# Patient Record
Sex: FEMALE | Race: BLACK OR AFRICAN AMERICAN | Employment: UNEMPLOYED | URBAN - METROPOLITAN AREA
[De-identification: names, ages, dates, MRNs, and addresses within clinical notes are randomized per-mention and may not be internally consistent; named-entity substitution may affect disease eponyms.]

---

## 2017-03-02 ENCOUNTER — OFFICE VISIT (OUTPATIENT)
Dept: FAMILY MEDICINE CLINIC | Age: 62
End: 2017-03-02

## 2017-03-02 VITALS
OXYGEN SATURATION: 100 % | RESPIRATION RATE: 20 BRPM | TEMPERATURE: 97.3 F | HEART RATE: 104 BPM | BODY MASS INDEX: 40.93 KG/M2 | DIASTOLIC BLOOD PRESSURE: 80 MMHG | HEIGHT: 63 IN | WEIGHT: 231 LBS | SYSTOLIC BLOOD PRESSURE: 150 MMHG

## 2017-03-02 DIAGNOSIS — Z12.31 ENCOUNTER FOR SCREENING MAMMOGRAM FOR MALIGNANT NEOPLASM OF BREAST: ICD-10-CM

## 2017-03-02 DIAGNOSIS — Z13.6 SCREENING FOR ISCHEMIC HEART DISEASE: ICD-10-CM

## 2017-03-02 DIAGNOSIS — E11.65 TYPE 2 DIABETES MELLITUS WITH HYPERGLYCEMIA, WITHOUT LONG-TERM CURRENT USE OF INSULIN (HCC): ICD-10-CM

## 2017-03-02 DIAGNOSIS — Z23 ENCOUNTER FOR IMMUNIZATION: ICD-10-CM

## 2017-03-02 DIAGNOSIS — Z11.59 NEED FOR HEPATITIS C SCREENING TEST: ICD-10-CM

## 2017-03-02 DIAGNOSIS — Z00.00 ROUTINE GENERAL MEDICAL EXAMINATION AT A HEALTH CARE FACILITY: Primary | ICD-10-CM

## 2017-03-02 DIAGNOSIS — Z78.0 POSTMENOPAUSAL: ICD-10-CM

## 2017-03-02 LAB — HBA1C MFR BLD HPLC: 6.5 %

## 2017-03-02 NOTE — PROGRESS NOTES
Pt here today for annual medicare wellness exam    1. Have you been to the ER, urgent care clinic since your last visit? Hospitalized since your last visit? No    2. Have you seen or consulted any other health care providers outside of the [address] [place] [address] since your last visit? Include any pap smears or colon screening.  No

## 2017-03-02 NOTE — PROGRESS NOTES
Chief Complaint   Patient presents with   Pulsity Annual Wellness Visit     annual         This is a Initial Medicare Annual Wellness Visit providing Personalized Prevention Plan Services (PPPS) (Performed 12 months after initial AWV and PPPS )    I have reviewed the patient's medical history in detail and updated the computerized patient record. Patient is here primarily for [address] Denies significant complaints. States she plans to stay with the practice instead of following prior PCP to new practice location. Declines pap today. History     Past Medical History:   Diagnosis Date    Anemia     resolved    Diverticulosis 11/13    DM type 2 (diabetes mellitus, type 2) ([facility] [address]     Hypertension     Internal hemorrhoids 11/13    Vitamin D deficiency       Past Surgical History:   Procedure Laterality Date    HX COLONOSCOPY  1/13    repeat 1/23 - [facility]    HX ORTHOPAEDIC      fluid drained from lt shoulder    HX TUBAL LIGATION       Current Outpatient Prescriptions   Medication Sig Dispense Refill    metFORMIN (GLUCOPHAGE) 1,000 mg tablet TAKE ONE TABLET BY MOUTH TWICE DAILY WITH MEALS 60 Tab 1    losartan-hydrochlorothiazide (HYZAAR) 100-25 mg per tablet TAKE ONE TABLET BY MOUTH ONCE DAILY 90 Tab 1    glipiZIDE SR (GLUCOTROL) 5 mg CR tablet TAKE ONE TABLET BY MOUTH ONCE DAILY 90 Tab 1    diltiazem (TAZTIA XT) 360 mg SR capsule Take 1 Cap by mouth daily. 90 Cap 1    ergocalciferol (VITAMIN D2) 50,000 unit capsule Take 1 Cap by mouth every fourteen (14) days. 12 Cap 1    aspirin 81 mg tablet Take 81 mg by mouth daily.          No Known Allergies  Family History   Problem Relation Age of Onset   Crystal Miners Stroke Mother     Hypertension Mother     Diabetes Daughter      Social History   Substance Use Topics    Smoking status: Never Smoker    Smokeless tobacco: Not on file    Alcohol use No     Patient Active Problem List   Diagnosis Code    Anemia D64.9    DM type 2 (diabetes mellitus, type 2) ([facility] [address] E11.9  Hypertension I10    Vitamin D deficiency E55.9       Depression Risk Factor Screening:     PHQ 2 / 9, over the last two weeks 3/2/2017   Little interest or pleasure in doing things Not at all   Feeling down, depressed or hopeless Not at all   Total Score PHQ 2 0     Alcohol Risk Factor Screening: On any occasion during the past 3 months, have you had more than 3 drinks containing alcohol? No    Do you average more than 7 drinks per week? No      Functional Ability and Level of Safety:     Hearing Loss   none    Activities of Daily Living   Self-care. Requires assistance with: no ADLs    Fall Risk     Fall Risk Assessment, last 12 mths 3/2/2017   Able to walk? No     Abuse Screen   None  Patient is not abused    Review of Systems   A comprehensive review of systems was negative except for that written in the HPI. Physical Examination     Evaluation of Cognitive Function:  Mood/affect:  happy  Appearance: age appropriate and overweight  Family member/caregiver input: none    No exam performed today, MWV. Patient Care Team:  Scott Hyde DO as PCP - General (Internal Medicine)  Linda Krishna MD (Gastroenterology)  Rigo Esquivel MD (Ophthalmology)  Marianne Peoples MD (Orthopedic Surgery)    Advice/Referrals/Counseling   Education and counseling provided:  Pneumococcal Vaccine      Assessment/Plan       ICD-10-CM ICD-9-CM    1. Routine general medical examination at a health care facility Z00.00 V70.0 Reviewed HM due with patient. Declined pap today. Will have tdap at local pharmacy       2. Encounter for screening mammogram for malignant neoplasm of breast Z12.31 V76.12 [facility] MAMMO BI SCREENING INCL CAD       3. Need for hepatitis C screening test Z11.59 V73.89 HEPATITIS C AB     4. Screening for ischemic heart disease Z13.6 V81.0 LIPID PANEL     5. Postmenopausal Z78.0 V49.81 DEXA BONE DENSITY STUDY AXIAL     6.  Encounter for immunization Z23 V03.89 [address] [place] [address] PNEUMOCOCCAL POLYSACCHARIDE VACCINE, 23-VALENT, ADULT OR IMMUNOSUPPRESSED PT DOSE,     7. Type 2 diabetes mellitus with hyperglycemia, without long-term current use of insulin (HCC) E11.65 250.00 AMB POC HEMOGLOBIN A1C     790.29        M[facility] schedule discussed. Printed copy provided. Follow-up Disposition:  Return in about 1 year (around 3/2/2018) for [address], 3 months for follow-up for DM. Lisandra Cool, ANP-BC  Adult Medicine  [facility]

## 2017-03-02 NOTE — MR AVS SNAPSHOT
Visit Information Date & Time Provider Department Dept. Phone Encounter #  
 3/2/2017 10:15 AM Camila Gorman [facility] [phone] [id number] Follow-up Instructions Return in about 1 year (around 3/2/2018) for [address], 3 months for follow-up for DM. Upcoming Health Maintenance Date Due Hepatitis C Screening 1955 Pneumococcal 19-64 Medium Risk (1 of 1 - PPSV23) 8/8/1976 DTaP/Tdap/Td series (1 - Tdap) 8/8/1978 PAP AKA CERVICAL CYTOLOGY 8/8/1978 BREAST CANCER SCRN MAMMOGRAM 5/16/2015 EYE EXAM RETINAL OR DILATED Q1 2/20/2016 HEMOGLOBIN A1C Q6M 3/13/2017 FOOT EXAM Q1 9/13/2017 MICROALBUMIN Q1 9/13/2017 LIPID PANEL Q1 9/13/2017 COLONOSCOPY 1/15/2023 Allergies as of 3/2/2017  Review Complete On: 3/2/2017 By: CLINT Gorman No Known Allergies Current Immunizations  Reviewed on 3/2/2017 Name Date Influenza Vaccine 10/1/2015, 11/20/2014 Pneumococcal Polysaccharide (PPSV-23)  Incomplete Tdap 10/7/2011 12:00 AM  
  
 Reviewed by CLINT Gorman on 3/2/2017 at 10:16 AM  
You Were Diagnosed With   
  
 Codes Comments Routine general medical examination at a health care facility    -  Primary ICD-10-CM: Z00.00 ICD-9-CM: V70.0 Encounter for screening mammogram for malignant neoplasm of breast     ICD-10-CM: Z12.31 
ICD-9-CM: V76.12 Need for hepatitis C screening test     ICD-10-CM: Z11.59 
ICD-9-CM: V73.89 Screening for ischemic heart disease     ICD-10-CM: Z13.6 ICD-9-CM: V81.0 Postmenopausal     ICD-10-CM: Z78.0 ICD-9-CM: V49.81 Encounter for immunization     ICD-10-CM: G95 ICD-9-CM: V03.89 Type 2 diabetes mellitus with hyperglycemia, without long-term current use of insulin (HCC)     ICD-10-CM: E11.65 ICD-9-CM: 250.00, 790.29 Vitals BP  
  
  
  
  
  
 150/80 (BP 1 Location: Left arm, BP Patient Position: Sitting) Vitals History BMI and BSA Data Body Mass Index Body Surface Area 40.92 kg/m 2 2.16 m 2 Preferred Pharmacy Pharmacy Name Phone [address] [phone] Your Updated Medication List  
  
   
This list is accurate as of: 3/2/17 10:38 AM.  Always use your most recent med list.  
  
  
  
  
 aspirin 81 mg tablet Take 81 mg by mouth daily. dilTIAZem 360 mg SR capsule Commonly known as:  TAZTIA XT Take 1 Cap by mouth daily. ergocalciferol 50,000 unit capsule Commonly known as:  VITAMIN D2 Take 1 Cap by mouth every fourteen (14) days. glipiZIDE SR 5 mg CR tablet Commonly known as:  GLUCOTROL XL  
TAKE ONE TABLET BY MOUTH ONCE DAILY losartan-hydroCHLOROthiazide 100-25 mg per tablet Commonly known as:  HYZAAR  
TAKE ONE TABLET BY MOUTH ONCE DAILY  
  
 metFORMIN 1,000 mg tablet Commonly known as:  GLUCOPHAGE  
TAKE ONE TABLET BY MOUTH TWICE DAILY WITH MEALS We Performed the Following ADMIN PNEUMOCOCCAL VACCINE [ [facility]] AMB POC HEMOGLOBIN A1C [76521 CPT(R)] PNEUMOCOCCAL POLYSACCHARIDE VACCINE, 23-VALENT, ADULT OR IMMUNOSUPPRESSED PT DOSE, [58771 CPT(R)] Follow-up Instructions Return in about 1 year (around 3/2/2018) for [address], 3 months for follow-up for DM. To-Do List   
 03/02/2017 Lab:  HEPATITIS C AB   
  
 03/02/2017 Lab:  LIPID PANEL   
  
 03/05/2017 Imaging:  DEXA BONE DENSITY STUDY AXIAL   
  
 03/05/2017 Imaging:  [facility] MAMMO BI SCREENING INCL CAD Patient Instructions Medicare Wellness Visit, Female The best way to live healthy is to have a healthy lifestyle by eating a well-balanced diet, exercising regularly, limiting alcohol and stopping smoking. Regular physical exams and screening tests are another way to keep healthy.  Preventive exams provided by your health care provider can find health problems before they become diseases or illnesses. Preventive services including immunizations, screening tests, monitoring and exams can help you take care of your own health. All people over age 72 should have a pneumovax  and and a prevnar shot to prevent pneumonia. These are once in a lifetime unless you and your provider decide differently. All people over 65 should have a yearly flu shot and a tetanus vaccine every 10 years. A bone mass density to screen for osteoporosis or thinning of the bones should be done every 2 years after 65. Screening for diabetes mellitus with a blood sugar test should be done every year. Glaucoma is a disease of the eye due to increased ocular pressure that can lead to blindness and it should be done every year by an eye professional. 
 
Cardiovascular screening tests that check for elevated lipids (fatty part of blood) which can lead to heart disease and strokes should be done every 5 years. Colorectal screening that evaluates for blood or polyps in your colon should be done yearly as a stool test or every five years as a flexible sigmoidoscope or every 10 years as a colonoscopy up to age 76. Breast cancer screening with a mammogram is recommended biennially  for women age 54-69. Screening for cervical cancer with a pap smear and pelvic exam is recommended for women after age 72 years every 2 years up to age 79 or when the provider and patient decide to stop. If there is a history of cervical abnormalities or other increased risk for cancer then the test is recommended yearly. Hepatitis C screening is also recommended for anyone born between 80 through [address]. A shingles vaccine is also recommended once in a lifetime after age 61. Your Medicare Wellness Exam is recommended annually. Here is a list of your current Health Maintenance items with a due date: 
Health Maintenance Due Topic Date Due  
 Hepatitis C Test  1955  Pneumococcal Vaccine (1 of 1 - PPSV23) 08/08/1976  
 DTaP/Tdap/Td  (1 - Tdap) 08/08/1978  Cervical Cancer Screening  08/08/1978  Breast Cancer Screening  05/16/2015 [facility] Exam  02/20/2016  Hemoglobin A1C    03/13/2017 Vaccine Information Statement Pneumococcal Polysaccharide Vaccine: What You Need to Know Many Vaccine Information Statements are available in Irish and other languages. See www.immunize.org/vis. Hojas de información Sobre Vacunas están disponibles en español y en muchos otros idiomas. [facility]. 1. Why get vaccinated? Vaccination can protect older adults (and some children and younger adults) from pneumococcal disease. Pneumococcal disease is caused by bacteria that can spread from person to person through close contact. It can cause ear infections, and it can also lead to more serious infections of the: 
 Lungs (pneumonia),  Blood (bacteremia), and 
 Covering of the brain and spinal cord (meningitis). Meningitis can cause deafness and brain damage, and it can be fatal.   
 
Anyone can get pneumococcal disease, but children under 3years of age, people with certain medical conditions, adults over 72years of age, and cigarette smokers are at the highest risk. About 18,000 older adults die each year from pneumococcal disease in the [place]. Treatment of pneumococcal infections with penicillin and other drugs used to be more effective. But some strains of the disease have become resistant to these drugs. This makes prevention of the disease, through vaccination, even more important. 2. Pneumococcal polysaccharide vaccine (PPSV23) Pneumococcal polysaccharide vaccine (PPSV23) protects against 23 types of pneumococcal bacteria. It will not prevent all pneumococcal disease. PPSV23 is recommended for:  All adults 72years of age and older,  Anyone 2 through 59years of age with certain long-term health problems, 
  Anyone 2 through 59years of age with a weakened immune system, 
 Adults 23 through 59years of age who smoke cigarettes or have asthma. Most people need only one dose of PPSV. A second dose is recommended for certain high-risk groups. People 72 and older should get a dose even if they have gotten one or more doses of the vaccine before they turned 65. Your healthcare provider can give you more information about these recommendations. Most healthy adults develop protection within 2 to 3 weeks of getting the shot. 3. Some people should not get this vaccine  Anyone who has had a life-threatening allergic reaction to PPSV should not get another dose.  Anyone who has a severe allergy to any component of PPSV should not receive it. Tell your provider if you have any severe allergies.  Anyone who is moderately or severely ill when the shot is scheduled may be asked to wait until they recover before getting the vaccine. Someone with a mild illness can usually be vaccinated.  Children less than 3years of age should not receive this vaccine.  There is no evidence that PPSV is harmful to either a pregnant woman or to her fetus. However, as a precaution, women who need the vaccine should be vaccinated before becoming pregnant, if possible. 4. Risks of a vaccine reaction With any medicine, including vaccines, there is a chance of side effects. These are usually mild and go away on their own, but serious reactions are also possible. About half of people who get PPSV have mild side effects, such as redness or pain where the shot is given, which go away within about two days. Less than 1 out of 100 people develop a fever, muscle aches, or more severe local reactions. Problems that could happen after any vaccine:  People sometimes faint after a medical procedure, including vaccination.  Sitting or lying down for about 15 minutes can help prevent fainting, and injuries caused by a fall. Tell your doctor if you feel dizzy, or have vision changes or ringing in the ears.  Some people get severe pain in the shoulder and have difficulty moving the arm where a shot was given. This happens very rarely.  Any medication can cause a severe allergic reaction. Such reactions from a vaccine are very rare, estimated at about 1 in a million doses, and would happen within a few minutes to a few hours after the vaccination. As with any medicine, there is a very remote chance of a vaccine causing a serious injury or death. The safety of vaccines is always being monitored. For more information, visit: www.cdc.gov/vaccinesafety/  
 
5. What if there is a serious reaction? What should I look for? Look for anything that concerns you, such as signs of a severe allergic reaction, very high fever, or unusual behavior. Signs of a severe allergic reaction can include hives, swelling of the face and throat, difficulty breathing, a fast heartbeat, dizziness, and weakness. These would usually start a few minutes to a few hours after the vaccination. What should I do? If you think it is a severe allergic reaction or other emergency that cant wait, call 9-1-1 or get to the nearest hospital. Otherwise, call your doctor. Afterward, the reaction should be reported to the Vaccine Adverse Event Reporting System (VAERS). Your doctor might file this report, or you can do it yourself through the VAERS web site at www.vaers. hhs.gov, or by calling 9-817.224.6281. VAERS does not give medical advice. 6. How can I learn more?  Ask your doctor. He or she can give you the vaccine package insert or suggest other sources of information.  Call your local or state health department.  Contact the Centers for Disease Control and Prevention (CDC): 
- Call 4-981.319.3525 (1-800-CDC-INFO) or 
- Visit CDCs website at www.cdc.gov/vaccines Vaccine Information Statement PPSV  
04/24/2015 Department of Health and [employer] Centers for Disease Control and Prevention Office Use Only Diabetes Foot Health: Care Instructions Your Care Instructions When you have diabetes, your feet need extra care and attention. Diabetes can damage the nerve endings and blood vessels in your feet, making you less likely to notice when your feet are injured. Diabetes also limits your body's ability to fight infection and get blood to areas that need it. If you get a minor foot injury, it could become an ulcer or a serious infection. With good foot care, you can prevent most of these problems. Caring for your feet can be quick and easy. Most of the care can be done when you are bathing or getting ready for bed. Follow-up care is a key part of your treatment and safety. Be sure to make and go to all appointments, and call your doctor if you are having problems. Its also a good idea to know your test results and keep a list of the medicines you take. How can you care for yourself at home? · Keep your blood sugar close to normal by watching what and how much you eat, monitoring blood sugar, taking medicines if prescribed, and getting regular exercise. · Do not smoke. Smoking affects blood flow and can make foot problems worse. If you need help quitting, talk to your doctor about stop-smoking programs and medicines. These can increase your chances of quitting for good. · Eat a diet that is low in fats. High fat intake can cause fat to build up in your blood vessels and decrease blood flow. · Inspect your feet daily for blisters, cuts, cracks, or sores. If you cannot see well, use a mirror or have someone help you. · Take care of your feet: 
[facility] your feet every day. Use warm (not hot) water. Check the water temperature with your wrists or other part of your body, not your feet. ¨ Dry your feet well. Pat them dry.  Do not rub the skin on your feet too hard. Dry well between your toes. If the skin on your feet stays moist, bacteria or a fungus can grow, which can lead to infection. ¨ Keep your skin soft. Use moisturizing skin cream to keep the skin on your feet soft and prevent calluses and cracks. But do not put the cream between your toes, and stop using any cream that causes a rash. ¨ Clean underneath your toenails carefully. Do not use a sharp object to clean underneath your toenails. Use the blunt end of a nail file or other rounded tool. ¨ Trim and file your toenails straight across to prevent ingrown toenails. Use a nail clipper, not scissors. Use an emery board to smooth the edges. · Change socks daily. Socks without seams are best, because seams often rub the feet. You can find socks for people with diabetes from specialty catalogs. · Look inside your shoes every day for things like gravel or torn linings, which could cause blisters or sores. · Buy shoes that fit well: 
¨ Look for shoes that have plenty of space around the toes. This helps prevent bunions and blisters. ¨ Try on shoes while wearing the kind of socks you will usually wear with the shoes. ¨ Avoid plastic shoes. They may rub your feet and cause blisters. Good shoes should be made of materials that are flexible and breathable, such as leather or cloth. ¨ Break in new shoes slowly by wearing them for no more than an hour a day for several days. Take extra time to check your feet for red areas, blisters, or other problems after you wear new shoes. · Do not go barefoot. Do not wear sandals, and do not wear shoes with very thin soles. Thin soles are easy to puncture. They also do not protect your feet from hot pavement or cold weather. · Have your doctor check your feet during each visit. If you have a foot problem, see your doctor. Do not try to treat an early foot problem at home.  Home remedies or treatments that you can buy without a prescription (such as corn removers) can be harmful. · Always get early treatment for foot problems. A minor irritation can lead to a major problem if not properly cared for early. When should you call for help? Call your doctor now or seek immediate medical care if: 
· You have a foot sore, an ulcer or break in the skin that is not healing after 4 days, bleeding corns or calluses, or an ingrown toenail. · You have blue or black areas, which can mean bruising or blood flow problems. · You have peeling skin or tiny blisters between your toes or cracking or oozing of the skin. · You have a fever for more than 24 hours and a foot sore. · You have new numbness or tingling in your feet that does not go away after you move your feet or change positions. · You have unexplained or unusual swelling of the foot or ankle. Watch closely for changes in your health, and be sure to contact your doctor if: 
· You cannot do proper foot care. Where can you learn more? Go to http://staci-alejandro.info/. Enter A739 in the search box to learn more about \"Diabetes Foot Health: Care Instructions. \" Current as of: May 23, 2016 Content Version: 11.1 © 6528-9793 [employer]. Care instructions adapted under license by [employer] (which disclaims liability or warranty for this information). If you have questions about a medical condition or this instruction, always ask your healthcare professional. [place] [address] any warranty or liability for your use of this information. Learning About Diabetes Food Guidelines Your Care Instructions Meal planning is important to manage diabetes. It helps keep your blood sugar at a target level (which you set with your doctor). You don't have to eat special foods. You can eat what your family eats, including sweets once in a while. But you do have to pay attention to how often you eat and how much you eat of certain foods. You may want to work with a dietitian or a certified diabetes educator (CDE) to help you plan meals and snacks. A dietitian or CDE can also help you lose weight if that is one of your goals. What should you know about eating carbs? Managing the amount of carbohydrate (carbs) you eat is an important part of healthy meals when you have diabetes. Carbohydrate is found in many foods. · Learn which foods have carbs. And learn the amounts of carbs in different foods. ¨ Bread, cereal, pasta, and rice have about 15 grams of carbs in a serving. A serving is 1 slice of bread (1 ounce), ½ cup of cooked cereal, or 1/3 cup of cooked pasta or rice. ¨ Fruits have 15 grams of carbs in a serving. A serving is 1 small fresh fruit, such as an apple or orange; ½ of a banana; ½ cup of cooked or canned fruit; ½ cup of fruit juice; 1 cup of melon or raspberries; or 2 tablespoons of dried fruit. ¨ Milk and no-sugar-added yogurt have 15 grams of carbs in a serving. A serving is 1 cup of milk or 2/3 cup of no-sugar-added yogurt. ¨ Starchy vegetables have 15 grams of carbs in a serving. A serving is ½ cup of mashed potatoes or sweet potato; 1 cup winter squash; ½ of a small baked potato; ½ cup of cooked beans; or ½ cup cooked corn or green peas. · Learn how much carbs to eat each day and at each meal. A dietitian or CDE can teach you how to keep track of the amount of carbs you eat. This is called carbohydrate counting. · If you are not sure how to count carbohydrate grams, use the Plate Method to plan meals. It is a good, quick way to make sure that you have a balanced meal. It also helps you spread carbs throughout the day. ¨ Divide your plate by types of foods. Put non-starchy vegetables on half the plate, meat or other protein food on one-quarter of the plate, and a grain or starchy vegetable in the final quarter of the plate.  To this you can add a small piece of fruit and 1 cup of milk or yogurt, depending on how many carbs you are supposed to eat at a meal. 
· Try to eat about the same amount of carbs at each meal. Do not \"save up\" your daily allowance of carbs to eat at one meal. 
· Proteins have very little or no carbs per serving. Examples of proteins are beef, chicken, turkey, fish, eggs, tofu, cheese, cottage cheese, and peanut butter. A serving size of meat is 3 ounces, which is about the size of a deck of cards. Examples of meat substitute serving sizes (equal to 1 ounce of meat) are 1/4 cup of cottage cheese, 1 egg, 1 tablespoon of peanut butter, and ½ cup of tofu. How can you eat out and still eat healthy? · Learn to estimate the serving sizes of foods that have carbohydrate. If you measure food at home, it will be easier to estimate the amount in a serving of restaurant food. · If the meal you order has too much carbohydrate (such as potatoes, corn, or baked beans), ask to have a low-carbohydrate food instead. Ask for a salad or green vegetables. · If you use insulin, check your blood sugar before and after eating out to help you plan how much to eat in the future. · If you eat more carbohydrate at a meal than you had planned, take a walk or do other exercise. This will help lower your blood sugar. What else should you know? · Limit saturated fat, such as the fat from meat and dairy products. This is a healthy choice because people who have diabetes are at higher risk of heart disease. So choose lean cuts of meat and nonfat or low-fat dairy products. Use olive or canola oil instead of butter or shortening when cooking. · Don't skip meals. Your blood sugar may drop too low if you skip meals and take insulin or certain medicines for diabetes. · Check with your doctor before you drink alcohol. Alcohol can cause your blood sugar to drop too low. Alcohol can also cause a bad reaction if you take certain diabetes medicines. Follow-up care is a key part of your treatment and safety.  Be sure to make and go to all appointments, and call your doctor if you are having problems. It's also a good idea to know your test results and keep a list of the medicines you take. Where can you learn more? Go to http://staci-alejandro.info/. Enter N115 in the search box to learn more about \"Learning About Diabetes Food Guidelines. \" Current as of: May 23, 2016 Content Version: 11.1 © 5215-9157 [employer]. Care instructions adapted under license by [employer] (which disclaims liability or warranty for this information). If you have questions about a medical condition or this instruction, always ask your healthcare professional. [address] any warranty or liability for your use of this information. Introducing hospitals & HEALTH SERVICES! Christiano Bonner introduces [employer] patient portal. Now you can access parts of your medical record, email your doctor's office, and request medication refills online. 1. In your internet browser, go to [email or web address] 2. Click on the First Time User? Click Here link in the Sign In box. You will see the New Member Sign Up page. 3. Enter your [employer] Access Code exactly as it appears below. You will not need to use this code after youve completed the sign-up process. If you do not sign up before the expiration date, you must request a new code. · [employer] Access Code: [email or web address] Expires: 5/7/2017  8:58 AM 
 
4. Enter the last four digits of your Social Security Number (xxxx) and Date of Birth (mm/dd/yyyy) as indicated and click Submit. You will be taken to the next sign-up page. 5. Create a Iconfindert ID. This will be your [employer] login ID and cannot be changed, so think of one that is secure and easy to remember. 6. Create a [employer] password. You can change your password at any time. 7. Enter your Password Reset Question and Answer. This can be used at a later time if you forget your password. 8. Enter your e-mail address. You will receive e-mail notification when new information is available in [address] 9. Click Sign Up. You can now view and download portions of your medical record. 10. Click the Download Summary menu link to download a portable copy of your medical information. If you have questions, please visit the Frequently Asked Questions section of the [employer] website. Remember, [employer] is NOT to be used for urgent needs. For medical emergencies, dial 911. Now available from your iPhone and Android! Please provide this summary of care documentation to your next provider. Your primary care clinician is listed as Ines Dunham. If you have any questions after today's visit, please call [phone].

## 2017-03-02 NOTE — PATIENT INSTRUCTIONS
Medicare Wellness Visit, Female    The best way to live healthy is to have a healthy lifestyle by eating a well-balanced diet, exercising regularly, limiting alcohol and stopping smoking. Regular physical exams and screening tests are another way to keep healthy. Preventive exams provided by your health care provider can find health problems before they become diseases or illnesses. Preventive services including immunizations, screening tests, monitoring and exams can help you take care of your own health. All people over age 72 should have a pneumovax  and and a prevnar shot to prevent pneumonia. These are once in a lifetime unless you and your provider decide differently. All people over 65 should have a yearly flu shot and a tetanus vaccine every 10 years. A bone mass density to screen for osteoporosis or thinning of the bones should be done every 2 years after 65. Screening for diabetes mellitus with a blood sugar test should be done every year. Glaucoma is a disease of the eye due to increased ocular pressure that can lead to blindness and it should be done every year by an eye professional.    Cardiovascular screening tests that check for elevated lipids (fatty part of blood) which can lead to heart disease and strokes should be done every 5 years. Colorectal screening that evaluates for blood or polyps in your colon should be done yearly as a stool test or every five years as a flexible sigmoidoscope or every 10 years as a colonoscopy up to age 76. Breast cancer screening with a mammogram is recommended biennially  for women age 54-69. Screening for cervical cancer with a pap smear and pelvic exam is recommended for women after age 72 years every 2 years up to age 79 or when the provider and patient decide to stop. If there is a history of cervical abnormalities or other increased risk for cancer then the test is recommended yearly.     Hepatitis C screening is also recommended for anyone born between [birth year] through [facility]. A shingles vaccine is also recommended once in a lifetime after age 61. Your Medicare Wellness Exam is recommended annually. Here is a list of your current Health Maintenance items with a due date:  Health Maintenance Due   Topic Date Due    Hepatitis C Test  1955    Pneumococcal Vaccine (1 of 1 - PPSV23) 08/08/1976    DTaP/Tdap/Td  (1 - Tdap) 08/08/1978    Cervical Cancer Screening  08/08/1978    Breast Cancer Screening  05/16/2015    Eye Exam  02/20/2016    Hemoglobin A1C    03/13/2017       Vaccine Information Statement    Pneumococcal Polysaccharide Vaccine: What You Need to Know    Many Vaccine Information Statements are available in Thai and other languages. See www.immunize.org/vis. Hojas de información Sobre Vacunas están disponibles en español y en muchos otros idiomas. Visite [employer]si. 1. Why get vaccinated? Vaccination can protect older adults (and some children and younger adults) from pneumococcal disease. Pneumococcal disease is caused by bacteria that can spread from person to person through close contact. It can cause ear infections, and it can also lead to more serious infections of the:   Lungs (pneumonia),   Blood (bacteremia), and   Covering of the brain and spinal cord (meningitis). Meningitis can cause deafness and brain damage, and it can be fatal.      Anyone can get pneumococcal disease, but children under 3years of age, people with certain medical conditions, adults over 72years of age, and cigarette smokers are at the highest risk. About 18,000 older adults die each year from pneumococcal disease in the [place]. Treatment of pneumococcal infections with penicillin and other drugs used to be more effective. But some strains of the disease have become resistant to these drugs. This makes prevention of the disease, through vaccination, even more important.     2. Pneumococcal polysaccharide vaccine (PPSV23)    Pneumococcal polysaccharide vaccine (PPSV23) protects against 23 types of pneumococcal bacteria. It will not prevent all pneumococcal disease. PPSV23 is recommended for:   All adults 72years of age and older,   Anyone 2 through 59years of age with certain long-term health problems,   Anyone 2 through 59years of age with a weakened immune system,   Adults 23 through 59years of age who smoke cigarettes or have asthma. Most people need only one dose of PPSV. A second dose is recommended for certain high-risk groups. People 72 and older should get a dose even if they have gotten one or more doses of the vaccine before they turned 65. Your healthcare provider can give you more information about these recommendations. Most healthy adults develop protection within 2 to 3 weeks of getting the shot. 3. Some people should not get this vaccine     Anyone who has had a life-threatening allergic reaction to PPSV should not get another dose.  Anyone who has a severe allergy to any component of PPSV should not receive it. Tell your provider if you have any severe allergies.  Anyone who is moderately or severely ill when the shot is scheduled may be asked to wait until they recover before getting the vaccine. Someone with a mild illness can usually be vaccinated.  Children less than 3years of age should not receive this vaccine.  There is no evidence that PPSV is harmful to either a pregnant woman or to her fetus. However, as a precaution, women who need the vaccine should be vaccinated before becoming pregnant, if possible. 4. Risks of a vaccine reaction    With any medicine, including vaccines, there is a chance of side effects. These are usually mild and go away on their own, but serious reactions are also possible.      About half of people who get PPSV have mild side effects, such as redness or pain where the shot is given, which go away within about two days. Less than 1 out of 100 people develop a fever, muscle aches, or more severe local reactions. Problems that could happen after any vaccine:     People sometimes faint after a medical procedure, including vaccination. Sitting or lying down for about 15 minutes can help prevent fainting, and injuries caused by a fall. Tell your doctor if you feel dizzy, or have vision changes or ringing in the ears.  Some people get severe pain in the shoulder and have difficulty moving the arm where a shot was given. This happens very rarely.  Any medication can cause a severe allergic reaction. Such reactions from a vaccine are very rare, estimated at about 1 in a million doses, and would happen within a few minutes to a few hours after the vaccination. As with any medicine, there is a very remote chance of a vaccine causing a serious injury or death. The safety of vaccines is always being monitored. For more information, visit: www.cdc.gov/vaccinesafety/     5. What if there is a serious reaction? What should I look for? Look for anything that concerns you, such as signs of a severe allergic reaction, very high fever, or unusual behavior. Signs of a severe allergic reaction can include hives, swelling of the face and throat, difficulty breathing, a fast heartbeat, dizziness, and weakness. These would usually start a few minutes to a few hours after the vaccination. What should I do? If you think it is a severe allergic reaction or other emergency that cant wait, call 9-1-1 or get to the nearest hospital. Otherwise, call your doctor. Afterward, the reaction should be reported to the Vaccine Adverse Event Reporting System (VAERS). Your doctor might file this report, or you can do it yourself through the VAERS web site at www.vaers. [facility].gov, or by calling 9-310.614.9405. VAERS does not give medical advice. 6. How can I learn more?  Ask your doctor.  He or she can give you the vaccine package insert or suggest other sources of information.  Call your local or state health department.  Contact the Centers for Disease Control and Prevention (CDC):  - Call 2-404.743.7786 (1-800-CDC-INFO) or  - Visit CDCs website at www.cdc.gov/vaccines    Vaccine Information Statement   PPSV   04/24/2015    [facility] Disease Control and Prevention    Office Use Only       Diabetes Foot Health: Care Instructions  Your Care Instructions    When you have diabetes, your feet need extra care and attention. Diabetes can damage the nerve endings and blood vessels in your feet, making you less likely to notice when your feet are injured. Diabetes also limits your body's ability to fight infection and get blood to areas that need it. If you get a minor foot injury, it could become an ulcer or a serious infection. With good foot care, you can prevent most of these problems. Caring for your feet can be quick and easy. Most of the care can be done when you are bathing or getting ready for bed. Follow-up care is a key part of your treatment and safety. Be sure to make and go to all appointments, and call your doctor if you are having problems. Its also a good idea to know your test results and keep a list of the medicines you take. How can you care for yourself at home? · Keep your blood sugar close to normal by watching what and how much you eat, monitoring blood sugar, taking medicines if prescribed, and getting regular exercise. · Do not smoke. Smoking affects blood flow and can make foot problems worse. If you need help quitting, talk to your doctor about stop-smoking programs and medicines. These can increase your chances of quitting for good. · Eat a diet that is low in fats. High fat intake can cause fat to build up in your blood vessels and decrease blood flow. · Inspect your feet daily for blisters, cuts, cracks, or sores.  If you cannot see well, use a mirror or have someone help you. · Take care of your feet:  [facility] your feet every day. Use warm (not hot) water. Check the water temperature with your wrists or other part of your body, not your feet. ¨ Dry your feet well. Pat them dry. Do not rub the skin on your feet too hard. Dry well between your toes. If the skin on your feet stays moist, bacteria or a fungus can grow, which can lead to infection. ¨ Keep your skin soft. Use moisturizing skin cream to keep the skin on your feet soft and prevent calluses and cracks. But do not put the cream between your toes, and stop using any cream that causes a rash. ¨ Clean underneath your toenails carefully. Do not use a sharp object to clean underneath your toenails. Use the blunt end of a nail file or other rounded tool. ¨ Trim and file your toenails straight across to prevent ingrown toenails. Use a nail clipper, not scissors. Use an emery board to smooth the edges. · Change socks daily. Socks without seams are best, because seams often rub the feet. You can find socks for people with diabetes from specialty catalogs. · Look inside your shoes every day for things like gravel or torn linings, which could cause blisters or sores. · Buy shoes that fit well:  ¨ Look for shoes that have plenty of space around the toes. This helps prevent bunions and blisters. ¨ Try on shoes while wearing the kind of socks you will usually wear with the shoes. ¨ Avoid plastic shoes. They may rub your feet and cause blisters. Good shoes should be made of materials that are flexible and breathable, such as leather or cloth. ¨ Break in new shoes slowly by wearing them for no more than an hour a day for several days. Take extra time to check your feet for red areas, blisters, or other problems after you wear new shoes. · Do not go barefoot. Do not wear sandals, and do not wear shoes with very thin soles. Thin soles are easy to puncture.  They also do not protect your feet from hot pavement or cold weather. · Have your doctor check your feet during each visit. If you have a foot problem, see your doctor. Do not try to treat an early foot problem at home. Home remedies or treatments that you can buy without a prescription (such as corn removers) can be harmful. · Always get early treatment for foot problems. A minor irritation can lead to a major problem if not properly cared for early. When should you call for help? Call your doctor now or seek immediate medical care if:  · You have a foot sore, an ulcer or break in the skin that is not healing after 4 days, bleeding corns or calluses, or an ingrown toenail. · You have blue or black areas, which can mean bruising or blood flow problems. · You have peeling skin or tiny blisters between your toes or cracking or oozing of the skin. · You have a fever for more than 24 hours and a foot sore. · You have new numbness or tingling in your feet that does not go away after you move your feet or change positions. · You have unexplained or unusual swelling of the foot or ankle. Watch closely for changes in your health, and be sure to contact your doctor if:  · You cannot do proper foot care. Where can you learn more? Go to http://staci-alejandro.info/. Enter A739 in the search box to learn more about \"Diabetes Foot Health: Care Instructions. \"  Current as of: May 23, 2016  Content Version: 11.1  © 9703-3139 [employer]. Care instructions adapted under license by [employer] (which disclaims liability or warranty for this information). If you have questions about a medical condition or this instruction, always ask your healthcare professional. [address] any warranty or liability for your use of this information. Learning About Diabetes Food Guidelines  Your Care Instructions  Meal planning is important to manage diabetes.  It helps keep your blood sugar at a target level (which you set with your doctor). You don't have to eat special foods. You can eat what your family eats, including sweets once in a while. But you do have to pay attention to how often you eat and how much you eat of certain foods. You may want to work with a dietitian or a certified diabetes educator (CDE) to help you plan meals and snacks. A dietitian or CDE can also help you lose weight if that is one of your goals. What should you know about eating carbs? Managing the amount of carbohydrate (carbs) you eat is an important part of healthy meals when you have diabetes. Carbohydrate is found in many foods. · Learn which foods have carbs. And learn the amounts of carbs in different foods. ¨ Bread, cereal, pasta, and rice have about 15 grams of carbs in a serving. A serving is 1 slice of bread (1 ounce), ½ cup of cooked cereal, or 1/3 cup of cooked pasta or rice. ¨ Fruits have 15 grams of carbs in a serving. A serving is 1 small fresh fruit, such as an apple or orange; ½ of a banana; ½ cup of cooked or canned fruit; ½ cup of fruit juice; 1 cup of melon or raspberries; or 2 tablespoons of dried fruit. ¨ Milk and no-sugar-added yogurt have 15 grams of carbs in a serving. A serving is 1 cup of milk or 2/3 cup of no-sugar-added yogurt. ¨ Starchy vegetables have 15 grams of carbs in a serving. A serving is ½ cup of mashed potatoes or sweet potato; 1 cup winter squash; ½ of a small baked potato; ½ cup of cooked beans; or ½ cup cooked corn or green peas. · Learn how much carbs to eat each day and at each meal. A dietitian or CDE can teach you how to keep track of the amount of carbs you eat. This is called carbohydrate counting. · If you are not sure how to count carbohydrate grams, use the Plate Method to plan meals. It is a good, quick way to make sure that you have a balanced meal. It also helps you spread carbs throughout the day. ¨ Divide your plate by types of foods.  Put non-starchy vegetables on half the plate, meat or other protein food on one-quarter of the plate, and a grain or starchy vegetable in the final quarter of the plate. To this you can add a small piece of fruit and 1 cup of milk or yogurt, depending on how many carbs you are supposed to eat at a meal.  · Try to eat about the same amount of carbs at each meal. Do not \"save up\" your daily allowance of carbs to eat at one meal.  · Proteins have very little or no carbs per serving. Examples of proteins are beef, chicken, turkey, fish, eggs, tofu, cheese, cottage cheese, and peanut butter. A serving size of meat is 3 ounces, which is about the size of a deck of cards. Examples of meat substitute serving sizes (equal to 1 ounce of meat) are 1/4 cup of cottage cheese, 1 egg, 1 tablespoon of peanut butter, and ½ cup of tofu. How can you eat out and still eat healthy? · Learn to estimate the serving sizes of foods that have carbohydrate. If you measure food at home, it will be easier to estimate the amount in a serving of restaurant food. · If the meal you order has too much carbohydrate (such as potatoes, corn, or baked beans), ask to have a low-carbohydrate food instead. Ask for a salad or green vegetables. · If you use insulin, check your blood sugar before and after eating out to help you plan how much to eat in the future. · If you eat more carbohydrate at a meal than you had planned, take a walk or do other exercise. This will help lower your blood sugar. What else should you know? · Limit saturated fat, such as the fat from meat and dairy products. This is a healthy choice because people who have diabetes are at higher risk of heart disease. So choose lean cuts of meat and nonfat or low-fat dairy products. Use olive or canola oil instead of butter or shortening when cooking. · Don't skip meals. Your blood sugar may drop too low if you skip meals and take insulin or certain medicines for diabetes.   · Check with your doctor before you drink alcohol. Alcohol can cause your blood sugar to drop too low. Alcohol can also cause a bad reaction if you take certain diabetes medicines. Follow-up care is a key part of your treatment and safety. Be sure to make and go to all appointments, and call your doctor if you are having problems. It's also a good idea to know your test results and keep a list of the medicines you take. Where can you learn more? Go to http://staci-alejandro.info/. Enter P656 in the search box to learn more about \"Learning About Diabetes Food Guidelines. \"  Current as of: May 23, 2016  Content Version: 11.1  © 4634-5687 [employer]. Care instructions adapted under license by [employer] (which disclaims liability or warranty for this information). If you have questions about a medical condition or this instruction, always ask your healthcare professional. [facility] [address] any warranty or liability for your use of this information.

## 2017-04-14 ENCOUNTER — HOSPITAL ENCOUNTER (OUTPATIENT)
Dept: GENERAL RADIOLOGY | Age: 62
Discharge: HOME | End: 2017-04-14
Attending: NURSE PRACTITIONER
Payer: MEDICARE

## 2017-04-14 ENCOUNTER — HOSPITAL ENCOUNTER (OUTPATIENT)
Dept: MAMMOGRAPHY | Age: 62
Discharge: HOME | End: 2017-04-14
Attending: NURSE PRACTITIONER
Payer: MEDICARE

## 2017-04-14 DIAGNOSIS — Z12.31 ENCOUNTER FOR SCREENING MAMMOGRAM FOR MALIGNANT NEOPLASM OF BREAST: ICD-10-CM

## 2017-04-14 DIAGNOSIS — Z78.0 POSTMENOPAUSAL: ICD-10-CM

## 2017-04-14 PROCEDURE — 77063 BREAST TOMOSYNTHESIS BI: CPT

## 2017-04-19 ENCOUNTER — DOCUMENTATION ONLY (OUTPATIENT)
Dept: FAMILY MEDICINE CLINIC | Age: 62
End: 2017-04-19

## 2017-04-19 ENCOUNTER — HOSPITAL ENCOUNTER (OUTPATIENT)
Dept: MAMMOGRAPHY | Age: 62
Discharge: HOME | End: 2017-04-19
Attending: NURSE PRACTITIONER
Payer: MEDICARE

## 2017-04-19 DIAGNOSIS — R92.1 BREAST CALCIFICATIONS: ICD-10-CM

## 2017-04-19 DIAGNOSIS — R92.8 ABNORMAL MAMMOGRAM: Primary | ICD-10-CM

## 2017-04-19 PROCEDURE — 77065 DX MAMMO INCL CAD UNI: CPT

## 2017-04-19 NOTE — PROGRESS NOTES
Called by radiology- mammogram abnormal with increasing calcifications- excision recommended rather than bx- referral to breast surgery placed.

## 2017-05-03 ENCOUNTER — HOSPITAL ENCOUNTER (OUTPATIENT)
Dept: LAB | Age: 62
Discharge: HOME | End: 2017-05-03
Payer: MEDICARE

## 2017-05-03 ENCOUNTER — OFFICE VISIT (OUTPATIENT)
Dept: FAMILY MEDICINE CLINIC | Age: 62
End: 2017-05-03

## 2017-05-03 VITALS
RESPIRATION RATE: 18 BRPM | HEIGHT: 63 IN | SYSTOLIC BLOOD PRESSURE: 136 MMHG | WEIGHT: 226 LBS | BODY MASS INDEX: 40.04 KG/M2 | DIASTOLIC BLOOD PRESSURE: 70 MMHG | TEMPERATURE: 98.1 F | HEART RATE: 96 BPM

## 2017-05-03 DIAGNOSIS — E66.01 MORBID OBESITY DUE TO EXCESS CALORIES (HCC): ICD-10-CM

## 2017-05-03 DIAGNOSIS — Z11.59 NEED FOR HEPATITIS C SCREENING TEST: ICD-10-CM

## 2017-05-03 DIAGNOSIS — D75.839 THROMBOCYTOSIS: ICD-10-CM

## 2017-05-03 DIAGNOSIS — I10 ESSENTIAL HYPERTENSION: ICD-10-CM

## 2017-05-03 DIAGNOSIS — R92.8 ABNORMAL MAMMOGRAM OF RIGHT BREAST: ICD-10-CM

## 2017-05-03 DIAGNOSIS — E11.65 TYPE 2 DIABETES MELLITUS WITH HYPERGLYCEMIA, WITHOUT LONG-TERM CURRENT USE OF INSULIN (HCC): ICD-10-CM

## 2017-05-03 DIAGNOSIS — E55.9 VITAMIN D DEFICIENCY: ICD-10-CM

## 2017-05-03 DIAGNOSIS — Z12.4 PAP SMEAR FOR CERVICAL CANCER SCREENING: ICD-10-CM

## 2017-05-03 LAB
25(OH)D3 SERPL-MCNC: 43.7 NG/ML (ref 30–100)
ALBUMIN SERPL BCP-MCNC: 4 G/DL (ref 3.4–5)
ALBUMIN/GLOB SERPL: 1.1 {RATIO} (ref 0.8–1.7)
ALP SERPL-CCNC: 91 U/L (ref 45–117)
ALT SERPL-CCNC: 21 U/L (ref 13–56)
ANION GAP BLD CALC-SCNC: 9 MMOL/L (ref 3–18)
AST SERPL W P-5'-P-CCNC: 11 U/L (ref 15–37)
BASOPHILS # BLD AUTO: 0 K/UL (ref 0–0.06)
BASOPHILS # BLD: 0 % (ref 0–2)
BILIRUB SERPL-MCNC: 0.2 MG/DL (ref 0.2–1)
BUN SERPL-MCNC: 13 MG/DL (ref 7–18)
BUN/CREAT SERPL: 18 (ref 12–20)
CALCIUM SERPL-MCNC: 9.4 MG/DL (ref 8.5–10.1)
CHLORIDE SERPL-SCNC: 104 MMOL/L (ref 100–108)
CHOLEST SERPL-MCNC: 170 MG/DL
CO2 SERPL-SCNC: 28 MMOL/L (ref 21–32)
CREAT SERPL-MCNC: 0.74 MG/DL (ref 0.6–1.3)
DIFFERENTIAL METHOD BLD: ABNORMAL
EOSINOPHIL # BLD: 0.2 K/UL (ref 0–0.4)
EOSINOPHIL NFR BLD: 2 % (ref 0–5)
ERYTHROCYTE [DISTWIDTH] IN BLOOD BY AUTOMATED COUNT: 15.2 % (ref 11.6–14.5)
EST. AVERAGE GLUCOSE BLD GHB EST-MCNC: 151 MG/DL
GLOBULIN SER CALC-MCNC: 3.7 G/DL (ref 2–4)
GLUCOSE SERPL-MCNC: 151 MG/DL (ref 74–99)
HBA1C MFR BLD: 6.9 % (ref 4.2–5.6)
HCT VFR BLD AUTO: 39.2 % (ref 35–45)
HDLC SERPL-MCNC: 75 MG/DL (ref 40–60)
HDLC SERPL: 2.3 {RATIO} (ref 0–5)
HGB BLD-MCNC: 12.4 G/DL (ref 12–16)
LDLC SERPL CALC-MCNC: 79.2 MG/DL (ref 0–100)
LIPID PROFILE,FLP: ABNORMAL
LYMPHOCYTES # BLD AUTO: 27 % (ref 21–52)
LYMPHOCYTES # BLD: 2.7 K/UL (ref 0.9–3.6)
MCH RBC QN AUTO: 27.1 PG (ref 24–34)
MCHC RBC AUTO-ENTMCNC: 31.6 G/DL (ref 31–37)
MCV RBC AUTO: 85.6 FL (ref 74–97)
MONOCYTES # BLD: 0.6 K/UL (ref 0.05–1.2)
MONOCYTES NFR BLD AUTO: 6 % (ref 3–10)
NEUTS SEG # BLD: 6.5 K/UL (ref 1.8–8)
NEUTS SEG NFR BLD AUTO: 65 % (ref 40–73)
PLATELET # BLD AUTO: 474 K/UL (ref 135–420)
PMV BLD AUTO: 9.7 FL (ref 9.2–11.8)
POTASSIUM SERPL-SCNC: 4.1 MMOL/L (ref 3.5–5.5)
PROT SERPL-MCNC: 7.7 G/DL (ref 6.4–8.2)
RBC # BLD AUTO: 4.58 M/UL (ref 4.2–5.3)
SODIUM SERPL-SCNC: 141 MMOL/L (ref 136–145)
T4 FREE SERPL-MCNC: 1.1 NG/DL (ref 0.7–1.5)
TRIGL SERPL-MCNC: 79 MG/DL (ref ?–150)
TSH SERPL DL<=0.05 MIU/L-ACNC: 1.79 UIU/ML (ref 0.36–3.74)
VLDLC SERPL CALC-MCNC: 15.8 MG/DL
WBC # BLD AUTO: 10.1 K/UL (ref 4.6–13.2)

## 2017-05-03 PROCEDURE — 36415 COLL VENOUS BLD VENIPUNCTURE: CPT | Performed by: CLINIC/CENTER

## 2017-05-03 PROCEDURE — 80061 LIPID PANEL: CPT | Performed by: CLINIC/CENTER

## 2017-05-03 PROCEDURE — 85025 COMPLETE CBC W/AUTO DIFF WBC: CPT | Performed by: CLINIC/CENTER

## 2017-05-03 PROCEDURE — 84439 ASSAY OF FREE THYROXINE: CPT | Performed by: CLINIC/CENTER

## 2017-05-03 PROCEDURE — 82306 VITAMIN D 25 HYDROXY: CPT | Performed by: CLINIC/CENTER

## 2017-05-03 PROCEDURE — 83036 HEMOGLOBIN GLYCOSYLATED A1C: CPT | Performed by: CLINIC/CENTER

## 2017-05-03 PROCEDURE — 80053 COMPREHEN METABOLIC PANEL: CPT | Performed by: CLINIC/CENTER

## 2017-05-03 PROCEDURE — 86803 HEPATITIS C AB TEST: CPT | Performed by: CLINIC/CENTER

## 2017-05-03 NOTE — PROGRESS NOTES
Elana Salmon is a 63 y.o. female and presents with Establish Care       Subjective:  Mrs. Андрей Coleman is here today to establish care. She is a former patient of Dr. Maynor Pantoja. She has no acute complaints, but does admit to feeling a bit anxious about meeting a new doctor today.      1. DM2: Last calculated A1c is 6.9. Patient is on Metformin 1000 BID and Glipizide SR 5 mg daily. She does not have her blood sugar log today, but says that her FBG is typically between 80-110s. She has had some in the 130s, but never much higher than that. Microalbumin/Cr ratio 7. I believe she is overdue for her annual eye exam with Dr. Fredis Chaudhari. Foot exam was done in 9/2016 with Dr. Maynor Pantoja? Needs a refill on glipizide.       2. HTN: BP good today at 136/70. On Hyzaar and Diltiazem. She denies HA, lightheadedness, dizziness. Needs refills.       3. Vit D: Last level 57.2. She is on 50,000 iu every 2 weeks. Needs a refill. 4. Abnormal Mammogram: Initial mammogram on 4/14/17 revealed \"Increasing calcifications in the right breast. Recommend ML view and spot magnification views. \" A follow up right diagnostic mammogram was done on 4/19/17 and report stated that there was an \"Indeterminate grouping of microcalcifications upper outer quadrant right breast. Biopsy recommended. \" Mrs. Андрей Coleman has been referred to a breast surgeon, Dr. Alfonso Parry, and has an appointment with him tomorrow at 1 pm.     5. Thromobocytosis: Appears to be chronic. Will update labs today. 6. Obesity: BMI > 40. Patient says she is working on her weight and she is down 5lbs since her last ov in 3/2017.     7. Lipids: Last LDL 73.2 and HDL 80. Patient is not on any meds.        Health Maintenance:   Colonoscopy: 1/13. Done with Dr. Imelda Francis: Done in 4/2017. Abnormal. See above. Pap: Patient reports having one at [facility]. Will refer her to GYN  DEXA: Scan was supposed to be done the same day as the mammogram, but the patient forgot.  She will call to reschedule  Flu shot: Done 10/4/16    ROS:  Pt denies: Fever/Chills, HA, Visual changes, Fatigue, Chest pain, SOB, TY, Abd pain, N/V/D/C, Blood in stool or urine, Edema. Pertinent positive as above in HPI. All others negative. (+) Intentional weigh loss    The problem list was updated as a part of today's visit. Patient Active Problem List   Diagnosis Code    Anemia D64.9    DM type 2 (diabetes mellitus, type 2) ([facility] E11.9    Hypertension I10    Vitamin D deficiency E55.9       The PSH, FH were reviewed. SH:  Social History   Substance Use Topics    Smoking status: Never Smoker    Smokeless tobacco: None    Alcohol use No         Medications/Allergies:  Current Outpatient Prescriptions on File Prior to Visit   Medication Sig Dispense Refill    metFORMIN (GLUCOPHAGE) 1,000 mg tablet TAKE ONE TABLET BY MOUTH TWICE DAILY WITH MEALS 60 Tab 2    losartan-hydrochlorothiazide (HYZAAR) 100-25 mg per tablet TAKE ONE TABLET BY MOUTH ONCE DAILY 90 Tab 1    glipiZIDE SR (GLUCOTROL) 5 mg CR tablet TAKE ONE TABLET BY MOUTH ONCE DAILY 90 Tab 1    diltiazem (TAZTIA XT) 360 mg SR capsule Take 1 Cap by mouth daily. 90 Cap 1    ergocalciferol (VITAMIN D2) 50,000 unit capsule Take 1 Cap by mouth every fourteen (14) days. 12 Cap 1    aspirin 81 mg tablet Take 81 mg by mouth daily. No current facility-administered medications on file prior to visit. No Known Allergies    Objective:  Visit Vitals    /70    Pulse 96    Temp 98.1 °F (36.7 °C) (Oral)    Resp 18    Ht 5' 3\" (1.6 m)    Wt 226 lb (102.5 kg)    BMI 40.03 kg/m2    Body mass index is 40.03 kg/(m^2). Appearance: Alert, well appearing, in no respiratory distress and well hydrated. HEENT:  NC/AT. Exterior ears and tympanic membranes normal bilaterally. Conjunctiva normal. PERRL. EOMI  Oropharynx clear and moist mucous membranes. Neck: Supple. No cervical lymphadenopathy. Heart:  RRR without M/R/G.    Lungs:  CTAB, no rhonchi, rales, or wheezes with good air exchange  Abdomen: Obese. Soft, normoactive BS, non-tender, non-distended, no palpable masses  MSK: Feet warm and well perfused with palpable dp pulses. Gait normal. Joints without deformity/tenderness. Strength intact bilateral upper and lower ext. Normal ROM all extremities. Skin: No rash   Neuro: AAO x 3. CN 2-12 intact. 2+DTR. No focal motor or sensory deficits. Speech normal.  Psych: Appropriate affect, judgement and insight. Short-term memory intact. Labwork and Ancillary Studies:    CBC w/Diff  Lab Results   Component Value Date/Time    WBC 11.0 02/22/2016 11:10 AM    HGB 12.4 02/22/2016 11:10 AM    PLATELET 519 52/29/6249 11:10 AM         Basic Metabolic Profile  Lab Results   Component Value Date/Time    Sodium 140 09/13/2016 11:13 AM    Potassium 3.6 09/13/2016 11:13 AM    Chloride 105 09/13/2016 11:13 AM    CO2 25 09/13/2016 11:13 AM    Anion gap 10 09/13/2016 11:13 AM    Glucose 97 09/13/2016 11:13 AM    BUN 14 09/13/2016 11:13 AM    Creatinine 0.77 09/13/2016 11:13 AM    BUN/Creatinine ratio 18 09/13/2016 11:13 AM    GFR est AA >60 09/13/2016 11:13 AM    GFR est non-AA >60 09/13/2016 11:13 AM    Calcium 9.1 09/13/2016 11:13 AM        Cholesterol  Lab Results   Component Value Date/Time    Cholesterol, total 170 09/13/2016 11:13 AM    HDL Cholesterol 80 09/13/2016 11:13 AM    LDL, calculated 73.2 09/13/2016 11:13 AM    Triglyceride 84 09/13/2016 11:13 AM    CHOL/HDL Ratio 2.1 09/13/2016 11:13 AM       Assessment/Plan:      ICD-10-CM ICD-9-CM    1. Type 2 diabetes mellitus with hyperglycemia, without long-term current use of insulin (HCC) E11.65 250.00 Last A1c 6.9. Will update level today along with other labs. Refill given for glipizide. Patient has been instructed to record her FBG x 2 weeks and bring to her next appointment so we can discuss. Microalbumin/Cr up to date. Foot exam is listed as done in the chart.  Refer to Dr. Hubert Burgess for annual DM eye exam  METABOLIC PANEL, COMPREHENSIVE     790.29 HEMOGLOBIN A1C WITH EAG      LIPID PANEL      REFERRAL TO OPHTHALMOLOGY   2. Essential hypertension I10 401.9 BP looking good today. Continue current meds for now. Refills given. CBC WITH AUTOMATED DIFF      TSH AND FREE T4   3. Vitamin D deficiency E55.9 268.9 Last level 57.2 Will refill limited amount of high dose supplement and check level. If level is still adequate, she can transition to a daily otc supplement. VITAMIN D, 25 HYDROXY   4. Morbid obesity due to excess calories ([facility] [address] E66.01 278.01 Patient reports she is working on her weight and has made dietary changes. Down 5lbs since last visit. 5. Thrombocytosis ([facility] [address] D47.3 238.71 Appears chronic. Primary vs. Reactive? Check CBC today. May need heme/onc referral.    6. Abnormal mammogram of right breast R92.8 793.80 Appointment with breast surgeon, Dr. Sammi Borges, set for tomorrow afternoon. 7. Need for hepatitis C screening test Z11.59 V73.89 HEPATITIS C AB   8. Pap smear for cervical cancer screening Z12.4 V76.2 REFERRAL TO GYNECOLOGY       Follow-up Disposition:  Return in about 2 weeks (around 5/17/2017) for 30 minute appointment to see me. .      I have discussed the diagnosis with the patient and the intended plan as seen in the above orders. The patient has received an After-Visit Summary and questions were answered concerning future plans. Patient verbalized understanding of above instructions.     Health Maintenance:   Health Maintenance   Topic Date Due    Hepatitis C Screening  1955    PAP AKA CERVICAL CYTOLOGY  08/08/1978    EYE EXAM RETINAL OR DILATED Q1  02/20/2016    INFLUENZA AGE 9 TO ADULT  08/01/2017    HEMOGLOBIN A1C Q6M  09/02/2017    FOOT EXAM Q1  09/13/2017    MICROALBUMIN Q1  09/13/2017    LIPID PANEL Q1  09/13/2017    BREAST CANCER SCRN MAMMOGRAM  04/19/2019    DTaP/Tdap/Td series (2 - Td) 10/07/2021    COLONOSCOPY  01/15/2023    Pneumococcal 19-64 Medium Risk  Completed       No orders of the defined types were placed in this encounter.

## 2017-05-03 NOTE — MR AVS SNAPSHOT
Visit Information Date & Time Provider Department Dept. Phone Encounter #  
 5/3/2017  8:30 AM Izzy Ruano, [address] [place] [address] [phone] [id number] Follow-up Instructions Return in about 2 weeks (around 5/17/2017) for 30 minute appointment to see me. Kyle Juarez Your Appointments 5/4/2017  1:00 PM  
New Patient with Wallace Simental MD  
[address]) Appt Note: Re: Abnormal mammogram / Referred by Dr. Jessica Ha / Sabine Cooper; Co-pay $0 / Insurance card / Laymond Nelson card / Yue Parker / Sabine Cooper; r/s  
 [id number] [address]  
  
   
 [id number] [facility] [address] [facility] [address]  
  
    
 6/5/2017 10:00 AM  
Follow Up with Izzy Ruano MD  
[facility] [address]) Appt Note: 3 mo f/u  
 [facility] [place]. [address]  
  
   
 [address] [facility] [address] Upcoming Health Maintenance Date Due Hepatitis C Screening 1955 PAP AKA CERVICAL CYTOLOGY 8/8/1978 EYE EXAM RETINAL OR DILATED Q1 2/20/2016 INFLUENZA AGE 9 TO ADULT 8/1/2017 HEMOGLOBIN A1C Q6M 9/2/2017 FOOT EXAM Q1 9/13/2017 MICROALBUMIN Q1 9/13/2017 LIPID PANEL Q1 9/13/2017 BREAST CANCER SCRN MAMMOGRAM 4/19/2019 DTaP/Tdap/Td series (2 - Td) 10/7/2021 COLONOSCOPY 1/15/2023 Allergies as of 5/3/2017  Review Complete On: 5/3/2017 By: Laquita Nguyen LPN No Known Allergies Current Immunizations  Reviewed on 3/2/2017 Name Date Influenza Vaccine 10/1/2015, 11/20/2014 Pneumococcal Polysaccharide (PPSV-23) 3/2/2017 Tdap 10/7/2011 12:00 AM  
  
 Not reviewed this visit You Were Diagnosed With   
  
 Codes Comments Need for hepatitis C screening test    -  Primary ICD-10-CM: Z11.59 
ICD-9-CM: V73.89  Type 2 diabetes mellitus with hyperglycemia, without long-term current use of insulin ([facility]     ICD-10-CM: E11.65 ICD-9-CM: 250.00, 790.29 Essential hypertension     ICD-10-CM: I10 
ICD-9-CM: 401.9 Vitamin D deficiency     ICD-10-CM: E55.9 ICD-9-CM: 268.9 Morbid obesity due to excess calories (HCC)     ICD-10-CM: E66.01 
ICD-9-CM: 278.01 Vitals BP Pulse Temp Resp Height(growth percentile) Weight(growth percentile) 136/70 96 98.1 °F (36.7 °C) (Oral) 18 5' 3\" (1.6 m) 226 lb (102.5 kg) BMI OB Status Smoking Status 40.03 kg/m2 Postmenopausal Never Smoker Vitals History BMI and BSA Data Body Mass Index Body Surface Area 40.03 kg/m 2 2.13 m 2 Preferred Pharmacy Pharmacy Name Phone [address] [phone] Your Updated Medication List  
  
   
This list is accurate as of: 5/3/17  9:25 AM.  Always use your most recent med list.  
  
  
  
  
 aspirin 81 mg tablet Take 81 mg by mouth daily. dilTIAZem 360 mg SR capsule Commonly known as:  TAZTIA XT Take 1 Cap by mouth daily. ergocalciferol 50,000 unit capsule Commonly known as:  VITAMIN D2 Take 1 Cap by mouth every fourteen (14) days. glipiZIDE SR 5 mg CR tablet Commonly known as:  GLUCOTROL XL  
TAKE ONE TABLET BY MOUTH ONCE DAILY losartan-hydroCHLOROthiazide 100-25 mg per tablet Commonly known as:  HYZAAR  
TAKE ONE TABLET BY MOUTH ONCE DAILY  
  
 metFORMIN 1,000 mg tablet Commonly known as:  GLUCOPHAGE  
TAKE ONE TABLET BY MOUTH TWICE DAILY WITH MEALS Follow-up Instructions Return in about 2 weeks (around 5/17/2017) for 30 minute appointment to see me. Berkley Perez To-Do List   
 05/03/2017 Lab:  CBC WITH AUTOMATED DIFF   
  
 05/03/2017 Lab:  HEMOGLOBIN A1C WITH EAG   
  
 05/03/2017 Lab:  HEPATITIS C AB   
  
 05/03/2017 Lab:  LIPID PANEL   
  
 05/03/2017 Lab:  METABOLIC PANEL, COMPREHENSIVE   
  
 05/03/2017 Lab: TSH AND FREE T4   
  
 05/03/2017 Lab:  VITAMIN D, 25 HYDROXY Patient Instructions Learning About Meal Planning for Diabetes Why plan your meals? Meal planning can be a key part of managing diabetes. Planning meals and snacks with the right balance of carbohydrate, protein, and fat can help you keep your blood sugar at the target level you set with your doctor. You don't have to eat special foods. You can eat what your family eats, including sweets once in a while. But you do have to pay attention to how often you eat and how much you eat of certain foods. You may want to work with a dietitian or a certified diabetes educator. He or she can give you tips and meal ideas and can answer your questions about meal planning. This health professional can also help you reach a healthy weight if that is one of your goals. What plan is right for you? Your dietitian or diabetes educator may suggest that you start with the plate format or carbohydrate counting. The plate format The plate format is a simple way to help you manage how you eat. You plan meals by learning how much space each food should take on a plate. Using the plate format helps you spread carbohydrate throughout the day. It can make it easier to keep your blood sugar level within your target range. It also helps you see if you're eating healthy portion sizes. To use the plate format, you put non-starchy vegetables on half your plate. Add meat or meat substitutes on one-quarter of the plate. Put a grain or starchy vegetable (such as brown rice or a potato) on the final quarter of the plate. You can add a small piece of fruit and some low-fat or fat-free milk or yogurt, depending on your carbohydrate goal for each meal. 
Here are some tips for using the plate format: · Make sure that you are not using an oversized plate. A 9-inch plate is best. Many restaurants use larger plates. · Get used to using the plate format at home. Then you can use it when you eat out. · Write down your questions about using the plate format. Talk to your doctor, a dietitian, or a diabetes educator about your concerns. Carbohydrate counting With carbohydrate counting, you plan meals based on the amount of carbohydrate in each food. Carbohydrate raises blood sugar higher and more quickly than any other nutrient. It is found in desserts, breads and cereals, and fruit. It's also found in starchy vegetables such as potatoes and corn, grains such as rice and pasta, and milk and yogurt. Spreading carbohydrate throughout the day helps keep your blood sugar levels within your target range. Your daily amount depends on several things, including your weight, how active you are, which diabetes medicines you take, and what your goals are for your blood sugar levels. A registered dietitian or diabetes educator can help you plan how much carbohydrate to include in each meal and snack. A guideline for your daily amount of carbohydrate is: · 45 to 60 grams at each meal. That's about the same as 3 to 4 carbohydrate servings. · 15 to 20 grams at each snack. That's about the same as 1 carbohydrate serving. The Nutrition Facts label on packaged foods tells you how much carbohydrate is in a serving of the food. First, look at the serving size on the food label. Is that the amount you eat in a serving? All of the nutrition information on a food label is based on that serving size. So if you eat more or less than that, you'll need to adjust the other numbers. Total carbohydrate is the next thing you need to look for on the label. If you count carbohydrate servings, one serving of carbohydrate is 15 grams. For foods that don't come with labels, such as fresh fruits and vegetables, you'll need a guide that lists carbohydrate in these foods.  Ask your doctor, dietitian, or diabetes educator about books or other nutrition guides you can use. If you take insulin, you need to know how many grams of carbohydrate are in a meal. This lets you know how much rapid-acting insulin to take before you eat. If you use an insulin pump, you get a constant rate of insulin during the day. So the pump must be programmed at meals to give you extra insulin to cover the rise in blood sugar after meals. When you know how much carbohydrate you will eat, you can take the right amount of insulin. Or, if you always use the same amount of insulin, you need to make sure that you eat the same amount of carbohydrate at meals. If you need more help to understand carbohydrate counting and food labels, ask your doctor, dietitian, or diabetes educator. How do you get started with meal planning? Here are some tips to get started: 
· Plan your meals a week at a time. Don't forget to include snacks too. · Use cookbooks or online recipes to plan several main meals. Plan some quick meals for busy nights. You also can double some recipes that freeze well. Then you can save half for other busy nights when you don't have time to cook. · Make sure you have the ingredients you need for your recipes. If you're running low on basic items, put these items on your shopping list too. · List foods that you use to make breakfasts, lunches, and snacks. List plenty of fruits and vegetables. · Post this list on the refrigerator. Add to it as you think of more things you need. · Take the list to the store to do your weekly shopping. Follow-up care is a key part of your treatment and safety. Be sure to make and go to all appointments, and call your doctor if you are having problems. It's also a good idea to know your test results and keep a list of the medicines you take. Where can you learn more? Go to http://staci-alejandro.info/. Anastasiia Mendoza in the search box to learn more about \"Learning About Meal Planning for Diabetes. \" 
 Current as of: October 26, 2016 Content Version: 11.2 © 7740-5549 [employer], [place]. Care instructions adapted under license by [employer] (which disclaims liability or warranty for this information). If you have questions about a medical condition or this instruction, always ask your healthcare professional. [address] any warranty or liability for your use of this information. Introducing [facility]! [employer] introduces Netsize patient portal. Now you can access parts of your medical record, email your doctor's office, and request medication refills online. 1. In your internet browser, go to [email or web address] 2. Click on the First Time User? Click Here link in the Sign In box. You will see the New Member Sign Up page. 3. Enter your Netsize Access Code exactly as it appears below. You will not need to use this code after youve completed the sign-up process. If you do not sign up before the expiration date, you must request a new code. · Netsize Access Code: [email or web address] Expires: 5/7/2017  9:58 AM 
 
4. Enter the last four digits of your Social Security Number (xxxx) and Date of Birth (mm/dd/yyyy) as indicated and click Submit. You will be taken to the next sign-up page. 5. Create a Netsize ID. This will be your Netsize login ID and cannot be changed, so think of one that is secure and easy to remember. 6. Create a Netsize password. You can change your password at any time. 7. Enter your Password Reset Question and Answer. This can be used at a later time if you forget your password. 8. Enter your e-mail address. You will receive e-mail notification when new information is available in [address] 9. Click Sign Up. You can now view and download portions of your medical record. 10. Click the Download Summary menu link to download a portable copy of your medical information. If you have questions, please visit the Frequently Asked Questions section of the [employer] website. Remember, [employer] is NOT to be used for urgent needs. For medical emergencies, dial 911. Now available from your iPhone and Android! Please provide this summary of care documentation to your next provider. Your primary care clinician is listed as Meda Rubinstein. If you have any questions after today's visit, please call [phone].

## 2017-05-03 NOTE — PATIENT INSTRUCTIONS

## 2017-05-04 ENCOUNTER — OFFICE VISIT (OUTPATIENT)
Dept: SURGERY | Age: 62
End: 2017-05-04

## 2017-05-04 VITALS
HEIGHT: 63 IN | SYSTOLIC BLOOD PRESSURE: 145 MMHG | HEART RATE: 100 BPM | WEIGHT: 229.4 LBS | TEMPERATURE: 97.9 F | RESPIRATION RATE: 17 BRPM | DIASTOLIC BLOOD PRESSURE: 75 MMHG | OXYGEN SATURATION: 99 % | BODY MASS INDEX: 40.64 KG/M2

## 2017-05-04 DIAGNOSIS — R92.1 BREAST CALCIFICATIONS ON MAMMOGRAM: Primary | ICD-10-CM

## 2017-05-04 DIAGNOSIS — E11.65 TYPE 2 DIABETES MELLITUS WITH HYPERGLYCEMIA, WITHOUT LONG-TERM CURRENT USE OF INSULIN (HCC): ICD-10-CM

## 2017-05-04 DIAGNOSIS — I10 ESSENTIAL HYPERTENSION: ICD-10-CM

## 2017-05-04 LAB
HCV AB SER IA-ACNC: 0.04 INDEX
HCV AB SERPL QL IA: NEGATIVE
HCV COMMENT,HCGAC: NORMAL

## 2017-05-04 RX ORDER — GLIPIZIDE 5 MG/1
TABLET, FILM COATED, EXTENDED RELEASE ORAL
Qty: 90 TAB | Refills: 1 | Status: SHIPPED | OUTPATIENT
Start: 2017-05-04 | End: 2018-01-11 | Stop reason: SDUPTHER

## 2017-05-04 RX ORDER — LOSARTAN POTASSIUM AND HYDROCHLOROTHIAZIDE 25; 100 MG/1; MG/1
TABLET ORAL
Qty: 90 TAB | Refills: 1 | Status: SHIPPED | OUTPATIENT
Start: 2017-05-04 | End: 2017-10-31 | Stop reason: SDUPTHER

## 2017-05-04 RX ORDER — ERGOCALCIFEROL 1.25 MG/1
50000 CAPSULE ORAL
Qty: 4 CAP | Refills: 0 | Status: SHIPPED | OUTPATIENT
Start: 2017-05-04 | End: 2017-10-03 | Stop reason: SDUPTHER

## 2017-05-04 RX ORDER — DILTIAZEM HYDROCHLORIDE 360 MG/1
360 CAPSULE, EXTENDED RELEASE ORAL DAILY
Qty: 90 CAP | Refills: 1 | Status: SHIPPED | OUTPATIENT
Start: 2017-05-04 | End: 2018-02-01 | Stop reason: SDUPTHER

## 2017-05-04 NOTE — PATIENT INSTRUCTIONS
If you have any questions or concerns about today's appointment, the verbal and/or written instructions you were given for follow up care, please call our office at [phone]. [facility]  [address] [place] [address]    [phone] office  [phone] fax             Your Stereotactic Biopsy is scheduled for Tuesday, May 16, 2017 at 11:00am Please check in 30 minutes early at 10:30am. It is important not to use any powders, lotions, deodorants, and perfumes for this procedure. Please sure our office has your FILMS from mammogram for this procedure. Please check in at the [facility] at [address] [facility], [address], [place] [id number], If you need to reschedule this appointment, please call Asaf Nelson at [phone].

## 2017-05-04 NOTE — MR AVS SNAPSHOT
Visit Information Date & Time Provider Department Dept. Phone Encounter #  
 5/4/2017  1:00 PM Harvey Cheney MD [address] [id number] Your Appointments 5/18/2017 11:30 AM  
Follow Up with Harvey Cheney MD  
[address] [place]) Appt Note: 1 week follow up after Stereo R Breast Bx  
 [address]  
  
   
 [address] [facility] [address] [facility]  
  
    
 5/22/2017 10:30 AM  
Follow Up with MD Jocelyn Vera [address] [place], [place] [address] [facility]) Appt Note: 2 wk fu per avs.  
 [place]  
  
   
 [address] [phone]  
  
    
 6/5/2017 10:00 AM  
Follow Up with MD Jocelyn Vera [address] [place] [address]) Appt Note: 3 mo f/u  
 [place] [phone]  
[phone] Upcoming Health Maintenance Date Due  
 PAP AKA CERVICAL CYTOLOGY 8/8/1978 EYE EXAM RETINAL OR DILATED Q1 2/20/2016 INFLUENZA AGE 9 TO ADULT 8/1/2017 FOOT EXAM Q1 9/13/2017 MICROALBUMIN Q1 9/13/2017 HEMOGLOBIN A1C Q6M 11/3/2017 LIPID PANEL Q1 5/3/2018 BREAST CANCER SCRN MAMMOGRAM 4/19/2019 DTaP/Tdap/Td series (2 - Td) 10/7/2021 COLONOSCOPY 1/15/2023 Allergies as of 5/4/2017  Review Complete On: 5/4/2017 By: Harvey Cheney MD  
 No Known Allergies Current Immunizations  Reviewed on 3/2/2017 Name Date Influenza Vaccine 10/1/2015, 11/20/2014 Pneumococcal Polysaccharide (PPSV-23) 3/2/2017 Tdap 10/7/2011 12:00 AM  
  
 Not reviewed this visit You Were Diagnosed With   
  
 Codes Comments Breast calcifications on mammogram    -  Primary ICD-10-CM: R92.1 ICD-9-CM: 793.89  Type 2 diabetes mellitus with hyperglycemia, without long-term current use of insulin ([facility]     ICD-10-CM: E11.65 ICD-9-CM: 250.00, 790.29 Essential hypertension     ICD-10-CM: I10 
ICD-9-CM: 401.9 Vitals BP Pulse Temp Resp Height(growth percentile) Weight(growth percentile) 145/75 (BP 1 Location: Right arm, BP Patient Position: Sitting) 100 97.9 °F (36.6 °C) (Oral) 17 5' 3\" (1.6 m) 229 lb 6.4 oz (104.1 kg) SpO2 BMI OB Status Smoking Status 99% 40.64 kg/m2 Postmenopausal Never Smoker BMI and BSA Data Body Mass Index Body Surface Area  
 40.64 kg/m 2 2.15 m 2 Preferred Pharmacy Pharmacy Name Phone [facility] [phone] Your Updated Medication List  
  
   
This list is accurate as of: 5/4/17  1:40 PM.  Always use your most recent med list.  
  
  
  
  
 aspirin 81 mg tablet Take 81 mg by mouth daily. dilTIAZem 360 mg SR capsule Commonly known as:  TAZTIA XT Take 1 Cap by mouth daily. ergocalciferol 50,000 unit capsule Commonly known as:  VITAMIN D2 Take 1 Cap by mouth every fourteen (14) days. glipiZIDE SR 5 mg CR tablet Commonly known as:  GLUCOTROL XL  
TAKE ONE TABLET BY MOUTH ONCE DAILY losartan-hydroCHLOROthiazide 100-25 mg per tablet Commonly known as:  HYZAAR  
TAKE ONE TABLET BY MOUTH ONCE DAILY  
  
 metFORMIN 1,000 mg tablet Commonly known as:  GLUCOPHAGE  
TAKE ONE TABLET BY MOUTH TWICE DAILY WITH MEALS Patient Instructions If you have any questions or concerns about today's appointment, the verbal and/or written instructions you were given for follow up care, please call our office at [phone]. [facility] - [address], [address] 
 
[phone] office [phone] fax Your Stereotactic Biopsy is scheduled for_________________________at ______. Please check in 30 minutes early at _____.  It is important not to use any powders, lotions, deodorants, and perfumes for this procedure. Please sure our office has your FILMS from mammogram for this procedure. Please check in at the [facility] at [address], [facility] [phone], If you need to reschedule this appointment, please call [facility] [place] at [phone]. Introducing [facility]! Grady Rothman introduces [employer] patient portal. Now you can access parts of your medical record, email your doctor's office, and request medication refills online. 1. In your internet browser, go to [email or web address] 2. Click on the First Time User? Click Here link in the Sign In box. You will see the New Member Sign Up page. 3. Enter your Kleo Access Code exactly as it appears below. You will not need to use this code after youve completed the sign-up process. If you do not sign up before the expiration date, you must request a new code. · Kleo Access Code: [email or web address] Expires: 5/7/2017  9:58 AM 
 
4. Enter the last four digits of your Social Security Number (xxxx) and Date of Birth (mm/dd/yyyy) as indicated and click Submit. You will be taken to the next sign-up page. 5. Create a Kleo ID. This will be your Kleo login ID and cannot be changed, so think of one that is secure and easy to remember. 6. Create a Kleo password. You can change your password at any time. 7. Enter your Password Reset Question and Answer. This can be used at a later time if you forget your password. 8. Enter your e-mail address. You will receive e-mail notification when new information is available in [address] 9. Click Sign Up. You can now view and download portions of your medical record. 10. Click the Download Summary menu link to download a portable copy of your medical information. If you have questions, please visit the Frequently Asked Questions section of the Kleo website.  Remember, Kleo is NOT to be used for urgent needs. For medical emergencies, dial 911. Now available from your iPhone and Android! Please provide this summary of care documentation to your next provider. Your primary care clinician is listed as Marie Shabazz. If you have any questions after today's visit, please call [phone].

## 2017-05-04 NOTE — COMMUNICATION BODY
Dear Al Jon came to the office today for follow-up of the recent abnormal finding of clustered calcifications in the upper outer right breast.  Thank you for your kind referral.    Mrs. Marialuisa Rojas has no previous history of significant breast problems or symptoms. The calcifications on her recent mammogram where an increase compared to her previous mammogram.  She has no increased risk factors for breast cancer. On examination today she had some slight thickening in the upper outer quadrant of the right breast but no discrete mass. Therefore, I have recommended that she undergo a stereotactic biopsy of the right breast which she is willing to do. Thank you again very much for allowing me to work with you in her care.     Casandra Liao MD

## 2017-05-04 NOTE — PROGRESS NOTES
History of present illness    Tab Haq comes to the office today for evaluation because of a recent screening mammogram that showed an area of calcifications in the upper outer quadrant of the right breast which had increased compared to a mammogram from 2013. The patient has not been aware of any breast tenderness or lumps. She has no family history of breast cancer or ovarian cancer. She has never had any previous breast problems before. Her menarche occurred at the age of 17. She is  4 para 4 and was 14years old at the time of her first pregnancy. She did not breast-feed. She took birth control pills for a total of about 1 year. Her last menstrual period was at about the age of 50. No Known Allergies  Past Medical History:   Diagnosis Date    Anemia     resolved    Diverticulosis     DM type 2 (diabetes mellitus, type 2) ([facility] [address]     Hypertension     Internal hemorrhoids     Menopause     Vitamin D deficiency      Past Surgical History:   Procedure Laterality Date    HX COLONOSCOPY      repeat  - Makdisi    HX ORTHOPAEDIC      fluid drained from lt shoulder    HX TUBAL LIGATION       Social History     Social History    Marital status: UNKNOWN     Spouse name: N/A    Number of children: N/A    Years of education: N/A     Occupational History    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 Skin:  No recent rashes, dermatitis or abnormal moles. HEENT:  No changes in vision, vertigo, epistaxis, dysphasia, or hoarseness. Cardiac:  No chest pain, palpitations, or edema.   History of hypertension     Respiratory: No chronic cough, shortness of breath, wheezing, hemoptysis, or history of sleep apnea. Breasts/GYN:  No history of recent breast lumps or nipple discharge. Mammograms are up to date. No GYN-type problems. See the history of present illness. History of tubal ligation     Gastrointestinal:  No significant food intolerances, no recent vomiting, no chronic abdominal pain, no change in bowel habits, no melena. No history of GERD. Genitourinary:  No history of hematuria, dysuria, frequency, or stress urinary incontinence. No nocturia. Musculoskeletal: No weakness, joint pains, or arthritis. Endocrine:  No history of thyroid disease. Hx. Diabetes type II. Lymph/hemo:  No history of blood transfusions or easy bruising. Hx anemia. History of hypovitaminosis D     Neuro: No dizziness or headaches or fainting. PHYSICAL EXAM    Visit Vitals    /75 (BP 1 Location: Right arm, BP Patient Position: Sitting)    Pulse 100    Temp 97.9 °F (36.6 °C) (Oral)    Resp 17    Ht 5' 3\" (1.6 m)    Wt 104.1 kg (229 lb 6.4 oz)    SpO2 99%    BMI 40.64 kg/m2        Constitutional:  Well-developed, well-nourished, no acute distress. Patient is morbidly obese     Head:  Head, eyes, ears, nose, throat within normal limits. Skin:  No suspicious moles or rashes. Neck:  No masses or adenopathy. The airway appears normal. Thyroid is not enlarged and there are no palpable thyroid nodules. Lungs:  Lungs are clear to auscultation and percussion. No respiratory distress. No chest wall tenderness. Heart:  Heart is regular with no extra heart sounds or murmur heard. Breast Exam: The breasts are free of any discrete tenderness or masses  The skin and nipple areas appear normal. Both axillae are negative. She does have some slight thickening in the upper outer quadrant of the right breast but no discrete mass. Abdomen: The abdomen is soft and nontender without organomegaly or masses.  Bowel sounds are active and of normal pitch. There is no abdominal distention. No hernias are evident. Small scar at the umbilicus from previous tubal ligation. Extremities:  No tenderness of the extremities and no significant swelling. Psych:  Alert and oriented. Assessment: #1 suspicious calcifications upper outer quadrant right breast with no previous significant risk factors for breast cancer. #2 obesity. #3 hypertension. #4 type 2 diabetes. #5 low vitamin D level. Recommendations: I recommend that the patient undergo a stereotactic biopsy of the calcifications in the right breast.  Risks and complications were explained to the patient and she is willing to proceed with this. The above was done with voice recognition and there may be unrecognized errors.     Eric Clemons MD

## 2017-05-05 DIAGNOSIS — R92.1 BREAST CALCIFICATIONS ON MAMMOGRAM: Primary | ICD-10-CM

## 2017-05-13 PROBLEM — D75.839 THROMBOCYTOSIS: Status: ACTIVE | Noted: 2017-05-13

## 2017-05-13 PROBLEM — E66.01 MORBID OBESITY DUE TO EXCESS CALORIES (HCC): Status: ACTIVE | Noted: 2017-05-13

## 2017-05-16 ENCOUNTER — HOSPITAL ENCOUNTER (OUTPATIENT)
Dept: MAMMOGRAPHY | Age: 62
Discharge: HOME | End: 2017-05-16
Attending: CLINIC/CENTER
Payer: MEDICARE

## 2017-05-16 ENCOUNTER — HOSPITAL ENCOUNTER (OUTPATIENT)
Dept: MAMMOGRAPHY | Age: 62
Discharge: HOME | End: 2017-05-16
Attending: SPECIALIST
Payer: MEDICARE

## 2017-05-16 DIAGNOSIS — R92.0 BREAST MICROCALCIFICATIONS: ICD-10-CM

## 2017-05-16 DIAGNOSIS — Z98.890 STATUS POST RIGHT BREAST BIOPSY: ICD-10-CM

## 2017-05-16 PROCEDURE — 19081 BX BREAST 1ST LESION STRTCTC: CPT

## 2017-05-16 PROCEDURE — 88305 TISSUE EXAM BY PATHOLOGIST: CPT | Performed by: INTERNAL MEDICINE

## 2017-05-16 PROCEDURE — 77030019999 MAM STEREO VAC  BX BREAST RT 1ST LESION W/CLIP AND SPECIMEN

## 2017-05-16 PROCEDURE — 74011000250 HC RX REV CODE- 250

## 2017-05-16 PROCEDURE — 74011000250 HC RX REV CODE- 250: Performed by: SPECIALIST

## 2017-05-16 RX ORDER — LIDOCAINE HYDROCHLORIDE 10 MG/ML
INJECTION INFILTRATION; PERINEURAL
Status: COMPLETED
Start: 2017-05-16 | End: 2017-05-16

## 2017-05-16 RX ORDER — LIDOCAINE HYDROCHLORIDE 10 MG/ML
20 INJECTION INFILTRATION; PERINEURAL
Status: COMPLETED | OUTPATIENT
Start: 2017-05-16 | End: 2017-05-16

## 2017-05-16 RX ORDER — LIDOCAINE HYDROCHLORIDE AND EPINEPHRINE 10; 10 MG/ML; UG/ML
1.5 INJECTION, SOLUTION INFILTRATION; PERINEURAL
Status: COMPLETED | OUTPATIENT
Start: 2017-05-16 | End: 2017-05-16

## 2017-05-16 RX ADMIN — LIDOCAINE HYDROCHLORIDE 20 ML: 10 INJECTION, SOLUTION INFILTRATION; PERINEURAL at 11:33

## 2017-05-16 RX ADMIN — LIDOCAINE HYDROCHLORIDE 20 ML: 10 INJECTION INFILTRATION; PERINEURAL at 11:33

## 2017-05-16 RX ADMIN — LIDOCAINE HYDROCHLORIDE,EPINEPHRINE BITARTRATE 15 MG: 10; .01 INJECTION, SOLUTION INFILTRATION; PERINEURAL at 11:34

## 2017-05-22 ENCOUNTER — OFFICE VISIT (OUTPATIENT)
Dept: FAMILY MEDICINE CLINIC | Age: 62
End: 2017-05-22

## 2017-05-22 VITALS
TEMPERATURE: 97.2 F | RESPIRATION RATE: 17 BRPM | BODY MASS INDEX: 40.4 KG/M2 | SYSTOLIC BLOOD PRESSURE: 151 MMHG | HEART RATE: 100 BPM | HEIGHT: 63 IN | WEIGHT: 228 LBS | OXYGEN SATURATION: 100 % | DIASTOLIC BLOOD PRESSURE: 79 MMHG

## 2017-05-22 DIAGNOSIS — D75.839 THROMBOCYTOSIS: ICD-10-CM

## 2017-05-22 DIAGNOSIS — E11.65 TYPE 2 DIABETES MELLITUS WITH HYPERGLYCEMIA, WITHOUT LONG-TERM CURRENT USE OF INSULIN (HCC): ICD-10-CM

## 2017-05-22 DIAGNOSIS — E55.9 VITAMIN D DEFICIENCY: ICD-10-CM

## 2017-05-22 DIAGNOSIS — E66.01 MORBID OBESITY DUE TO EXCESS CALORIES (HCC): ICD-10-CM

## 2017-05-22 DIAGNOSIS — I10 ESSENTIAL HYPERTENSION: ICD-10-CM

## 2017-05-22 DIAGNOSIS — R92.1 BREAST CALCIFICATIONS ON MAMMOGRAM: ICD-10-CM

## 2017-05-22 DIAGNOSIS — R00.0 TACHYCARDIA: ICD-10-CM

## 2017-05-22 NOTE — PROGRESS NOTES
Anastasia Ruiz is a 63 y.o. female and presents with Labs (*Results)       Subjective:  Mrs. Candy Last is here today to follow up on her chronic medical conditions and review labs. She has no acute complaints today. 1. DM2: A1c unchanged at 6.9. Patient is on Metformin 1000 BID and Glipizide SR 5 mg daily. She says she had her bg log ready to bring to the appointment today, but forgot it at home. Last time, patient said that her FBG is typically between 80-110s. She has had some AM blood sugars in the 130s, but never much higher than that. Microalbumin/Cr ratio 7. Eye exams done with Dr. Agapito Trujillo - referral done at last visit. Foot exam was done in 9/2016 with Dr. Gasper Agarwal. Patient says that she has never seen a Podiatrist- will refer today.       2. HTN: BP up some today 151/79 and still up on recheck. When I saw her earlier this month her BP was great at 136/70, but then recorded at 145/75 the day after. Her goal is < 140/90. On Hyzaar and Diltiazem. She reports compliance with her medications and denies HA, lightheadedness, dizziness. Patient admits that she gets \"worked up\" before coming to the doctor's office. She is also under a lot of stress because her daughter is currently incarcerated.      3. Vit D: Last level 43.7 (down from 57.2 a year ago). She is on 50,000 iu every 2 weeks. I will keep her on this for now     4. Abnormal Mammogram: Initial mammogram on 4/14/17 revealed \"Increasing calcifications in the right breast. Recommend ML view and spot magnification views. \" A follow up right diagnostic mammogram was done on 4/19/17 and report stated that there was an \"Indeterminate grouping of microcalcifications upper outer quadrant right breast. Biopsy recommended. \" Mrs. Candy Last saw Dr. Juan Neal on 5/4/17 and underwent a stereotactic biopsy of the calcifications in the right breast. She returns to Dr. Juan Neal tomorrow to get the results of her biopsy.      5. Thromobocytosis: Persistent on recent labs.  No other abnormalities on CBC. Acute phase reactant?     6. Obesity: BMI > 40. Patient says she is working on her weight and trying to eat better. Today we discussed carbohydrates, how they are metabolized and how she needs to limit her carbohydrate intake. We discussed alternatives to things like white bread, rice, pasta and potatoes. She should try to stick to lean meats, fresh fruits and vegetables. Avoid processed foods that come in a bag or box.     7. Lipids: Last LDL 79.2 and HDL 75. Patient is not on any meds. 8. Persistent Tachycardia: Upon chart review, it looks like the patient's HR has been running in the upper 90s to low 100s for a while. Patient was not aware of this and denies any symptoms. She denies any chest pain, no sensation that her heart is racing, no sob or breathing issues at all. She thinks it may be due to getting nervous about coming to the doctor.     [facility]  Maintenance:   Colonoscopy: 1/13. Done with Dr. Wang Epp: Done in 4/2017. Abnormal. See above. Pap: Patient reports having one at [facility]. Referred to GYN  DEXA: She still needs to call and reschedule. She promises to take care of this. Flu shot: Done 10/4/16       ROS:  Pt denies: Fever/Chills, HA, Visual changes, Fatigue, Chest pain, SOB, TY, Abd pain, N/V/D/C, Blood in stool or urine, Edema. Pertinent positive as above in HPI. All others negative. (+) Patient is trying to lose weight    The problem list was updated as a part of today's visit. Patient Active Problem List   Diagnosis Code    Anemia D64.9    DM type 2 (diabetes mellitus, type 2) ([facility] [address] E11.9    Hypertension I10    Vitamin D deficiency E55.9    Breast calcifications on mammogram R92.1    Thrombocytosis (HCC) D47.3    Morbid obesity due to excess calories (HCC) E66.01       The PSH, FH were reviewed.       SH:  Social History   Substance Use Topics    Smoking status: Never Smoker    Smokeless tobacco: None    Alcohol use No Medications/Allergies:  Current Outpatient Prescriptions on File Prior to Visit   Medication Sig Dispense Refill    dilTIAZem (TAZTIA XT) 360 mg SR capsule Take 1 Cap by mouth daily. 90 Cap 1    ergocalciferol (VITAMIN D2) 50,000 unit capsule Take 1 Cap by mouth every fourteen (14) days. 4 Cap 0    glipiZIDE SR (GLUCOTROL XL) 5 mg CR tablet TAKE ONE TABLET BY MOUTH ONCE DAILY 90 Tab 1    losartan-hydroCHLOROthiazide (HYZAAR) 100-25 mg per tablet TAKE ONE TABLET BY MOUTH ONCE DAILY 90 Tab 1    metFORMIN (GLUCOPHAGE) 1,000 mg tablet TAKE ONE TABLET BY MOUTH TWICE DAILY WITH MEALS 60 Tab 2    aspirin 81 mg tablet Take 81 mg by mouth daily. No current facility-administered medications on file prior to visit. No Known Allergies    Objective:  Visit Vitals    /79    Pulse 100    Temp 97.2 °F (36.2 °C) (Oral)    Resp 17    Ht 5' 3\" (1.6 m)    Wt 228 lb (103.4 kg)    SpO2 100%    BMI 40.39 kg/m2    Body mass index is 40.39 kg/(m^2). Appearance: Alert, well appearing, in no respiratory distress and well hydrated. HEENT: NC/AT. Exterior ears and tympanic membranes normal bilaterally. Conjunctiva normal. PERRL. EOMI  Oropharynx clear and moist mucous membranes. Neck: Supple. No cervical lymphadenopathy. Heart: Tachycardia with regular rhythm. No M/R/G. Lungs: CTAB, no rhonchi, rales, or wheezes with good air exchange  Abdomen: Obese. Soft, normoactive BS, non-tender, non-distended, no palpable masses  Ext: No cyanosis. No edema. Feet warm and well perfused with palpable dp pulses. Skin: No rash   Neuro: AAO x 3. No focal deficits. Speech normal.  Psych: Very pleasant. Normal mood.  Normal behavior.     Labwork and Ancillary Studies:    CBC w/Diff  Lab Results   Component Value Date/Time    WBC 10.1 05/03/2017 09:28 AM    HGB 12.4 05/03/2017 09:28 AM    PLATELET 579 02/63/5149 09:28 AM         Basic Metabolic Profile  Lab Results   Component Value Date/Time    Sodium 141 05/03/2017 09:28 AM    Potassium 4.1 05/03/2017 09:28 AM    Chloride 104 05/03/2017 09:28 AM    CO2 28 05/03/2017 09:28 AM    Anion gap 9 05/03/2017 09:28 AM    Glucose 151 05/03/2017 09:28 AM    BUN 13 05/03/2017 09:28 AM    Creatinine 0.74 05/03/2017 09:28 AM    BUN/Creatinine ratio 18 05/03/2017 09:28 AM    GFR est AA >60 05/03/2017 09:28 AM    GFR est non-AA >60 05/03/2017 09:28 AM    Calcium 9.4 05/03/2017 09:28 AM        Cholesterol  Lab Results   Component Value Date/Time    Cholesterol, total 170 05/03/2017 09:28 AM    HDL Cholesterol 75 05/03/2017 09:28 AM    LDL, calculated 79.2 05/03/2017 09:28 AM    Triglyceride 79 05/03/2017 09:28 AM    CHOL/HDL Ratio 2.3 05/03/2017 09:28 AM       Assessment/Plan:      ICD-10-CM ICD-9-CM    1. Type 2 diabetes mellitus with hyperglycemia, without long-term current use of insulin ([facility]) E11.65 250.00 A1c unchanged at 6.9. Continue current medications for now. Bring bg log to next appointment. Microalbumin/Cr up to date. LDL < 100 on no meds. Referral to Dr. Mayur Da Silva sent last visit. REFERRAL TO PODIATRY     184.39    2. Essential hypertension I10 401.9 BP not at goal. Will need to add a fourth agent. Will also ask Cardiology to assist with management of her resistant hypertension. 3. Tachycardia R00.0 785.0 Etiology? Anxiety? Patient is not in pain and does not appear volume depleted. She denies any chest pain or sob. O2 sats are 100% in the office. Recent TFTs also wnl. EKG done. Will ask Cardiology to weigh in as well. AMB POC EKG ROUTINE W/ 12 LEADS, INTER & REP   4. Vitamin D deficiency E55.9 268.9 Will continue Vitamin D every two weeks for now. 5. Thrombocytosis ([address] D47.3 238.71 REFERRAL TO HEMATOLOGY ONCOLOGY   6. Morbid obesity due to excess calories ([facility]) E66.01 278.01 Dietary counseling provided. See HPI   7. Breast calcifications on mammogram R92.1 793.89 Followed by Dr. Ankita Jolly.  Patient has had a biopsy and will return to Dr. Ankita Jolly tomorrow to get her results. Follow-up Disposition:  Return in about 3 months (around 8/22/2017) for 30 minute follow up appointment with me. BP check with nurse in 1 week. .    I have discussed the diagnosis with the patient and the intended plan as seen in the above orders. The patient has received an After-Visit Summary and questions were answered concerning future plans. Patient verbalized understanding of above instructions. Health Maintenance:   Health Maintenance   Topic Date Due    PAP AKA CERVICAL CYTOLOGY  08/08/1978    EYE EXAM RETINAL OR DILATED Q1  02/20/2016    INFLUENZA AGE 9 TO ADULT  08/01/2017    FOOT EXAM Q1  09/13/2017    MICROALBUMIN Q1  09/13/2017    HEMOGLOBIN A1C Q6M  11/03/2017    LIPID PANEL Q1  05/03/2018    BREAST CANCER SCRN MAMMOGRAM  04/19/2019    DTaP/Tdap/Td series (2 - Td) 10/07/2021    COLONOSCOPY  01/15/2023    Hepatitis C Screening  Completed    Pneumococcal 19-64 Medium Risk  Completed       No orders of the defined types were placed in this encounter.

## 2017-05-22 NOTE — PATIENT INSTRUCTIONS

## 2017-05-22 NOTE — PROGRESS NOTES
1. Have you been to the ER, urgent care clinic since your last visit? Hospitalized since your last visit? No    2. Have you seen or consulted any other health care providers outside of the [address] [place] [address] since your last visit? Include any pap smears or colon screening. No       Patient presents for blood work results.

## 2017-05-22 NOTE — MR AVS SNAPSHOT
Visit Information Date & Time Provider Department Dept. Phone Encounter #  
 5/22/2017 10:30 AM Eber Foster, [address] [place] [address] [phone] [id number] Follow-up Instructions Return in about 3 months (around 8/22/2017) for 30 minute follow up appointment with me. BP check with nurse in 1 week. .  
  
Your Appointments 5/23/2017  1:15 PM  
Follow Up with Mac Correa MD  
[address] [place] [address]) Appt Note: 1 week follow up after Stereo R Breast Bx; r/s  
 [address] [facility] [address]  
  
   
 [address] [facility] [address]  
  
    
 6/5/2017 10:00 AM  
Follow Up with Eber Foster MD  
[place] [address] [address] [facility]) Appt Note: 3 mo f/u  
 [facility]. [address] [facility]  
  
   
 [address] [address]  
  
    
 7/6/2017 10:00 AM  
New Patient with David Palencia MD  
[address]  ([address]) Appt Note: WWE  
 [address] [address]  
[phone]  
  
   
 [facility] [address] [facility] [address] Upcoming Health Maintenance Date Due  
 PAP AKA CERVICAL CYTOLOGY 8/8/1978 EYE EXAM RETINAL OR DILATED Q1 2/20/2016 INFLUENZA AGE 9 TO ADULT 8/1/2017 FOOT EXAM Q1 9/13/2017 MICROALBUMIN Q1 9/13/2017 HEMOGLOBIN A1C Q6M 11/3/2017 LIPID PANEL Q1 5/3/2018 BREAST CANCER SCRN MAMMOGRAM 4/19/2019 DTaP/Tdap/Td series (2 - Td) 10/7/2021 COLONOSCOPY 1/15/2023 Allergies as of 5/22/2017  Review Complete On: 5/22/2017 By: Bella Schneider LPN No Known Allergies Current Immunizations  Reviewed on 5/22/2017 Name Date Influenza Vaccine 10/1/2015, 11/20/2014 Pneumococcal Polysaccharide (PPSV-23) 3/2/2017  Tdap 10/7/2011 12:00 AM  
  
 Reviewed by Bella Schneider LPN on 7/50/2484 at 05:62 AM  
 You Were Diagnosed With   
  
 Codes Comments Tachycardia    -  Primary ICD-10-CM: R00.0 ICD-9-CM: 056. 0 Vitals BP Pulse Temp Resp Height(growth percentile) Weight(growth percentile) 151/79 100 97.2 °F (36.2 °C) (Oral) 17 5' 3\" (1.6 m) 228 lb (103.4 kg) SpO2 BMI OB Status Smoking Status 100% 40.39 kg/m2 Postmenopausal Never Smoker Vitals History BMI and BSA Data Body Mass Index Body Surface Area  
 40.39 kg/m 2 2.14 m 2 Preferred Pharmacy Pharmacy Name Phone [facility] [address] [phone] Your Updated Medication List  
  
   
This list is accurate as of: 5/22/17 11:33 AM.  Always use your most recent med list.  
  
  
  
  
 aspirin 81 mg tablet Take 81 mg by mouth daily. dilTIAZem 360 mg SR capsule Commonly known as:  TAZTIA XT Take 1 Cap by mouth daily. ergocalciferol 50,000 unit capsule Commonly known as:  VITAMIN D2 Take 1 Cap by mouth every fourteen (14) days. glipiZIDE SR 5 mg CR tablet Commonly known as:  GLUCOTROL XL  
TAKE ONE TABLET BY MOUTH ONCE DAILY losartan-hydroCHLOROthiazide 100-25 mg per tablet Commonly known as:  HYZAAR  
TAKE ONE TABLET BY MOUTH ONCE DAILY  
  
 metFORMIN 1,000 mg tablet Commonly known as:  GLUCOPHAGE  
TAKE ONE TABLET BY MOUTH TWICE DAILY WITH MEALS We Performed the Following AMB POC EKG ROUTINE W/ 12 LEADS, INTER & REP [02208 CPT(R)] Follow-up Instructions Return in about 3 months (around 8/22/2017) for 30 minute follow up appointment with me. BP check with nurse in 1 week. .  
  
  
Patient Instructions DASH Diet: Care Instructions Your Care Instructions The DASH diet is an eating plan that can help lower your blood pressure. DASH stands for Dietary Approaches to Stop Hypertension. Hypertension is high blood pressure. The DASH diet focuses on eating foods that are high in calcium, potassium, and magnesium. These nutrients can lower blood pressure. The foods that are highest in these nutrients are fruits, vegetables, low-fat dairy products, nuts, seeds, and legumes. But taking calcium, potassium, and magnesium supplements instead of eating foods that are high in those nutrients does not have the same effect. The DASH diet also includes whole grains, fish, and poultry. The DASH diet is one of several lifestyle changes your doctor may recommend to lower your high blood pressure. Your doctor may also want you to decrease the amount of sodium in your diet. Lowering sodium while following the DASH diet can lower blood pressure even further than just the DASH diet alone. Follow-up care is a key part of your treatment and safety. Be sure to make and go to all appointments, and call your doctor if you are having problems. It's also a good idea to know your test results and keep a list of the medicines you take. How can you care for yourself at home? Following the DASH diet · Eat 4 to 5 servings of fruit each day. A serving is 1 medium-sized piece of fruit, ½ cup chopped or canned fruit, 1/4 cup dried fruit, or 4 ounces (½ cup) of fruit juice. Choose fruit more often than fruit juice. · Eat 4 to 5 servings of vegetables each day. A serving is 1 cup of lettuce or raw leafy vegetables, ½ cup of chopped or cooked vegetables, or 4 ounces (½ cup) of vegetable juice. Choose vegetables more often than vegetable juice. · Get 2 to 3 servings of low-fat and fat-free dairy each day. A serving is 8 ounces of milk, 1 cup of yogurt, or 1 ½ ounces of cheese. · Eat 6 to 8 servings of grains each day. A serving is 1 slice of bread, 1 ounce of dry cereal, or ½ cup of cooked rice, pasta, or cooked cereal. Try to choose whole-grain products as much as possible. · Limit lean meat, poultry, and fish to 2 servings each day.  A serving is 3 ounces, about the size of a deck of cards. · Eat 4 to 5 servings of nuts, seeds, and legumes (cooked dried beans, lentils, and split peas) each week. A serving is 1/3 cup of nuts, 2 tablespoons of seeds, or ½ cup of cooked beans or peas. · Limit fats and oils to 2 to 3 servings each day. A serving is 1 teaspoon of vegetable oil or 2 tablespoons of salad dressing. · Limit sweets and added sugars to 5 servings or less a week. A serving is 1 tablespoon jelly or jam, ½ cup sorbet, or 1 cup of lemonade. · Eat less than 2,300 milligrams (mg) of sodium a day. If you limit your sodium to 1,500 mg a day, you can lower your blood pressure even more. Tips for success · Start small. Do not try to make dramatic changes to your diet all at once. You might feel that you are missing out on your favorite foods and then be more likely to not follow the plan. Make small changes, and stick with them. Once those changes become habit, add a few more changes. · Try some of the following: ¨ Make it a goal to eat a fruit or vegetable at every meal and at snacks. This will make it easy to get the recommended amount of fruits and vegetables each day. ¨ Try yogurt topped with fruit and nuts for a snack or healthy dessert. ¨ Add lettuce, tomato, cucumber, and onion to sandwiches. ¨ Combine a ready-made pizza crust with low-fat mozzarella cheese and lots of vegetable toppings. Try using tomatoes, squash, spinach, broccoli, carrots, cauliflower, and onions. ¨ Have a variety of cut-up vegetables with a low-fat dip as an appetizer instead of chips and dip. ¨ Sprinkle sunflower seeds or chopped almonds over salads. Or try adding chopped walnuts or almonds to cooked vegetables. ¨ Try some vegetarian meals using beans and peas. Add garbanzo or kidney beans to salads. Make burritos and tacos with mashed ramos beans or black beans. Where can you learn more? Go to http://small-alejandro.info/. Enter O030 in the search box to learn more about \"DASH Diet: Care Instructions. \" Current as of: March 23, 2016 Content Version: 11.2 © 1350-3792 [employer]. Care instructions adapted under license by [employer] (which disclaims liability or warranty for this information). If you have questions about a medical condition or this instruction, always ask your healthcare professional. [address] any warranty or liability for your use of this information. Introducing [facility] & HEALTH SERVICES! Calixt[facility] Castro introduces Pinguo patient portal. Now you can access parts of your medical record, email your doctor's office, and request medication refills online. 1. In your internet browser, go to [email or web address] 2. Click on the First Time User? Click Here link in the Sign In box. You will see the New Member Sign Up page. 3. Enter your Pinguo Access Code exactly as it appears below. You will not need to use this code after youve completed the sign-up process. If you do not sign up before the expiration date, you must request a new code. · Pinguo Access Code: [email or web address] Expires: 8/6/2017 11:00 AM 
 
4. Enter the last four digits of your Social Security Number (xxxx) and Date of Birth (mm/dd/yyyy) as indicated and click Submit. You will be taken to the next sign-up page. 5. Create a Pinguo ID. This will be your Pinguo login ID and cannot be changed, so think of one that is secure and easy to remember. 6. Create a Pinguo password. You can change your password at any time. 7. Enter your Password Reset Question and Answer. This can be used at a later time if you forget your password. 8. Enter your e-mail address. You will receive e-mail notification when new information is available in [address] 9. Click Sign Up. You can now view and download portions of your medical record. 10. Click the Download Summary menu link to download a portable copy of your medical information. If you have questions, please visit the Frequently Asked Questions section of the [facility] website. Remember, [facility] is NOT to be used for urgent needs. For medical emergencies, dial 911. Now available from your iPhone and Android! Please provide this summary of care documentation to your next provider. Your primary care clinician is listed as Jarad Martinez. If you have any questions after today's visit, please call [phone].

## 2017-05-23 ENCOUNTER — NURSE NAVIGATOR (OUTPATIENT)
Dept: OTHER | Age: 62
End: 2017-05-23

## 2017-05-23 ENCOUNTER — OFFICE VISIT (OUTPATIENT)
Dept: SURGERY | Age: 62
End: 2017-05-23

## 2017-05-23 VITALS
HEIGHT: 63 IN | BODY MASS INDEX: 39.9 KG/M2 | HEART RATE: 109 BPM | TEMPERATURE: 99 F | SYSTOLIC BLOOD PRESSURE: 147 MMHG | OXYGEN SATURATION: 99 % | DIASTOLIC BLOOD PRESSURE: 76 MMHG | WEIGHT: 225.2 LBS | RESPIRATION RATE: 16 BRPM

## 2017-05-23 DIAGNOSIS — D05.11 DUCTAL CARCINOMA IN SITU (DCIS) OF RIGHT BREAST: Primary | ICD-10-CM

## 2017-05-23 DIAGNOSIS — I10 ESSENTIAL HYPERTENSION: ICD-10-CM

## 2017-05-23 DIAGNOSIS — E55.9 VITAMIN D DEFICIENCY: ICD-10-CM

## 2017-05-23 DIAGNOSIS — E11.65 TYPE 2 DIABETES MELLITUS WITH HYPERGLYCEMIA, WITHOUT LONG-TERM CURRENT USE OF INSULIN (HCC): ICD-10-CM

## 2017-05-23 NOTE — MR AVS SNAPSHOT
Visit Information Date & Time Provider Department Dept. Phone Encounter #  
 5/23/2017  1:15 PM MD Bren Gage [facility] [phone] [id number] Your Appointments 6/5/2017 11:15 AM  
Nurse Visit with Brain Jeans, MD [address] [place] [address]) Appt Note: bp check [facility]. [address] [address]  
[phone]  
  
   
 [address] [address]  
  
    
 6/6/2017  2:15 PM  
POST OP with Debo Garces MD  
[address] [facility]) Appt Note: post op [address] [address] [facility] [address]  
  
   
 [address] [address]  
  
    
 7/6/2017 10:00 AM  
New Patient with Usha Le MD  
[address] ([facility]) Appt Note: WWE  
 [facility] [address]  
  
   
 [address] [facility]  
  
    
 8/1/2017 10:00 AM  
Follow Up with Brain Jeans, MD [address] [place] [address]) Appt Note: 3 month follow-up [facility]. [address]  
  
   
 [address] Upcoming Health Maintenance Date Due  
 PAP AKA CERVICAL CYTOLOGY 8/8/1978 EYE EXAM RETINAL OR DILATED Q1 2/20/2016 INFLUENZA AGE 9 TO ADULT 8/1/2017 FOOT EXAM Q1 9/13/2017 MICROALBUMIN Q1 9/13/2017 HEMOGLOBIN A1C Q6M 11/3/2017 LIPID PANEL Q1 5/3/2018 BREAST CANCER SCRN MAMMOGRAM 4/19/2019 DTaP/Tdap/Td series (2 - Td) 10/7/2021 COLONOSCOPY 1/15/2023 Allergies as of 5/23/2017  Review Complete On: 5/23/2017 By: Debo Garces MD  
 No Known Allergies Current Immunizations  Reviewed on 5/22/2017 Name Date Influenza Vaccine 10/1/2015, 11/20/2014 Pneumococcal Polysaccharide (PPSV-23) 3/2/2017 Tdap 10/7/2011 12:00 AM  
  
 Not reviewed this visit You Were Diagnosed With   
  
 Codes Comments Ductal carcinoma in situ (DCIS) of right breast    -  Primary ICD-10-CM: D05.11 ICD-9-CM: 233.0 Type 2 diabetes mellitus with hyperglycemia, without long-term current use of insulin (HCC)     ICD-10-CM: E11.65 ICD-9-CM: 250.00, 790.29 Essential hypertension     ICD-10-CM: I10 
ICD-9-CM: 401.9 Vitamin D deficiency     ICD-10-CM: E55.9 ICD-9-CM: 268.9 Vitals BP Pulse Temp Resp Height(growth percentile) Weight(growth percentile) 147/76 (BP 1 Location: Left arm, BP Patient Position: Sitting) (!) 109 99 °F (37.2 °C) (Oral) 16 5' 3\" (1.6 m) 225 lb 3.2 oz (102.2 kg) SpO2 BMI OB Status Smoking Status 99% 39.89 kg/m2 Postmenopausal Never Smoker BMI and BSA Data Body Mass Index Body Surface Area  
 39.89 kg/m 2 2.13 m 2 Preferred Pharmacy Pharmacy Name Phone [address] [phone] Your Updated Medication List  
  
   
This list is accurate as of: 5/23/17  2:18 PM.  Always use your most recent med list.  
  
  
  
  
 aspirin 81 mg tablet Take 81 mg by mouth daily. dilTIAZem 360 mg SR capsule Commonly known as:  TAZTIA XT Take 1 Cap by mouth daily. ergocalciferol 50,000 unit capsule Commonly known as:  VITAMIN D2 Take 1 Cap by mouth every fourteen (14) days. glipiZIDE SR 5 mg CR tablet Commonly known as:  GLUCOTROL XL  
TAKE ONE TABLET BY MOUTH ONCE DAILY losartan-hydroCHLOROthiazide 100-25 mg per tablet Commonly known as:  HYZAAR  
TAKE ONE TABLET BY MOUTH ONCE DAILY  
  
 metFORMIN 1,000 mg tablet Commonly known as:  GLUCOPHAGE  
TAKE ONE TABLET BY MOUTH TWICE DAILY WITH MEALS Patient Instructions If you have any questions or concerns about today's appointment, the verbal and/or written instructions you were given for follow up care, please call our office at [phone]. [facility] [address] [place] [address], [address] [place] [address] 
 
[phone] office [phone] Ralph Basket Ralph Basket PATIENT PRE AND POST OPERATIVE INSTRUCTIONS [address] [place] [address] Before Surgery Instructions:  
1) You must have someone available to drive you to and from your procedure and stay with you for the first 24 hours. 2) It is very important that you have nothing to eat or drink after midnight the night before your surgery. This includes chewing gum or sucking on hard candy. Take only heart, blood pressure and cholesterol medications the morning of surgery with only a sip of water. 3) Please stop taking Plavix 10 days prior to your surgery. Stop taking Coumadin 5 days prior to your surgery. Stop taking all Aspirin or Aspirin containing products 7 days prior to your surgery. Stop taking Advil, Motrin, Aleve, and etc. 3 days prior to your surgery. 4) If you take any diabetic medications please consult with your primary care physician on how to take them on the day of your surgery 5) Please stop all Herbal products 2 weeks prior to your surgery. 6) Please arrive at the hospital 1 ½ hours prior to your surgery, unless you have been otherwise instructed. 7) Patients having an operation on their colon will be given a separate instruction sheet on their Bowel Prep. 8) For any pre-operative work up check in at the main entrance to [address] [place] [address], and then go to Patient Registration. These studies are done on a walk in basis they are open from 7:00am to 5:00pm Monday through Friday. 9) Please wash your surgical site the morning of your surgery with soap and water. 10) If you are of child bearing age you will have pregnancy test done the morning of your surgery as soon as you arrive. After Surgery Instructions: You will need to be seen in the office for a follow-up visit 7-14 days after your surgery. Please call after you have had the procedure to make this appointment. Unless otherwise instructed, you may remove your outer bandage and shower 48 hours after your surgery. If you develop a fever greater than 101, have any significant drainage, bleeding, swelling and/or pus of the wound. Please call our office immediately. Surgery Date and Time:  Tuesday, May 30, 2017 at 9:00am  
 
Please check in at [facility], enter through the Emergency Room entrance and go up to the second floor. Please check in by  6:00am the day of your surgery. You may contact Veronica Peres with any questions at [phone]. Introducing [facility]! [facility] introduces Blacksum[employer] patient portal. Now you can access parts of your medical record, email your doctor's office, and request medication refills online. 1. In your internet browser, go to [email or web address] 2. Click on the First Time User? Click Here link in the Sign In box. You will see the New Member Sign Up page. 3. Enter your Blacksumac Access Code exactly as it appears below. You will not need to use this code after youve completed the sign-up process. If you do not sign up before the expiration date, you must request a new code. · [employer] Access Code: [email or web address] Expires: 8/6/2017 11:00 AM 
 
4. Enter the last four digits of your Social Security Number (xxxx) and Date of Birth (mm/dd/yyyy) as indicated and click Submit. You will be taken to the next sign-up page. 5. Create a Blacksumac ID. This will be your Blacksumac login ID and cannot be changed, so think of one that is secure and easy to remember. 6. Create a Blacksumac password. You can change your password at any time. 7. Enter your Password Reset Question and Answer. This can be used at a later time if you forget your password. 8. Enter your e-mail address. You will receive e-mail notification when new information is available in [address] 9. Click Sign Up. You can now view and download portions of your medical record. 10. Click the Download Summary menu link to download a portable copy of your medical information. If you have questions, please visit the Frequently Asked Questions section of the P[employer].com website. Remember, [employer] is NOT to be used for urgent needs. For medical emergencies, dial 911. Now available from your iPhone and Android! Please provide this summary of care documentation to your next provider. Your primary care clinician is listed as Dipak Hayes. If you have any questions after today's visit, please call [phone].

## 2017-05-23 NOTE — COMMUNICATION BODY
Dear Dottie Lozoya,    Kelsey Trejo came to the office today for follow-up from the recent stereotactic breast biopsy that she had because of some suspicious calcifications in the right breast.  She tolerated the procedure well. She did get some bruising. 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 Therefore she needs this area more widely excised. I discussed the need for surgery for the DCIS and she is willing to proceed. Unless any invasive carcinoma is found she will not need a node biopsy. Thank you again for allowing me to work with you in her care.     With kindest regards,    Estelle Marinelli MD

## 2017-05-23 NOTE — PROGRESS NOTES
SUBJECTIVE: Jeanna Concepcion is a 63 y.o.  female is seen for a visit to discuss the results of her recent stereotactic biopsy of abnormal calcifications in the right breast.. Reports no problems with the wound or other issues. Activity, diet and bowels are normal. Minimal pain. She did get some bruising. OBJECTIVE: Appears well. Wound is healing well without complications except for some bruising and perhaps a small hematoma. The pathology report came back showing areas of lobular carcinoma in situ, atypical ductal hyperplasia but also an intraductal papilloma with DCIS. ASSESSMENT: Normal postoperative course, doing well. New diagnosis of DCIS from an area of abnormal calcifications in the right breast.    PLAN: I had a good discussion with the patient regarding her diagnosis. We discussed the options for treatment. I recommended that she undergo a partial mastectomy with localization. We discussed the need for radiation therapy and she will see Dr. Chely Marinelli prior to that procedure. Follow-up Disposition: Not on File    Ludivina Fu MD    Please note: This document has been produced using voice recognition software. Unrecognizable air is in transcription may be present.

## 2017-05-23 NOTE — PATIENT INSTRUCTIONS
If you have any questions or concerns about today's appointment, the verbal and/or written instructions you were given for follow up care, please call our office at [phone]. [facility] - [address]    [phone] office  [phone] [place] PATIENT PRE AND POST OPERATIVE INSTRUCTIONS     [address] [place] [address]     Before Surgery Instructions:   1) You must have someone available to drive you to and from your procedure and stay with you for the first 24 hours. 2) It is very important that you have nothing to eat or drink after midnight the night before your surgery. This includes chewing gum or sucking on hard candy. Take only heart, blood pressure and cholesterol medications the morning of surgery with only a sip of water. 3) Please stop taking Plavix 10 days prior to your surgery. Stop taking Coumadin 5 days prior to your surgery. Stop taking all Aspirin or Aspirin containing products 7 days prior to your surgery. Stop taking Advil, Motrin, Aleve, and etc. 3 days prior to your surgery. 4) If you take any diabetic medications please consult with your primary care physician on how to take them on the day of your surgery  5) Please stop all Herbal products 2 weeks prior to your surgery. 6) Please arrive at the hospital 1 ½ hours prior to your surgery, unless you have been otherwise instructed. 7) Patients having an operation on their colon will be given a separate instruction sheet on their Bowel Prep. 8) For any pre-operative work up check in at the main entrance to [address] [place] [address], and then go to Patient Registration. These studies are done on a walk in basis they are open from 7:00am to 5:00pm Monday through Friday. 9) Please wash your surgical site the morning of your surgery with soap and water.   10) If you are of child bearing age you will have pregnancy test done the morning of your surgery as soon as you arrive. After Surgery Instructions: You will need to be seen in the office for a follow-up visit 7-14 days after your surgery. Please call after you have had the procedure to make this appointment. Unless otherwise instructed, you may remove your outer bandage and shower 48 hours after your surgery. If you develop a fever greater than 101, have any significant drainage, bleeding, swelling and/or pus of the wound. Please call our office immediately. Surgery Date and Time:  Tuesday, May 30, 2017 at 9:00am     Please check in at [facility], enter through the Emergency Room entrance and go up to the second floor. Please check in by  6:00am the day of your surgery. You may contact Enrique Trimble with any questions at [phone].

## 2017-05-23 NOTE — PROGRESS NOTES
Conchita Gonzalez is a 63 y.o. female who presents today for a post-op exam for a stereotactic biopsy of right breast calcifications performed on 05/16/17. Patient scores their post-op pain level on a pain scale from 1-10  as a 0 today. 1. Have you been to the ER, urgent care clinic since your last visit? Hospitalized since your last visit? No    2. Have you seen or consulted any other health care providers outside of the [address] [place] [address] since your last visit? Include any pap smears or colon screening.  No

## 2017-05-24 DIAGNOSIS — D05.11 DUCTAL CARCINOMA IN SITU (DCIS) OF RIGHT BREAST: Primary | ICD-10-CM

## 2017-05-24 NOTE — NURSE NAVIGATOR
Initial assessment for Robert Velasco, newly diagnosed with right breast cancer. Meeting with pt included pt only. Patient was assessed for the following barriers:    Communication:       Yes    No  -Ability to read/write,understand [place]       [x]      []    -Ability to talk with family/children,friends about diagnosis     [x]      []    -Other:  Comments/Referrals/Services provided: n/a  Employment/Financial/Legal:     Yes    No  -Loss of employment/income         []      [x]    -Insurance coverage          [x]      []     -Needs help applying for Social Security/Disability/FMLA     []      [x]     -Help with Co-Pays for office visits         []      [x]    -Medication assistance/medical supplies or equipment     []      [x]    -Difficulty paying bills: utilities/housing       []      [x]    -Means to buy food                     [x]      []    -Legal issues           []      [x]    -Other:  Comments/Referrals/Services provided: Pt is retired from [employer]. She has [employer] insurances and no financial concerns today. Psychosocial        Yes    No  Housing:  -Homeless           []      [x]    -Extended care needs: long term care/home care/Hospice     []      [x]    -Other:  Comments/Referrals/Services provided: She lives in a house. Transportation:       Yes    No  -Lack of vehicle or public transportation options      []      [x]    -Funds need for public transportation: bus/taxi      []      [x]    -Other:  Comments/Referrals/Services provided: Her son drives her or she takes a cab. Support system:       Yes No  -Family members at home: spouse/significant other/children    [x]      []    -Has a support system         [x]      []    Other:  Comments/Referrals/Services provided: Her son and niece live with her. She has three daughters that live close by. She does request services from [facility]. Referral form faxed to [phone].   Spirituality:        Yes No  -Spiritual issues          []      [x] -Cultural concerns          []      [x]    -Other:  -Comments/Referrals/Services provided: She does not attend Hinduism. Sexuality:        Yes No  -Body image concerns                     []      [x]    -Relationships/significant other issues        []      [x]    -Other:  Comments/Referrals/Services provided: No concerns today. Coping:        Yes    No  -Able to manage emotions         [x]      []    -Feeling fearful or anxious         [x]      []    -Interest in attending support groups        []      [x]    -Cancer related pain/control         []      [x]    -Other:  Comments/Referrals/Services provided: She appears calm and states she does not take any medications for anxiety at home. Tobacco dependency:      Yes No  -Currently smokes: cigarettes/cigars        []      [x]    -Uses smokeless tobacco         []      [x]    -Other:  Comments/Referrals/Services provided: n/a    Education/review of Disease process and Management Yes No  -Explanation of navigator role/contact information      [x]      []    -New Patient Guide for Breast Cancer provided                 [x]      []    -Pathology/Staging          [x]      []    -Diagnostic tests          []      [x]    -Genetic testing needed         []      [x]    -Treatment options/plan: surgery/chemotherapy/radiation     [x]      []    -Mediport placement as needed                              []      [x]    -Tobacco cessation as needed        []      [x]    -Need for a second opinion         [x]      []    -Importance of bringing family/friends to medical appts     [x]      []   -Other:  Patient/family verbalized understanding of treatment plan: Yes. Pt has elected to have a lumpectomy with needle localization procedure. She will speak with Dr Daisy Dyer about her RT options. All questions answered. NCCN Distress Tool    Joyce Rankin  was assessed for management of distress using the NCCN Distress Thermometer for Patients tool.       Mild to moderate distress  Scorin-4        Yes    No    -Practical/physical issues       [x]      []      -Provide community resources      [x]      []      -Provide list of support groups      [x]      []      -Provide list of national organizations and websites               [x]      []      -Provide ongoing supportive care by oncology medical team        [x]      []                  Comments/Referrals/Services provided:  . Moderate to severe distress  Scorin or greater                   Yes    No    -Navigator consulted with MD      [x]      []     -Navigator follow up         [x]      []     -Spiritual concerns        []      [x]     -Emotional/family concerns       [x]      []     -Refer to /mental health professional/PCP   [x]      []      Comments/Referral/Services provided:  Score:10. Dr Jacob Mcnair aware. Pt speak with her PCP about her cancer diagnosis. She will be reassessed in the future.       Patient Acuity  0    No navigation        Patient declined services or never returned call    1    Initial education/guidance only        No follow up needed    2    Moderate intensity of needs        Multimodality treatment        Ongoing education/guidance for 5-6 months        No barriers    3    High intensity of needs        Multimodality treatment        Hospitalizations associated with care        Ongoing education/guidance for 6-12 months        Barriers: 1-2    4     High intensity of needs         Multimodality treatment         Coordinated care outside of facility         Ongoing education/guidance for 6-12 months         Barriers: 3 or more    Acuity score: 3

## 2017-05-26 ENCOUNTER — ANESTHESIA EVENT (OUTPATIENT)
Dept: SURGERY | Age: 62
End: 2017-05-26
Payer: MEDICARE

## 2017-05-30 ENCOUNTER — APPOINTMENT (OUTPATIENT)
Dept: MAMMOGRAPHY | Age: 62
End: 2017-05-30
Attending: SPECIALIST
Payer: MEDICARE

## 2017-05-30 ENCOUNTER — ANESTHESIA (OUTPATIENT)
Dept: SURGERY | Age: 62
End: 2017-05-30
Payer: MEDICARE

## 2017-05-30 ENCOUNTER — APPOINTMENT (OUTPATIENT)
Dept: INTERVENTIONAL RADIOLOGY/VASCULAR | Age: 62
End: 2017-05-30
Attending: SPECIALIST
Payer: MEDICARE

## 2017-05-30 ENCOUNTER — HOSPITAL ENCOUNTER (OUTPATIENT)
Age: 62
Setting detail: OUTPATIENT SURGERY
Discharge: HOME | End: 2017-05-30
Attending: SPECIALIST | Admitting: SPECIALIST
Payer: MEDICARE

## 2017-05-30 ENCOUNTER — NURSE NAVIGATOR (OUTPATIENT)
Dept: OTHER | Age: 62
End: 2017-05-30

## 2017-05-30 VITALS
HEIGHT: 63 IN | BODY MASS INDEX: 39.87 KG/M2 | OXYGEN SATURATION: 94 % | WEIGHT: 225 LBS | HEART RATE: 81 BPM | DIASTOLIC BLOOD PRESSURE: 63 MMHG | TEMPERATURE: 98.5 F | RESPIRATION RATE: 16 BRPM | SYSTOLIC BLOOD PRESSURE: 137 MMHG

## 2017-05-30 DIAGNOSIS — R92.0 BREAST MICROCALCIFICATIONS: ICD-10-CM

## 2017-05-30 DIAGNOSIS — D05.11 DUCTAL CARCINOMA IN SITU (DCIS) OF RIGHT BREAST: ICD-10-CM

## 2017-05-30 LAB
GLUCOSE BLD STRIP.AUTO-MCNC: 141 MG/DL (ref 70–110)
GLUCOSE BLD STRIP.AUTO-MCNC: 143 MG/DL (ref 70–110)

## 2017-05-30 PROCEDURE — 77030010516 HC APPL HEMA CLP TELE -B: Performed by: SPECIALIST

## 2017-05-30 PROCEDURE — 74011250636 HC RX REV CODE- 250/636

## 2017-05-30 PROCEDURE — 76060000033 HC ANESTHESIA 1 TO 1.5 HR: Performed by: SPECIALIST

## 2017-05-30 PROCEDURE — 88341 IMHCHEM/IMCYTCHM EA ADD ANTB: CPT | Performed by: SPECIALIST

## 2017-05-30 PROCEDURE — 82962 GLUCOSE BLOOD TEST: CPT

## 2017-05-30 PROCEDURE — 77030012510 HC MSK AIRWY LMA TELE -B: Performed by: ANESTHESIOLOGY

## 2017-05-30 PROCEDURE — 74011250636 HC RX REV CODE- 250/636: Performed by: SPECIALIST

## 2017-05-30 PROCEDURE — 74011000250 HC RX REV CODE- 250

## 2017-05-30 PROCEDURE — 77030031139 HC SUT VCRL2 J&J -A: Performed by: SPECIALIST

## 2017-05-30 PROCEDURE — 74011000250 HC RX REV CODE- 250: Performed by: RADIOLOGY

## 2017-05-30 PROCEDURE — 76010000149 HC OR TIME 1 TO 1.5 HR: Performed by: SPECIALIST

## 2017-05-30 PROCEDURE — 77030008462 HC STPLR SKN PROX J&J -A: Performed by: SPECIALIST

## 2017-05-30 PROCEDURE — 74011250637 HC RX REV CODE- 250/637: Performed by: NURSE ANESTHETIST, CERTIFIED REGISTERED

## 2017-05-30 PROCEDURE — 76210000021 HC REC RM PH II 0.5 TO 1 HR: Performed by: SPECIALIST

## 2017-05-30 PROCEDURE — 74011250636 HC RX REV CODE- 250/636: Performed by: NURSE ANESTHETIST, CERTIFIED REGISTERED

## 2017-05-30 PROCEDURE — 77030002986 HC SUT PROL J&J -A: Performed by: SPECIALIST

## 2017-05-30 PROCEDURE — 88360 TUMOR IMMUNOHISTOCHEM/MANUAL: CPT | Performed by: SPECIALIST

## 2017-05-30 PROCEDURE — 88307 TISSUE EXAM BY PATHOLOGIST: CPT | Performed by: SPECIALIST

## 2017-05-30 PROCEDURE — 88342 IMHCHEM/IMCYTCHM 1ST ANTB: CPT | Performed by: SPECIALIST

## 2017-05-30 PROCEDURE — 77030013079 HC BLNKT BAIR HGGR 3M -A: Performed by: ANESTHESIOLOGY

## 2017-05-30 PROCEDURE — 77030018842 HC SOL IRR SOD CL 9% BAXT -A: Performed by: SPECIALIST

## 2017-05-30 PROCEDURE — 77030010938 HC CLP LIG TELE -A: Performed by: SPECIALIST

## 2017-05-30 PROCEDURE — 74011000250 HC RX REV CODE- 250: Performed by: SPECIALIST

## 2017-05-30 PROCEDURE — 77030032490 HC SLV COMPR SCD KNE COVD -B: Performed by: SPECIALIST

## 2017-05-30 PROCEDURE — 77030011267 HC ELECTRD BLD COVD -A: Performed by: SPECIALIST

## 2017-05-30 PROCEDURE — 77030003456 MAM PLC NDL/WIRE/CLIP/SEED BREAST RT

## 2017-05-30 PROCEDURE — 74011000272 HC RX REV CODE- 272: Performed by: SPECIALIST

## 2017-05-30 PROCEDURE — 76210000006 HC OR PH I REC 0.5 TO 1 HR: Performed by: SPECIALIST

## 2017-05-30 PROCEDURE — 77030011640 HC PAD GRND REM COVD -A: Performed by: SPECIALIST

## 2017-05-30 PROCEDURE — 77030002916 HC SUT ETHLN J&J -A: Performed by: SPECIALIST

## 2017-05-30 PROCEDURE — 74011636320 HC RX REV CODE- 636/320: Performed by: RADIOLOGY

## 2017-05-30 RX ORDER — SODIUM CHLORIDE 0.9 % (FLUSH) 0.9 %
5-10 SYRINGE (ML) INJECTION AS NEEDED
Status: DISCONTINUED | OUTPATIENT
Start: 2017-05-30 | End: 2017-05-30 | Stop reason: HOSPADM

## 2017-05-30 RX ORDER — CEFAZOLIN SODIUM 2 G/50ML
SOLUTION INTRAVENOUS
Status: DISCONTINUED
Start: 2017-05-30 | End: 2017-05-30 | Stop reason: HOSPADM

## 2017-05-30 RX ORDER — FENTANYL CITRATE 50 UG/ML
INJECTION, SOLUTION INTRAMUSCULAR; INTRAVENOUS AS NEEDED
Status: DISCONTINUED | OUTPATIENT
Start: 2017-05-30 | End: 2017-05-30 | Stop reason: HOSPADM

## 2017-05-30 RX ORDER — ONDANSETRON 2 MG/ML
INJECTION INTRAMUSCULAR; INTRAVENOUS AS NEEDED
Status: DISCONTINUED | OUTPATIENT
Start: 2017-05-30 | End: 2017-05-30 | Stop reason: HOSPADM

## 2017-05-30 RX ORDER — SODIUM CHLORIDE 0.9 % (FLUSH) 0.9 %
5-10 SYRINGE (ML) INJECTION EVERY 8 HOURS
Status: DISCONTINUED | OUTPATIENT
Start: 2017-05-30 | End: 2017-05-30 | Stop reason: HOSPADM

## 2017-05-30 RX ORDER — SODIUM CHLORIDE, SODIUM LACTATE, POTASSIUM CHLORIDE, CALCIUM CHLORIDE 600; 310; 30; 20 MG/100ML; MG/100ML; MG/100ML; MG/100ML
25 INJECTION, SOLUTION INTRAVENOUS CONTINUOUS
Status: DISCONTINUED | OUTPATIENT
Start: 2017-05-30 | End: 2017-05-30 | Stop reason: HOSPADM

## 2017-05-30 RX ORDER — ACETAMINOPHEN AND CODEINE PHOSPHATE 300; 30 MG/1; MG/1
1-2 TABLET ORAL
Qty: 25 TAB | Refills: 0 | Status: SHIPPED | OUTPATIENT
Start: 2017-05-30 | End: 2017-06-04

## 2017-05-30 RX ORDER — CEFAZOLIN SODIUM 2 G/50ML
2 SOLUTION INTRAVENOUS ONCE
Status: DISCONTINUED | OUTPATIENT
Start: 2017-05-30 | End: 2017-05-30 | Stop reason: SDUPTHER

## 2017-05-30 RX ORDER — FENTANYL CITRATE 50 UG/ML
50 INJECTION, SOLUTION INTRAMUSCULAR; INTRAVENOUS
Status: DISCONTINUED | OUTPATIENT
Start: 2017-05-30 | End: 2017-05-30 | Stop reason: HOSPADM

## 2017-05-30 RX ORDER — INSULIN LISPRO 100 [IU]/ML
INJECTION, SOLUTION INTRAVENOUS; SUBCUTANEOUS ONCE
Status: DISCONTINUED | OUTPATIENT
Start: 2017-05-30 | End: 2017-05-30 | Stop reason: HOSPADM

## 2017-05-30 RX ORDER — LIDOCAINE HYDROCHLORIDE 10 MG/ML
0.1 INJECTION, SOLUTION EPIDURAL; INFILTRATION; INTRACAUDAL; PERINEURAL AS NEEDED
Status: DISCONTINUED | OUTPATIENT
Start: 2017-05-30 | End: 2017-05-30 | Stop reason: HOSPADM

## 2017-05-30 RX ORDER — MIDAZOLAM HYDROCHLORIDE 1 MG/ML
INJECTION, SOLUTION INTRAMUSCULAR; INTRAVENOUS AS NEEDED
Status: DISCONTINUED | OUTPATIENT
Start: 2017-05-30 | End: 2017-05-30 | Stop reason: HOSPADM

## 2017-05-30 RX ORDER — PROPOFOL 10 MG/ML
INJECTION, EMULSION INTRAVENOUS AS NEEDED
Status: DISCONTINUED | OUTPATIENT
Start: 2017-05-30 | End: 2017-05-30 | Stop reason: HOSPADM

## 2017-05-30 RX ORDER — DEXAMETHASONE SODIUM PHOSPHATE 4 MG/ML
INJECTION, SOLUTION INTRA-ARTICULAR; INTRALESIONAL; INTRAMUSCULAR; INTRAVENOUS; SOFT TISSUE AS NEEDED
Status: DISCONTINUED | OUTPATIENT
Start: 2017-05-30 | End: 2017-05-30 | Stop reason: HOSPADM

## 2017-05-30 RX ORDER — DEXTROSE MONOHYDRATE 25 G/50ML
25-50 INJECTION, SOLUTION INTRAVENOUS AS NEEDED
Status: DISCONTINUED | OUTPATIENT
Start: 2017-05-30 | End: 2017-05-30 | Stop reason: HOSPADM

## 2017-05-30 RX ORDER — FAMOTIDINE 20 MG/1
20 TABLET, FILM COATED ORAL ONCE
Status: COMPLETED | OUTPATIENT
Start: 2017-05-30 | End: 2017-05-30

## 2017-05-30 RX ORDER — HYDROMORPHONE HYDROCHLORIDE 2 MG/ML
0.5 INJECTION, SOLUTION INTRAMUSCULAR; INTRAVENOUS; SUBCUTANEOUS
Status: DISCONTINUED | OUTPATIENT
Start: 2017-05-30 | End: 2017-05-30 | Stop reason: HOSPADM

## 2017-05-30 RX ORDER — ISOSULFAN BLUE 50 MG/5ML
5 INJECTION, SOLUTION SUBCUTANEOUS
Status: COMPLETED | OUTPATIENT
Start: 2017-05-30 | End: 2017-05-30

## 2017-05-30 RX ORDER — SODIUM CHLORIDE, SODIUM LACTATE, POTASSIUM CHLORIDE, CALCIUM CHLORIDE 600; 310; 30; 20 MG/100ML; MG/100ML; MG/100ML; MG/100ML
50 INJECTION, SOLUTION INTRAVENOUS CONTINUOUS
Status: DISCONTINUED | OUTPATIENT
Start: 2017-05-30 | End: 2017-05-30 | Stop reason: HOSPADM

## 2017-05-30 RX ORDER — MAGNESIUM SULFATE 100 %
4 CRYSTALS MISCELLANEOUS AS NEEDED
Status: DISCONTINUED | OUTPATIENT
Start: 2017-05-30 | End: 2017-05-30 | Stop reason: HOSPADM

## 2017-05-30 RX ORDER — LIDOCAINE HYDROCHLORIDE 20 MG/ML
INJECTION, SOLUTION EPIDURAL; INFILTRATION; INTRACAUDAL; PERINEURAL AS NEEDED
Status: DISCONTINUED | OUTPATIENT
Start: 2017-05-30 | End: 2017-05-30 | Stop reason: HOSPADM

## 2017-05-30 RX ORDER — LIDOCAINE HYDROCHLORIDE 10 MG/ML
10 INJECTION INFILTRATION; PERINEURAL
Status: COMPLETED | OUTPATIENT
Start: 2017-05-30 | End: 2017-05-30

## 2017-05-30 RX ORDER — CEFAZOLIN SODIUM 2 G/50ML
2 SOLUTION INTRAVENOUS
Status: COMPLETED | OUTPATIENT
Start: 2017-05-30 | End: 2017-05-30

## 2017-05-30 RX ADMIN — DEXAMETHASONE SODIUM PHOSPHATE 4 MG: 4 INJECTION, SOLUTION INTRA-ARTICULAR; INTRALESIONAL; INTRAMUSCULAR; INTRAVENOUS; SOFT TISSUE at 09:07

## 2017-05-30 RX ADMIN — FENTANYL CITRATE 50 MCG: 50 INJECTION, SOLUTION INTRAMUSCULAR; INTRAVENOUS at 09:26

## 2017-05-30 RX ADMIN — FAMOTIDINE 20 MG: 20 TABLET, FILM COATED ORAL at 07:09

## 2017-05-30 RX ADMIN — FENTANYL CITRATE 50 MCG: 50 INJECTION, SOLUTION INTRAMUSCULAR; INTRAVENOUS at 10:28

## 2017-05-30 RX ADMIN — LIDOCAINE HYDROCHLORIDE 40 MG: 20 INJECTION, SOLUTION EPIDURAL; INFILTRATION; INTRACAUDAL; PERINEURAL at 09:11

## 2017-05-30 RX ADMIN — SODIUM CHLORIDE, SODIUM LACTATE, POTASSIUM CHLORIDE, AND CALCIUM CHLORIDE 25 ML/HR: 600; 310; 30; 20 INJECTION, SOLUTION INTRAVENOUS at 07:09

## 2017-05-30 RX ADMIN — ONDANSETRON 4 MG: 2 INJECTION INTRAMUSCULAR; INTRAVENOUS at 09:07

## 2017-05-30 RX ADMIN — FENTANYL CITRATE 25 MCG: 50 INJECTION, SOLUTION INTRAMUSCULAR; INTRAVENOUS at 09:09

## 2017-05-30 RX ADMIN — PROPOFOL 200 MG: 10 INJECTION, EMULSION INTRAVENOUS at 09:11

## 2017-05-30 RX ADMIN — CEFAZOLIN SODIUM 2 G: 2 SOLUTION INTRAVENOUS at 09:17

## 2017-05-30 RX ADMIN — ISOSULFAN BLUE 0.07 ML: 10 INJECTION, SOLUTION SUBCUTANEOUS at 08:13

## 2017-05-30 RX ADMIN — MIDAZOLAM HYDROCHLORIDE 2 MG: 1 INJECTION, SOLUTION INTRAMUSCULAR; INTRAVENOUS at 09:00

## 2017-05-30 RX ADMIN — FENTANYL CITRATE 75 MCG: 50 INJECTION, SOLUTION INTRAMUSCULAR; INTRAVENOUS at 09:14

## 2017-05-30 RX ADMIN — LIDOCAINE HYDROCHLORIDE 3 ML: 10 INJECTION, SOLUTION INFILTRATION; PERINEURAL at 08:11

## 2017-05-30 NOTE — NURSE NAVIGATOR
Pre op visit. Pt slept well last pm. Understands procedure and what to expect post op. Post op pillow given.  at hospital for support. All questions answered.

## 2017-05-30 NOTE — IP AVS SNAPSHOT
Suad Gayle 
 
 
 [address] 
[phone] Patient: Jose Guadalupe Espinoza MRN: [id number] GPT:0/2/3013 You are allergic to the following No active allergies Recent Documentation Height Weight BMI OB Status Smoking Status 1.6 m 102.1 kg 39.86 kg/m2 Postmenopausal Never Smoker Emergency Contacts Name Discharge Info Relation Home Work Mobile Sabrina Horowitz DISCHARGE CAREGIVER [3] Friend [5] [phone] About your hospitalization You were admitted on:  May 30, 2017 [facility] on:  May 30, 2017 Unit phone number:  [phone] Why you were hospitalized Your primary diagnosis was:  Not on File Providers Seen During Your Hospitalizations Provider Role Specialty Primary office phone Debo Garces MD Attending Provider General Surgery [phone] Your Primary Care Physician (PCP) Primary Care Physician Office Phone Office Fax [address] [facility], [address] [place] [address] [phone] Follow-up Information Follow up With Details Comments Contact Info Brain Jeans, MD   [facility] [address] [address] 
[phone] Your Appointments Monday June 05, 2017 11:15 AM EDT Nurse Visit with Brain Jeans, MD  
[address] [address]  
[phone] Tuesday June 06, 2017  2:15 PM EDT  
POST OP with Debo Garces MD  
[address] [place] [address] [address] [address]  
[phone] Thursday July 06, 2017 10:00 AM EDT New Patient with Usha Le MD  
[address] [facility] ([address]) [facility] [address] [address]  
[phone] Current Discharge Medication List  
  
 START taking these medications Dose & Instructions Dispensing Information Comments Morning Noon Evening Bedtime  
 acetaminophen-codeine 300-30 mg per tablet Commonly known as:  TYLENOL-CODEINE #3 Your last dose was: Your next dose is:    
   
   
 Dose:  1-2 Tab Take 1-2 Tabs by mouth every four (4) hours as needed for Pain for up to 5 days. Max Daily Amount: 12 Tabs. Quantity:  25 Tab Refills:  0 CONTINUE these medications which have NOT CHANGED Dose & Instructions Dispensing Information Comments Morning Noon Evening Bedtime  
 dilTIAZem 360 mg SR capsule Commonly known as:  TAZTIA XT Your last dose was: Your next dose is:    
   
   
 Dose:  360 mg Take 1 Cap by mouth daily. Quantity:  90 Cap Refills:  1  
     
   
   
   
  
 ergocalciferol 50,000 unit capsule Commonly known as:  VITAMIN D2 Your last dose was: Your next dose is:    
   
   
 Dose:  [address]85 Units Take 1 Cap by mouth every fourteen (14) days. Quantity:  4 Cap Refills:  0  
     
   
   
   
  
 glipiZIDE SR 5 mg CR tablet Commonly known as:  GLUCOTROL XL Your last dose was: Your next dose is: TAKE ONE TABLET BY MOUTH ONCE DAILY Quantity:  90 Tab Refills:  1  
     
   
   
   
  
 losartan-hydroCHLOROthiazide 100-25 mg per tablet Commonly known as:  HYZAAR Your last dose was: Your next dose is: TAKE ONE TABLET BY MOUTH ONCE DAILY Quantity:  90 Tab Refills:  1 STOP taking these medications   
 aspirin 81 mg tablet  
   
  
 metFORMIN 1,000 mg tablet Commonly known as:  GLUCOPHAGE Where to Get Your Medications Information on where to get these meds will be given to you by the nurse or doctor. ! Ask your nurse or doctor about these medications  
  acetaminophen-codeine 300-30 mg per tablet Discharge Instructions [facility] [address] [facility], [address] [facility] 
[phone] Dr. James Bailey' Discharge Instructions Patient: Alice Gallego MRN: [id number]  SSN: [id number] YOB: 1955  Age: 63 y.o. Sex: female General Instructions 1. No heavy lifting, straining or exercise until notified by the doctor. 2. Do not drive while you are still taking narcotic pain medications. 3. Ok to resume regular diet. 4. Alright to shower and wet wounds after 48 hours, but make sure to pat dry. Do not sit in bath or swim until approved by the doctor. 5. Please call [phone] to schedule follow-up appointment upon discharge. Follow-up should be in one week to ten days. Our office is located at: [address] [place] [address], [facility]. 6. Please fill prescriptions on the way home from the hospital, therefore any unforeseen problems can be dealt with during regular office hours. What to Expect 1. There will be some mild discomfort. Take the pain medication as necessary and don't try to be a hero. 2. There may be some clear to bloody discharge from the wounds. A small amount is normal.  There may be bruising around the incisions. 3. You might have a very small amount of dark stool or blood in the stool. 4. You might experience constipation due to the pain meds. If this occurs, please obtain a laxative from the pharmacy and take as instructed. A good choice is Milk of Magnesia. Also take a daily stool softener to prevent problems. When to call the office (What not to expect) 1. A fever of 101.5 or higher 2. Significant abdominal pain or vomiting 3. Inability to eat or drink 4. Pus from the wounds 5. Rash around the wounds 6. Pain or swelling of the legs When to seek emergency care 1. Significant chest pain or trouble breathing 2. Lightheadedness, dizziness or passing out 3. Significant blood in vomit or stool 4. Severe abdominal pain that will not go away 5. Very low or very high blood pressure. DISCHARGE SUMMARY from Nurse The following personal items are in your possession at time of discharge: 
 
Dental Appliances: None Visual Aid: Glasses PATIENT INSTRUCTIONS: 
 
After general anesthesia or intravenous sedation, for 24 hours or while taking prescription Narcotics: · Limit your activities · Do not drive and operate hazardous machinery · Do not make important personal or business decisions · Do  not drink alcoholic beverages · If you have not urinated within 8 hours after discharge, please contact your surgeon on call. Report the following to your surgeon: 
· Excessive pain, swelling, redness or odor of or around the surgical area · Temperature over 100.5 · Nausea and vomiting lasting longer than 4 hours or if unable to take medications · Any signs of decreased circulation or nerve impairment to extremity: change in color, persistent  numbness, tingling, coldness or increase pain · Any questions What to do at Home: These are general instructions for a healthy lifestyle: No smoking/ No tobacco products/ Avoid exposure to second hand smoke Surgeon General's Warning:  Quitting smoking now greatly reduces serious risk to your health. Obesity, smoking, and sedentary lifestyle greatly increases your risk for illness A healthy diet, regular physical exercise & weight monitoring are important for maintaining a healthy lifestyle You may be retaining fluid if you have a history of heart failure or if you experience any of the following symptoms:  Weight gain of 3 pounds or more overnight or 5 pounds in a week, increased swelling in our hands or feet or shortness of breath while lying flat in bed. Please call your doctor as soon as you notice any of these symptoms; do not wait until your next office visit. Recognize signs and symptoms of STROKE: 
 
F-face looks uneven A-arms unable to move or move unevenly S-speech slurred or non-existent T-time-call 911 as soon as signs and symptoms begin-DO NOT go Back to bed or wait to see if you get better-TIME IS BRAIN. Warning Signs of HEART ATTACK Call 911 if you have these symptoms: 
? Chest discomfort. Most heart attacks involve discomfort in the center of the chest that lasts more than a few minutes, or that goes away and comes back. It can feel like uncomfortable pressure, squeezing, fullness, or pain. ? Discomfort in other areas of the upper body. Symptoms can include pain or discomfort in one or both arms, the back, neck, jaw, or stomach. ? Shortness of breath with or without chest discomfort. ? Other signs may include breaking out in a cold sweat, nausea, or lightheadedness. Don't wait more than five minutes to call [address]! Fast action can save your life. Calling 911 is almost always the fastest way to get lifesaving treatment. Emergency Medical Services staff can begin treatment when they arrive  up to an hour sooner than if someone gets to the hospital by car. The discharge information has been reviewed with the patient. The patient verbalized understanding. Discharge medications reviewed with the patient and appropriate educational materials and side effects teaching were provided. Patient armband removed and given to patient to take home. Patient was informed of the privacy risks if armband lost or stolen Discharge Orders Procedure Order Date Status Priority Quantity Spec Type Associated Dx CALL YOUR DOCTOR For: Persistant nausea and vomiting., Redness, tenderness, or signs of infection. , Severe uncontrolled pain. Call for temp greater than 101 05/30/17 1001 Normal Routine 1  Ductal carcinoma in situ (DCIS) of right breast [[id number]] Comments:  Call for temp greater than 101 Questions: For:  Persistant nausea and vomiting. For:  Redness, tenderness, or signs of infection. For:  Severe uncontrolled pain. ACTIVITY AFTER DISCHARGE Patient should: Restrict lifting, Shower on day of dressing removal(no baths). 05/30/17 1001 Normal Routine 1  Ductal carcinoma in situ (DCIS) of right breast [[id number]] Questions: Patient should:  Restrict lifting Patient should: Shower on day of dressing removal(no baths). DIET REGULAR 05/30/17 1001 Normal Routine 1  Ductal carcinoma in situ (DCIS) of right breast [[id number]] DRESSING, REMOVE IN 48 HOURS 05/30/17 1001 Normal Routine 1  Ductal carcinoma in situ (DCIS) of right breast [[id number]] Comments:  Remove dressing in 48 hours. Leave steri strips if present. ACO Transitions of Care Introducing [employer] [address] El[address] Giuliano offers a voluntary care coordination program to provide high quality service and care to [place] fee-for-service beneficiaries. Nadiya Brown was designed to help you enhance your health and well-being through the following services: ? Transitions of Care  support for individuals who are transitioning from one care setting to another (example: Hospital to home). ? Chronic and Complex Care Coordination  support for individuals and caregivers of those with serious or chronic illnesses or with more than one chronic (ongoing) condition and those who take a number of different medications. If you meet specific medical criteria, a [address] [place] [address] Rd may call you directly to coordinate your care with your primary care physician and your other care providers. For questions about the [facility] programs, please, contact your physicians office. For general questions or additional information about Accountable Care Organizations: 
Please visit www.medicare.gov/acos. html or call 1-800-MEDICARE (5-923.337.5789) TTY users should call 6-155.298.3134. Introducing [facility]! Jemma Foster introduces [employer] patient portal. Now you can access parts of your medical record, email your doctor's office, and request medication refills online. 1. In your internet browser, go to [email or web address] 2. Click on the First Time User? Click Here link in the Sign In box. You will see the New Member Sign Up page. 3. Enter your [employer] Access Code exactly as it appears below. You will not need to use this code after youve completed the sign-up process. If you do not sign up before the expiration date, you must request a new code. · [employer] Access Code: [email or web address] Expires: 8/6/2017 11:00 AM 
 
4. Enter the last four digits of your Social Security Number (xxxx) and Date of Birth (mm/dd/yyyy) as indicated and click Submit. You will be taken to the next sign-up page. 5. Create a [employer] ID. This will be your [employer] login ID and cannot be changed, so think of one that is secure and easy to remember. 6. Create a [employer] password. You can change your password at any time. 7. Enter your Password Reset Question and Answer. This can be used at a later time if you forget your password. 8. Enter your e-mail address. You will receive e-mail notification when new information is available in [address] 9. Click Sign Up. You can now view and download portions of your medical record. 10. Click the Download Summary menu link to download a portable copy of your medical information. If you have questions, please visit the Frequently Asked Questions section of the [employer] website. Remember, [employer] is NOT to be used for urgent needs. For medical emergencies, dial 911. Now available from your iPhone and Android! General Information Please provide this summary of care documentation to your next provider.  
  
  
    
    
 Patient Signature: ____________________________________________________________ Date:  ____________________________________________________________  
  
Leocadia Nim Provider Signature:  ____________________________________________________________ Date:  ____________________________________________________________

## 2017-05-30 NOTE — INTERVAL H&P NOTE
H&P Update:  Kodi Fernandes was seen and examined. History and physical has been reviewed. The patient has been examined. There have been no significant clinical changes since the completion of the originally dated History and Physical. Stereotactic bx. Showed DCIS. Now for wider excision.     Signed By: Wallace Simental MD     May 30, 2017 9:00 AM

## 2017-05-30 NOTE — OP NOTES
Rodney Carr    Name:  Bryson Patton  MR#:  [id number]  :  1955  Account #:  [de-identified]  Date of Adm:  2017  Date of Surgery:  2017      PREOPERATIVE DIAGNOSIS: Ductal carcinoma in situ, upper outer  quadrant right breast area of calcifications. POSTOPERATIVE DIAGNOSIS: Ductal carcinoma in situ, upper outer  quadrant right breast area of calcifications. Await final pathology report. PROCEDURE PERFORMED: Excisional right breast lumpectomy to  achieve negative margins with localization. SURGEON: Tangela Barcenas MD    ASSISTANT: Sharon Reid and Zoë Nascimento    ANESTHESIA: General LMA plus 0.5% Marcaine with epinephrine 9  mL. ESTIMATED BLOOD LOSS: 10 mL. SPECIMENS REMOVED: Large tissue area of upper outer quadrant  right breast, including the localization wires. OPERATIVE FINDINGS: The patient is a 68-year-old female who, on a  recent mammogram, had some suspicious calcifications in the upper  outer quadrant of the right breast. She underwent a stereotactic  biopsy, which showed both LCIS and ADH, but also an area of DCIS. Therefore, more extensive excision to achieve negative margins was  recommended to the patient and she agreed. The calcifications were  bracketed with wires since they covered a moderate-sized area. This  was done by Radiology before surgery. Specimen mammogram confirmed calcifications after the specimen  was removed. DESCRIPTION OF PROCEDURE: The patient was brought to the  operating room already having had the area localized in Radiology. She was placed under general anesthesia with an LMA and a timeout  was performed after the patient was prepped and draped in  an appropriate manner. I marked the area of the incision, which was a transverse type incision  in the upper outer right breast between the 2 wires and then injected  the Marcaine.  I then made the incision and developed kind of a superior  flap to include the superior most lateral and stay above that. I  then developed an inferior flap to include the inferior most flap and  extend both of these areas down to about the chest wall. Then we  excised medially and laterally to free up this breast tissue. Then we  gently elevated the specimen and marked it with a short stitch  superiorly and a long stitch right laterally. Then the specimen was fully  removed from the attachments to the chest wall. It was sent for  specimen mammogram.    The wound was irrigated. There appeared to be good hemostasis. I  then closed the deeper layer with interrupted 3-0 Vicryl and then the  subdermal layer with interrupted 3-0 Vicryl. Then, the skin was closed  with subcuticular 4-0 Prolene. Steri-Strips were applied, sterile  dressings, and the patient was taken to the recovery room in  satisfactory condition.         MD REGULO Arenas / REAL  D:  05/30/2017   09:56  T:  05/30/2017   10:39  Job #:  [id number]

## 2017-05-30 NOTE — PROGRESS NOTES
conducted a pre-surgery visit with Tad Alexandra, who is a 63 y.o.,female. The  provided the following Interventions:  Initiated a relationship of care and support. Offered prayer and assurance of continued prayers on patient's behalf. Plan:  Chaplains will continue to follow and will provide pastoral care on an as needed/requested basis.  recommends bedside caregivers page  on duty if patient shows signs of acute spiritual or emotional distress.     khoi [facility]   Spiritual Care   [phone]

## 2017-05-30 NOTE — DISCHARGE INSTRUCTIONS
[facility]  [address] [place] [address]  [phone]    Dr. Ankita Jolly' Discharge Instructions    Patient: Tad Alexandra MRN: [id number]  SSN: [id number]    YOB: 1955  Age: 63 y.o. Sex: female       General Instructions    1. No heavy lifting, straining or exercise until notified by the doctor. 2. Do not drive while you are still taking narcotic pain medications. 3. Ok to resume regular diet. 4. Alright to shower and wet wounds after 48 hours, but make sure to pat dry. Do not sit in bath or swim until approved by the doctor. 5. Please call [phone] to schedule follow-up appointment upon discharge. Follow-up should be in one week to ten days. Our office is located at: [address] [place] [address]. 6. Please fill prescriptions on the way home from the hospital, therefore any unforeseen problems can be dealt with during regular office hours. What to Expect    1. There will be some mild discomfort. Take the pain medication as necessary and don't try to be a hero. 2. There may be some clear to bloody discharge from the wounds. A small amount is normal.  There may be bruising around the incisions. 3. You might have a very small amount of dark stool or blood in the stool. 4. You might experience constipation due to the pain meds. If this occurs, please obtain a laxative from the pharmacy and take as instructed. A good choice is Milk of Magnesia. Also take a daily stool softener to prevent problems. When to call the office (What not to expect)    1. A fever of 101.5 or higher  2. Significant abdominal pain or vomiting  3. Inability to eat or drink  4. Pus from the wounds  5. Rash around the wounds  6. Pain or swelling of the legs     When to seek emergency care    1. Significant chest pain or trouble breathing  2. Lightheadedness, dizziness or passing out  3. Significant blood in vomit or stool  4.  Severe abdominal pain that will not go away  5. Very low or very high blood pressure. DISCHARGE SUMMARY from Nurse    The following personal items are in your possession at time of discharge:    Dental Appliances: None  Visual Aid: Glasses                            PATIENT INSTRUCTIONS:    After general anesthesia or intravenous sedation, for 24 hours or while taking prescription Narcotics:  · Limit your activities  · Do not drive and operate hazardous machinery  · Do not make important personal or business decisions  · Do  not drink alcoholic beverages  · If you have not urinated within 8 hours after discharge, please contact your surgeon on call. Report the following to your surgeon:  · Excessive pain, swelling, redness or odor of or around the surgical area  · Temperature over 100.5  · Nausea and vomiting lasting longer than 4 hours or if unable to take medications  · Any signs of decreased circulation or nerve impairment to extremity: change in color, persistent  numbness, tingling, coldness or increase pain  · Any questions        What to do at Home:        These are general instructions for a healthy lifestyle:    No smoking/ No tobacco products/ Avoid exposure to second hand smoke    Surgeon General's Warning:  Quitting smoking now greatly reduces serious risk to your health. Obesity, smoking, and sedentary lifestyle greatly increases your risk for illness    A healthy diet, regular physical exercise & weight monitoring are important for maintaining a healthy lifestyle    You may be retaining fluid if you have a history of heart failure or if you experience any of the following symptoms:  Weight gain of 3 pounds or more overnight or 5 pounds in a week, increased swelling in our hands or feet or shortness of breath while lying flat in bed. Please call your doctor as soon as you notice any of these symptoms; do not wait until your next office visit.     Recognize signs and symptoms of STROKE:    F-face looks uneven    A-arms unable to move or move unevenly    S-speech slurred or non-existent    T-time-call 911 as soon as signs and symptoms begin-DO NOT go       Back to bed or wait to see if you get better-TIME IS BRAIN. Warning Signs of HEART ATTACK     Call 911 if you have these symptoms:   Chest discomfort. Most heart attacks involve discomfort in the center of the chest that lasts more than a few minutes, or that goes away and comes back. It can feel like uncomfortable pressure, squeezing, fullness, or pain.  Discomfort in other areas of the upper body. Symptoms can include pain or discomfort in one or both arms, the back, neck, jaw, or stomach.  Shortness of breath with or without chest discomfort.  Other signs may include breaking out in a cold sweat, nausea, or lightheadedness. Don't wait more than five minutes to call 911 - MINUTES MATTER! Fast action can save your life. Calling 911 is almost always the fastest way to get lifesaving treatment. Emergency Medical Services staff can begin treatment when they arrive -- up to an hour sooner than if someone gets to the hospital by car. The discharge information has been reviewed with the patient. The patient verbalized understanding. Discharge medications reviewed with the patient and appropriate educational materials and side effects teaching were provided. Patient armband removed and given to patient to take home.   Patient was informed of the privacy risks if armband lost or stolen

## 2017-05-30 NOTE — PERIOP NOTES
1008  Received pt. Connected pt to monitor. VSS. Assessment preformed. RN at bedside. Will continue to monitor. 1016  MD at bedside. Scripts for home placed on pt chart. [address] to waiting room, spoke to Kinsman  - pt  - pt id number verified. Update given on pt status.

## 2017-05-30 NOTE — ANESTHESIA PREPROCEDURE EVALUATION
Anesthetic History   No history of anesthetic complications            Review of Systems / Medical History  Patient summary reviewed and pertinent labs reviewed    Pulmonary  Within defined limits                 Neuro/Psych   Within defined limits           Cardiovascular    Hypertension: well controlled              Exercise tolerance: >4 METS     GI/Hepatic/Renal  Within defined limits              Endo/Other    Diabetes: well controlled, type 2    Morbid obesity and anemia     Other Findings   Comments:   Risk Factors for Postoperative nausea/vomiting:       History of postoperative nausea/vomiting? NO       Female? YES       Motion sickness? NO       Intended opioid administration for postoperative analgesia? YES      Smoking Abstinence  Current Smoker? NO  Elective Surgery? YES  Seen preoperatively by anesthesiologist or proxy prior to day of surgery? YES  Pt abstained from smoking 24 hours prior to anesthesia?  N/A           Physical Exam    Airway  Mallampati: III  TM Distance: 4 - 6 cm  Neck ROM: normal range of motion   Mouth opening: Normal     Cardiovascular    Rhythm: regular  Rate: normal         Dental  No notable dental hx       Pulmonary  Breath sounds clear to auscultation               Abdominal  GI exam deferred       Other Findings            Anesthetic Plan    ASA: 3  Anesthesia type: general          Induction: Intravenous  Anesthetic plan and risks discussed with: Patient

## 2017-05-30 NOTE — ANESTHESIA POSTPROCEDURE EVALUATION
Post-Anesthesia Evaluation & Assessment    Visit Vitals    /68    Pulse 79    Temp 36.9 °C (98.5 °F)    Resp 18    Ht 5' 3\" (1.6 m)    Wt 102.1 kg (225 lb)    SpO2 91%    BMI 39.86 kg/m2       Nausea/Vomiting: no nausea and no vomiting    Pain score (VAS): 0    Post-operative hydration adequate.     Mental status & Level of consciousness: orientation per pre-anesthetic level    Neurological status: moves all extremities, sensation grossly intact    Pulmonary status: airway patent, no supplemental oxygen required    Complications related to anesthesia: none    Additional comments:        Moraima Willams CRNA  May 30, 2017

## 2017-05-31 ENCOUNTER — TELEPHONE (OUTPATIENT)
Dept: OTHER | Age: 62
End: 2017-05-31

## 2017-05-31 NOTE — TELEPHONE ENCOUNTER
Post hospital discharge f/u call to pt. Pt states she slept well last pm. She is having pain and is taking pain medication as directed. Reinforced to pt to restart taking ASA and Metformin 48 hours after breast surgery. She will begin taking them again on Thursday, June 1, 2017. She has a f/u ov with Dr Roberto Boyer on 6-6-17. All questions answered.

## 2017-06-02 RX ORDER — METOPROLOL TARTRATE 25 MG/1
12.5 TABLET, FILM COATED ORAL 2 TIMES DAILY
Qty: 60 TAB | Refills: 0 | Status: SHIPPED | OUTPATIENT
Start: 2017-06-02 | End: 2019-06-11 | Stop reason: ALTCHOICE

## 2017-06-13 ENCOUNTER — NURSE NAVIGATOR (OUTPATIENT)
Dept: OTHER | Age: 62
End: 2017-06-13

## 2017-06-13 ENCOUNTER — OFFICE VISIT (OUTPATIENT)
Dept: SURGERY | Age: 62
End: 2017-06-13

## 2017-06-13 VITALS
BODY MASS INDEX: 39.73 KG/M2 | SYSTOLIC BLOOD PRESSURE: 134 MMHG | RESPIRATION RATE: 16 BRPM | OXYGEN SATURATION: 97 % | HEIGHT: 63 IN | TEMPERATURE: 97.9 F | WEIGHT: 224.2 LBS | HEART RATE: 77 BPM | DIASTOLIC BLOOD PRESSURE: 74 MMHG

## 2017-06-13 DIAGNOSIS — E11.65 TYPE 2 DIABETES MELLITUS WITH HYPERGLYCEMIA, WITHOUT LONG-TERM CURRENT USE OF INSULIN (HCC): ICD-10-CM

## 2017-06-13 DIAGNOSIS — I10 ESSENTIAL HYPERTENSION: ICD-10-CM

## 2017-06-13 DIAGNOSIS — D05.11 DUCTAL CARCINOMA IN SITU (DCIS) OF RIGHT BREAST: Primary | ICD-10-CM

## 2017-06-13 RX ORDER — ASPIRIN 81 MG/1
81 TABLET ORAL DAILY
COMMUNITY
End: 2017-06-20

## 2017-06-13 RX ORDER — METFORMIN HYDROCHLORIDE 1000 MG/1
1000 TABLET ORAL 2 TIMES DAILY
COMMUNITY
Start: 2017-06-03 | End: 2017-06-20

## 2017-06-13 NOTE — OP NOTES
Rodney Carr    Name:  Ofelia Sierra  MR#:  [id number]  :  1955  Account #:  [de-identified]  Date of Adm:  2017  Date of Surgery:  2017      PREOPERATIVE DIAGNOSIS: Suspicious clustered calcifications,  right breast.    POSTOPERATIVE DIAGNOSIS: Suspicious clustered calcifications,  right breast. Await final pathology report. PROCEDURES PERFORMED: Right breast stereotactic biopsy. SURGEON: Carmita Jewell MD with Amaury Fajardo MD and  by radiologist assistants. RADIOLOGY TECHNICIAN: Dudley. ANESTHESIA: Xylocaine 1% with epinephrine, total of 15 mL; and 1%  Xylocaine plain 15 mL. ESTIMATED BLOOD LOSS: Less than 5 mL. SPECIMENS REMOVED: Multiple cores of tissue from the right breast  - calcifications seen on specimen mammogram.    OPERATIVE FINDINGS: The patient is a 22-year-old female who on a  recent mammogram had a suspicious area of calcifications in the  upper right breast. Stereotactic biopsy was recommended. The initial  cores that were obtained did not show calcifications in the specimen  mammogram, so some revision of the targeting and additional cores  were made and these did contain calcifications. DESCRIPTION OF PROCEDURE: The patient was brought to the  stereotactic biopsy room. The right breast was previously marked. We  had a discussion with the patient and permits were signed. Prior to  proceeding with the procedure, a time-out was performed. The right breast was placed in the 3D stereotactic equipment and the  mammogram taken visualizing the calcifications. The views were taken  with the stereotactic equipment. Then, we targeted the area of  calcifications. The skin was prepped and draped in the appropriate  manner using Hibiclens. The skin and subcutaneous tissue were  infiltrated with 1% Xylocaine with epinephrine. A small incision was  made with a #11 knife blade in the skin.  The core biopsy needle was  then advanced slowly into position in the pre-fire position. Additional  films were taken, we appeared to be on target. Then, the needle was  fired to its final position and repeat films suggested that we were on  target. Multiple cores of tissue were then taken in a 360-degree fashion  with the vacuum-assisted dermatome. At the end of this, pressure was  held and the specimens were sent for specimen mammogram, but no  calcifications were seen. Decision was made to retarget. The needle  was pulled out. We held compression on the breast for about 5  minutes first, then the breast was placed in the stereotactic equipment  and retargeting carried out. As we advanced the dermatome we did not  have to make a new incision. We advanced the dermatome to again its  final targeted fired position and it appeared to be on target again. Multiple additional cores of tissue were taken and indeed the specimen  mammogram did show calcifications. At this point, then we inserted a  marking clip and a completion film indicated that it was appropriately  fired. Additional pressure was held on the breast for about 5 more minutes,  there was some oozing, but this did stop. There appeared to be slight  swelling suggesting perhaps a small hematoma. The patient tolerated  the procedure well. Incision with Steri-Strip and a dressing was applied,  along with cold pack when the patient's left. Completion post biopsy  mammograms were taken with findings suggesting that multiple  calcifications were removed.         MD REGULO Clemente / DALTON  D:  06/12/2017   10:55  T:  06/12/2017   18:34  Job #:  [id number]

## 2017-06-13 NOTE — PATIENT INSTRUCTIONS
If you have any questions or concerns about today's appointment, the verbal and/or written instructions you were given for follow up care, please call our office at [phone]. [facility] [address]    [phone] office  [phone] fax    . PATIENT PRE AND POST OPERATIVE INSTRUCTIONS     [address] [place] [address]     Before Surgery Instructions:   1) You must have someone available to drive you to and from your procedure and stay with you for the first 24 hours. 2) It is very important that you have nothing to eat or drink after midnight the night before your surgery. This includes chewing gum or sucking on hard candy. Take only heart, blood pressure and cholesterol medications the morning of surgery with only a sip of water. 3) Please stop taking Plavix 10 days prior to your surgery. Stop taking Coumadin 5 days prior to your surgery. Stop taking all Aspirin or Aspirin containing products 7 days prior to your surgery. Stop taking Advil, Motrin, Aleve, and etc. 3 days prior to your surgery. 4) If you take any diabetic medications please consult with your primary care physician on how to take them on the day of your surgery  5) Please stop all Herbal products 2 weeks prior to your surgery. 6) Please arrive at the hospital 1 ½ hours prior to your surgery, unless you have been otherwise instructed. 7) Patients having an operation on their colon will be given a separate instruction sheet on their Bowel Prep. 8) For any pre-operative work up check in at the main entrance to [address] [place] [address], and then go to Patient Registration. These studies are done on a walk in basis they are open from 7:00am to 5:00pm Monday through Friday. 9) Please wash your surgical site the morning of your surgery with soap and water.   10) If you are of child bearing age you will have pregnancy test done the morning of your surgery as soon as you arrive. After Surgery Instructions: You will need to be seen in the office for a follow-up visit 7-14 days after your surgery. Please call after you have had the procedure to make this appointment. Unless otherwise instructed, you may remove your outer bandage and shower 48 hours after your surgery. If you develop a fever greater than 101, have any significant drainage, bleeding, swelling and/or pus of the wound. Please call our office immediately. Surgery Date and Time:  Tuesday, June 20, 2017 at  7:30am     Please check in at [facility], enter through the Emergency Room entrance and go up to the second floor. Please check in by 6:00am the day of your surgery. You may contact Neville Sarah with any questions at [phone].

## 2017-06-13 NOTE — NURSE NAVIGATOR
Post op f/u ov with Dr Patricia Saab. Pt will be scheduled for positive margin re-excision. No chemotherapy needed but will need AI after completion of RT. Survivorship t-shirt given. All questions answered. NCCN Distress Tool    Wendy peace assessed for management of distress using the NCCN Distress Thermometer for Patients tool. Mild to moderate distress  Scorin-4        Yes    No    -Practical/physical issues       []      []      -Provide community resources      []      []      -Provide list of support groups      []      []      -Provide list of national organizations and websites               []      []      -Provide ongoing supportive care by oncology medical team        []      []                  Comments/Referrals/Services provided:  No.    Moderate to severe distress  Scorin or greater                   Yes    No    -Navigator consulted with MD      [x]      []     -Navigator follow up         [x]      []     -Spiritual concerns        []      [x]     -Emotional/family concerns       [x]      []     -Refer to /mental health professional/PCP   [x]      []      Comments/Referral/Services provided:  Score:10. Dr Patricia Saab aware. Pt has been referred to her PCP to address her continuing anxiety. Will be reassessed in the future.

## 2017-06-13 NOTE — PROGRESS NOTES
Ashely Larsen is a 63 y.o. female who presents today for a post-op exam for an excisional right breast lumpectomy to achieve negative margins with localization performed on 05/30/17. Patient scores their post-op pain level on a pain scale from 1-10  as a 0 today. 1. Have you been to the ER, urgent care clinic since your last visit? Hospitalized since your last visit? No    2. Have you seen or consulted any other health care providers outside of the [facility] since your last visit? Include any pap smears or colon screening.  No

## 2017-06-13 NOTE — MR AVS SNAPSHOT
Visit Information Date & Time Provider Department Dept. Phone Encounter #  
 6/13/2017  1:30 PM Kortney Royal MD [facility] [address] [id number] Your Appointments 6/27/2017  2:00 PM  
POST OP with Kortney Royal MD  
[address] [place] [address]) Appt Note: Post op [address] [facility] [address]  
  
   
 [address] [facility]  
  
    
 7/6/2017 10:00 AM  
New Patient with Luis Daniel Fernandez MD  
[address] ([address]) Appt Note: [place]  
 [place] [address]  
  
   
 [address] [facility]  
  
    
 8/1/2017 10:00 AM  
Follow Up with Wesley Chairez MD  
[facility] [address]) Appt Note: 3 month follow-up [place]. [address]  
  
   
 [address] [place] Upcoming Health Maintenance Date Due  
 PAP AKA CERVICAL CYTOLOGY 8/8/1978 EYE EXAM RETINAL OR DILATED Q1 2/20/2016 INFLUENZA AGE 9 TO ADULT 8/1/2017 FOOT EXAM Q1 9/13/2017 MICROALBUMIN Q1 9/13/2017 HEMOGLOBIN A1C Q6M 11/3/2017 LIPID PANEL Q1 5/3/2018 BREAST CANCER SCRN MAMMOGRAM 4/19/2019 DTaP/Tdap/Td series (2 - Td) 10/7/2021 COLONOSCOPY 1/15/2023 Allergies as of 6/13/2017  Review Complete On: 6/13/2017 By: Rudy Harris No Known Allergies Current Immunizations  Reviewed on 5/22/2017 Name Date Influenza Vaccine 10/1/2015, 11/20/2014 Pneumococcal Polysaccharide (PPSV-23) 3/2/2017 Tdap 10/7/2011 12:00 AM  
  
 Not reviewed this visit Vitals BP Pulse Temp Resp Height(growth percentile) Weight(growth percentile)  134/74 (BP 1 Location: Left arm, BP Patient Position: Sitting) 77 97.9 °F (36.6 °C) (Oral) 16 5' 3\" (1.6 m) 224 lb 3.2 oz (101.7 kg) SpO2 BMI OB Status Smoking Status 97% 39.72 kg/m2 Postmenopausal Never Smoker Vitals History BMI and BSA Data Body Mass Index Body Surface Area  
 39.72 kg/m 2 2.13 m 2 Preferred Pharmacy Pharmacy Name Phone [facility] [phone] Your Updated Medication List  
  
   
This list is accurate as of: 6/13/17  2:35 PM.  Always use your most recent med list.  
  
  
  
  
 dilTIAZem 360 mg SR capsule Commonly known as:  TAZTIA XT Take 1 Cap by mouth daily. ergocalciferol 50,000 unit capsule Commonly known as:  VITAMIN D2 Take 1 Cap by mouth every fourteen (14) days. glipiZIDE SR 5 mg CR tablet Commonly known as:  GLUCOTROL XL  
TAKE ONE TABLET BY MOUTH ONCE DAILY losartan-hydroCHLOROthiazide 100-25 mg per tablet Commonly known as:  HYZAAR  
TAKE ONE TABLET BY MOUTH ONCE DAILY  
  
 metFORMIN 1,000 mg tablet Commonly known as:  GLUCOPHAGE  
  
 metoprolol tartrate 25 mg tablet Commonly known as:  LOPRESSOR Take 0.5 Tabs by mouth two (2) times a day. Patient Instructions If you have any questions or concerns about today's appointment, the verbal and/or written instructions you were given for follow up care, please call our office at [phone]. [facility] [address] [facility], [address] [facility], [address] [place] [address] 
 
[phone] office [phone] fax [place] PATIENT PRE AND POST OPERATIVE INSTRUCTIONS [address] Before Surgery Instructions:  
1) You must have someone available to drive you to and from your procedure and stay with you for the first 24 hours. 2) It is very important that you have nothing to eat or drink after midnight the night before your surgery. This includes chewing gum or sucking on hard candy. Take only heart, blood pressure and cholesterol medications the morning of surgery with only a sip of water. 3) Please stop taking Plavix 10 days prior to your surgery. Stop taking Coumadin 5 days prior to your surgery. Stop taking all Aspirin or Aspirin containing products 7 days prior to your surgery. Stop taking Advil, Motrin, Aleve, and etc. 3 days prior to your surgery. 4) If you take any diabetic medications please consult with your primary care physician on how to take them on the day of your surgery 5) Please stop all Herbal products 2 weeks prior to your surgery. 6) Please arrive at the hospital 1 ½ hours prior to your surgery, unless you have been otherwise instructed. 7) Patients having an operation on their colon will be given a separate instruction sheet on their Bowel Prep. 8) For any pre-operative work up check in at the main entrance to [address] [place] [address], and then go to Patient Registration. These studies are done on a walk in basis they are open from 7:00am to 5:00pm Monday through Friday. 9) Please wash your surgical site the morning of your surgery with soap and water. 10) If you are of child bearing age you will have pregnancy test done the morning of your surgery as soon as you arrive. After Surgery Instructions: You will need to be seen in the office for a follow-up visit 7-14 days after your surgery. Please call after you have had the procedure to make this appointment. Unless otherwise instructed, you may remove your outer bandage and shower 48 hours after your surgery. If you develop a fever greater than 101, have any significant drainage, bleeding, swelling and/or pus of the wound. Please call our office immediately. Surgery Date and Time:  Tuesday, June 20, 2017 at  7:30am  
 
Please check in at [facility], enter through the Emergency Room entrance and go up to the second floor. Please check in by 6:00am the day of your surgery. You may contact Jeevan Gardner with any questions at [phone]. Introducing [facility]! Jemma Foster introduces [employer] patient portal. Now you can access parts of your medical record, email your doctor's office, and request medication refills online. 1. In your internet browser, go to [email or web address] 2. Click on the First Time User? Click Here link in the Sign In box. You will see the New Member Sign Up page. 3. Enter your [employer] Access Code exactly as it appears below. You will not need to use this code after youve completed the sign-up process. If you do not sign up before the expiration date, you must request a new code. · [employer] Access Code: [email or web address] Expires: 8/6/2017 11:00 AM 
 
4. Enter the last four digits of your Social Security Number (xxxx) and Date of Birth (mm/dd/yyyy) as indicated and click Submit. You will be taken to the next sign-up page. 5. Create a [employer] ID. This will be your [employer] login ID and cannot be changed, so think of one that is secure and easy to remember. 6. Create a [employer] password. You can change your password at any time. 7. Enter your Password Reset Question and Answer. This can be used at a later time if you forget your password. 8. Enter your e-mail address. You will receive e-mail notification when new information is available in [address]. 9. Click Sign Up. You can now view and download portions of your medical record. 10. Click the Download Summary menu link to download a portable copy of your medical information. If you have questions, please visit the Frequently Asked Questions section of the [employer] website. Remember, [employer] is NOT to be used for urgent needs. For medical emergencies, dial 911. Now available from your iPhone and Android! Please provide this summary of care documentation to your next provider.  
  
  
 Your primary care clinician is listed as Jeronimo Frank. If you have any questions after today's visit, please call [phone].

## 2017-06-19 ENCOUNTER — ANESTHESIA EVENT (OUTPATIENT)
Dept: SURGERY | Age: 62
End: 2017-06-19
Payer: MEDICARE

## 2017-06-20 ENCOUNTER — ANESTHESIA (OUTPATIENT)
Dept: SURGERY | Age: 62
End: 2017-06-20
Payer: MEDICARE

## 2017-06-20 ENCOUNTER — NURSE NAVIGATOR (OUTPATIENT)
Dept: OTHER | Age: 62
End: 2017-06-20

## 2017-06-20 ENCOUNTER — HOSPITAL ENCOUNTER (OUTPATIENT)
Age: 62
Setting detail: OUTPATIENT SURGERY
Discharge: HOME | End: 2017-06-20
Attending: SPECIALIST | Admitting: SPECIALIST
Payer: MEDICARE

## 2017-06-20 VITALS
HEIGHT: 64 IN | WEIGHT: 221 LBS | TEMPERATURE: 98 F | DIASTOLIC BLOOD PRESSURE: 65 MMHG | SYSTOLIC BLOOD PRESSURE: 133 MMHG | BODY MASS INDEX: 37.73 KG/M2 | HEART RATE: 87 BPM | OXYGEN SATURATION: 95 % | RESPIRATION RATE: 16 BRPM

## 2017-06-20 DIAGNOSIS — D05.11 DUCTAL CARCINOMA IN SITU (DCIS) OF RIGHT BREAST: Primary | ICD-10-CM

## 2017-06-20 LAB
BUN BLD-MCNC: 12 MG/DL (ref 7–18)
CHLORIDE BLD-SCNC: 107 MMOL/L (ref 100–108)
GLUCOSE BLD STRIP.AUTO-MCNC: 126 MG/DL (ref 70–110)
GLUCOSE BLD STRIP.AUTO-MCNC: 154 MG/DL (ref 74–106)
HCT VFR BLD CALC: 41 % (ref 36–49)
HGB BLD-MCNC: 13.9 G/DL (ref 12–16)
POTASSIUM BLD-SCNC: 3.5 MMOL/L (ref 3.5–5.5)
SODIUM BLD-SCNC: 144 MMOL/L (ref 136–145)

## 2017-06-20 PROCEDURE — 77030010938 HC CLP LIG TELE -A: Performed by: SPECIALIST

## 2017-06-20 PROCEDURE — 77030003028 HC SUT VCRL J&J -A: Performed by: SPECIALIST

## 2017-06-20 PROCEDURE — 77030002986 HC SUT PROL J&J -A: Performed by: SPECIALIST

## 2017-06-20 PROCEDURE — 77030032490 HC SLV COMPR SCD KNE COVD -B: Performed by: SPECIALIST

## 2017-06-20 PROCEDURE — 74011000250 HC RX REV CODE- 250

## 2017-06-20 PROCEDURE — 77030018836 HC SOL IRR NACL ICUM -A: Performed by: SPECIALIST

## 2017-06-20 PROCEDURE — 82962 GLUCOSE BLOOD TEST: CPT

## 2017-06-20 PROCEDURE — 88307 TISSUE EXAM BY PATHOLOGIST: CPT | Performed by: SPECIALIST

## 2017-06-20 PROCEDURE — 74011250636 HC RX REV CODE- 250/636

## 2017-06-20 PROCEDURE — 77030002916 HC SUT ETHLN J&J -A: Performed by: SPECIALIST

## 2017-06-20 PROCEDURE — 77030008462 HC STPLR SKN PROX J&J -A: Performed by: SPECIALIST

## 2017-06-20 PROCEDURE — 77030010516 HC APPL HEMA CLP TELE -B: Performed by: SPECIALIST

## 2017-06-20 PROCEDURE — 76210000021 HC REC RM PH II 0.5 TO 1 HR: Performed by: SPECIALIST

## 2017-06-20 PROCEDURE — 76010000149 HC OR TIME 1 TO 1.5 HR: Performed by: SPECIALIST

## 2017-06-20 PROCEDURE — 74011250636 HC RX REV CODE- 250/636: Performed by: NURSE ANESTHETIST, CERTIFIED REGISTERED

## 2017-06-20 PROCEDURE — 74011636637 HC RX REV CODE- 636/637

## 2017-06-20 PROCEDURE — 82947 ASSAY GLUCOSE BLOOD QUANT: CPT

## 2017-06-20 PROCEDURE — 76210000006 HC OR PH I REC 0.5 TO 1 HR: Performed by: SPECIALIST

## 2017-06-20 PROCEDURE — 77030012510 HC MSK AIRWY LMA TELE -B: Performed by: NURSE ANESTHETIST, CERTIFIED REGISTERED

## 2017-06-20 PROCEDURE — 74011250636 HC RX REV CODE- 250/636: Performed by: SPECIALIST

## 2017-06-20 PROCEDURE — 74011250637 HC RX REV CODE- 250/637: Performed by: NURSE ANESTHETIST, CERTIFIED REGISTERED

## 2017-06-20 PROCEDURE — 76060000033 HC ANESTHESIA 1 TO 1.5 HR: Performed by: SPECIALIST

## 2017-06-20 PROCEDURE — 77030011267 HC ELECTRD BLD COVD -A: Performed by: SPECIALIST

## 2017-06-20 PROCEDURE — 74011000250 HC RX REV CODE- 250: Performed by: SPECIALIST

## 2017-06-20 PROCEDURE — 77030031139 HC SUT VCRL2 J&J -A: Performed by: SPECIALIST

## 2017-06-20 RX ORDER — LIDOCAINE HYDROCHLORIDE 20 MG/ML
INJECTION, SOLUTION EPIDURAL; INFILTRATION; INTRACAUDAL; PERINEURAL AS NEEDED
Status: DISCONTINUED | OUTPATIENT
Start: 2017-06-20 | End: 2017-06-20 | Stop reason: HOSPADM

## 2017-06-20 RX ORDER — BUPIVACAINE HYDROCHLORIDE AND EPINEPHRINE 5; 5 MG/ML; UG/ML
INJECTION, SOLUTION EPIDURAL; INTRACAUDAL; PERINEURAL AS NEEDED
Status: DISCONTINUED | OUTPATIENT
Start: 2017-06-20 | End: 2017-06-20 | Stop reason: HOSPADM

## 2017-06-20 RX ORDER — INSULIN LISPRO 100 [IU]/ML
INJECTION, SOLUTION INTRAVENOUS; SUBCUTANEOUS ONCE
Status: COMPLETED | OUTPATIENT
Start: 2017-06-20 | End: 2017-06-20

## 2017-06-20 RX ORDER — SODIUM CHLORIDE, SODIUM LACTATE, POTASSIUM CHLORIDE, CALCIUM CHLORIDE 600; 310; 30; 20 MG/100ML; MG/100ML; MG/100ML; MG/100ML
75 INJECTION, SOLUTION INTRAVENOUS CONTINUOUS
Status: DISCONTINUED | OUTPATIENT
Start: 2017-06-20 | End: 2017-06-20 | Stop reason: HOSPADM

## 2017-06-20 RX ORDER — MIDAZOLAM HYDROCHLORIDE 1 MG/ML
INJECTION, SOLUTION INTRAMUSCULAR; INTRAVENOUS AS NEEDED
Status: DISCONTINUED | OUTPATIENT
Start: 2017-06-20 | End: 2017-06-20 | Stop reason: HOSPADM

## 2017-06-20 RX ORDER — INSULIN LISPRO 100 [IU]/ML
INJECTION, SOLUTION INTRAVENOUS; SUBCUTANEOUS
Status: COMPLETED
Start: 2017-06-20 | End: 2017-06-20

## 2017-06-20 RX ORDER — SODIUM CHLORIDE 0.9 % (FLUSH) 0.9 %
5-10 SYRINGE (ML) INJECTION AS NEEDED
Status: DISCONTINUED | OUTPATIENT
Start: 2017-06-20 | End: 2017-06-20 | Stop reason: HOSPADM

## 2017-06-20 RX ORDER — ACETAMINOPHEN AND CODEINE PHOSPHATE 300; 30 MG/1; MG/1
1-2 TABLET ORAL
Qty: 30 TAB | Refills: 0 | Status: SHIPPED | OUTPATIENT
Start: 2017-06-20 | End: 2017-06-25

## 2017-06-20 RX ORDER — ONDANSETRON 2 MG/ML
INJECTION INTRAMUSCULAR; INTRAVENOUS AS NEEDED
Status: DISCONTINUED | OUTPATIENT
Start: 2017-06-20 | End: 2017-06-20 | Stop reason: HOSPADM

## 2017-06-20 RX ORDER — OXYCODONE AND ACETAMINOPHEN 7.5; 325 MG/1; MG/1
1 TABLET ORAL
Status: DISCONTINUED | OUTPATIENT
Start: 2017-06-20 | End: 2017-06-20 | Stop reason: HOSPADM

## 2017-06-20 RX ORDER — PROPOFOL 10 MG/ML
INJECTION, EMULSION INTRAVENOUS AS NEEDED
Status: DISCONTINUED | OUTPATIENT
Start: 2017-06-20 | End: 2017-06-20 | Stop reason: HOSPADM

## 2017-06-20 RX ORDER — CEFAZOLIN SODIUM 2 G/50ML
2 SOLUTION INTRAVENOUS
Status: COMPLETED | OUTPATIENT
Start: 2017-06-20 | End: 2017-06-20

## 2017-06-20 RX ORDER — FENTANYL CITRATE 50 UG/ML
50 INJECTION, SOLUTION INTRAMUSCULAR; INTRAVENOUS
Status: DISCONTINUED | OUTPATIENT
Start: 2017-06-20 | End: 2017-06-20

## 2017-06-20 RX ORDER — KETOROLAC TROMETHAMINE 30 MG/ML
INJECTION, SOLUTION INTRAMUSCULAR; INTRAVENOUS AS NEEDED
Status: DISCONTINUED | OUTPATIENT
Start: 2017-06-20 | End: 2017-06-20 | Stop reason: HOSPADM

## 2017-06-20 RX ORDER — FENTANYL CITRATE 50 UG/ML
INJECTION, SOLUTION INTRAMUSCULAR; INTRAVENOUS AS NEEDED
Status: DISCONTINUED | OUTPATIENT
Start: 2017-06-20 | End: 2017-06-20 | Stop reason: HOSPADM

## 2017-06-20 RX ORDER — SODIUM CHLORIDE, SODIUM LACTATE, POTASSIUM CHLORIDE, CALCIUM CHLORIDE 600; 310; 30; 20 MG/100ML; MG/100ML; MG/100ML; MG/100ML
50 INJECTION, SOLUTION INTRAVENOUS CONTINUOUS
Status: DISCONTINUED | OUTPATIENT
Start: 2017-06-20 | End: 2017-06-20 | Stop reason: HOSPADM

## 2017-06-20 RX ORDER — CEFAZOLIN SODIUM 2 G/50ML
2 SOLUTION INTRAVENOUS ONCE
Status: DISCONTINUED | OUTPATIENT
Start: 2017-06-20 | End: 2017-06-20 | Stop reason: HOSPADM

## 2017-06-20 RX ORDER — FENTANYL CITRATE 50 UG/ML
50 INJECTION, SOLUTION INTRAMUSCULAR; INTRAVENOUS
Status: DISCONTINUED | OUTPATIENT
Start: 2017-06-20 | End: 2017-06-20 | Stop reason: HOSPADM

## 2017-06-20 RX ORDER — FAMOTIDINE 20 MG/1
20 TABLET, FILM COATED ORAL ONCE
Status: COMPLETED | OUTPATIENT
Start: 2017-06-20 | End: 2017-06-20

## 2017-06-20 RX ORDER — SODIUM CHLORIDE 0.9 % (FLUSH) 0.9 %
5-10 SYRINGE (ML) INJECTION EVERY 8 HOURS
Status: DISCONTINUED | OUTPATIENT
Start: 2017-06-20 | End: 2017-06-20 | Stop reason: HOSPADM

## 2017-06-20 RX ADMIN — FENTANYL CITRATE 50 MCG: 50 INJECTION, SOLUTION INTRAMUSCULAR; INTRAVENOUS at 07:47

## 2017-06-20 RX ADMIN — INSULIN LISPRO 2 UNITS: 100 INJECTION, SOLUTION INTRAVENOUS; SUBCUTANEOUS at 06:47

## 2017-06-20 RX ADMIN — FENTANYL CITRATE 25 MCG: 50 INJECTION, SOLUTION INTRAMUSCULAR; INTRAVENOUS at 08:09

## 2017-06-20 RX ADMIN — MIDAZOLAM HYDROCHLORIDE 2 MG: 1 INJECTION, SOLUTION INTRAMUSCULAR; INTRAVENOUS at 07:44

## 2017-06-20 RX ADMIN — PROPOFOL 300 MG: 10 INJECTION, EMULSION INTRAVENOUS at 07:54

## 2017-06-20 RX ADMIN — FAMOTIDINE 20 MG: 20 TABLET ORAL at 06:42

## 2017-06-20 RX ADMIN — LIDOCAINE HYDROCHLORIDE 40 MG: 20 INJECTION, SOLUTION EPIDURAL; INFILTRATION; INTRACAUDAL; PERINEURAL at 07:49

## 2017-06-20 RX ADMIN — KETOROLAC TROMETHAMINE 15 MG: 30 INJECTION, SOLUTION INTRAMUSCULAR; INTRAVENOUS at 08:27

## 2017-06-20 RX ADMIN — CEFAZOLIN SODIUM 2 G: 2 SOLUTION INTRAVENOUS at 07:46

## 2017-06-20 RX ADMIN — SODIUM CHLORIDE, SODIUM LACTATE, POTASSIUM CHLORIDE, AND CALCIUM CHLORIDE: 600; 310; 30; 20 INJECTION, SOLUTION INTRAVENOUS at 07:44

## 2017-06-20 RX ADMIN — ONDANSETRON 4 MG: 2 INJECTION INTRAMUSCULAR; INTRAVENOUS at 08:24

## 2017-06-20 NOTE — ANESTHESIA PREPROCEDURE EVALUATION
Anesthetic History   No history of anesthetic complications            Review of Systems / Medical History  Patient summary reviewed and pertinent labs reviewed    Pulmonary  Within defined limits            Pertinent negatives: No smoker     Neuro/Psych   Within defined limits           Cardiovascular    Hypertension                   GI/Hepatic/Renal  Within defined limits              Endo/Other    Diabetes    Morbid obesity     Other Findings   Comments:   Risk Factors for Postoperative nausea/vomiting:       History of postoperative nausea/vomiting? NO       Female? YES       Motion sickness? NO       Intended opioid administration for postoperative analgesia? NO      Smoking Abstinence  Current Smoker? NO  Elective Surgery? YES  Seen preoperatively by anesthesiologist or proxy prior to day of surgery? YES  Pt abstained from smoking 24 hours prior to anesthesia?  N/A           Physical Exam    Airway  Mallampati: II  TM Distance: 4 - 6 cm  Neck ROM: normal range of motion   Mouth opening: Normal     Cardiovascular  Regular rate and rhythm,  S1 and S2 normal,  no murmur, click, rub, or gallop             Dental  No notable dental hx       Pulmonary  Breath sounds clear to auscultation               Abdominal  Abdominal exam normal       Other Findings            Anesthetic Plan    ASA: 3  Anesthesia type: general          Induction: Intravenous  Anesthetic plan and risks discussed with: Patient

## 2017-06-20 NOTE — H&P
17  Pt. Had excision of calcifications rt breast with positive lateral margin. Now for wider resection and placement biozorb device. History of present illness     Flo Barboza comes to the office today for evaluation because of a recent screening mammogram that showed an area of calcifications in the upper outer quadrant of the right breast which had increased compared to a mammogram from 2013. The patient has not been aware of any breast tenderness or lumps. She has no family history of breast cancer or ovarian cancer. She has never had any previous breast problems before.     Her menarche occurred at the age of 17. She is  4 para 4 and was 14years old at the time of her first pregnancy. She did not breast-feed. She took birth control pills for a total of about 1 year.  Her last menstrual period was at about the age of 50.     No Known Allergies       Past Medical History:   Diagnosis Date    Anemia       resolved    Diverticulosis     DM type 2 (diabetes mellitus, type 2) ([facility]      Hypertension      Internal hemorrhoids     Menopause      Vitamin D deficiency              Past Surgical History:   Procedure Laterality Date    HX COLONOSCOPY        repeat  - Yulyi    HX ORTHOPAEDIC         fluid drained from lt shoulder    HX TUBAL LIGATION           Social History                Social History    Marital status: UNKNOWN       Spouse name: N/A    Number of children: N/A    Years of education: N/A           Occupational History    has custody of grandson               Social History Main Topics    Smoking status: Never Smoker    Smokeless tobacco: None    Alcohol use No    Drug use: No    Sexual activity: Not Currently       Partners: Male         Comment:            Other Topics Concern    None      Social History Narrative         REVIEW OF SYSTEMS      Constitutional: No fever, weight loss, fatigue or recent chills.      Skin: No recent rashes, dermatitis or abnormal moles.      HEENT: No changes in vision, vertigo, epistaxis, dysphasia, or hoarseness.      Cardiac: No chest pain, palpitations, or edema. History of hypertension      Respiratory: No chronic cough, shortness of breath, wheezing, hemoptysis, or history of sleep apnea.       Breasts/GYN: No history of recent breast lumps or nipple discharge. Mammograms are up to date. No GYN-type problems. See the history of present illness. History of tubal ligation      Gastrointestinal: No significant food intolerances, no recent vomiting, no chronic abdominal pain, no change in bowel habits, no melena. No history of GERD.      Genitourinary: No history of hematuria, dysuria, frequency, or stress urinary incontinence. No nocturia.      Musculoskeletal: No weakness, joint pains, or arthritis.      Endocrine: No history of thyroid disease. Hx. Diabetes type II.      Lymph/hemo: No history of blood transfusions or easy bruising. Hx anemia. History of hypovitaminosis D      Neuro: No dizziness or headaches or fainting.     PHYSICAL EXAM          Visit Vitals    /75 (BP 1 Location: Right arm, BP Patient Position: Sitting)    Pulse 100    Temp 97.9 °F (36.6 °C) (Oral)    Resp 17    Ht 5' 3\" (1.6 m)    Wt 104.1 kg (229 lb 6.4 oz)    SpO2 99%    BMI 40.64 kg/m2          Constitutional: Well-developed, well-nourished, no acute distress. Patient is morbidly obese      Head: Head, eyes, ears, nose, throat within normal limits.      Skin: No suspicious moles or rashes.      Neck: No masses or adenopathy. The airway appears normal. Thyroid is not enlarged and there are no palpable thyroid nodules.      Lungs: Lungs are clear to auscultation and percussion. No respiratory distress.  No chest wall tenderness.      Heart: Heart is regular with no extra heart sounds or murmur heard.      Breast Exam: The breasts are free of any discrete tenderness or masses The skin and nipple areas appear normal. Both axillae are negative. She does have some slight thickening in the upper outer quadrant of the right breast but no discrete mass.      Abdomen: The abdomen is soft and nontender without organomegaly or masses. Bowel sounds are active and of normal pitch. There is no abdominal distention. No hernias are evident. Small scar at the umbilicus from previous tubal ligation.      Extremities: No tenderness of the extremities and no significant swelling.      Psych: Alert and oriented.     Assessment: #1 suspicious calcifications upper outer quadrant right breast with no previous significant risk factors for breast cancer. #2 obesity. #3 hypertension. #4 type 2 diabetes. #5 low vitamin D level.     Recommendations: I recommend that the patient undergo a stereotactic biopsy of the calcifications in the right breast. Risks and complications were explained to the patient and she is willing to proceed with this.     The above was done with voice recognition and there may be unrecognized errors.     Stephani Chisholm MD      Electronically signed by Dixon Obregon MD at 05/04/17 2555          Healing incision rt breast. Now for wider excision for margins.   Dixon Obregon MD  6/20/2017    Office Visit on 5/4/2017

## 2017-06-20 NOTE — INTERVAL H&P NOTE
H&P Update:  Flo Barboza was seen and examined. History and physical has been reviewed. The patient has been examined.  There have been no significant clinical changes since the completion of the originally dated History and Physical.    Signed By: Hetal Cueto MD     June 20, 2017 7:21 AM

## 2017-06-20 NOTE — PERIOP NOTES
4347  Patient received in PACU and connected to monitors. Vital signs stable. RN at bedside. Will continue to monitor.

## 2017-06-20 NOTE — BRIEF OP NOTE
BRIEF OPERATIVE NOTE    Date of Procedure: 6/20/2017   Preoperative Diagnosis: dcis right breast   D05.11 positive margin laterally  Postoperative Diagnosis: dcis right breast   d05.11  Same  Procedure(s):  reexcision right breast to achieve neg margin  Surgeon(s) and Role:     * Micah Mejía MD - Primary         Assistant Staff:       Surgical Staff:  Circ-1: Tab Barfield  Circ-2: Riana Velázquez  Scrub Tech-1: Orvis Smaller  Surg Asst-1: Willis Olivares  Event Time In   Incision Start 0805   Incision Close 5265     Anesthesia: General and 1/2%marcaine+epi-10ml  Estimated Blood Loss: 20ml  Specimens:   ID Type Source Tests Collected by Time Destination   1 : RIGHT BREAST MASS, [address] DCIS Preservative Breast  Micah Mejía MD 6/20/2017 0665 Pathology      Findings: seroma cavity; await path report   Complications: none  Implants: * No implants in log *

## 2017-06-20 NOTE — PROGRESS NOTES
conducted a pre-surgery visit with Tab Haq, who is a 63 y.o.,female. The  provided the following Interventions:  Initiated a relationship of care and support. Offered prayer and assurance of continued prayers on patient's behalf. Plan:  Chaplains will continue to follow and will provide pastoral care on an as needed/requested basis.  recommends bedside caregivers page  on duty if patient shows signs of acute spiritual or emotional distress.     [facility] Leader   Spiritual Care   [phone]

## 2017-06-20 NOTE — IP AVS SNAPSHOT
Ayan Dan 
 
 
 [address] 
[phone] Patient: Katiuska White MRN: [id number] BBX:[birth year] You are allergic to the following No active allergies Recent Documentation Height Weight BMI OB Status Smoking Status 1.626 m 100.2 kg 37.93 kg/m2 Postmenopausal Never Smoker Emergency Contacts Name Discharge Info Relation Home Work Mobile Sabrina Horowitz DISCHARGE CAREGIVER [3] Spouse [3]   [phone] About your hospitalization You were admitted on:  June 20, 2017 [facility] You were discharged on:  June 20, 2017 Unit phone number:  [phone] Why you were hospitalized Your primary diagnosis was:  Not on File Providers Seen During Your Hospitalizations Provider Role Specialty Primary office phone Sharon Hutchison MD Attending Provider General Surgery [phone] Your Primary Care Physician (PCP) Primary Care Physician Office Phone Office Fax [phone] [facility], [address] [place] [address] [phone] Follow-up Information Follow up With Details Comments Contact Info Virgen Stern MD   [facility] [address] [facility] [address] 
[phone] Your Appointments Tuesday June 27, 2017  2:00 PM EDT  
POST OP with Sharon Hutchison MD  
[address] [place] [address] [address] [id number]  
[phone] Thursday July 06, 2017 10:00 AM EDT New Patient with Yumiko Salamanca MD  
[address] [facility] ([address] [place] [address]) [facility] [address] [address]  
[phone] Tuesday August 01, 2017 10:00 AM EDT Follow Up with Virgen Stern MD  
[address]. [facility] [address]  
[phone] Current Discharge Medication List  
  
START taking these medications Dose & Instructions Dispensing Information Comments Morning Noon Evening Bedtime  
 acetaminophen-codeine 300-30 mg per tablet Commonly known as:  TYLENOL-CODEINE #3 Your last dose was: Your next dose is:    
   
   
 Dose:  1-2 Tab Take 1-2 Tabs by mouth every four (4) hours as needed for Pain for up to 5 days. Max Daily Amount: 12 Tabs. Quantity:  30 Tab Refills:  0 CONTINUE these medications which have NOT CHANGED Dose & Instructions Dispensing Information Comments Morning Noon Evening Bedtime  
 dilTIAZem 360 mg SR capsule Commonly known as:  TAZTIA XT Your last dose was: Your next dose is:    
   
   
 Dose:  360 mg Take 1 Cap by mouth daily. Quantity:  90 Cap Refills:  1  
     
   
   
   
  
 ergocalciferol 50,000 unit capsule Commonly known as:  VITAMIN D2 Your last dose was: Your next dose is:    
   
   
 Dose:  03667 Units Take 1 Cap by mouth every fourteen (14) days. Quantity:  4 Cap Refills:  0  
     
   
   
   
  
 glipiZIDE SR 5 mg CR tablet Commonly known as:  GLUCOTROL XL Your last dose was: Your next dose is: TAKE ONE TABLET BY MOUTH ONCE DAILY Quantity:  90 Tab Refills:  1  
     
   
   
   
  
 losartan-hydroCHLOROthiazide 100-25 mg per tablet Commonly known as:  HYZAAR Your last dose was: Your next dose is: TAKE ONE TABLET BY MOUTH ONCE DAILY Quantity:  90 Tab Refills:  1  
     
   
   
   
  
 metoprolol tartrate 25 mg tablet Commonly known as:  LOPRESSOR Your last dose was: Your next dose is:    
   
   
 Dose:  12.5 mg Take 0.5 Tabs by mouth two (2) times a day. Quantity:  60 Tab Refills:  0 STOP taking these medications   
 aspirin delayed-release 81 mg tablet metFORMIN 1,000 mg tablet Commonly known as:  GLUCOPHAGE Where to Get Your Medications Information on where to get these meds will be given to you by the nurse or doctor. ! Ask your nurse or doctor about these medications  
  acetaminophen-codeine 300-30 mg per tablet Discharge Instructions [facility] [id number] [place] [address], [address] [facility] ([place], [place]) 
[phone] Dr. Kourtney Gomez' Discharge Instructions Patient: Ashely Larsen MRN: [id number]  SSN: [id number] YOB: 1955  Age: 63 y.o. Sex: female General Instructions 1. No heavy lifting, straining or exercise until notified by the doctor. 2. Do not drive while you are still taking narcotic pain medications. 3. Ok to resume regular diet. 4. Alright to shower and wet wounds after 48 hours, but make sure to pat dry. Do not sit in bath or swim until approved by the doctor. 5. Please call [phone] to schedule follow-up appointment upon discharge. Follow-up should be in one week to ten days. Our office is located at: [address] [place] [address], [facility]. 6. Please fill prescriptions on the way home from the hospital, therefore any unforeseen problems can be dealt with during regular office hours. What to Expect 1. There will be some mild discomfort. Take the pain medication as necessary and don't try to be a hero. 2. There may be some clear to bloody discharge from the wounds. A small amount is normal.  There may be bruising around the incisions. 3. You might have a very small amount of dark stool or blood in the stool. 4. You might experience constipation due to the pain meds. If this occurs, please obtain a laxative from the pharmacy and take as instructed. A good choice is Milk of Magnesia. Also take a daily stool softener to prevent problems. When to call the office (What not to expect) 1. A fever of 101.5 or higher 2. Significant abdominal pain or vomiting 3. Inability to eat or drink 4. Pus from the wounds 5. Rash around the wounds 6. Pain or swelling of the legs When to seek emergency care 1. Significant chest pain or trouble breathing 2. Lightheadedness, dizziness or passing out 3. Significant blood in vomit or stool 4. Severe abdominal pain that will not go away 5. Very low or very high blood pressure. DISCHARGE SUMMARY from Nurse The following personal items are in your possession at time of discharge: 
 
Dental Appliances: None Visual Aid: Glasses PATIENT INSTRUCTIONS: 
 
After general anesthesia or intravenous sedation, for 24 hours or while taking prescription Narcotics: · Limit your activities · Do not drive and operate hazardous machinery · Do not make important personal or business decisions · Do  not drink alcoholic beverages · If you have not urinated within 8 hours after discharge, please contact your surgeon on call. Report the following to your surgeon: 
· Excessive pain, swelling, redness or odor of or around the surgical area · Temperature over 100.5 · Nausea and vomiting lasting longer than 4 hours or if unable to take medications · Any signs of decreased circulation or nerve impairment to extremity: change in color, persistent  numbness, tingling, coldness or increase pain · Any questions What to do at Home: These are general instructions for a healthy lifestyle: No smoking/ No tobacco products/ Avoid exposure to second hand smoke Surgeon General's Warning:  Quitting smoking now greatly reduces serious risk to your health. Obesity, smoking, and sedentary lifestyle greatly increases your risk for illness A healthy diet, regular physical exercise & weight monitoring are important for maintaining a healthy lifestyle You may be retaining fluid if you have a history of heart failure or if you experience any of the following symptoms:  Weight gain of 3 pounds or more overnight or 5 pounds in a week, increased swelling in our hands or feet or shortness of breath while lying flat in bed. Please call your doctor as soon as you notice any of these symptoms; do not wait until your next office visit. Recognize signs and symptoms of STROKE: 
 
F-face looks uneven A-arms unable to move or move unevenly S-speech slurred or non-existent T-time-call 911 as soon as signs and symptoms begin-DO NOT go Back to bed or wait to see if you get better-TIME IS BRAIN. Warning Signs of HEART ATTACK Call 911 if you have these symptoms: 
? Chest discomfort. Most heart attacks involve discomfort in the center of the chest that lasts more than a few minutes, or that goes away and comes back. It can feel like uncomfortable pressure, squeezing, fullness, or pain. ? Discomfort in other areas of the upper body. Symptoms can include pain or discomfort in one or both arms, the back, neck, jaw, or stomach. ? Shortness of breath with or without chest discomfort. ? Other signs may include breaking out in a cold sweat, nausea, or lightheadedness. Don't wait more than five minutes to call [address]! Fast action can save your life. Calling 911 is almost always the fastest way to get lifesaving treatment. Emergency Medical Services staff can begin treatment when they arrive  up to an hour sooner than if someone gets to the hospital by car. The discharge information has been reviewed with the patient. The patient verbalized understanding. Discharge medications reviewed with the patient and appropriate educational materials and side effects teaching were provided. Patient armband removed and given to patient to take home. Patient was informed of the privacy risks if armband lost or stolen Discharge Orders Procedure Order Date Status Priority Quantity Spec Type Associated Dx CALL YOUR DOCTOR For: Persistant nausea and vomiting., Redness, tenderness, or signs of infection. , Severe uncontrolled pain. Call for temp greater than 101 06/20/17 0858 Normal Routine 1  Ductal carcinoma in situ (DCIS) of right breast [[id number]] Comments:  Call for temp greater than 101 Questions: For:  Persistant nausea and vomiting. For:  Redness, tenderness, or signs of infection. For:  Severe uncontrolled pain. ACTIVITY AFTER DISCHARGE Patient should: Restrict lifting, Shower on day of dressing removal(no baths). 06/20/17 0858 Normal Routine 1  Ductal carcinoma in situ (DCIS) of right breast [[id number]] Questions: Patient should:  Restrict lifting Patient should: Shower on day of dressing removal(no baths). DIET REGULAR 06/20/17 0858 Normal Routine 1  Ductal carcinoma in situ (DCIS) of right breast [[id number]] DRESSING, REMOVE IN 48 HOURS 06/20/17 0858 Normal Routine 1  Ductal carcinoma in situ (DCIS) of right breast [[id number]] Comments:  Remove dressing in 48 hours. Leave steri strips if present. ACO Transitions of Care Introducing [employer] [address]les offers a voluntary care coordination program to provide high quality service and care to [facility] fee-for-service beneficiaries. Terri Limon was designed to help you enhance your health and well-being through the following services: ? Transitions of Care  support for individuals who are transitioning from one care setting to another (example: Hospital to home). ? Chronic and Complex Care Coordination  support for individuals and caregivers of those with serious or chronic illnesses or with more than one chronic (ongoing) condition and those who take a number of different medications.   
 
 
If you meet specific medical criteria, a [facility] Coordinator may call you directly to coordinate your care with your primary care physician and your other care providers. For questions about the [facility] programs, please, contact your physicians office. For general questions or additional information about Accountable Care Organizations: 
Please visit www.medicare.gov/acos. html or call 1-800-MEDICARE (2-126.232.6946) TTY users should call 7-539.507.4188. Introducing [facility]! [facility] introduces [employer] patient portal. Now you can access parts of your medical record, email your doctor's office, and request medication refills online. 1. In your internet browser, go to [email or web address] 2. Click on the First Time User? Click Here link in the Sign In box. You will see the New Member Sign Up page. 3. Enter your [employer] Access Code exactly as it appears below. You will not need to use this code after youve completed the sign-up process. If you do not sign up before the expiration date, you must request a new code. · [employer] Access Code: [email or web address] Expires: 8/6/2017 11:00 AM 
 
4. Enter the last four digits of your Social Security Number (xxxx) and Date of Birth (mm/dd/yyyy) as indicated and click Submit. You will be taken to the next sign-up page. 5. Create a [employer] ID. This will be your [employer] login ID and cannot be changed, so think of one that is secure and easy to remember. 6. Create a [employer] password. You can change your password at any time. 7. Enter your Password Reset Question and Answer. This can be used at a later time if you forget your password. 8. Enter your e-mail address. You will receive e-mail notification when new information is available in [address] 9. Click Sign Up. You can now view and download portions of your medical record. 10. Click the Download Summary menu link to download a portable copy of your medical information. If you have questions, please visit the Frequently Asked Questions section of the MyChart website. Remember, MyChart is NOT to be used for urgent needs. For medical emergencies, dial 911. Now available from your iPhone and Android! General Information Please provide this summary of care documentation to your next provider. Patient Signature:  ____________________________________________________________ Date:  ____________________________________________________________  
  
Isa Marlon Provider Signature:  ____________________________________________________________ Date:  ____________________________________________________________

## 2017-06-20 NOTE — NURSE NAVIGATOR
Pre op visit. Pt slept at times last pm. Family at bedside. Understands procedure and what to expect post op. All questions answered.

## 2017-06-20 NOTE — DISCHARGE INSTRUCTIONS
[facility]  [address] [place] [address]  [phone]    Dr. Tristan Stover' Discharge Instructions    Patient: Flo Barboza MRN: [id number]  SSN: [id number]    YOB: 1955  Age: 63 y.o. Sex: female       General Instructions    1. No heavy lifting, straining or exercise until notified by the doctor. 2. Do not drive while you are still taking narcotic pain medications. 3. Ok to resume regular diet. 4. Alright to shower and wet wounds after 48 hours, but make sure to pat dry. Do not sit in bath or swim until approved by the doctor. 5. Please call [phone] to schedule follow-up appointment upon discharge. Follow-up should be in one week to ten days. Our office is located at: [address] [place] [address]. 6. Please fill prescriptions on the way home from the hospital, therefore any unforeseen problems can be dealt with during regular office hours. What to Expect    1. There will be some mild discomfort. Take the pain medication as necessary and don't try to be a hero. 2. There may be some clear to bloody discharge from the wounds. A small amount is normal.  There may be bruising around the incisions. 3. You might have a very small amount of dark stool or blood in the stool. 4. You might experience constipation due to the pain meds. If this occurs, please obtain a laxative from the pharmacy and take as instructed. A good choice is Milk of Magnesia. Also take a daily stool softener to prevent problems. When to call the office (What not to expect)    1. A fever of 101.5 or higher  2. Significant abdominal pain or vomiting  3. Inability to eat or drink  4. Pus from the wounds  5. Rash around the wounds  6. Pain or swelling of the legs     When to seek emergency care    1. Significant chest pain or trouble breathing  2. Lightheadedness, dizziness or passing out  3. Significant blood in vomit or stool  4.  Severe abdominal pain that will not go away  5. Very low or very high blood pressure. DISCHARGE SUMMARY from Nurse    The following personal items are in your possession at time of discharge:    Dental Appliances: None  Visual Aid: Glasses                            PATIENT INSTRUCTIONS:    After general anesthesia or intravenous sedation, for 24 hours or while taking prescription Narcotics:  · Limit your activities  · Do not drive and operate hazardous machinery  · Do not make important personal or business decisions  · Do  not drink alcoholic beverages  · If you have not urinated within 8 hours after discharge, please contact your surgeon on call. Report the following to your surgeon:  · Excessive pain, swelling, redness or odor of or around the surgical area  · Temperature over 100.5  · Nausea and vomiting lasting longer than 4 hours or if unable to take medications  · Any signs of decreased circulation or nerve impairment to extremity: change in color, persistent  numbness, tingling, coldness or increase pain  · Any questions        What to do at Home:        These are general instructions for a healthy lifestyle:    No smoking/ No tobacco products/ Avoid exposure to second hand smoke    Surgeon General's Warning:  Quitting smoking now greatly reduces serious risk to your health. Obesity, smoking, and sedentary lifestyle greatly increases your risk for illness    A healthy diet, regular physical exercise & weight monitoring are important for maintaining a healthy lifestyle    You may be retaining fluid if you have a history of heart failure or if you experience any of the following symptoms:  Weight gain of 3 pounds or more overnight or 5 pounds in a week, increased swelling in our hands or feet or shortness of breath while lying flat in bed. Please call your doctor as soon as you notice any of these symptoms; do not wait until your next office visit.     Recognize signs and symptoms of STROKE:    F-face looks uneven    A-arms unable to move or move unevenly    S-speech slurred or non-existent    T-time-call 911 as soon as signs and symptoms begin-DO NOT go       Back to bed or wait to see if you get better-TIME IS BRAIN. Warning Signs of HEART ATTACK     Call 911 if you have these symptoms:   Chest discomfort. Most heart attacks involve discomfort in the center of the chest that lasts more than a few minutes, or that goes away and comes back. It can feel like uncomfortable pressure, squeezing, fullness, or pain.  Discomfort in other areas of the upper body. Symptoms can include pain or discomfort in one or both arms, the back, neck, jaw, or stomach.  Shortness of breath with or without chest discomfort.  Other signs may include breaking out in a cold sweat, nausea, or lightheadedness. Don't wait more than five minutes to call 911 - MINUTES MATTER! Fast action can save your life. Calling 911 is almost always the fastest way to get lifesaving treatment. Emergency Medical Services staff can begin treatment when they arrive -- up to an hour sooner than if someone gets to the hospital by car. The discharge information has been reviewed with the patient. The patient verbalized understanding. Discharge medications reviewed with the patient and appropriate educational materials and side effects teaching were provided. Patient armband removed and given to patient to take home.   Patient was informed of the privacy risks if armband lost or stolen

## 2017-06-20 NOTE — OP NOTES
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 PROCEDURES PERFORMED: Wider excision of ductal carcinoma in  situ of the right breast to include a wider lateral margin to achieve  negative margins. SURGEON: Hetal Cueto MD    ASSISTANT: SA Castillo, surgical assistant, and student surgical  assistant Mirta Grewal. ANESTHESIA: General LMA plus 0.5% Marcaine with epinephrine, 10  mL. ESTIMATED BLOOD LOSS: 20 mL. SPECIMENS REMOVED: Additional right breast skin in lateral margin  with short stitch marking the superior margin and a long stitch marking  the lateral margin. OPERATIVE FINDINGS: The patient is a 30-year-old female who on  recent mammogram was noted to have significant change in  calcifications. Biopsy was done which showed some ADH. Her wide  excision showed DCIS, which actually measured to a distance of about  5 cm, much larger then what we saw on the mammogram. The anterior  margin was close, but the lateral margin was positive; therefore, it was  recommended that she have additional excision to achieve negative  margins. OPERATIVE PROCEDURE: The patient was brought to the operating  room after being seen preoperatively and having had the right breast  marked. She was placed under general anesthesia with an LMA. The  right breast was prepped and draped in the appropriate manner. A  timeout was performed.  We marked an elliptical incision to include the  old incision, but we stayed fairly close just to take down some of the  anterior skin and margin, but then we extended the margin laterally a  distance of about 2 cm on the skin, but even wider than that as we  went deeper into the tissue including the lateral margin of the previous  excised cavity. The wound was irrigated. Cautery was used for any areas of oozing. There appeared to be good hemostasis. Thus, we excised some  anterior margin and a much wider lateral margin all the way down  almost to the chest wall. The wound was closed in 2 layers, interrupted 2-0 Vicryl to the deeper  tissue and interrupted 3-0 Vicryl to the subdermal tissue. Then, the  skin was closed with subcuticular 4-0 Prolene. Steri-Strips applied  and sterile dressing. It should be mentioned that the patient did have a seroma cavity at the  time of surgery and this helped to arthur the previous margins for us  so that it was clear what the lateral margin was.         MD REGULO Dye / Ying.Edinson  D:  06/20/2017   08:48  T:  06/20/2017   09:38  Job #:  [id number]

## 2017-06-20 NOTE — ANESTHESIA POSTPROCEDURE EVALUATION
Post-Anesthesia Evaluation and Assessment    Patient: Joyce Rankin MRN: [id number]  SSN: [id number]    YOB: 1955  Age: 63 y.o. Sex: female      Data from PACU flowsheet    Cardiovascular Function/Vital Signs  Visit Vitals    /65 (BP 1 Location: Left arm, BP Patient Position: At rest)    Pulse 87    Temp 36.7 °C (98 °F)    Resp 16    Ht 5' 4\" (1.626 m)    Wt 100.2 kg (221 lb)    SpO2 95%    BMI 37.93 kg/m2       Patient is status post general anesthesia for Procedure(s):  revision right breast to achieve neg margin. Nausea/Vomiting: controlled    Postoperative hydration reviewed and adequate. Pain:  Pain Scale 1: Numeric (0 - 10) (06/20/17 0940)  Pain Intensity 1: 0 (06/20/17 0940)   Managed      Mental Status and Level of Consciousness: Alert and oriented     Pulmonary Status:   O2 Device: Room air (06/20/17 0925)   Adequate oxygenation and airway patent    Complications related to anesthesia: None    Post-anesthesia assessment completed.  No concerns    Signed By: Han Flowers MD     June 20, 2017

## 2017-06-20 NOTE — H&P (VIEW-ONLY)
SUBJECTIVE: Katiuska White is a 63 y.o.  female is seen for a visit to discuss the results of her recent stereotactic biopsy of abnormal calcifications in the right breast.. Reports no problems with the wound or other issues. Activity, diet and bowels are normal. Minimal pain. She did get some bruising. OBJECTIVE: Appears well. Wound is healing well without complications except for some bruising and perhaps a small hematoma. The pathology report came back showing areas of lobular carcinoma in situ, atypical ductal hyperplasia but also an intraductal papilloma with DCIS. ASSESSMENT: Normal postoperative course, doing well. New diagnosis of DCIS from an area of abnormal calcifications in the right breast.    PLAN: I had a good discussion with the patient regarding her diagnosis. We discussed the options for treatment. I recommended that she undergo a partial mastectomy with localization. We discussed the need for radiation therapy and she will see Dr. Loyd Ahuja prior to that procedure. Follow-up Disposition: Not on File    Sharon Hutchison MD    Please note: This document has been produced using voice recognition software. Unrecognizable air is in transcription may be present.

## 2017-06-21 ENCOUNTER — TELEPHONE (OUTPATIENT)
Dept: OTHER | Age: 62
End: 2017-06-21

## 2017-06-21 NOTE — TELEPHONE ENCOUNTER
Post hospital discharge f/u call to pt. Pt states she is \"sore\" but doing OK. She slept \"off and on\" last night. Spoke with her about transportation concerns while receiving radiation therapy and she stated she has Medicaid and she will try to arrange transportation through them. Instructed her that I could check with [employer] if needed. F/u ov with Dr Carlos Penny on 6-27-17. All questions answered.

## 2017-06-21 NOTE — PROGRESS NOTES
SUBJECTIVE: Jose Guadalupe Espinoza is a 63 y.o.  female is seen for a routine postop check. Reports no problems with the wound or other issues. Activity, diet and bowels are normal. Minimal pain. OBJECTIVE: Appears well. Wound is healing well without complications or infection. ASSESSMENT: Normal postoperative course, doing well. 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 It measured approximately 5 cm. The margins were close anteriorly but the lateral margin was involved. The tumor is grade 2. It is estrogen and progestin receptor positive. PLAN: Sutures removed. I had a good discussion with the patient. She had been presented to our recent tumor board. It is my recommendation that she be taken back for wider excision primarily in the lateral border to achieve negative margins. Subsequent to that she will need radiation therapy and probably hormonal therapy. The patient was in agreement with this plan. She will be scheduled for the additional surgery of a lumpectomy to achieve negative margins. Follow-up Disposition: Not on File    Debo Garces MD    Please note: This document has been produced using voice recognition software. Unrecognizable air is in transcription may be present.

## 2017-06-23 ENCOUNTER — TELEPHONE (OUTPATIENT)
Dept: FAMILY MEDICINE CLINIC | Age: 62
End: 2017-06-23

## 2017-06-23 NOTE — TELEPHONE ENCOUNTER
Incoming Fax From [address] requesting:    OMEGA 3 capsules  360/90 days    (See [employer])  Signature Requried

## 2017-06-27 ENCOUNTER — NURSE NAVIGATOR (OUTPATIENT)
Dept: OTHER | Age: 62
End: 2017-06-27

## 2017-06-27 ENCOUNTER — OFFICE VISIT (OUTPATIENT)
Dept: SURGERY | Age: 62
End: 2017-06-27

## 2017-06-27 VITALS
RESPIRATION RATE: 16 BRPM | BODY MASS INDEX: 38.04 KG/M2 | WEIGHT: 222.8 LBS | TEMPERATURE: 96.5 F | DIASTOLIC BLOOD PRESSURE: 76 MMHG | OXYGEN SATURATION: 97 % | HEART RATE: 76 BPM | SYSTOLIC BLOOD PRESSURE: 147 MMHG | HEIGHT: 64 IN

## 2017-06-27 DIAGNOSIS — E11.65 TYPE 2 DIABETES MELLITUS WITH HYPERGLYCEMIA, WITHOUT LONG-TERM CURRENT USE OF INSULIN (HCC): ICD-10-CM

## 2017-06-27 DIAGNOSIS — I10 ESSENTIAL HYPERTENSION: ICD-10-CM

## 2017-06-27 DIAGNOSIS — D05.11 DUCTAL CARCINOMA IN SITU (DCIS) OF RIGHT BREAST: Primary | ICD-10-CM

## 2017-06-27 NOTE — MR AVS SNAPSHOT
Visit Information Date & Time Provider Department Dept. Phone Encounter #  
 6/27/2017  2:00 PM Peter Anglin MD [facility] [address] [facility] [id number] Follow-up Instructions Return in about 4 months (around 10/27/2017). Follow-up and Disposition History Your Appointments 7/6/2017 10:00 AM  
New Patient with Will Phillips MD  
[address] ([facility]) Appt Note: WWE  
 [facility] [address]  
  
   
 [address] [facility]  
  
    
 7/13/2017  1:00 PM  
New Patient with Seamus Carreon MD  
[address] [facility]) Appt Note: Referred by Dr. Rachel Casanova for AI  
 [address] [facility] [address]  
  
    
 8/1/2017 10:00 AM  
Follow Up with Frances Shabazz MD  
[address] [facility]) Appt Note: 3 month follow-up [facility] [address]  
  
   
 [address] [facility]  
  
    
 10/24/2017  3:00 PM  
Follow Up with Peter Anglin MD  
[address] [place] [place] ([facility]) Appt Note: 4 month follow up  
 [address] [facility] [address] [facility] [address]  
  
   
 [address] [facility] 88 [facility] Upcoming Health Maintenance Date Due  
 PAP AKA CERVICAL CYTOLOGY 8/8/1978 EYE EXAM RETINAL OR DILATED Q1 2/20/2016 INFLUENZA AGE 9 TO ADULT 8/1/2017 FOOT EXAM Q1 9/13/2017 MICROALBUMIN Q1 9/13/2017 HEMOGLOBIN A1C Q6M 11/3/2017 LIPID PANEL Q1 5/3/2018 BREAST CANCER SCRN MAMMOGRAM 4/19/2019 DTaP/Tdap/Td series (2 - Td) 10/7/2021 COLONOSCOPY 1/15/2023 Allergies as of 6/27/2017  Review Complete On: 6/27/2017 By: Peter Anglin MD  
 No Known Allergies Current Immunizations  Reviewed on 5/22/2017 Name Date Influenza Vaccine 10/1/2015, 11/20/2014 Pneumococcal Polysaccharide (PPSV-23) 3/2/2017 Tdap 10/7/2011 12:00 AM  
  
 Not reviewed this visit You Were Diagnosed With   
  
 Codes Comments Ductal carcinoma in situ (DCIS) of right breast    -  Primary ICD-10-CM: D05.11 ICD-9-CM: 233.0 Type 2 diabetes mellitus with hyperglycemia, without long-term current use of insulin (HCC)     ICD-10-CM: E11.65 ICD-9-CM: 250.00, 790.29 Essential hypertension     ICD-10-CM: I10 
ICD-9-CM: 401.9 Vitals BP Pulse Temp Resp Height(growth percentile) Weight(growth percentile) 147/76 (BP 1 Location: Left arm, BP Patient Position: Sitting) 76 96.5 °F (35.8 °C) (Oral) 16 5' 4\" (1.626 m) 222 lb 12.8 oz (101.1 kg) SpO2 BMI OB Status Smoking Status 97% 38.24 kg/m2 Postmenopausal Never Smoker BMI and BSA Data Body Mass Index Body Surface Area  
 38.24 kg/m 2 2.14 m 2 Preferred Pharmacy Pharmacy Name Phone [address] [phone] Your Updated Medication List  
  
   
This list is accurate as of: 6/27/17  2:29 PM.  Always use your most recent med list.  
  
  
  
  
 dilTIAZem 360 mg SR capsule Commonly known as:  TAZTIA XT Take 1 Cap by mouth daily. ergocalciferol 50,000 unit capsule Commonly known as:  VITAMIN D2 Take 1 Cap by mouth every fourteen (14) days. glipiZIDE SR 5 mg CR tablet Commonly known as:  GLUCOTROL XL  
TAKE ONE TABLET BY MOUTH ONCE DAILY losartan-hydroCHLOROthiazide 100-25 mg per tablet Commonly known as:  HYZAAR  
TAKE ONE TABLET BY MOUTH ONCE DAILY  
  
 metoprolol tartrate 25 mg tablet Commonly known as:  LOPRESSOR Take 0.5 Tabs by mouth two (2) times a day. We Performed the Following REFERRAL TO ONCOLOGY [JYV36 Custom] Follow-up Instructions Return in about 4 months (around 10/27/2017). Referral Information Referral ID Referred By Referred To  
  
 [id number] [place], [address] [place], [address] Phone: [phone] Fax: [phone] Visits Status Start Date End Date 1 New Request 6/27/17 6/27/18 If your referral has a status of pending review or denied, additional information will be sent to support the outcome of this decision. Patient Instructions If you have any questions or concerns about today's appointment, the verbal and/or written instructions you were given for follow up care, please call our office at [phone]. [facility] [id number] [place] [address], [address] 
 
[phone] office [phone] fax Appointment: Thursday, July 13, 2017 at 1:00pm  with Dr. Megan Pardo with [address] [place] [address] located at [address] [place] [address] [address] [facility] (I) [phone], please arrive 15-20 minutes early to register Patient Instructions History Introducing [facility]! Elaine Rawls introduces [employer] patient portal. Now you can access parts of your medical record, email your doctor's office, and request medication refills online. 1. In your internet browser, go to [email or web address] 2. Click on the First Time User? Click Here link in the Sign In box. You will see the New Member Sign Up page. 3. Enter your [employer] Access Code exactly as it appears below. You will not need to use this code after youve completed the sign-up process. If you do not sign up before the expiration date, you must request a new code. · [employer] Access Code: [email or web address] Expires: 8/6/2017 11:00 AM 
 
4. Enter the last four digits of your Social Security Number (xxxx) and Date of Birth (mm/dd/yyyy) as indicated and click Submit.  You will be taken to the next sign-up page. 5. Create a [employer] ID. This will be your [employer] login ID and cannot be changed, so think of one that is secure and easy to remember. 6. Create a [employer] password. You can change your password at any time. 7. Enter your Password Reset Question and Answer. This can be used at a later time if you forget your password. 8. Enter your e-mail address. You will receive e-mail notification when new information is available in [address] 9. Click Sign Up. You can now view and download portions of your medical record. 10. Click the Download Summary menu link to download a portable copy of your medical information. If you have questions, please visit the Frequently Asked Questions section of the [employer] website. Remember, [employer] is NOT to be used for urgent needs. For medical emergencies, dial 911. Now available from your iPhone and Android! Please provide this summary of care documentation to your next provider. Your primary care clinician is listed as Dwayne Mayo. If you have any questions after today's visit, please call [phone].

## 2017-06-27 NOTE — NURSE NAVIGATOR
Re excision f/u ov with Dr Christine Em. Pt doing well. She will schedule f/u ov with Dr Aurelia Mckinley. F/u ov with Dr Christine Em in four months. Appt with Dr Rl Schmidt to discuss hormone blocker pending. All questions answered.

## 2017-06-27 NOTE — PROGRESS NOTES
SUBJECTIVE: Alice Gallego is a 63 y.o.  female is seen for a routine postop check. Reports no problems with the wound or other issues. Activity, diet and bowels are normal. Minimal pain. OBJECTIVE: Appears well. Wound is healing well without complications or infection. ASSESSMENT: Normal postoperative course, doing well. Initial pathology showed a 5 cm area of mixed ductal and lobular carcinomas in situ. No invasive carcinoma was evident. There was a positive medial margin but on repeat resection there is no evidence of any residual disease. The tumor was estrogen and progestin receptor positive. PLAN: Sutures removed. Patient will follow up with Dr. Alyce Cason regarding radiation therapy to start within the next 2-3 weeks. I told her that she should also consider hormonal therapy so I have referred her to Dr. Juanita Sim for his evaluation and recommendations. I will see the patient back in about 4 months. Follow-up Disposition:  Return in about 4 months (around 10/27/2017). Mendoza Borrero MD    Please note: This document has been produced using voice recognition software. Unrecognizable air is in transcription may be present.

## 2017-06-27 NOTE — PROGRESS NOTES
Robert Velasco is a 63 y.o. female who presents today for a post-op exam for a wider excision of ductal carcinoma in situ of the right breast to include a wider lateral margin to achieve negative margins performed on 06/20/17. Patient scores their post-op pain level on a pain scale from 1-10  as a 0 today. 1. Have you been to the ER, urgent care clinic since your last visit? Hospitalized since your last visit? No    2. Have you seen or consulted any other health care providers outside of the [address] [place] [address] since your last visit? Include any pap smears or colon screening.  No

## 2017-06-27 NOTE — COMMUNICATION BODY
Dear Arnoldo Norman came back to the office today after the recent surgery to excise a wider margin to achieve a negative margin from her recent lumpectomy. We excised additional  margin and fortunately that was negative. Her wound is healing well. She will likely begin radiation therapy in about 3 weeks. I have referred her to Dr. Rudi Orozco, our medical oncologist, because after radiation therapy she likely will need hormonal therapy. Thank you again for allowing me to work with you in her care.     With kindest regards,    Tejas Infante MD    Cc: Dr. Rudi Orozco

## 2017-06-27 NOTE — PATIENT INSTRUCTIONS
If you have any questions or concerns about today's appointment, the verbal and/or written instructions you were given for follow up care, please call our office at [phone].     [facility]  [address] [place] [address], [address]    [phone] office  [phone] fax    Appointment: Thursday, July 13, 2017 at 1:00pm  with Dr. Arthur Rivero with [address] [place] [address] located at [address] [place] [address] [address] [facility] (j) [phone], please arrive 15-20 minutes early to register

## 2017-07-06 ENCOUNTER — HOSPITAL ENCOUNTER (OUTPATIENT)
Dept: RADIATION THERAPY | Age: 62
Discharge: HOME | End: 2017-07-06
Payer: MEDICARE

## 2017-07-06 PROCEDURE — 99211 OFF/OP EST MAY X REQ PHY/QHP: CPT

## 2017-07-11 ENCOUNTER — HOSPITAL ENCOUNTER (OUTPATIENT)
Dept: RADIATION THERAPY | Age: 62
Discharge: HOME | End: 2017-07-11
Payer: MEDICARE

## 2017-07-11 PROCEDURE — 77290 THER RAD SIMULAJ FIELD CPLX: CPT

## 2017-07-11 PROCEDURE — 77332 RADIATION TREATMENT AID(S): CPT

## 2017-07-12 ENCOUNTER — HOSPITAL ENCOUNTER (OUTPATIENT)
Dept: RADIATION THERAPY | Age: 62
Discharge: HOME | End: 2017-07-12
Payer: MEDICARE

## 2017-07-12 PROCEDURE — 77300 RADIATION THERAPY DOSE PLAN: CPT

## 2017-07-12 PROCEDURE — 77295 3-D RADIOTHERAPY PLAN: CPT

## 2017-07-12 PROCEDURE — 77334 RADIATION TREATMENT AID(S): CPT

## 2017-07-13 ENCOUNTER — HOSPITAL ENCOUNTER (OUTPATIENT)
Dept: RADIATION THERAPY | Age: 62
Discharge: HOME | End: 2017-07-13
Payer: MEDICARE

## 2017-07-13 ENCOUNTER — OFFICE VISIT (OUTPATIENT)
Dept: ONCOLOGY | Age: 62
End: 2017-07-13

## 2017-07-13 VITALS — RESPIRATION RATE: 16 BRPM | OXYGEN SATURATION: 100 % | WEIGHT: 223 LBS | HEIGHT: 64 IN | BODY MASS INDEX: 38.07 KG/M2

## 2017-07-13 DIAGNOSIS — D05.11 DUCTAL CARCINOMA IN SITU (DCIS) OF RIGHT BREAST: Primary | ICD-10-CM

## 2017-07-13 DIAGNOSIS — D75.839 THROMBOCYTOSIS: ICD-10-CM

## 2017-07-13 DIAGNOSIS — E55.9 VITAMIN D DEFICIENCY: ICD-10-CM

## 2017-07-13 PROCEDURE — 77280 THER RAD SIMULAJ FIELD SMPL: CPT

## 2017-07-13 PROCEDURE — 77412 RADIATION TX DELIVERY LVL 3: CPT

## 2017-07-13 RX ORDER — METFORMIN HYDROCHLORIDE 1000 MG/1
TABLET ORAL
COMMUNITY
Start: 2017-06-03 | End: 2017-07-19 | Stop reason: SDUPTHER

## 2017-07-13 NOTE — PROGRESS NOTES
[facility] HEMATOLOGY-MEDICAL ONCOLOGY     Patient Active Problem List   Diagnosis Code    Anemia D64.9    DM type 2 (diabetes mellitus, type 2) ([facility] [address] E11.9    Hypertension I10    Vitamin D deficiency E55.9    Breast calcifications on mammogram R92.1    Thrombocytosis (HCC) D47.3    Morbid obesity due to excess calories (HCC) E66.01    Ductal carcinoma in situ (DCIS) of right breast D05.11         Assessment/ Plan:     1.) Right Non-invasive breast cancer (DCIS)/ (LCIS)  -AJCC STAGE: pTis.   -ER+/ME+  -s/p right lumptectomy per Dr. Jimmy Jones 5/2017  -Pt. to have adj. XRT with Rad./Onc. (Dr. Gloria Nye 7/13/17  -F/u  with gyn. once yearly  -No known contraindications to Tx with Tamoxifen  -Plan for chemoprophylaxis with tamoxifen once XRT is completed  -Rtc. in 5 weeks to reevaluate clinically and for further medical decision making      Pathology Report 5/2017:     FINAL DIAGNOSIS:   RIGHT BREAST MASS, LUMPECTOMY:   EXTENSIVE IN-SITU MAMMARY CARCINOMA (SEE COMMENT). HISTOLOGIC TYPE: MIXED DUCTAL AND LOBULAR. NUCLEAR stGstRstAstDstEst:st st1st. NECROSIS: NOT IDENTIFIED. ESTIMATED SIZE: APPROXIMATELY 5 CM (INVOLVING APPROXIMATELY ½ OF   SPECIMEN). MARGINS OF RESECTION: POSITIVE AT LATERAL MARGIN, MULTIFOCALLY CLOSE AT   ANTERIOR MARGIN.   LYMPH NODES: NOT SUBMITTED. ESTROGEN RECEPTOR: POSITIVE   PROGESTERONE RECEPTOR: POSITIVE. OTHER PATHOLOGIC FINDINGS: EXTENSIVE SCLEROSING ADENOSIS, FIBROCYSTIC   CHANGE, INTRADUCTAL PAPILLOMAS, BIOPSY SITE CHANGES. AJCC STAGE: pTis. Thank you for the opportunity to participate in the care, please do not hesitate to call for any questions or concerns. I have discussed the diagnosis with the patient and the intended plan. The patient verbalized understanding and agrees with the plan.     Subjective:    No bone pain  Appetite stable      History:   Dave Yeager is a 63 y.o. female presenting today for:     -Right Non-invasive breast cancer (DCIS)  -ER+/NC+  -s/p right lumptectomy per Dr. Lisa Natarajan  -F/u  with gyn. once yearly  -No contraindications to Tx with Tamoxifen    No fever, chills, sweats, or chest pain. No bone pain. The remainder of a 12 point ROS was otherwise negative. Current Outpatient Prescriptions   Medication Sig Dispense Refill    metoprolol tartrate (LOPRESSOR) 25 mg tablet Take 0.5 Tabs by mouth two (2) times a day. 60 Tab 0    dilTIAZem (TAZTIA XT) 360 mg SR capsule Take 1 Cap by mouth daily. 90 Cap 1    ergocalciferol (VITAMIN D2) 50,000 unit capsule Take 1 Cap by mouth every fourteen (14) days. 4 Cap 0    glipiZIDE SR (GLUCOTROL XL) 5 mg CR tablet TAKE ONE TABLET BY MOUTH ONCE DAILY 90 Tab 1    losartan-hydroCHLOROthiazide (HYZAAR) 100-25 mg per tablet TAKE ONE TABLET BY MOUTH ONCE DAILY 90 Tab 1       Objective:   VS:    Vitals:    07/13/17 1252   Resp: 16   SpO2: 100%   Weight: 101.2 kg (223 lb)   Height: 5' 4\" (1.626 m)        Physical Exam:     Constitutional:  we no acute distress and alert and oriented x 3    HENT:  atraumatic   Eyes:  EOMI, PERRLA   Neck:  atraumatic   Cardiovascular:  heart sounds normal, S1, S2   Pulmonary/Chest Wall:  breath sounds are clear bilaterally and no use of accessory muscles in breathing.    Abdominal:  Non tender, no palpable masses, .        Musculoskeletal:  No deformities   Skin:  No rashes or lesions    Peripheral Vascular:  Dorsalis pedis pulse normal bilaterally. Review of Systems: 12 point ROS o/w negative        No results found for this or any previous visit (from the past 168 hour(s)).     Past Medical History:   Diagnosis Date    Anemia     resolved    Diverticulosis 11/13    DM type 2 (diabetes mellitus, type 2) ([facility] [address]     Hypertension     Internal hemorrhoids 11/13    Menopause     Vitamin D deficiency        Past Surgical History:   Procedure Laterality Date    HX BREAST LUMPECTOMY Right 5/30/2017    right BREAST LUMPECTOMY WITH localization performed by Clau Espinoza MD at [address] HX BREAST LUMPECTOMY Right 6/20/2017    revision right breast to achieve neg margin performed by Clau Espinoza MD at [address] HX COLONOSCOPY  1/13    repeat 1/23 - [facility]      fluid drained from lt shoulder    HX TUBAL LIGATION         Social History     Social History    Marital status: UNKNOWN     Spouse name: N/A    Number of children: N/A    Years of education: N/A     Occupational History    has custody of grandson      Social History Main Topics    Smoking status: Never Smoker    Smokeless tobacco: Never Used    Alcohol use No    Drug use: No    Sexual activity: Not Currently     Partners: Male      Comment:      Other Topics Concern    Not on file     Social History Narrative       Family History   Problem Relation Age of Onset    Stroke Mother     Hypertension Mother     Diabetes Daughter        No Known Allergies    Follow-up Disposition: Not on File    Jett Cai MD Hematology-Medical Oncology

## 2017-07-13 NOTE — PROGRESS NOTES
Blake Martinez is a 63 y.o. female who presents today to establish care, pt referred by Dr. Santhosh Veras dx of ductal carcinoma in situ (DCIS) of right breast  Pt educated on taking blood pressure meds as directed, pt verbalized understanding. 1. Have you been to the ER, urgent care clinic since your last visit? Hospitalized since your last visit? NO    2. Have you seen or consulted any other health care providers outside of the [address] [place] [address] since your last visit? Include any pap smears or colon screening. NO    Health Maintenance reviewed  Health Maintenance Due   Topic Date Due    PAP AKA CERVICAL CYTOLOGY  08/08/1978    EYE EXAM RETINAL OR DILATED Q1  02/20/2016

## 2017-07-13 NOTE — MR AVS SNAPSHOT
Visit Information Date & Time Provider Department Dept. Phone Encounter #  
 7/13/2017  1:00 PM Cris Govea MD [facility] Oncology [phone] [id number] Follow-up Instructions Return in about 5 weeks (around 8/17/2017), or if symptoms worsen or fail to improve, for reeevaluate clinically. Routing History Follow-up and Disposition History Your Appointments 8/1/2017 10:00 AM  
Follow Up with Nicolas Castañeda MD  
[facility] ([facility]) Appt Note: 3 month follow-up [facility] [address]  
  
   
 [address] [address]  
  
    
 8/3/2017 10:00 AM  
New Patient with Mary Fowler MD  
[address] ([facility]) Appt Note: WWE; RESCHEDULED FROM 07/06/2017  
 [address] [address]  
[phone]  
  
   
 [facility] 8/17/2017 12:30 PM  
Follow Up with Cris Govea MD  
[facility] Oncology [facility]) Appt Note: f/u from 7/13  
 [address] [facility] [address] [facility] [address]  
[address]  
  
    
 10/24/2017  3:00 PM  
Follow Up with Jennifer Berg MD  
[address] [facility] Appt Note: 4 month follow up  
 [address] [facility] [address] [facility] [address] [address]  
  
   
 [address] [facility] [address] Upcoming Health Maintenance Date Due  
 PAP AKA CERVICAL CYTOLOGY 8/8/1978 EYE EXAM RETINAL OR DILATED Q1 2/20/2016 INFLUENZA AGE 9 TO ADULT 8/1/2017 FOOT EXAM Q1 9/13/2017 MICROALBUMIN Q1 9/13/2017 HEMOGLOBIN A1C Q6M 11/3/2017 LIPID PANEL Q1 5/3/2018 BREAST CANCER SCRN MAMMOGRAM 4/19/2019 DTaP/Tdap/Td series (2 - Td) 10/7/2021 COLONOSCOPY 1/15/2023 Allergies as of 7/13/2017  Review Complete On: 7/13/2017 By: Diane Alcantar MD  
 No Known Allergies Current Immunizations  Reviewed on 5/22/2017 Name Date Influenza Vaccine 10/1/2015, 11/20/2014 Pneumococcal Polysaccharide (PPSV-23) 3/2/2017 Tdap 10/7/2011 12:00 AM  
  
 Not reviewed this visit You Were Diagnosed With   
  
 Codes Comments Ductal carcinoma in situ (DCIS) of right breast    -  Primary ICD-10-CM: D05.11 ICD-9-CM: 233.0 Vitamin D deficiency     ICD-10-CM: E55.9 ICD-9-CM: 268.9 Thrombocytosis ([address]     ICD-10-CM: D47.3 ICD-9-CM: 238.71 Vitals Resp Height(growth percentile) Weight(growth percentile) SpO2 BMI OB Status 16 5' 4\" (1.626 m) 223 lb (101.2 kg) 100% 38.28 kg/m2 Postmenopausal  
 Smoking Status Never Smoker BMI and BSA Data Body Mass Index Body Surface Area  
 38.28 kg/m 2 2.14 m 2 Preferred Pharmacy Pharmacy Name Phone [address] [phone] Your Updated Medication List  
  
   
This list is accurate as of: 7/13/17  1:45 PM.  Always use your most recent med list.  
  
  
  
  
 dilTIAZem 360 mg SR capsule Commonly known as:  TAZTIA XT Take 1 Cap by mouth daily. ergocalciferol 50,000 unit capsule Commonly known as:  VITAMIN D2 Take 1 Cap by mouth every fourteen (14) days. glipiZIDE SR 5 mg CR tablet Commonly known as:  GLUCOTROL XL  
TAKE ONE TABLET BY MOUTH ONCE DAILY losartan-hydroCHLOROthiazide 100-25 mg per tablet Commonly known as:  HYZAAR  
TAKE ONE TABLET BY MOUTH ONCE DAILY  
  
 metFORMIN 1,000 mg tablet Commonly known as:  GLUCOPHAGE  
  
 metoprolol tartrate 25 mg tablet Commonly known as:  LOPRESSOR Take 0.5 Tabs by mouth two (2) times a day. Follow-up Instructions  Return in about 5 weeks (around 8/17/2017), or if symptoms worsen or fail to improve, for reeevaluate clinically. To-Do List   
 07/14/2017 8:00 AM  
  Appointment with Eleanor Hamilton at [facility] RADIATION THERAPY ([phone]) This appointment time is simply a place rosenberg and may not reflect the true appointment time. If patient does not know the appointment time given to them at the time of scheduling, they should contact the Radiation Therapy department for detail.  
  
 07/17/2017 8:00 AM  
  Appointment with Eleanor Hamilton at [address] [place] [address] ([phone]) This appointment time is simply a place rosenberg and may not reflect the true appointment time. If patient does not know the appointment time given to them at the time of scheduling, they should contact the Radiation Therapy department for detail.  
  
 07/18/2017 8:00 AM  
  Appointment with Eleanor Hamilton at [address] [place] [address] ([phone]) This appointment time is simply a place rosenberg and may not reflect the true appointment time. If patient does not know the appointment time given to them at the time of scheduling, they should contact the Radiation Therapy department for detail.  
  
 07/19/2017 8:00 AM  
  Appointment with Eleanor Hamilton at [address] [place] [address] ([phone]) This appointment time is simply a place rosenberg and may not reflect the true appointment time. If patient does not know the appointment time given to them at the time of scheduling, they should contact the Radiation Therapy department for detail.  
  
 07/20/2017 8:00 AM  
  Appointment with Eleanor Hamilton at [address] [place] [address] ([phone]) This appointment time is simply a place rosenberg and may not reflect the true appointment time.   If patient does not know the appointment time given to them at the time of scheduling, they should contact the Radiation Therapy department for detail.  
  
 07/21/2017 8:00 AM  
  Appointment with Eleanor Hamilton at [address] [place] [address] ([phone]) This appointment time is simply a place rosenberg and may not reflect the true appointment time. If patient does not know the appointment time given to them at the time of scheduling, they should contact the Radiation Therapy department for detail.  
  
 07/24/2017 8:00 AM  
  Appointment with Eleanor Hamilton at [address] [facility] ([phone]) This appointment time is simply a place rosenberg and may not reflect the true appointment time. If patient does not know the appointment time given to them at the time of scheduling, they should contact the Radiation Therapy department for detail.  
  
 07/25/2017 8:00 AM  
  Appointment with Eleanor Hamilton at [address] [facility] ([phone]) This appointment time is simply a place rosenberg and may not reflect the true appointment time. If patient does not know the appointment time given to them at the time of scheduling, they should contact the Radiation Therapy department for detail.  
  
 07/26/2017 8:00 AM  
  Appointment with Eleanor Hamilton at [address] [facility] ([phone]) This appointment time is simply a place rosenberg and may not reflect the true appointment time. If patient does not know the appointment time given to them at the time of scheduling, they should contact the Radiation Therapy department for detail.  
  
 07/27/2017 8:00 AM  
  Appointment with Eleanor Hamilton at [address] [facility] ([phone]) This appointment time is simply a place rosenberg and may not reflect the true appointment time. If patient does not know the appointment time given to them at the time of scheduling, they should contact the Radiation Therapy department for detail.  
  
 07/28/2017 8:00 AM  
  Appointment with Eleanor Hamilton at [address] [facility] ([phone]) This appointment time is simply a place rosenberg and may not reflect the true appointment time.   If patient does not know the appointment time given to them at the time of scheduling, they should contact the Radiation Therapy department for detail.  
  
 07/31/2017 8:00 AM  
  Appointment with [address] [place] [address] at [address] [facility] ([phone]) This appointment time is simply a place rosenberg and may not reflect the true appointment time. If patient does not know the appointment time given to them at the time of scheduling, they should contact the Radiation Therapy department for detail. 08/01/2017 8:00 AM  
  Appointment with [facility] RADIATION ONCOLOGY at [facility] RADIATION THERAPY ([phone]) This appointment time is simply a place rosenberg and may not reflect the true appointment time. If patient does not know the appointment time given to them at the time of scheduling, they should contact the Radiation Therapy department for detail. 08/02/2017 8:00 AM  
  Appointment with [facility] RADIATION ONCOLOGY at [facility] ([phone]) This appointment time is simply a place rosenberg and may not reflect the true appointment time. If patient does not know the appointment time given to them at the time of scheduling, they should contact the Radiation Therapy department for detail. 08/03/2017 8:00 AM  
  Appointment with [facility] RADIATION ONCOLOGY at [facility] RADIATION [facility] ([phone]) This appointment time is simply a place rosenberg and may not reflect the true appointment time. If patient does not know the appointment time given to them at the time of scheduling, they should contact the Radiation Therapy department for detail. 08/04/2017 8:00 AM  
  Appointment with [facility] RADIATION ONCOLOGY at [facility] RADIATION THERAPY ([phone]) This appointment time is simply a place rosenberg and may not reflect the true appointment time. If patient does not know the appointment time given to them at the time of scheduling, they should contact the Radiation Therapy department for detail.   
  
 08/07/2017 8:00 AM  
 Appointment with [address] [place] [address] at [facility] RADIATION THERAPY ([phone]) This appointment time is simply a place rosenberg and may not reflect the true appointment time. If patient does not know the appointment time given to them at the time of scheduling, they should contact the Radiation Therapy department for detail. 08/08/2017 8:00 AM  
  Appointment with [facility] RADIATION ONCOLOGY at [facility] RADIATION THERAPY ([phone]) This appointment time is simply a place rosenberg and may not reflect the true appointment time. If patient does not know the appointment time given to them at the time of scheduling, they should contact the Radiation Therapy department for detail. 08/09/2017 8:00 AM  
  Appointment with [facility] RADIATION ONCOLOGY at [facility] RADIATION THERAPY ([phone]) This appointment time is simply a place rosenberg and may not reflect the true appointment time. If patient does not know the appointment time given to them at the time of scheduling, they should contact the Radiation Therapy department for detail.  
  
 08/10/2017 8:00 AM  
  Appointment with [address] [place] [address] at [address] [place] [address] ([phone]) This appointment time is simply a place rosenberg and may not reflect the true appointment time. If patient does not know the appointment time given to them at the time of scheduling, they should contact the Radiation Therapy department for detail. Please provide this summary of care documentation to your next provider. Your primary care clinician is listed as Juan Francisco Mclain. If you have any questions after today's visit, please call [phone].

## 2017-07-14 ENCOUNTER — HOSPITAL ENCOUNTER (OUTPATIENT)
Dept: RADIATION THERAPY | Age: 62
Discharge: HOME | End: 2017-07-14
Payer: MEDICARE

## 2017-07-14 PROCEDURE — 77412 RADIATION TX DELIVERY LVL 3: CPT

## 2017-07-17 ENCOUNTER — TELEPHONE (OUTPATIENT)
Dept: FAMILY MEDICINE CLINIC | Age: 62
End: 2017-07-17

## 2017-07-17 ENCOUNTER — HOSPITAL ENCOUNTER (OUTPATIENT)
Dept: RADIATION THERAPY | Age: 62
Discharge: HOME | End: 2017-07-17
Payer: MEDICARE

## 2017-07-17 PROCEDURE — 77412 RADIATION TX DELIVERY LVL 3: CPT

## 2017-07-18 ENCOUNTER — HOSPITAL ENCOUNTER (OUTPATIENT)
Dept: RADIATION THERAPY | Age: 62
Discharge: HOME | End: 2017-07-18
Payer: MEDICARE

## 2017-07-18 PROCEDURE — 77412 RADIATION TX DELIVERY LVL 3: CPT

## 2017-07-19 ENCOUNTER — HOSPITAL ENCOUNTER (OUTPATIENT)
Dept: RADIATION THERAPY | Age: 62
Discharge: HOME | End: 2017-07-19
Payer: MEDICARE

## 2017-07-19 PROCEDURE — 77412 RADIATION TX DELIVERY LVL 3: CPT

## 2017-07-19 PROCEDURE — 77336 RADIATION PHYSICS CONSULT: CPT

## 2017-07-20 ENCOUNTER — HOSPITAL ENCOUNTER (OUTPATIENT)
Dept: RADIATION THERAPY | Age: 62
Discharge: HOME | End: 2017-07-20
Payer: MEDICARE

## 2017-07-20 PROCEDURE — 77417 THER RADIOLOGY PORT IMAGE(S): CPT

## 2017-07-20 PROCEDURE — 77412 RADIATION TX DELIVERY LVL 3: CPT

## 2017-07-21 ENCOUNTER — HOSPITAL ENCOUNTER (OUTPATIENT)
Dept: RADIATION THERAPY | Age: 62
Discharge: HOME | End: 2017-07-21
Payer: MEDICARE

## 2017-07-21 ENCOUNTER — DOCUMENTATION ONLY (OUTPATIENT)
Dept: FAMILY MEDICINE CLINIC | Age: 62
End: 2017-07-21

## 2017-07-21 PROCEDURE — 77412 RADIATION TX DELIVERY LVL 3: CPT

## 2017-07-24 ENCOUNTER — HOSPITAL ENCOUNTER (OUTPATIENT)
Dept: RADIATION THERAPY | Age: 62
Discharge: HOME | End: 2017-07-24
Payer: MEDICARE

## 2017-07-24 PROCEDURE — 77412 RADIATION TX DELIVERY LVL 3: CPT

## 2017-07-25 ENCOUNTER — NURSE NAVIGATOR (OUTPATIENT)
Dept: OTHER | Age: 62
End: 2017-07-25

## 2017-07-25 ENCOUNTER — HOSPITAL ENCOUNTER (OUTPATIENT)
Dept: RADIATION THERAPY | Age: 62
Discharge: HOME | End: 2017-07-25
Payer: MEDICARE

## 2017-07-25 PROCEDURE — 77412 RADIATION TX DELIVERY LVL 3: CPT

## 2017-07-26 ENCOUNTER — HOSPITAL ENCOUNTER (OUTPATIENT)
Dept: RADIATION THERAPY | Age: 62
Discharge: HOME | End: 2017-07-26
Payer: MEDICARE

## 2017-07-26 PROCEDURE — 77336 RADIATION PHYSICS CONSULT: CPT

## 2017-07-26 PROCEDURE — 77412 RADIATION TX DELIVERY LVL 3: CPT

## 2017-07-27 ENCOUNTER — HOSPITAL ENCOUNTER (OUTPATIENT)
Dept: RADIATION THERAPY | Age: 62
Discharge: HOME | End: 2017-07-27
Payer: MEDICARE

## 2017-07-27 PROCEDURE — 77412 RADIATION TX DELIVERY LVL 3: CPT

## 2017-07-27 PROCEDURE — 77417 THER RADIOLOGY PORT IMAGE(S): CPT

## 2017-07-28 ENCOUNTER — HOSPITAL ENCOUNTER (OUTPATIENT)
Dept: RADIATION THERAPY | Age: 62
Discharge: HOME | End: 2017-07-28
Payer: MEDICARE

## 2017-07-28 PROCEDURE — 77412 RADIATION TX DELIVERY LVL 3: CPT

## 2017-07-31 ENCOUNTER — HOSPITAL ENCOUNTER (OUTPATIENT)
Dept: RADIATION THERAPY | Age: 62
Discharge: HOME | End: 2017-07-31
Payer: MEDICARE

## 2017-07-31 PROCEDURE — 77334 RADIATION TREATMENT AID(S): CPT

## 2017-07-31 PROCEDURE — 77307 TELETHX ISODOSE PLAN CPLX: CPT

## 2017-07-31 PROCEDURE — 77412 RADIATION TX DELIVERY LVL 3: CPT

## 2017-08-01 ENCOUNTER — HOSPITAL ENCOUNTER (OUTPATIENT)
Dept: RADIATION THERAPY | Age: 62
Discharge: HOME | End: 2017-08-01
Payer: MEDICARE

## 2017-08-01 PROCEDURE — 77412 RADIATION TX DELIVERY LVL 3: CPT

## 2017-08-02 ENCOUNTER — HOSPITAL ENCOUNTER (OUTPATIENT)
Dept: RADIATION THERAPY | Age: 62
Discharge: HOME | End: 2017-08-02
Payer: MEDICARE

## 2017-08-02 PROCEDURE — 77412 RADIATION TX DELIVERY LVL 3: CPT

## 2017-08-02 PROCEDURE — 77336 RADIATION PHYSICS CONSULT: CPT

## 2017-08-03 ENCOUNTER — HOSPITAL ENCOUNTER (OUTPATIENT)
Dept: RADIATION THERAPY | Age: 62
Discharge: HOME | End: 2017-08-03
Payer: MEDICARE

## 2017-08-03 PROCEDURE — 77412 RADIATION TX DELIVERY LVL 3: CPT

## 2017-08-03 PROCEDURE — 77417 THER RADIOLOGY PORT IMAGE(S): CPT

## 2017-08-04 ENCOUNTER — HOSPITAL ENCOUNTER (OUTPATIENT)
Dept: RADIATION THERAPY | Age: 62
Discharge: HOME | End: 2017-08-04
Payer: MEDICARE

## 2017-08-04 PROCEDURE — 77412 RADIATION TX DELIVERY LVL 3: CPT

## 2017-08-04 PROCEDURE — 77280 THER RAD SIMULAJ FIELD SMPL: CPT

## 2017-08-07 ENCOUNTER — HOSPITAL ENCOUNTER (OUTPATIENT)
Dept: RADIATION THERAPY | Age: 62
Discharge: HOME | End: 2017-08-07
Payer: MEDICARE

## 2017-08-07 PROCEDURE — 77412 RADIATION TX DELIVERY LVL 3: CPT

## 2017-08-08 ENCOUNTER — HOSPITAL ENCOUNTER (OUTPATIENT)
Dept: RADIATION THERAPY | Age: 62
Discharge: HOME | End: 2017-08-08
Payer: MEDICARE

## 2017-08-08 PROCEDURE — 77412 RADIATION TX DELIVERY LVL 3: CPT

## 2017-08-09 ENCOUNTER — HOSPITAL ENCOUNTER (OUTPATIENT)
Dept: RADIATION THERAPY | Age: 62
Discharge: HOME | End: 2017-08-09
Payer: MEDICARE

## 2017-08-09 PROCEDURE — 77412 RADIATION TX DELIVERY LVL 3: CPT

## 2017-08-10 ENCOUNTER — HOSPITAL ENCOUNTER (OUTPATIENT)
Dept: RADIATION THERAPY | Age: 62
Discharge: HOME | End: 2017-08-10
Payer: MEDICARE

## 2017-08-17 ENCOUNTER — OFFICE VISIT (OUTPATIENT)
Dept: ONCOLOGY | Age: 62
End: 2017-08-17

## 2017-08-17 VITALS
DIASTOLIC BLOOD PRESSURE: 88 MMHG | HEART RATE: 87 BPM | HEIGHT: 64 IN | SYSTOLIC BLOOD PRESSURE: 122 MMHG | WEIGHT: 230 LBS | RESPIRATION RATE: 17 BRPM | BODY MASS INDEX: 39.27 KG/M2 | TEMPERATURE: 97.3 F | OXYGEN SATURATION: 100 %

## 2017-08-17 DIAGNOSIS — D05.11 DUCTAL CARCINOMA IN SITU (DCIS) OF RIGHT BREAST: Primary | ICD-10-CM

## 2017-08-17 RX ORDER — TAMOXIFEN CITRATE 20 MG/1
20 TABLET ORAL DAILY
Qty: 90 TAB | Refills: 3 | Status: SHIPPED | OUTPATIENT
Start: 2017-08-17 | End: 2019-03-21 | Stop reason: DRUGHIGH

## 2017-08-17 NOTE — PROGRESS NOTES
Terri Lomax is a 63 y.o. female who presents today for follow up. Pt educated on taking meds as ordered, pt verbalized understanding. 1. Have you been to the ER, urgent care clinic since your last visit? Hospitalized since your last visit? NO    2. Have you seen or consulted any other health care providers outside of the [address] [place] [address] since your last visit? Include any pap smears or colon screening. NO    Health Maintenance reviewed.     Health Maintenance Due   Topic Date Due    PAP AKA CERVICAL CYTOLOGY  08/08/1978    EYE EXAM RETINAL OR DILATED Q1  02/20/2016    ZOSTER VACCINE AGE 60>  06/08/2017    INFLUENZA AGE 9 TO ADULT  08/01/2017    FOOT EXAM Q1  09/13/2017    MICROALBUMIN Q1  09/13/2017

## 2017-08-17 NOTE — PROGRESS NOTES
[facility] HEMATOLOGY-MEDICAL ONCOLOGY     Patient Active Problem List   Diagnosis Code    Anemia D64.9    DM type 2 (diabetes mellitus, type 2) ([facility] [address] E11.9    Hypertension I10    Vitamin D deficiency E55.9    Breast calcifications on mammogram R92.1    Thrombocytosis (HCC) D47.3    Morbid obesity due to excess calories (HCC) E66.01    Ductal carcinoma in situ (DCIS) of right breast D05.11         Assessment/ Plan:     1.) Right Non-invasive breast cancer (DCIS)/ (LCIS)  -AJCC STAGE: pTis.   -ER+/MS+  -s/p right lumptectomy per Dr. Karina Vaughan 5/2017  -s/p adj. XRT with Rad./Onc. (Dr. Nicole Felix 7/13/17  -F/u  with gyn. once yearly  -No known contraindications to Tx with Tamoxifen  -Proceed with chemoprophylaxis with tamoxifen now that XRT is completed  -Rtc. in 4 weeks to reevaluate clinically and for further medical decision making      Pathology Report 5/2017:     FINAL DIAGNOSIS:   RIGHT BREAST MASS, LUMPECTOMY:   EXTENSIVE IN-SITU MAMMARY CARCINOMA (SEE COMMENT). HISTOLOGIC TYPE: MIXED DUCTAL AND LOBULAR. NUCLEAR rdGrdRrdArdDrdErd:rd rd3rd. NECROSIS: NOT IDENTIFIED. ESTIMATED SIZE: APPROXIMATELY 5 CM (INVOLVING APPROXIMATELY ½ OF   SPECIMEN). MARGINS OF RESECTION: POSITIVE AT LATERAL MARGIN, MULTIFOCALLY CLOSE AT   ANTERIOR MARGIN.   LYMPH NODES: NOT SUBMITTED. ESTROGEN RECEPTOR: POSITIVE   PROGESTERONE RECEPTOR: POSITIVE. OTHER PATHOLOGIC FINDINGS: EXTENSIVE SCLEROSING ADENOSIS, FIBROCYSTIC   CHANGE, INTRADUCTAL PAPILLOMAS, BIOPSY SITE CHANGES. AJCC STAGE: pTis. Thank you for the opportunity to participate in the care, please do not hesitate to call for any questions or concerns. I have discussed the diagnosis with the patient and the intended plan. The patient verbalized understanding and agrees with the plan.     Subjective:    No bone pain  Appetite stable      History:   Eliseo Dodson is a 63 y.o. female presenting today for:     -Right Non-invasive breast cancer (DCIS)  -ER+/FL+  -s/p right lumptectomy per Dr. Oral Childers  -F/u  with gyn. once yearly  -No contraindications to Tx with Tamoxifen    No fever, chills, sweats, or chest pain. No bone pain. The remainder of a 12 point ROS was otherwise negative. Current Outpatient Prescriptions   Medication Sig Dispense Refill    metFORMIN (GLUCOPHAGE) 1,000 mg tablet TAKE ONE TABLET BY MOUTH TWICE DAILY WITH MEALS 60 Tab 2    metoprolol tartrate (LOPRESSOR) 25 mg tablet Take 0.5 Tabs by mouth two (2) times a day. 60 Tab 0    dilTIAZem (TAZTIA XT) 360 mg SR capsule Take 1 Cap by mouth daily. 90 Cap 1    ergocalciferol (VITAMIN D2) 50,000 unit capsule Take 1 Cap by mouth every fourteen (14) days. 4 Cap 0    glipiZIDE SR (GLUCOTROL XL) 5 mg CR tablet TAKE ONE TABLET BY MOUTH ONCE DAILY 90 Tab 1    losartan-hydroCHLOROthiazide (HYZAAR) 100-25 mg per tablet TAKE ONE TABLET BY MOUTH ONCE DAILY 90 Tab 1       Objective:   VS:    There were no vitals filed for this visit.     Physical Exam:     Constitutional:  we no acute distress and alert and oriented x 3    HENT:  atraumatic   Eyes:  EOMI, PERRLA   Neck:  atraumatic   Cardiovascular:  heart sounds normal, S1, S2   Pulmonary/Chest Wall:  breath sounds are clear bilaterally and no use of accessory muscles in breathing.    Abdominal:  Non tender, no palpable masses, .        Musculoskeletal:  No deformities   Skin:  No rashes or lesions    Peripheral Vascular:  Dorsalis pedis pulse normal bilaterally. Review of Systems: 12 point ROS o/w negative        No results found for this or any previous visit (from the past 168 hour(s)).     Past Medical History:   Diagnosis Date    Anemia     resolved    Diverticulosis 11/13    DM type 2 (diabetes mellitus, type 2) ([facility] [address]     Hypertension     Internal hemorrhoids 11/13    Menopause     Vitamin D deficiency        Past Surgical History:   Procedure Laterality Date    HX BREAST LUMPECTOMY Right 5/30/2017    right BREAST LUMPECTOMY WITH localization performed by Jacoby Deleon MD at [address] HX BREAST LUMPECTOMY Right 6/20/2017    revision right breast to achieve neg margin performed by Jacoby Deleon MD at [address] HX COLONOSCOPY  1/13    repeat 1/23 - [facility]    HX ORTHOPAEDIC      fluid drained from lt shoulder    HX TUBAL LIGATION         Social History     Social History    Marital status: UNKNOWN     Spouse name: N/A    Number of children: N/A    Years of education: N/A     Occupational History    has custody of grandson      Social History Main Topics    Smoking status: Never Smoker    Smokeless tobacco: Never Used    Alcohol use No    Drug use: No    Sexual activity: Not Currently     Partners: Male      Comment:      Other Topics Concern    Not on file     Social History Narrative       Family History   Problem Relation Age of Onset    Stroke Mother     Hypertension Mother     Diabetes Daughter        No Known Allergies    Follow-up Disposition: Not on File    Martinez Beard MD Hematology-Medical Oncology

## 2017-08-17 NOTE — MR AVS SNAPSHOT
Visit Information Date & Time Provider Department Dept. Phone Encounter #  
 8/17/2017 12:30 PM Pollo Ball  [facility] [phone] [id number] Your Appointments 8/22/2017  3:30 PM  
Follow Up with Mariel Hoyt MD  
[address]) Appt Note: 3 month follow-up; pt r/s 08/01/17 appt  syb 08/01/17  
 [facility] [place]. [address]  
  
   
 [address]  
  
    
 8/24/2017  1:00 PM  
New Patient with Jesus Silva MD  
[address] ([facility] Appt Note: [facility]  
 [facility] [address] [facility] [address]  
[phone]  
  
   
 [facility] [address] [facility] [address] 9/19/2017 12:00 PM  
Follow Up with Pollo Ball MD  
[address] [facility]) Appt Note: 1 month f-up [address] [facility] [address]  
[address]  
  
    
 10/24/2017  3:00 PM  
Follow Up with Darlin Soriano MD  
[address] [facility]) Appt Note: 4 month follow up  
 [address] [facility] [address]  
  
   
 [address] [facility] [address] [facility] [address]  
  
    
 3/6/2018  9:00 AM  
Office Visit with Kimberly Luna MD  
[address] [place]. [address]  
  
   
 [address] Upcoming Health Maintenance Date Due  
 PAP AKA CERVICAL CYTOLOGY 8/8/1978 EYE EXAM RETINAL OR DILATED Q1 2/20/2016 ZOSTER VACCINE AGE 60> 6/8/2017 INFLUENZA AGE 9 TO ADULT 8/1/2017 FOOT EXAM Q1 9/13/2017 MICROALBUMIN Q1 9/13/2017 HEMOGLOBIN A1C Q6M 11/3/2017 LIPID PANEL Q1 5/3/2018 BREAST CANCER SCRN MAMMOGRAM 4/19/2019 DTaP/Tdap/Td series (2 - Td) 10/7/2021 COLONOSCOPY 1/15/2023 Allergies as of 8/17/2017  Review Complete On: 8/17/2017 By: Chiquis Waddell MD  
 No Known Allergies Current Immunizations  Reviewed on 5/22/2017 Name Date Influenza Vaccine 10/1/2015, 11/20/2014 Pneumococcal Polysaccharide (PPSV-23) 3/2/2017 Tdap 10/7/2011 12:00 AM  
  
 Not reviewed this visit You Were Diagnosed With   
  
 Codes Comments Ductal carcinoma in situ (DCIS) of right breast    -  Primary ICD-10-CM: D05.11 ICD-9-CM: 233.0 Vitals OB Status Smoking Status Postmenopausal Never Smoker Preferred Pharmacy Pharmacy Name Phone [address] [phone] Your Updated Medication List  
  
   
This list is accurate as of: 8/17/17 12:57 PM.  Always use your most recent med list.  
  
  
  
  
 dilTIAZem 360 mg SR capsule Commonly known as:  TAZTIA XT Take 1 Cap by mouth daily. ergocalciferol 50,000 unit capsule Commonly known as:  VITAMIN D2 Take 1 Cap by mouth every fourteen (14) days. glipiZIDE SR 5 mg CR tablet Commonly known as:  GLUCOTROL XL  
TAKE ONE TABLET BY MOUTH ONCE DAILY losartan-hydroCHLOROthiazide 100-25 mg per tablet Commonly known as:  HYZAAR  
TAKE ONE TABLET BY MOUTH ONCE DAILY  
  
 metFORMIN 1,000 mg tablet Commonly known as:  GLUCOPHAGE  
TAKE ONE TABLET BY MOUTH TWICE DAILY WITH MEALS  
  
 metoprolol tartrate 25 mg tablet Commonly known as:  LOPRESSOR Take 0.5 Tabs by mouth two (2) times a day. tamoxifen 20 mg tablet Commonly known as:  NOLVADEX Take 1 Tab by mouth daily. Prescriptions Sent to Pharmacy Refills  
 tamoxifen (NOLVADEX) 20 mg tablet 3 Sig: Take 1 Tab by mouth daily. Class: Normal  
 Pharmacy: [address] [place] [address], [address] [place] [address] Ph #: [phone] Route: Oral  
  
To-Do List   
 09/21/2017 1:00 PM  
  Appointment with [address] [place] [address] RADIATION THERAPY ([phone]) This appointment time is simply a place rosenberg and may not reflect the true appointment time. If patient does not know the appointment time given to them at the time of scheduling, they should contact the Radiation Therapy department for detail. Please provide this summary of care documentation to your next provider. Your primary care clinician is listed as Joseph Foote. If you have any questions after today's visit, please call [phone].

## 2017-08-22 ENCOUNTER — OFFICE VISIT (OUTPATIENT)
Dept: FAMILY MEDICINE CLINIC | Age: 62
End: 2017-08-22

## 2017-08-22 VITALS
BODY MASS INDEX: 37.66 KG/M2 | TEMPERATURE: 97.7 F | SYSTOLIC BLOOD PRESSURE: 139 MMHG | HEART RATE: 80 BPM | RESPIRATION RATE: 16 BRPM | HEIGHT: 64 IN | WEIGHT: 220.6 LBS | DIASTOLIC BLOOD PRESSURE: 61 MMHG

## 2017-08-22 DIAGNOSIS — E11.9 CONTROLLED TYPE 2 DIABETES MELLITUS WITHOUT COMPLICATION, WITHOUT LONG-TERM CURRENT USE OF INSULIN (HCC): Primary | ICD-10-CM

## 2017-08-22 DIAGNOSIS — I10 ESSENTIAL HYPERTENSION: ICD-10-CM

## 2017-08-22 NOTE — PROGRESS NOTES
Alejandro Gomez is a 63 y.o. female and presents with Follow Up Chronic Condition; Diabetes; Hypertension; and Vitamin D Deficiency       Subjective:    Diabetes type 2- last A1c 6.9%. bs at home 120-130. No hypoglycemia- pt is aware of sx to monitor for.   htn- on ARB- controlled today. DCIS- s/p resection- following with general surgery and oncology- finished XRT as well. Just started tamoxifen. Obesity- minimal wt loss since last visit     Assessment/Plan:    Diabetes type 2- controlled- labs with next visit. Wt loss encouraged. Obesity- encouraged caloric restriction. Exercise. Goal of 1/2- 1 lb/week wt loss discussed. DCIS- keep f/u with onc and surgery. Pt aware of tamoxifen duration being 5 years. RTC in 3 months with labs/urine prior. Orders Placed This Encounter    METABOLIC PANEL, BASIC     Standing Status:   Future     Standing Expiration Date:   2018    HEMOGLOBIN A1C WITH EAG     Standing Status:   Future     Standing Expiration Date:   2018    MICROALBUMIN, UR, RAND W/ MICROALBUMIN/CREA RATIO     Standing Status:   Future     Standing Expiration Date:   2018    varicella zoster vacine live (VARICELLA-ZOSTER VACINE LIVE) 19,400 unit/0.65 mL susr injection     Si Vial by SubCUTAneous route once for 1 dose. Dispense:  0.65 mL     Refill:  0   Diagnoses and all orders for this visit:    1. Controlled type 2 diabetes mellitus without complication, without long-term current use of insulin (HCC)  -     HEMOGLOBIN A1C WITH EAG; Future  -     MICROALBUMIN, UR, RAND W/ MICROALBUMIN/CREA RATIO; Future    2. Essential hypertension  -     METABOLIC PANEL, BASIC; Future    Other orders  -     varicella zoster vacine live (VARICELLA-ZOSTER VACINE LIVE) 19,400 unit/0.65 mL susr injection; 1 Vial by SubCUTAneous route once for 1 dose.         ROS:  Negative except as mentioned above  Cardiac- no chest pain or palpitations  Pulmonary- no sob or wheezes  GI- no n/v or diarrhea. SH:  Social History   Substance Use Topics    Smoking status: Never Smoker    Smokeless tobacco: Never Used    Alcohol use No         Medications/Allergies:  Current Outpatient Prescriptions on File Prior to Visit   Medication Sig Dispense Refill    tamoxifen (NOLVADEX) 20 mg tablet Take 1 Tab by mouth daily. 90 Tab 3    metFORMIN (GLUCOPHAGE) 1,000 mg tablet TAKE ONE TABLET BY MOUTH TWICE DAILY WITH MEALS 60 Tab 2    metoprolol tartrate (LOPRESSOR) 25 mg tablet Take 0.5 Tabs by mouth two (2) times a day. 60 Tab 0    dilTIAZem (TAZTIA XT) 360 mg SR capsule Take 1 Cap by mouth daily. 90 Cap 1    ergocalciferol (VITAMIN D2) 50,000 unit capsule Take 1 Cap by mouth every fourteen (14) days. 4 Cap 0    glipiZIDE SR (GLUCOTROL XL) 5 mg CR tablet TAKE ONE TABLET BY MOUTH ONCE DAILY 90 Tab 1    losartan-hydroCHLOROthiazide (HYZAAR) 100-25 mg per tablet TAKE ONE TABLET BY MOUTH ONCE DAILY 90 Tab 1     No current facility-administered medications on file prior to visit. No Known Allergies    Objective:  Visit Vitals    /61    Pulse 80    Temp 97.7 °F (36.5 °C) (Oral)    Resp 16    Ht 5' 4\" (1.626 m)    Wt 220 lb 9.6 oz (100.1 kg)    BMI 37.87 kg/m2    Body mass index is 37.87 kg/(m^2). Constitutional: Well developed, nourished, no distress, alert   CV: S1, S2.  RRR. No murmurs/rubs. No thrills palpated. No carotid bruits. Intact distal pulses. No edema. Pulm: No abnormalities on inspection. Clear to auscultation bilaterally. No wheezing/rhonchi. Normal effort. GI: Soft, nontender, nondistended. Normal active bowel sounds. No  masses on palpation. No hepatosplenomegaly.

## 2017-08-22 NOTE — MR AVS SNAPSHOT
Visit Information Date & Time Provider Department Dept. Phone Encounter #  
 8/22/2017  3:30 PM Melani Wagner, [address] [facility] [phone] [id number] Follow-up Instructions Return in about 3 months (around 11/22/2017) for 30 minute slot and lab/urine prior. .  
  
Your Appointments 8/22/2017  3:30 PM  
Follow Up with MD Jocelyn Valverde [address] [place] [address]) Appt Note: 3 month follow-up; pt r/s 08/01/17 appt  syb 08/01/17  
 [facility] [place]. [address]  
  
   
 [address] [facility] [address]  
  
    
 8/24/2017  1:00 PM  
New Patient with Augustina Fontaine MD  
[address]  ([facility]) Appt Note: WWE  
 [facility] [address] [facility] [address]  
[phone]  
  
   
 [facility] [address] [facility] [address] 9/19/2017 12:00 PM  
Follow Up with Tejal Mahmood MD  
[address] [place] [address]) Appt Note: 1 month f-up [address] [facility] [address] [facility] [address]  
[address] [facility]  
  
    
 10/24/2017  3:00 PM  
Follow Up with Yvette Chauhan MD  
[address] [place] [address]) Appt Note: 4 month follow up  
 [address] [facility] [address] [place]  
  
   
 [address] [facility] [address] [facility] [address]  
  
    
 3/6/2018  9:00 AM  
Office Visit with Andres Oviedo MD  
[facility] [address] [facility] [place]. [address]  
  
   
 [address] [facility] [address] Upcoming Health Maintenance Date Due  
 EYE EXAM RETINAL OR DILATED Q1 2/20/2016 ZOSTER VACCINE AGE 60> 6/8/2017 INFLUENZA AGE 9 TO ADULT 8/1/2017 FOOT EXAM Q1 9/13/2017 MICROALBUMIN Q1 9/13/2017 PAP AKA CERVICAL CYTOLOGY 10/19/2017* HEMOGLOBIN A1C Q6M 11/3/2017 MEDICARE YEARLY EXAM 3/3/2018 LIPID PANEL Q1 5/3/2018 BREAST CANCER SCRN MAMMOGRAM 4/19/2019 DTaP/Tdap/Td series (2 - Td) 10/7/2021 COLONOSCOPY 1/15/2023 *Topic was postponed. The date shown is not the original due date. Allergies as of 8/22/2017  Review Complete On: 8/22/2017 By: Amee Monsivais MD  
 No Known Allergies Current Immunizations  Reviewed on 5/22/2017 Name Date Influenza Vaccine 10/1/2015, 11/20/2014 Pneumococcal Polysaccharide (PPSV-23) 3/2/2017 Tdap 10/7/2011 12:00 AM  
  
 Not reviewed this visit You Were Diagnosed With   
  
 Codes Comments Controlled type 2 diabetes mellitus without complication, without long-term current use of insulin ([facility]    -  Primary ICD-10-CM: E11.9 ICD-9-CM: 250.00 Essential hypertension     ICD-10-CM: I10 
ICD-9-CM: 401.9 Vitals BP Pulse Temp Resp Height(growth percentile) Weight(growth percentile) 139/61 80 97.7 °F (36.5 °C) (Oral) 16 5' 4\" (1.626 m) 220 lb 9.6 oz (100.1 kg) BMI OB Status Smoking Status 37.87 kg/m2 Postmenopausal Never Smoker BMI and BSA Data Body Mass Index Body Surface Area  
 37.87 kg/m 2 2.13 m 2 Preferred Pharmacy Pharmacy Name Phone [address] [phone] Your Updated Medication List  
  
   
This list is accurate as of: 8/22/17  3:26 PM.  Always use your most recent med list.  
  
  
  
  
 dilTIAZem 360 mg SR capsule Commonly known as:  TAZTIA XT Take 1 Cap by mouth daily. ergocalciferol 50,000 unit capsule Commonly known as:  VITAMIN D2 Take 1 Cap by mouth every fourteen (14) days. glipiZIDE SR 5 mg CR tablet Commonly known as:  GLUCOTROL XL  
TAKE ONE TABLET BY MOUTH ONCE DAILY losartan-hydroCHLOROthiazide 100-25 mg per tablet Commonly known as:  HYZAAR  
TAKE ONE TABLET BY MOUTH ONCE DAILY metFORMIN 1,000 mg tablet Commonly known as:  GLUCOPHAGE  
TAKE ONE TABLET BY MOUTH TWICE DAILY WITH MEALS  
  
 metoprolol tartrate 25 mg tablet Commonly known as:  LOPRESSOR Take 0.5 Tabs by mouth two (2) times a day. tamoxifen 20 mg tablet Commonly known as:  NOLVADEX Take 1 Tab by mouth daily. varicella zoster vacine live 19,400 unit/0.65 mL Susr injection Commonly known as:  varicella-zoster vacine live 1 Vial by SubCUTAneous route once for 1 dose. Prescriptions Printed Refills  
 varicella zoster vacine live (VARICELLA-ZOSTER VACINE LIVE) 19,400 unit/0.65 mL susr injection 0 Si Vial by SubCUTAneous route once for 1 dose. Class: Print Route: SubCUTAneous Follow-up Instructions Return in about 3 months (around 2017) for 30 minute slot and lab/urine prior. Sana Kerr To-Do List   
 2017 1:00 PM  
  Appointment with [address] [place] [address] RADIATION THERAPY ([phone]) This appointment time is simply a place rosenberg and may not reflect the true appointment time. If patient does not know the appointment time given to them at the time of scheduling, they should contact the Radiation Therapy department for detail. Patient Instructions Please find and download a weight loss jamaal for your phone. I.e My fitness pal or calorie karlie. Learning About Meal Planning for Diabetes Why plan your meals? Meal planning can be a key part of managing diabetes. Planning meals and snacks with the right balance of carbohydrate, protein, and fat can help you keep your blood sugar at the target level you set with your doctor. You don't have to eat special foods. You can eat what your family eats, including sweets once in a while. But you do have to pay attention to how often you eat and how much you eat of certain foods. You may want to work with a dietitian or a certified diabetes educator.  He or she can give you tips and meal ideas and can answer your questions about meal planning. This health professional can also help you reach a healthy weight if that is one of your goals. What plan is right for you? Your dietitian or diabetes educator may suggest that you start with the plate format or carbohydrate counting. The plate format The plate format is a simple way to help you manage how you eat. You plan meals by learning how much space each food should take on a plate. Using the plate format helps you spread carbohydrate throughout the day. It can make it easier to keep your blood sugar level within your target range. It also helps you see if you're eating healthy portion sizes. To use the plate format, you put non-starchy vegetables on half your plate. Add meat or meat substitutes on one-quarter of the plate. Put a grain or starchy vegetable (such as brown rice or a potato) on the final quarter of the plate. You can add a small piece of fruit and some low-fat or fat-free milk or yogurt, depending on your carbohydrate goal for each meal. 
Here are some tips for using the plate format: · Make sure that you are not using an oversized plate. A 9-inch plate is best. Many restaurants use larger plates. · Get used to using the plate format at home. Then you can use it when you eat out. · Write down your questions about using the plate format. Talk to your doctor, a dietitian, or a diabetes educator about your concerns. Carbohydrate counting With carbohydrate counting, you plan meals based on the amount of carbohydrate in each food. Carbohydrate raises blood sugar higher and more quickly than any other nutrient. It is found in desserts, breads and cereals, and fruit. It's also found in starchy vegetables such as potatoes and corn, grains such as rice and pasta, and milk and yogurt. Spreading carbohydrate throughout the day helps keep your blood sugar levels within your target range. Your daily amount depends on several things, including your weight, how active you are, which diabetes medicines you take, and what your goals are for your blood sugar levels. A registered dietitian or diabetes educator can help you plan how much carbohydrate to include in each meal and snack. A guideline for your daily amount of carbohydrate is: · 45 to 60 grams at each meal. That's about the same as 3 to 4 carbohydrate servings. · 15 to 20 grams at each snack. That's about the same as 1 carbohydrate serving. The Nutrition Facts label on packaged foods tells you how much carbohydrate is in a serving of the food. First, look at the serving size on the food label. Is that the amount you eat in a serving? All of the nutrition information on a food label is based on that serving size. So if you eat more or less than that, you'll need to adjust the other numbers. Total carbohydrate is the next thing you need to look for on the label. If you count carbohydrate servings, one serving of carbohydrate is 15 grams. For foods that don't come with labels, such as fresh fruits and vegetables, you'll need a guide that lists carbohydrate in these foods. Ask your doctor, dietitian, or diabetes educator about books or other nutrition guides you can use. If you take insulin, you need to know how many grams of carbohydrate are in a meal. This lets you know how much rapid-acting insulin to take before you eat. If you use an insulin pump, you get a constant rate of insulin during the day. So the pump must be programmed at meals to give you extra insulin to cover the rise in blood sugar after meals. When you know how much carbohydrate you will eat, you can take the right amount of insulin. Or, if you always use the same amount of insulin, you need to make sure that you eat the same amount of carbohydrate at meals.  
If you need more help to understand carbohydrate counting and food labels, ask your doctor, dietitian, or diabetes educator. How do you get started with meal planning? Here are some tips to get started: 
· Plan your meals a week at a time. Don't forget to include snacks too. · Use cookbooks or online recipes to plan several main meals. Plan some quick meals for busy nights. You also can double some recipes that freeze well. Then you can save half for other busy nights when you don't have time to cook. · Make sure you have the ingredients you need for your recipes. If you're running low on basic items, put these items on your shopping list too. · List foods that you use to make breakfasts, lunches, and snacks. List plenty of fruits and vegetables. · Post this list on the refrigerator. Add to it as you think of more things you need. · Take the list to the store to do your weekly shopping. Follow-up care is a key part of your treatment and safety. Be sure to make and go to all appointments, and call your doctor if you are having problems. It's also a good idea to know your test results and keep a list of the medicines you take. Where can you learn more? Go to http://staciByAllAccountsalejandro.info/. Edgar Sandra in the search box to learn more about \"Learning About Meal Planning for Diabetes. \" Current as of: March 13, 2017 Content Version: 11.3 © 1491-6524 ANDalyze. Care instructions adapted under license by [employer] (which disclaims liability or warranty for this information). If you have questions about a medical condition or this instruction, always ask your healthcare professional. [address] any warranty or liability for your use of this information. Learning About Low-Carbohydrate Diets for Weight Loss What is a low-carbohydrate diet? Low-carb diets avoid foods that are high in carbohydrate. These high-carb foods include pasta, bread, rice, cereal, fruits, and starchy vegetables. Instead, these diets usually have you eat foods that are high in fat and protein. Many people lose weight quickly on a low-carb diet. But the early weight loss is water. People on this diet often gain the weight back after they start eating carbs again. Not all diet plans are safe or work well. A lot of the evidence shows that low-carb diets aren't healthy. That's because these diets often don't include healthy foods like fruits and vegetables. Losing weight safely means balancing protein, fat, and carbs with every meal and snack. And low-carb diets don't always provide the vitamins, minerals, and fiber you need. If you have a serious medical condition, talk to your doctor before you try any diet. These conditions include kidney disease, heart disease, type 2 diabetes, high cholesterol, and high blood pressure. If you are pregnant, it may not be safe for your baby if you are on a low-carb diet. How can you lose weight safely? You might have heard that a diet plan helped another person lose weight. But that doesn't mean that it will work for you. It is very hard to stay on a diet that includes lots of big changes in your eating habits. If you want to get to a healthy weight and stay there, making healthy lifestyle changes will often work better than dieting. These steps can help. · Make a plan for change. Work with your doctor to create a plan that is right for you. · See a dietitian. He or she can show you how to make healthy changes in your eating habits. · Manage stress. If you have a lot of stress in your life, it can be hard to focus on making healthy changes to your daily habits. · Track your food and activity. You are likely to do better at losing weight if you keep track of what you eat and what you do. Follow-up care is a key part of your treatment and safety.  Be sure to make and go to all appointments, and call your doctor if you are having problems. It's also a good idea to know your test results and keep a list of the medicines you take. Where can you learn more? Go to http://staci-alejandro.info/. Enter A121 in the search box to learn more about \"Learning About Low-Carbohydrate Diets for Weight Loss. \" Current as of: December 8, 2016 Content Version: 11.3 © 6119-6407 [employer]. Care instructions adapted under license by [employer] (which disclaims liability or warranty for this information). If you have questions about a medical condition or this instruction, always ask your healthcare professional. [place] [address] any warranty or liability for your use of this information. DASH Diet: Care Instructions Your Care Instructions The DASH diet is an eating plan that can help lower your blood pressure. DASH stands for Dietary Approaches to Stop Hypertension. Hypertension is high blood pressure. The DASH diet focuses on eating foods that are high in calcium, potassium, and magnesium. These nutrients can lower blood pressure. The foods that are highest in these nutrients are fruits, vegetables, low-fat dairy products, nuts, seeds, and legumes. But taking calcium, potassium, and magnesium supplements instead of eating foods that are high in those nutrients does not have the same effect. The DASH diet also includes whole grains, fish, and poultry. The DASH diet is one of several lifestyle changes your doctor may recommend to lower your high blood pressure. Your doctor may also want you to decrease the amount of sodium in your diet. Lowering sodium while following the DASH diet can lower blood pressure even further than just the DASH diet alone. Follow-up care is a key part of your treatment and safety. Be sure to make and go to all appointments, and call your doctor if you are having problems.  It's also a good idea to know your test results and keep a list of the medicines you take. How can you care for yourself at home? Following the DASH diet · Eat 4 to 5 servings of fruit each day. A serving is 1 medium-sized piece of fruit, ½ cup chopped or canned fruit, 1/4 cup dried fruit, or 4 ounces (½ cup) of fruit juice. Choose fruit more often than fruit juice. · Eat 4 to 5 servings of vegetables each day. A serving is 1 cup of lettuce or raw leafy vegetables, ½ cup of chopped or cooked vegetables, or 4 ounces (½ cup) of vegetable juice. Choose vegetables more often than vegetable juice. · Get 2 to 3 servings of low-fat and fat-free dairy each day. A serving is 8 ounces of milk, 1 cup of yogurt, or 1 ½ ounces of cheese. · Eat 6 to 8 servings of grains each day. A serving is 1 slice of bread, 1 ounce of dry cereal, or ½ cup of cooked rice, pasta, or cooked cereal. Try to choose whole-grain products as much as possible. · Limit lean meat, poultry, and fish to 2 servings each day. A serving is 3 ounces, about the size of a deck of cards. · Eat 4 to 5 servings of nuts, seeds, and legumes (cooked dried beans, lentils, and split peas) each week. A serving is 1/3 cup of nuts, 2 tablespoons of seeds, or ½ cup of cooked beans or peas. · Limit fats and oils to 2 to 3 servings each day. A serving is 1 teaspoon of vegetable oil or 2 tablespoons of salad dressing. · Limit sweets and added sugars to 5 servings or less a week. A serving is 1 tablespoon jelly or jam, ½ cup sorbet, or 1 cup of lemonade. · Eat less than 2,300 milligrams (mg) of sodium a day. If you limit your sodium to 1,500 mg a day, you can lower your blood pressure even more. Tips for success · Start small. Do not try to make dramatic changes to your diet all at once. You might feel that you are missing out on your favorite foods and then be more likely to not follow the plan. Make small changes, and stick with them. Once those changes become habit, add a few more changes. · Try some of the following: ¨ Make it a goal to eat a fruit or vegetable at every meal and at snacks. This will make it easy to get the recommended amount of fruits and vegetables each day. ¨ Try yogurt topped with fruit and nuts for a snack or healthy dessert. ¨ Add lettuce, tomato, cucumber, and onion to sandwiches. ¨ Combine a ready-made pizza crust with low-fat mozzarella cheese and lots of vegetable toppings. Try using tomatoes, squash, spinach, broccoli, carrots, cauliflower, and onions. ¨ Have a variety of cut-up vegetables with a low-fat dip as an appetizer instead of chips and dip. ¨ Sprinkle sunflower seeds or chopped almonds over salads. Or try adding chopped walnuts or almonds to cooked vegetables. ¨ Try some vegetarian meals using beans and peas. Add garbanzo or kidney beans to salads. Make burritos and tacos with mashed ramos beans or black beans. Where can you learn more? Go to http://staci-alejandro.info/. Enter I039 in the search box to learn more about \"DASH Diet: Care Instructions. \" Current as of: April 3, 2017 Content Version: 11.3 © 0443-8206 [employer] rumr. Care instructions adapted under license by [employer] (which disclaims liability or warranty for this information). If you have questions about a medical condition or this instruction, always ask your healthcare professional. [place] [address] any warranty or liability for your use of this information. Please provide this summary of care documentation to your next provider. Your primary care clinician is listed as Perry Abad. If you have any questions after today's visit, please call [phone].

## 2017-08-22 NOTE — PATIENT INSTRUCTIONS
Please find and download a weight loss jamaal for your phone. I.e My fitness pal or calorie karlie. Learning About Meal Planning for Diabetes  Why plan your meals? Meal planning can be a key part of managing diabetes. Planning meals and snacks with the right balance of carbohydrate, protein, and fat can help you keep your blood sugar at the target level you set with your doctor. You don't have to eat special foods. You can eat what your family eats, including sweets once in a while. But you do have to pay attention to how often you eat and how much you eat of certain foods. You may want to work with a dietitian or a certified diabetes educator. He or she can give you tips and meal ideas and can answer your questions about meal planning. This health professional can also help you reach a healthy weight if that is one of your goals. What plan is right for you? Your dietitian or diabetes educator may suggest that you start with the plate format or carbohydrate counting. The plate format  The plate format is a simple way to help you manage how you eat. You plan meals by learning how much space each food should take on a plate. Using the plate format helps you spread carbohydrate throughout the day. It can make it easier to keep your blood sugar level within your target range. It also helps you see if you're eating healthy portion sizes. To use the plate format, you put non-starchy vegetables on half your plate. Add meat or meat substitutes on one-quarter of the plate. Put a grain or starchy vegetable (such as brown rice or a potato) on the final quarter of the plate. You can add a small piece of fruit and some low-fat or fat-free milk or yogurt, depending on your carbohydrate goal for each meal.  Here are some tips for using the plate format:  · Make sure that you are not using an oversized plate. A 9-inch plate is best. Many restaurants use larger plates. · Get used to using the plate format at home.  Then you can use it when you eat out. · Write down your questions about using the plate format. Talk to your doctor, a dietitian, or a diabetes educator about your concerns. Carbohydrate counting  With carbohydrate counting, you plan meals based on the amount of carbohydrate in each food. Carbohydrate raises blood sugar higher and more quickly than any other nutrient. It is found in desserts, breads and cereals, and fruit. It's also found in starchy vegetables such as potatoes and corn, grains such as rice and pasta, and milk and yogurt. Spreading carbohydrate throughout the day helps keep your blood sugar levels within your target range. Your daily amount depends on several things, including your weight, how active you are, which diabetes medicines you take, and what your goals are for your blood sugar levels. A registered dietitian or diabetes educator can help you plan how much carbohydrate to include in each meal and snack. A guideline for your daily amount of carbohydrate is:  · 45 to 60 grams at each meal. That's about the same as 3 to 4 carbohydrate servings. · 15 to 20 grams at each snack. That's about the same as 1 carbohydrate serving. The Nutrition Facts label on packaged foods tells you how much carbohydrate is in a serving of the food. First, look at the serving size on the food label. Is that the amount you eat in a serving? All of the nutrition information on a food label is based on that serving size. So if you eat more or less than that, you'll need to adjust the other numbers. Total carbohydrate is the next thing you need to look for on the label. If you count carbohydrate servings, one serving of carbohydrate is 15 grams. For foods that don't come with labels, such as fresh fruits and vegetables, you'll need a guide that lists carbohydrate in these foods. Ask your doctor, dietitian, or diabetes educator about books or other nutrition guides you can use.   If you take insulin, you need to know how many grams of carbohydrate are in a meal. This lets you know how much rapid-acting insulin to take before you eat. If you use an insulin pump, you get a constant rate of insulin during the day. So the pump must be programmed at meals to give you extra insulin to cover the rise in blood sugar after meals. When you know how much carbohydrate you will eat, you can take the right amount of insulin. Or, if you always use the same amount of insulin, you need to make sure that you eat the same amount of carbohydrate at meals. If you need more help to understand carbohydrate counting and food labels, ask your doctor, dietitian, or diabetes educator. How do you get started with meal planning? Here are some tips to get started:  · Plan your meals a week at a time. Don't forget to include snacks too. · Use cookbooks or online recipes to plan several main meals. Plan some quick meals for busy nights. You also can double some recipes that freeze well. Then you can save half for other busy nights when you don't have time to cook. · Make sure you have the ingredients you need for your recipes. If you're running low on basic items, put these items on your shopping list too. · List foods that you use to make breakfasts, lunches, and snacks. List plenty of fruits and vegetables. · Post this list on the refrigerator. Add to it as you think of more things you need. · Take the list to the store to do your weekly shopping. Follow-up care is a key part of your treatment and safety. Be sure to make and go to all appointments, and call your doctor if you are having problems. It's also a good idea to know your test results and keep a list of the medicines you take. Where can you learn more? Go to http://staci-alejandro.info/. Alisson Mcdaniel in the search box to learn more about \"Learning About Meal Planning for Diabetes. \"  Current as of: March 13, 2017  Content Version: 11.3  © 7423-5657 [employer], [email or web address] Care instructions adapted under license by [employer] (which disclaims liability or warranty for this information). If you have questions about a medical condition or this instruction, always ask your healthcare professional. [address] any warranty or liability for your use of this information. Learning About Low-Carbohydrate Diets for Weight Loss  What is a low-carbohydrate diet? Low-carb diets avoid foods that are high in carbohydrate. These high-carb foods include pasta, bread, rice, cereal, fruits, and starchy vegetables. Instead, these diets usually have you eat foods that are high in fat and protein. Many people lose weight quickly on a low-carb diet. But the early weight loss is water. People on this diet often gain the weight back after they start eating carbs again. Not all diet plans are safe or work well. A lot of the evidence shows that low-carb diets aren't healthy. That's because these diets often don't include healthy foods like fruits and vegetables. Losing weight safely means balancing protein, fat, and carbs with every meal and snack. And low-carb diets don't always provide the vitamins, minerals, and fiber you need. If you have a serious medical condition, talk to your doctor before you try any diet. These conditions include kidney disease, heart disease, type 2 diabetes, high cholesterol, and high blood pressure. If you are pregnant, it may not be safe for your baby if you are on a low-carb diet. How can you lose weight safely? You might have heard that a diet plan helped another person lose weight. But that doesn't mean that it will work for you. It is very hard to stay on a diet that includes lots of big changes in your eating habits. If you want to get to a healthy weight and stay there, making healthy lifestyle changes will often work better than dieting. These steps can help. · Make a plan for change.  Work with your doctor to create a plan that is right for you. · See a dietitian. He or she can show you how to make healthy changes in your eating habits. · Manage stress. If you have a lot of stress in your life, it can be hard to focus on making healthy changes to your daily habits. · Track your food and activity. You are likely to do better at losing weight if you keep track of what you eat and what you do. Follow-up care is a key part of your treatment and safety. Be sure to make and go to all appointments, and call your doctor if you are having problems. It's also a good idea to know your test results and keep a list of the medicines you take. Where can you learn more? Go to http://staci-alejandro.info/. Enter A121 in the search box to learn more about \"Learning About Low-Carbohydrate Diets for Weight Loss. \"  Current as of: December 8, 2016  Content Version: 11.3  © 4756-7804 [employer]. Care instructions adapted under license by [employer] (which disclaims liability or warranty for this information). If you have questions about a medical condition or this instruction, always ask your healthcare professional. [place] [address] any warranty or liability for your use of this information. DASH Diet: Care Instructions  Your Care Instructions  The DASH diet is an eating plan that can help lower your blood pressure. DASH stands for Dietary Approaches to Stop Hypertension. Hypertension is high blood pressure. The DASH diet focuses on eating foods that are high in calcium, potassium, and magnesium. These nutrients can lower blood pressure. The foods that are highest in these nutrients are fruits, vegetables, low-fat dairy products, nuts, seeds, and legumes. But taking calcium, potassium, and magnesium supplements instead of eating foods that are high in those nutrients does not have the same effect. The DASH diet also includes whole grains, fish, and poultry.   The DASH diet is one of several lifestyle changes your doctor may recommend to lower your high blood pressure. Your doctor may also want you to decrease the amount of sodium in your diet. Lowering sodium while following the DASH diet can lower blood pressure even further than just the DASH diet alone. Follow-up care is a key part of your treatment and safety. Be sure to make and go to all appointments, and call your doctor if you are having problems. It's also a good idea to know your test results and keep a list of the medicines you take. How can you care for yourself at home? Following the DASH diet  · Eat 4 to 5 servings of fruit each day. A serving is 1 medium-sized piece of fruit, ½ cup chopped or canned fruit, 1/4 cup dried fruit, or 4 ounces (½ cup) of fruit juice. Choose fruit more often than fruit juice. · Eat 4 to 5 servings of vegetables each day. A serving is 1 cup of lettuce or raw leafy vegetables, ½ cup of chopped or cooked vegetables, or 4 ounces (½ cup) of vegetable juice. Choose vegetables more often than vegetable juice. · Get 2 to 3 servings of low-fat and fat-free dairy each day. A serving is 8 ounces of milk, 1 cup of yogurt, or 1 ½ ounces of cheese. · Eat 6 to 8 servings of grains each day. A serving is 1 slice of bread, 1 ounce of dry cereal, or ½ cup of cooked rice, pasta, or cooked cereal. Try to choose whole-grain products as much as possible. · Limit lean meat, poultry, and fish to 2 servings each day. A serving is 3 ounces, about the size of a deck of cards. · Eat 4 to 5 servings of nuts, seeds, and legumes (cooked dried beans, lentils, and split peas) each week. A serving is 1/3 cup of nuts, 2 tablespoons of seeds, or ½ cup of cooked beans or peas. · Limit fats and oils to 2 to 3 servings each day. A serving is 1 teaspoon of vegetable oil or 2 tablespoons of salad dressing. · Limit sweets and added sugars to 5 servings or less a week.  A serving is 1 tablespoon jelly or jam, ½ cup sorbet, or 1 cup of lemonade. · Eat less than 2,300 milligrams (mg) of sodium a day. If you limit your sodium to 1,500 mg a day, you can lower your blood pressure even more. Tips for success  · Start small. Do not try to make dramatic changes to your diet all at once. You might feel that you are missing out on your favorite foods and then be more likely to not follow the plan. Make small changes, and stick with them. Once those changes become habit, add a few more changes. · Try some of the following:  ¨ Make it a goal to eat a fruit or vegetable at every meal and at snacks. This will make it easy to get the recommended amount of fruits and vegetables each day. ¨ Try yogurt topped with fruit and nuts for a snack or healthy dessert. ¨ Add lettuce, tomato, cucumber, and onion to sandwiches. ¨ Combine a ready-made pizza crust with low-fat mozzarella cheese and lots of vegetable toppings. Try using tomatoes, squash, spinach, broccoli, carrots, cauliflower, and onions. ¨ Have a variety of cut-up vegetables with a low-fat dip as an appetizer instead of chips and dip. ¨ Sprinkle sunflower seeds or chopped almonds over salads. Or try adding chopped walnuts or almonds to cooked vegetables. ¨ Try some vegetarian meals using beans and peas. Add garbanzo or kidney beans to salads. Make burritos and tacos with mashed ramos beans or black beans. Where can you learn more? Go to http://staci-alejandro.info/. Enter D705 in the search box to learn more about \"DASH Diet: Care Instructions. \"  Current as of: April 3, 2017  Content Version: 11.3  © 6257-9279 [employer]. Care instructions adapted under license by [employer] (which disclaims liability or warranty for this information). If you have questions about a medical condition or this instruction, always ask your healthcare professional. [place] [address] any warranty or liability for your use of this information.

## 2017-08-22 NOTE — PROGRESS NOTES
1. Have you been to the ER, urgent care clinic since your last visit? Hospitalized since your last visit? No.     2. Have you seen or consulted any other health care providers outside of the [address] [place] [address] since your last visit? Include any pap smears or colon screening. Yes, saw her Breast Surgeon in 6/2017. Patient presents with follow up Diabetes, Hypertension and Vitamin D deficiency.

## 2017-09-19 ENCOUNTER — OFFICE VISIT (OUTPATIENT)
Dept: ONCOLOGY | Age: 62
End: 2017-09-19

## 2017-09-19 ENCOUNTER — HOSPITAL ENCOUNTER (OUTPATIENT)
Dept: INFUSION THERAPY | Age: 62
End: 2017-09-19

## 2017-09-19 VITALS
OXYGEN SATURATION: 100 % | HEIGHT: 64 IN | TEMPERATURE: 98.8 F | HEART RATE: 91 BPM | DIASTOLIC BLOOD PRESSURE: 64 MMHG | SYSTOLIC BLOOD PRESSURE: 123 MMHG | RESPIRATION RATE: 17 BRPM | BODY MASS INDEX: 36.7 KG/M2 | WEIGHT: 215 LBS

## 2017-09-19 DIAGNOSIS — E55.9 VITAMIN D DEFICIENCY: ICD-10-CM

## 2017-09-19 DIAGNOSIS — D75.839 THROMBOCYTOSIS: ICD-10-CM

## 2017-09-19 DIAGNOSIS — D05.11 DUCTAL CARCINOMA IN SITU (DCIS) OF RIGHT BREAST: Primary | ICD-10-CM

## 2017-09-19 NOTE — MR AVS SNAPSHOT
Visit Information Date & Time Provider Department Dept. Phone Encounter #  
 9/19/2017 12:00 PM Elza Alonso  [facility] Oncology [phone] [id number] Follow-up Instructions Return in about 3 months (around 12/19/2017), or if symptoms worsen or fail to improve, for reeval.. Routing History Your Appointments 10/24/2017  3:00 PM  
Follow Up with Kaity Hill MD  
[address] [place] [address]) Appt Note: 4 month follow up  
 [address] [facility] 405 [facility] [address] [place] [address]  
  
   
 [address] [facility] 88 [address] [facility] [address]  
  
    
 11/1/2017 11:45 AM  
LAB with [facility] 23 ([facility]) [facility] [address]  
  
   
 [address] [facility] [address]  
  
    
 11/7/2017  1:30 PM  
Follow Up with Cathleen Biswas MD  
[address]) Appt Note: 3 month fu [facility]t [facility] [place] [address]  
  
   
 [address]  
  
    
 12/20/2017 11:30 AM  
Follow Up with Elza Alonso MD  
130 [facility] Oncology [address] [place] [address] [address] [facility] [address] [facility] [address] [place] [address]  
  
   
 [address] [facility] [address] [facility] [address]  
  
    
 3/6/2018  9:00 AM  
Office Visit with Florentino Donovan MD  
[facility] 23 [address] [place] [address]) [place]. [address]  
  
   
 [address] [facility] [address] Upcoming Health Maintenance Date Due  
 EYE EXAM RETINAL OR DILATED Q1 2/20/2016 ZOSTER VACCINE AGE 60> 6/8/2017 INFLUENZA AGE 9 TO ADULT 8/1/2017 FOOT EXAM Q1 9/13/2017 MICROALBUMIN Q1 9/13/2017 PAP AKA CERVICAL CYTOLOGY 10/19/2017* HEMOGLOBIN A1C Q6M 11/3/2017 MEDICARE YEARLY EXAM 3/3/2018 LIPID PANEL Q1 5/3/2018 BREAST CANCER SCRN MAMMOGRAM 4/19/2019 DTaP/Tdap/Td series (2 - Td) 10/7/2021 COLONOSCOPY 1/15/2023 *Topic was postponed. The date shown is not the original due date. Allergies as of 9/19/2017  Review Complete On: 9/19/2017 By: Martinez Beard MD  
 No Known Allergies Current Immunizations  Reviewed on 5/22/2017 Name Date Influenza Vaccine 10/1/2015, 11/20/2014 Pneumococcal Polysaccharide (PPSV-23) 3/2/2017 Tdap 10/7/2011 12:00 AM  
  
 Not reviewed this visit You Were Diagnosed With   
  
 Codes Comments Ductal carcinoma in situ (DCIS) of right breast    -  Primary ICD-10-CM: D05.11 ICD-9-CM: 233.0 Thrombocytosis ([address]     ICD-10-CM: D47.3 ICD-9-CM: 238.71 Vitamin D deficiency     ICD-10-CM: E55.9 ICD-9-CM: 268.9 Vitals BP Pulse Temp Resp Height(growth percentile) Weight(growth percentile) 123/64 (BP 1 Location: Left arm, BP Patient Position: Sitting) 91 98.8 °F (37.1 °C) (Oral) 17 5' 4\" (1.626 m) 215 lb (97.5 kg) SpO2 BMI OB Status Smoking Status 100% 36.9 kg/m2 Postmenopausal Never Smoker BMI and BSA Data Body Mass Index Body Surface Area  
 36.9 kg/m 2 2.1 m 2 Preferred Pharmacy Pharmacy Name Phone [address] [phone] Your Updated Medication List  
  
   
This list is accurate as of: 9/19/17 12:15 PM.  Always use your most recent med list.  
  
  
  
  
 dilTIAZem 360 mg SR capsule Commonly known as:  TAZTIA XT Take 1 Cap by mouth daily. ergocalciferol 50,000 unit capsule Commonly known as:  VITAMIN D2 Take 1 Cap by mouth every fourteen (14) days. glipiZIDE SR 5 mg CR tablet Commonly known as:  GLUCOTROL XL  
TAKE ONE TABLET BY MOUTH ONCE DAILY losartan-hydroCHLOROthiazide 100-25 mg per tablet Commonly known as:  HYZAAR  
TAKE ONE TABLET BY MOUTH ONCE DAILY metFORMIN 1,000 mg tablet Commonly known as:  GLUCOPHAGE  
TAKE ONE TABLET BY MOUTH TWICE DAILY WITH MEALS  
  
 metoprolol tartrate 25 mg tablet Commonly known as:  LOPRESSOR Take 0.5 Tabs by mouth two (2) times a day. tamoxifen 20 mg tablet Commonly known as:  NOLVADEX Take 1 Tab by mouth daily. Follow-up Instructions Return in about 3 months (around 12/19/2017), or if symptoms worsen or fail to improve, for reeval.. To-Do List   
 09/19/2017 1:30 PM  
  Appointment with [facility] INFUSION NURSE 5 at [facility] OP INFUSION [facility] ([phone])  
  
 09/22/2017 8:00 AM  
  Appointment with [address] [place] [address] at [address] [place] [address] ([phone]) This appointment time is simply a place rosenberg and may not reflect the true appointment time. If patient does not know the appointment time given to them at the time of scheduling, they should contact the Radiation Therapy department for detail. Please provide this summary of care documentation to your next provider. Your primary care clinician is listed as Ana M Mcmanus. If you have any questions after today's visit, please call [phone].

## 2017-09-19 NOTE — PROGRESS NOTES
[facility]:    Patient: Yaneth Sampson Age: 63 y.o. Sex: female    YOB: 1955  PCP: Alyssa Mendoza MD   MRN: [id number]  CSN: [id number]         Patient Active Problem List   Diagnosis Code    Anemia D64.9    DM type 2 (diabetes mellitus, type 2) ([facility] [address] E11.9    Hypertension I10    Vitamin D deficiency E55.9    Breast calcifications on mammogram R92.1    Thrombocytosis ([facility] [address] D47.3    Morbid obesity due to excess calories (HCC) E66.01    Ductal carcinoma in situ (DCIS) of right breast D05.11    Controlled type 2 diabetes mellitus without complication, without long-term current use of insulin (HCC) E11.9       Assessment/ Plan:     1.) Right Non-Invasive Breast Cancer (DCIS)/ (LCIS)  -AJCC STAGE: pTis.   -ER+/NJ+  -s/p right lumptectomy per Dr. Diana Fiore 5/2017  -s/p adj. XRT with Rad./Onc. (Dr. Neelam Riley 7/13/17  -F/u  with gyn. once yearly  -No known contraindications to Tx with Tamoxifen  -Continue with chemoprophylaxis with tamoxifen   -She is tolerating tamoxifen very well  -Rtc. in 12 weeks to reevaluate clinically and for further medical decision making      Pathology Report 5/2017:     FINAL DIAGNOSIS:   RIGHT BREAST MASS, LUMPECTOMY:   EXTENSIVE IN-SITU MAMMARY CARCINOMA (SEE COMMENT). HISTOLOGIC TYPE: MIXED DUCTAL AND LOBULAR. NUCLEAR stGstRstAstDstEst:st st1st. NECROSIS: NOT IDENTIFIED. ESTIMATED SIZE: APPROXIMATELY 5 CM (INVOLVING APPROXIMATELY ½ OF   SPECIMEN). MARGINS OF RESECTION: POSITIVE AT LATERAL MARGIN, MULTIFOCALLY CLOSE AT   ANTERIOR MARGIN.   LYMPH NODES: NOT SUBMITTED. ESTROGEN RECEPTOR: POSITIVE   PROGESTERONE RECEPTOR: POSITIVE. OTHER PATHOLOGIC FINDINGS: EXTENSIVE SCLEROSING ADENOSIS, FIBROCYSTIC   CHANGE, INTRADUCTAL PAPILLOMAS, BIOPSY SITE CHANGES. AJCC STAGE: pTis. Thank you for the opportunity to participate in the care, please do not hesitate to call for any questions or concerns.  I have discussed the diagnosis with the patient and the intended plan. The patient verbalized understanding and agrees with the plan. Subjective:    No bone pain  Appetite stable  Tolerating tamoxifen well    History:   Anitra Worley is a 63 y.o. female presenting today for:     -Right Non-invasive breast cancer (DCIS)  -ER+/AL+  -s/p right lumptectomy per Dr. Bonny Gonzales  -F/u  with gyn. once yearly  -No contraindications to Tx with Tamoxifen    No fever, chills, sweats, or chest pain. No bone pain. The remainder of a 12 point ROS was otherwise negative. Current Outpatient Prescriptions   Medication Sig Dispense Refill    tamoxifen (NOLVADEX) 20 mg tablet Take 1 Tab by mouth daily. 90 Tab 3    metFORMIN (GLUCOPHAGE) 1,000 mg tablet TAKE ONE TABLET BY MOUTH TWICE DAILY WITH MEALS 60 Tab 2    metoprolol tartrate (LOPRESSOR) 25 mg tablet Take 0.5 Tabs by mouth two (2) times a day. 60 Tab 0    dilTIAZem (TAZTIA XT) 360 mg SR capsule Take 1 Cap by mouth daily. 90 Cap 1    ergocalciferol (VITAMIN D2) 50,000 unit capsule Take 1 Cap by mouth every fourteen (14) days. 4 Cap 0    glipiZIDE SR (GLUCOTROL XL) 5 mg CR tablet TAKE ONE TABLET BY MOUTH ONCE DAILY 90 Tab 1    losartan-hydroCHLOROthiazide (HYZAAR) 100-25 mg per tablet TAKE ONE TABLET BY MOUTH ONCE DAILY 90 Tab 1       Objective:   VS:    Vitals:    09/19/17 1158   BP: 123/64   Pulse: 91   Resp: 17   Temp: 98.8 °F (37.1 °C)   TempSrc: Oral   SpO2: 100%   Weight: 97.5 kg (215 lb)   Height: 5' 4\" (1.626 m)     Physical Exam:     Constitutional:  we no acute distress and alert and oriented x 3    HENT:  atraumatic   Eyes:  EOMI, PERRLA   Neck:  atraumatic   Cardiovascular:  S1, S2   Pulmonary/Chest Wall:  breath sounds are clear .    Abdominal:  Non tender, no palpable masses, .        Musculoskeletal:  No deformities   Skin:  No rashes or lesions    Peripheral Vascular:  Pulses palp. .       Review of Systems: 12 point ROS o/w negative    No results found for this or any previous visit (from the past 168 hour(s)). Past Medical History:   Diagnosis Date    Anemia     resolved    Diverticulosis 11/13    DM type 2 (diabetes mellitus, type 2) ([facility] [address]     Hypertension     Internal hemorrhoids 11/13    Menopause     Vitamin D deficiency        Past Surgical History:   Procedure Laterality Date    HX BREAST LUMPECTOMY Right 5/30/2017    right BREAST LUMPECTOMY WITH localization performed by Jim Lanier MD at [address] HX BREAST LUMPECTOMY Right 6/20/2017    revision right breast to achieve neg margin performed by Jim Lanier MD at [address] HX COLONOSCOPY  1/13    repeat 1/23 - [facility]    HX ORTHOPAEDIC      fluid drained from lt shoulder    HX TUBAL LIGATION         Social History     Social History    Marital status: UNKNOWN     Spouse name: N/A    Number of children: N/A    Years of education: N/A     Occupational History    has custody of grandson      Social History Main Topics    Smoking status: Never Smoker    Smokeless tobacco: Never Used    Alcohol use No    Drug use: No    Sexual activity: Not Currently     Partners: Male      Comment:      Other Topics Concern    Not on file     Social History Narrative       Family History   Problem Relation Age of Onset   [facility] Stroke Mother     Hypertension Mother     Diabetes Daughter        No Known Allergies    Follow-up Disposition: Not on File    Paula Martinez MD, MPH Hematology-Medical Oncology  September 19, 2017 12:10 PM

## 2017-09-19 NOTE — PROGRESS NOTES
Patricia Flores is a 63 y.o. female who presents today for follow up  Pt educated on taking meds as directed, pt verbalized understanding. 1. Have you been to the ER, urgent care clinic since your last visit? Hospitalized since your last visit? NO    2. Have you seen or consulted any other health care providers outside of the [address] [place] [address] since your last visit? Include any pap smears or colon screening. NO    Health Maintenance reviewed.     Health Maintenance Due   Topic Date Due    EYE EXAM RETINAL OR DILATED Q1  02/20/2016    ZOSTER VACCINE AGE 60>  06/08/2017    INFLUENZA AGE 9 TO ADULT  08/01/2017    FOOT EXAM Q1  09/13/2017    MICROALBUMIN Q1  09/13/2017

## 2017-10-03 ENCOUNTER — HOSPITAL ENCOUNTER (OUTPATIENT)
Dept: RADIATION THERAPY | Age: 62
Discharge: HOME | End: 2017-10-03
Payer: MEDICARE

## 2017-10-03 PROCEDURE — 99211 OFF/OP EST MAY X REQ PHY/QHP: CPT

## 2017-10-16 RX ORDER — ERGOCALCIFEROL 1.25 MG/1
CAPSULE ORAL
Qty: 4 CAP | Refills: 0 | Status: SHIPPED | OUTPATIENT
Start: 2017-10-16 | End: 2017-12-01 | Stop reason: SDUPTHER

## 2017-10-31 ENCOUNTER — OFFICE VISIT (OUTPATIENT)
Dept: SURGERY | Age: 62
End: 2017-10-31

## 2017-10-31 VITALS
HEIGHT: 64 IN | TEMPERATURE: 98.1 F | BODY MASS INDEX: 37.32 KG/M2 | HEART RATE: 84 BPM | SYSTOLIC BLOOD PRESSURE: 157 MMHG | WEIGHT: 218.6 LBS | DIASTOLIC BLOOD PRESSURE: 75 MMHG | RESPIRATION RATE: 16 BRPM | OXYGEN SATURATION: 98 %

## 2017-10-31 DIAGNOSIS — E66.01 MORBID OBESITY DUE TO EXCESS CALORIES (HCC): ICD-10-CM

## 2017-10-31 DIAGNOSIS — E11.65 TYPE 2 DIABETES MELLITUS WITH HYPERGLYCEMIA, WITHOUT LONG-TERM CURRENT USE OF INSULIN (HCC): ICD-10-CM

## 2017-10-31 DIAGNOSIS — D05.11 DUCTAL CARCINOMA IN SITU (DCIS) OF RIGHT BREAST: Primary | ICD-10-CM

## 2017-10-31 DIAGNOSIS — I10 ESSENTIAL HYPERTENSION: ICD-10-CM

## 2017-10-31 RX ORDER — LOSARTAN POTASSIUM AND HYDROCHLOROTHIAZIDE 25; 100 MG/1; MG/1
TABLET ORAL
Qty: 90 TAB | Refills: 1 | Status: SHIPPED | OUTPATIENT
Start: 2017-10-31 | End: 2018-10-19 | Stop reason: SDUPTHER

## 2017-10-31 RX ORDER — METFORMIN HYDROCHLORIDE 1000 MG/1
TABLET ORAL
Qty: 180 TAB | Refills: 1 | Status: SHIPPED | OUTPATIENT
Start: 2017-10-31 | End: 2018-05-08 | Stop reason: SDUPTHER

## 2017-10-31 NOTE — PROGRESS NOTES
Brianna Bella comes to the office today for a four-month follow-up from a previous fairly large segment excision of a DCIS of the right breast.  At the initial surgery a 5 cm area of DCIS was resected and she had a positive margin. She was taken back for repeat resection but no residual tumor was found. The tumor was estrogen and progestin receptor positive. The patient has completed her radiation therapy. She has been seen by Dr. Aquiles Nassar and started on tamoxifen which she seems to be tolerating well. She denies any new breast lumps or soreness. She has not been aware of any arm edema. She denies any new aches, pains, shortness of breath or headaches. No Known Allergies  Past Medical History:   Diagnosis Date    Anemia     resolved    Diverticulosis 11/13    DM type 2 (diabetes mellitus, type 2) ([facility] [address]     Hypertension     Internal hemorrhoids 11/13    Menopause     Vitamin D deficiency      Past Surgical History:   Procedure Laterality Date    HX BREAST LUMPECTOMY Right 5/30/2017    right BREAST LUMPECTOMY WITH localization performed by Joni Lopez MD at [address] [place] [address] HX BREAST LUMPECTOMY Right 6/20/2017    revision right breast to achieve neg margin performed by Joni Lopez MD at [address] [place] [address] HX COLONOSCOPY  1/13    repeat 1/23 - [facility]i    HX ORTHOPAEDIC      fluid drained from lt shoulder    HX TUBAL LIGATION       Social History     Social History    Marital status: UNKNOWN     Spouse name: N/A    Number of children: N/A    Years of education: N/A     Occupational History    has custody of grandson      Social History Main Topics    Smoking status: Never Smoker    Smokeless tobacco: Never Used    Alcohol use No    Drug use: No    Sexual activity: Not Currently     Partners: Male      Comment:      Other Topics Concern    None     Social History Narrative     The patient's review of systems has not changed.   She continues to be on medications for diabetes and hypertension. She denies any new cardiac, respiratory, gastrointestinal or genitourinary problems. PHYSICAL EXAM    Visit Vitals    /75 (BP 1 Location: Left arm, BP Patient Position: Sitting)    Pulse 84    Temp 98.1 °F (36.7 °C) (Oral)    Resp 16    Ht 5' 4\" (1.626 m)    Wt 99.2 kg (218 lb 9.6 oz)    SpO2 98%    BMI 37.52 kg/m2          Constitutional:  Well-developed, well-nourished, no acute distress. Head:  Head, eyes, ears, nose, throat within normal limits. Skin:  No suspicious moles or rashes. Neck:  No masses or adenopathy. The airway appears normal. Thyroid is not enlarged and there are no palpable thyroid nodules. Lungs:  Lungs are clear to auscultation and percussion. No respiratory distress. No chest wall tenderness. Heart:  Heart is regular with no extra heart sounds or murmur heard. Breast Exam: The breasts are free of any discrete tenderness or masses  The skin and nipple areas appear normal. Both axillae are negative. The patient has a fairly well-healed incision from previous lumpectomy without any significant tenderness but some thickening around that area still. No evidence of recurrent disease     Abdomen: The abdomen is soft and nontender without organomegaly or masses. Bowel sounds are active and of normal pitch. There is no abdominal distention. No hernias are evident. Extremities:  No tenderness of the extremities and no significant swelling. Psych:  Alert and oriented. Assessment: Right breast 5 cm area of DCIS with negative margins after a second take back for wider excision. Estrogen and progestin receptor positive. Patient has completed radiation therapy and is now on tamoxifen without evidence of recurrence. #2 diabetes #3 hypertension. Recommendations: I recommend the patient come back in about 3 months. She is scheduled to have a mammogram before that time. The above was dictated with Sara.   There may be unrecognized errors.     Marta Campbell MD

## 2017-10-31 NOTE — PROGRESS NOTES
Kat Lomeli is a 63 y.o. y.o. female who presents today for a follow up breast exam for a previous right breast lumpectomy done for mixed ductal & lobular carcinomas on 06/20/17. She denies any new breast changes or areas of concern today. 1. Have you been to the ER, urgent care clinic since your last visit? Hospitalized since your last visit? No    2. Have you seen or consulted any other health care providers outside of the [address] [place] [address] since your last visit? Include any pap smears or colon screening.  No

## 2017-10-31 NOTE — PATIENT INSTRUCTIONS
If you have any questions or concerns about today's appointment, the verbal and/or written instructions you were given for follow up care, please call our office at [phone].     [facility] - [address]    [phone] office  [phone]

## 2017-10-31 NOTE — MR AVS SNAPSHOT
Visit Information Date & Time Provider Department Dept. Phone Encounter #  
 10/31/2017  3:00 PM Lionel Mccormick MD [facility] [phone] [id number] Follow-up Instructions Return in about 3 months (around 1/31/2018). Your Appointments 11/1/2017 11:45 AM  
LAB with [facility] Aisha [address] ([address]) [place] [address]  
  
   
 [address] [facility] [address]  
  
    
 11/7/2017  1:30 PM  
Follow Up with MD Aisha Villasenor 23 [address] [facility]) Appt Note: 3 month fu appt [place]. [address]  
  
   
 [address] [facility]  
  
    
 12/20/2017 11:30 AM  
Follow Up with Gay Hudson MD  
[address] [facility] [address] [facility] [address] [address]  
[phone]  
  
   
 [address] [facility]  
  
    
 1/30/2018  1:15 PM  
Follow Up with Lionel Mccormick MD  
[address] [place] [address] [address] [address] [facility] [address]  
  
   
 [address] [address] [facility] [address]  
  
    
 3/6/2018  9:00 AM  
Office Visit with Kendall Simms MD  
[facility] [address] [facility]) [facility] [place]. [address]  
  
   
 [address] [facility] [address] Upcoming Health Maintenance Date Due  
 PAP AKA CERVICAL CYTOLOGY 8/8/1978 ZOSTER VACCINE AGE 60> 6/8/2017 INFLUENZA AGE 9 TO ADULT 8/1/2017 FOOT EXAM Q1 9/13/2017 MICROALBUMIN Q1 9/13/2017 HEMOGLOBIN A1C Q6M 11/3/2017 MEDICARE YEARLY EXAM 3/3/2018 LIPID PANEL Q1 5/3/2018 EYE EXAM RETINAL OR DILATED Q1 9/5/2018 BREAST CANCER SCRN MAMMOGRAM 4/19/2019 DTaP/Tdap/Td series (2 - Td) 10/7/2021 COLONOSCOPY 1/15/2023 Allergies as of 10/31/2017  Review Complete On: 10/31/2017 By: Bouchra Lynch MD  
 No Known Allergies Current Immunizations  Reviewed on 5/22/2017 Name Date Influenza Vaccine 10/1/2015, 11/20/2014 Pneumococcal Polysaccharide (PPSV-23) 3/2/2017 Tdap 10/7/2011 12:00 AM  
  
 Not reviewed this visit You Were Diagnosed With   
  
 Codes Comments Ductal carcinoma in situ (DCIS) of right breast    -  Primary ICD-10-CM: D05.11 ICD-9-CM: 233.0 Morbid obesity due to excess calories (HCC)     ICD-10-CM: E66.01 
ICD-9-CM: 278.01 Type 2 diabetes mellitus with hyperglycemia, without long-term current use of insulin (HCC)     ICD-10-CM: E11.65 ICD-9-CM: 250.00, 790.29 Essential hypertension     ICD-10-CM: I10 
ICD-9-CM: 401.9 Vitals BP Pulse Temp Resp Height(growth percentile) Weight(growth percentile) 157/75 (BP 1 Location: Left arm, BP Patient Position: Sitting) 84 98.1 °F (36.7 °C) (Oral) 16 5' 4\" (1.626 m) 218 lb 9.6 oz (99.2 kg) SpO2 BMI OB Status Smoking Status 98% 37.52 kg/m2 Postmenopausal Never Smoker BMI and BSA Data Body Mass Index Body Surface Area  
 37.52 kg/m 2 2.12 m 2 Preferred Pharmacy Pharmacy Name Phone [address] [phone] Your Updated Medication List  
  
   
This list is accurate as of: 10/31/17  3:51 PM.  Always use your most recent med list.  
  
  
  
  
 dilTIAZem 360 mg SR capsule Commonly known as:  TAZTIA XT Take 1 Cap by mouth daily. glipiZIDE SR 5 mg CR tablet Commonly known as:  GLUCOTROL XL  
TAKE ONE TABLET BY MOUTH ONCE DAILY losartan-hydroCHLOROthiazide 100-25 mg per tablet Commonly known as:  HYZAAR  
TAKE ONE TABLET BY MOUTH ONCE DAILY  
  
 metFORMIN 1,000 mg tablet Commonly known as:  GLUCOPHAGE  
 TAKE ONE TABLET BY MOUTH TWICE DAILY WITH MEALS  
  
 metoprolol tartrate 25 mg tablet Commonly known as:  LOPRESSOR Take 0.5 Tabs by mouth two (2) times a day. tamoxifen 20 mg tablet Commonly known as:  NOLVADEX Take 1 Tab by mouth daily. VITAMIN D2 50,000 unit capsule Generic drug:  ergocalciferol TAKE ONE CAPSULE BY MOUTH EVERY 14 DAYS Follow-up Instructions Return in about 3 months (around 1/31/2018). To-Do List   
 12/31/2017 Imaging:  [facility] 3D KARAN W MAMMO RT DX INCL CAD Patient Instructions If you have any questions or concerns about today's appointment, the verbal and/or written instructions you were given for follow up care, please call our office at [phone]. [facility] - [facility], [address] 
 
[phone] office [phone] Please provide this summary of care documentation to your next provider. Your primary care clinician is listed as Ash Saul. If you have any questions after today's visit, please call [phone].

## 2017-11-03 ENCOUNTER — HOSPITAL ENCOUNTER (OUTPATIENT)
Dept: LAB | Age: 62
Discharge: HOME | End: 2017-11-03
Payer: MEDICARE

## 2017-11-03 DIAGNOSIS — I10 ESSENTIAL HYPERTENSION: ICD-10-CM

## 2017-11-03 DIAGNOSIS — E11.9 CONTROLLED TYPE 2 DIABETES MELLITUS WITHOUT COMPLICATION, WITHOUT LONG-TERM CURRENT USE OF INSULIN (HCC): ICD-10-CM

## 2017-11-03 LAB
ANION GAP SERPL CALC-SCNC: 7 MMOL/L (ref 3–18)
BUN SERPL-MCNC: 14 MG/DL (ref 7–18)
BUN/CREAT SERPL: 23 (ref 12–20)
CALCIUM SERPL-MCNC: 9.3 MG/DL (ref 8.5–10.1)
CHLORIDE SERPL-SCNC: 106 MMOL/L (ref 100–108)
CO2 SERPL-SCNC: 29 MMOL/L (ref 21–32)
CREAT SERPL-MCNC: 0.61 MG/DL (ref 0.6–1.3)
CREAT UR-MCNC: 178.07 MG/DL (ref 30–125)
EST. AVERAGE GLUCOSE BLD GHB EST-MCNC: 146 MG/DL
GLUCOSE SERPL-MCNC: 113 MG/DL (ref 74–99)
HBA1C MFR BLD: 6.7 % (ref 4.2–5.6)
MICROALBUMIN UR-MCNC: 0.8 MG/DL (ref 0–3)
MICROALBUMIN/CREAT UR-RTO: 4 MG/G (ref 0–30)
POTASSIUM SERPL-SCNC: 3.8 MMOL/L (ref 3.5–5.5)
SODIUM SERPL-SCNC: 142 MMOL/L (ref 136–145)

## 2017-11-03 PROCEDURE — 82043 UR ALBUMIN QUANTITATIVE: CPT | Performed by: INTERNAL MEDICINE

## 2017-11-03 PROCEDURE — 83036 HEMOGLOBIN GLYCOSYLATED A1C: CPT | Performed by: INTERNAL MEDICINE

## 2017-11-03 PROCEDURE — 36415 COLL VENOUS BLD VENIPUNCTURE: CPT | Performed by: INTERNAL MEDICINE

## 2017-11-03 PROCEDURE — 80048 BASIC METABOLIC PNL TOTAL CA: CPT | Performed by: INTERNAL MEDICINE

## 2017-12-04 RX ORDER — ERGOCALCIFEROL 1.25 MG/1
CAPSULE ORAL
Qty: 4 CAP | Refills: 0 | Status: SHIPPED | OUTPATIENT
Start: 2017-12-04 | End: 2018-02-20 | Stop reason: SDUPTHER

## 2017-12-08 ENCOUNTER — HOSPITAL ENCOUNTER (OUTPATIENT)
Dept: MAMMOGRAPHY | Age: 62
Discharge: HOME | End: 2017-12-08
Attending: SPECIALIST
Payer: MEDICARE

## 2017-12-08 DIAGNOSIS — D05.11 DUCTAL CARCINOMA IN SITU (DCIS) OF RIGHT BREAST: ICD-10-CM

## 2017-12-08 PROCEDURE — 77061 BREAST TOMOSYNTHESIS UNI: CPT

## 2017-12-20 ENCOUNTER — OFFICE VISIT (OUTPATIENT)
Dept: ONCOLOGY | Age: 62
End: 2017-12-20

## 2017-12-20 VITALS
RESPIRATION RATE: 17 BRPM | HEART RATE: 100 BPM | TEMPERATURE: 97.7 F | DIASTOLIC BLOOD PRESSURE: 69 MMHG | HEIGHT: 64 IN | SYSTOLIC BLOOD PRESSURE: 130 MMHG | BODY MASS INDEX: 37.22 KG/M2 | OXYGEN SATURATION: 100 % | WEIGHT: 218 LBS

## 2017-12-20 DIAGNOSIS — D75.839 THROMBOCYTOSIS: ICD-10-CM

## 2017-12-20 DIAGNOSIS — E55.9 VITAMIN D DEFICIENCY: ICD-10-CM

## 2017-12-20 DIAGNOSIS — D05.11 DUCTAL CARCINOMA IN SITU (DCIS) OF RIGHT BREAST: Primary | ICD-10-CM

## 2017-12-20 NOTE — MR AVS SNAPSHOT
Visit Information Date & Time Provider Department Dept. Phone Encounter #  
 12/20/2017 11:30 AM Larry Uriarte, [address] [address] Oncology [phone] [id number] Your Appointments 1/8/2018  3:30 PM  
Follow Up with [facility]id MD  
[facility] [address] [place] [address]) Appt Note: follow up 15 min [facility] [place]. [address]  
  
   
 [address] [address]  
  
    
 1/30/2018  1:15 PM  
Follow Up with Scott Alejandro MD  
[address] [facility] [address] [facility] [address]  
  
   
 [address] [facility] [address] [facility] [address]  
  
    
 3/6/2018  9:00 AM  
Office Visit with Will Ireland MD  
[address] [facility]) [facility] [place]. [address]  
  
   
 [address] [facility] [address]  
  
    
 3/20/2018  1:00 PM  
Follow Up with Larry Uriarte MD  
[address] [facility] Oncology [facility]) Appt Note: 3  month f-up [address] [facility] [address]  
  
   
 [address] [facility] [address] [facility] [address] Upcoming Health Maintenance Date Due  
 PAP AKA CERVICAL CYTOLOGY 8/8/1978 ZOSTER VACCINE AGE 60> 6/8/2017 Influenza Age 5 to Adult 8/1/2017 FOOT EXAM Q1 9/13/2017 MEDICARE YEARLY EXAM 3/3/2018 HEMOGLOBIN A1C Q6M 5/3/2018 LIPID PANEL Q1 5/3/2018 EYE EXAM RETINAL OR DILATED Q1 9/5/2018 MICROALBUMIN Q1 11/3/2018 DTaP/Tdap/Td series (2 - Td) 10/7/2021 COLONOSCOPY 1/15/2023 Allergies as of 12/20/2017  Review Complete On: 12/20/2017 By: Larry Uriarte MD  
 No Known Allergies Current Immunizations  Reviewed on 5/22/2017 Name Date Influenza Vaccine 10/1/2015, 11/20/2014 Pneumococcal Polysaccharide (PPSV-23) 3/2/2017  Tdap 10/7/2011 12:00 AM  
  
 Not reviewed this visit You Were Diagnosed With   
  
 Codes Comments Ductal carcinoma in situ (DCIS) of right breast    -  Primary ICD-10-CM: D05.11 ICD-9-CM: 233.0 Thrombocytosis ([address]     ICD-10-CM: D47.3 ICD-9-CM: 238.71 Vitamin D deficiency     ICD-10-CM: E55.9 ICD-9-CM: 268.9 Vitals BP Pulse Temp Resp Height(growth percentile) Weight(growth percentile) 130/69 (BP 1 Location: Left arm, BP Patient Position: Sitting) 100 97.7 °F (36.5 °C) (Oral) 17 5' 4\" (1.626 m) 218 lb (98.9 kg) SpO2 BMI OB Status Smoking Status 100% 37.42 kg/m2 Postmenopausal Never Smoker BMI and BSA Data Body Mass Index Body Surface Area  
 37.42 kg/m 2 2.11 m 2 Preferred Pharmacy Pharmacy Name Phone [address] [phone] Your Updated Medication List  
  
   
This list is accurate as of: 12/20/17 12:32 PM.  Always use your most recent med list.  
  
  
  
  
 dilTIAZem 360 mg SR capsule Commonly known as:  TAZTIA XT Take 1 Cap by mouth daily. glipiZIDE SR 5 mg CR tablet Commonly known as:  GLUCOTROL XL  
TAKE ONE TABLET BY MOUTH ONCE DAILY losartan-hydroCHLOROthiazide 100-25 mg per tablet Commonly known as:  HYZAAR  
TAKE ONE TABLET BY MOUTH ONCE DAILY  
  
 metFORMIN 1,000 mg tablet Commonly known as:  GLUCOPHAGE  
TAKE ONE TABLET BY MOUTH TWICE DAILY WITH MEALS  
  
 metoprolol tartrate 25 mg tablet Commonly known as:  LOPRESSOR Take 0.5 Tabs by mouth two (2) times a day. tamoxifen 20 mg tablet Commonly known as:  NOLVADEX Take 1 Tab by mouth daily. VITAMIN D2 50,000 unit capsule Generic drug:  ergocalciferol TAKE ONE CAPSULE BY MOUTH EVERY 14 DAYS Please provide this summary of care documentation to your next provider.  
  
  
 Your primary care clinician is listed as Kendall Simms. If you have any questions after today's visit, please call [phone].

## 2017-12-20 NOTE — PROGRESS NOTES
[facility]:    Patient: Padma Figueroa Age: 63 y.o. Sex: female    YOB: 1955  PCP: Ap Pimentel MD   MRN: [id number]  CSN: [id number]         Patient Active Problem List   Diagnosis Code    Anemia D64.9    DM type 2 (diabetes mellitus, type 2) ([facility] E11.9    Hypertension I10    Vitamin D deficiency E55.9    Breast calcifications on mammogram R92.1    Thrombocytosis ([facility] D47.3    Morbid obesity due to excess calories (HCC) E66.01    Ductal carcinoma in situ (DCIS) of right breast D05.11    Controlled type 2 diabetes mellitus without complication, without long-term current use of insulin (HCC) E11.9       Assessment/ Plan:     1.) Right Non-Invasive Breast Cancer (DCIS)/ (LCIS)  -AJCC STAGE: pTis.   -ER+/MA+  -s/p right lumptectomy per Dr. Campos Mason 5/2017  -s/p adj. XRT with Rad./Onc. (Dr. Paul Dimas 7/13/17  -F/u  with gyn. once yearly  -No known contraindications to Tx with Tamoxifen    -Continue with chemoprophylaxis with tamoxifen     -She is tolerating tamoxifen very well    -Repeat mammo. right breast 12/2017-Birads Cat. 2    -Rtc. in 12 weeks to reevaluate clinically and for further medical decision making or sooner if necessary    Pathology Report 5/2017:     FINAL DIAGNOSIS:   RIGHT BREAST MASS, LUMPECTOMY:   EXTENSIVE IN-SITU MAMMARY CARCINOMA (SEE COMMENT). HISTOLOGIC TYPE: MIXED DUCTAL AND LOBULAR. NUCLEAR rdGrdRrdArdDrdErd:rd rd3rd. NECROSIS: NOT IDENTIFIED. ESTIMATED SIZE: APPROXIMATELY 5 CM (INVOLVING APPROXIMATELY ½ OF   SPECIMEN). MARGINS OF RESECTION: POSITIVE AT LATERAL MARGIN, MULTIFOCALLY CLOSE AT   ANTERIOR MARGIN.   LYMPH NODES: NOT SUBMITTED. ESTROGEN RECEPTOR: POSITIVE   PROGESTERONE RECEPTOR: POSITIVE. OTHER PATHOLOGIC FINDINGS: EXTENSIVE SCLEROSING ADENOSIS, FIBROCYSTIC   CHANGE, INTRADUCTAL PAPILLOMAS, BIOPSY SITE CHANGES. AJCC STAGE: pTis.      Thank you for the opportunity to participate in the care, please do not hesitate to call for any questions or concerns. I have discussed the diagnosis with the patient and the intended plan. The patient verbalized understanding and agrees with the plan. Subjective:    No bone pain  Appetite stable  Tolerating tamoxifen well    History:   Svetlana Waterman is a 63 y.o. female presenting today for:     -Right Non-invasive breast cancer (DCIS)  -ER+/UT+  -s/p right lumptectomy per Dr. Kathy Longo  -F/u  with gyn. once yearly  -No contraindications to Tx with Tamoxifen    No fever, chills, sweats, or chest pain. No bone pain. The remainder of a 12 point ROS was otherwise negative. Current Outpatient Prescriptions   Medication Sig Dispense Refill    VITAMIN D2 50,000 unit capsule TAKE ONE CAPSULE BY MOUTH EVERY 14 DAYS 4 Cap 0    losartan-hydroCHLOROthiazide (HYZAAR) 100-25 mg per tablet TAKE ONE TABLET BY MOUTH ONCE DAILY 90 Tab 1    metFORMIN (GLUCOPHAGE) 1,000 mg tablet TAKE ONE TABLET BY MOUTH TWICE DAILY WITH MEALS 180 Tab 1    tamoxifen (NOLVADEX) 20 mg tablet Take 1 Tab by mouth daily. 90 Tab 3    metoprolol tartrate (LOPRESSOR) 25 mg tablet Take 0.5 Tabs by mouth two (2) times a day. 60 Tab 0    dilTIAZem (TAZTIA XT) 360 mg SR capsule Take 1 Cap by mouth daily. 90 Cap 1    glipiZIDE SR (GLUCOTROL XL) 5 mg CR tablet TAKE ONE TABLET BY MOUTH ONCE DAILY 90 Tab 1       Objective:   VS:    Vitals:    12/20/17 1156   BP: 130/69   Pulse: 100   Resp: 17   Temp: 97.7 °F (36.5 °C)   TempSrc: Oral   SpO2: 100%   Weight: 98.9 kg (218 lb)   Height: 5' 4\" (1.626 m)     Physical Exam:     Constitutional:  we no acute distress  alert and oriented x 3    HENT:  atraumatic   Eyes:  EOMI, PERRLA   Neck:  atraumatic   Cardiovascular:  S1, S2   Pulmonary/Chest Wall:  clear    Abdominal:  Non tender, no palpable masses, .        Musculoskeletal:  No deformities   Skin:  No rashes or lesions    Peripheral Vascular:  Pulses palp. .       Review of Systems: 12 point ROS o/w negative    No results found for this or any previous visit (from the past 168 hour(s)). Past Medical History:   Diagnosis Date    Anemia     resolved    Diverticulosis 11/13    DM type 2 (diabetes mellitus, type 2) ([address]     Hypertension     Internal hemorrhoids 11/13    Menopause     Vitamin D deficiency        Past Surgical History:   Procedure Laterality Date    HX BREAST LUMPECTOMY Right 5/30/2017    right BREAST LUMPECTOMY WITH localization performed by Jeronimo Pozo MD at [address] HX BREAST LUMPECTOMY Right 6/20/2017    revision right breast to achieve neg margin performed by Jeronimo Pozo MD at [address] HX COLONOSCOPY  1/13    repeat 1/23 - [place]    HX ORTHOPAEDIC      fluid drained from lt shoulder    HX TUBAL LIGATION         Social History     Social History    Marital status: UNKNOWN     Spouse name: N/A    Number of children: N/A    Years of education: N/A     Occupational History    has custody of grandson      Social History Main Topics    Smoking status: Never Smoker    Smokeless tobacco: Never Used    Alcohol use No    Drug use: No    Sexual activity: Not Currently     Partners: Male      Comment:      Other Topics Concern    Not on file     Social History Narrative       Family History   Problem Relation Age of Onset   Boubacar Rikki Stroke Mother     Hypertension Mother     Diabetes Daughter        No Known Allergies    Follow-up Disposition: Not on File    Cite Wesly Julian MD, MPH Hematology-Medical Oncology  December 20, 2017 12:10 PM

## 2017-12-20 NOTE — PROGRESS NOTES
Joan Rm is a 63 y.o. female who presents today for follow up  Pt educated on taking meds as directed, pt verbalized understanding. 1. Have you been to the ER, urgent care clinic since your last visit? Hospitalized since your last visit? NO    2. Have you seen or consulted any other health care providers outside of the [address] [place] [address] since your last visit? Include any pap smears or colon screening. NO    Health Maintenance reviewed.     Health Maintenance Due   Topic Date Due    PAP AKA CERVICAL CYTOLOGY  08/08/1978    ZOSTER VACCINE AGE 60>  06/08/2017    Influenza Age 9 to Adult  08/01/2017    FOOT EXAM Q1  09/13/2017

## 2018-01-18 RX ORDER — GLIPIZIDE 5 MG/1
TABLET, FILM COATED, EXTENDED RELEASE ORAL
Qty: 90 TAB | Refills: 1 | Status: SHIPPED | OUTPATIENT
Start: 2018-01-18 | End: 2018-01-18 | Stop reason: SDUPTHER

## 2018-01-18 RX ORDER — GLIPIZIDE 5 MG/1
TABLET, FILM COATED, EXTENDED RELEASE ORAL
Qty: 90 TAB | Refills: 1 | Status: SHIPPED | OUTPATIENT
Start: 2018-01-18 | End: 2018-01-19 | Stop reason: SDUPTHER

## 2018-01-19 NOTE — TELEPHONE ENCOUNTER
Requested Prescriptions     Pending Prescriptions Disp Refills    glipiZIDE SR (GLUCOTROL XL) 5 mg CR tablet 90 Tab 1     Sig: TAKE ONE TABLET BY MOUTH ONCE DAILY     Last visit: 01/12/2018  Next visit:01/22/2018

## 2018-01-23 RX ORDER — GLIPIZIDE 5 MG/1
TABLET, FILM COATED, EXTENDED RELEASE ORAL
Qty: 90 TAB | Refills: 1 | Status: SHIPPED | OUTPATIENT
Start: 2018-01-23 | End: 2019-01-01 | Stop reason: SDUPTHER

## 2018-01-30 ENCOUNTER — OFFICE VISIT (OUTPATIENT)
Dept: SURGERY | Age: 63
End: 2018-01-30

## 2018-01-30 VITALS
OXYGEN SATURATION: 100 % | BODY MASS INDEX: 37.73 KG/M2 | SYSTOLIC BLOOD PRESSURE: 164 MMHG | TEMPERATURE: 98.5 F | DIASTOLIC BLOOD PRESSURE: 75 MMHG | HEART RATE: 109 BPM | RESPIRATION RATE: 16 BRPM | HEIGHT: 64 IN | WEIGHT: 221 LBS

## 2018-01-30 DIAGNOSIS — D05.11 DUCTAL CARCINOMA IN SITU (DCIS) OF RIGHT BREAST: Primary | ICD-10-CM

## 2018-01-30 DIAGNOSIS — E66.01 MORBID OBESITY DUE TO EXCESS CALORIES (HCC): ICD-10-CM

## 2018-01-30 DIAGNOSIS — E11.9 CONTROLLED TYPE 2 DIABETES MELLITUS WITHOUT COMPLICATION, WITHOUT LONG-TERM CURRENT USE OF INSULIN (HCC): ICD-10-CM

## 2018-01-30 DIAGNOSIS — E55.9 VITAMIN D DEFICIENCY: ICD-10-CM

## 2018-01-30 DIAGNOSIS — I10 ESSENTIAL HYPERTENSION: ICD-10-CM

## 2018-01-30 NOTE — MR AVS SNAPSHOT
[facility], [place], operated by [facility] 
 
 
 [id number] [place] [address] [address] [facility] [phone] 
[phone] Patient: Alexis Erickson MRN: [id number] Visit Information Date & Time Provider Department Dept. Phone Encounter #  
 1/30/2018  1:15 PM Lokesh Díaz MD [facility] [phone] [id number] Follow-up Instructions Return in about 3 months (around 4/30/2018). Your Appointments 2/2/2018 11:45 AM  
Follow Up with Bella Marc MD  
[address]) Appt Note: follow up 15 min; pt r/s from 1/8/18; RESCHEDULED FROM 01/12/2018; $0 CP, $0 BAL, 01/12/2018 VC; r/s from 1/22 to this date at the request of the pt  
 [address] [place]  
  
   
 [address] [phone]  
  
    
 3/6/2018  9:00 AM  
Office Visit with Dalia Doshi MD  
[address]) [facility] [place]  
  
   
 [address] [facility]  
  
    
 3/20/2018  1:00 PM  
Follow Up with Coby Wise MD  
[address] [place] [address]) Appt Note: 3  month f-up [address] [facility] [facility]  
  
   
 [address] [place] [address] [address] Upcoming Health Maintenance Date Due  
 PAP AKA CERVICAL CYTOLOGY 8/8/1978 ZOSTER VACCINE AGE 60> 6/8/2017 Influenza Age 5 to Adult 8/1/2017 FOOT EXAM Q1 9/13/2017 MEDICARE YEARLY EXAM 3/3/2018 HEMOGLOBIN A1C Q6M 5/3/2018 LIPID PANEL Q1 5/3/2018 EYE EXAM RETINAL OR DILATED Q1 9/5/2018 MICROALBUMIN Q1 11/3/2018 BREAST CANCER SCRN MAMMOGRAM 12/8/2019 DTaP/Tdap/Td series (2 - Td) 10/7/2021 COLONOSCOPY 1/15/2023 Allergies as of 1/30/2018  Review Complete On: 1/30/2018 By: Lokesh Díaz MD  
 No Known Allergies Current Immunizations  Reviewed on 5/22/2017 Name Date Influenza Vaccine 10/1/2015, 11/20/2014 Pneumococcal Polysaccharide (PPSV-23) 3/2/2017 Tdap 10/7/2011 12:00 AM  
  
 Not reviewed this visit You Were Diagnosed With   
  
 Codes Comments Ductal carcinoma in situ (DCIS) of right breast    -  Primary ICD-10-CM: D05.11 ICD-9-CM: 233.0 Morbid obesity due to excess calories (HCC)     ICD-10-CM: E66.01 
ICD-9-CM: 278.01 Essential hypertension     ICD-10-CM: I10 
ICD-9-CM: 401.9 Controlled type 2 diabetes mellitus without complication, without long-term current use of insulin [facility]     ICD-10-CM: E11.9 ICD-9-CM: 250.00 Vitamin D deficiency     ICD-10-CM: E55.9 ICD-9-CM: 268.9 Vitals BP Pulse Temp Resp Height(growth percentile) Weight(growth percentile) 164/75 (BP 1 Location: Left arm, BP Patient Position: Sitting) (!) 109 98.5 °F (36.9 °C) (Oral) 16 5' 4\" (1.626 m) 221 lb (100.2 kg) SpO2 BMI OB Status Smoking Status 100% 37.93 kg/m2 Postmenopausal Never Smoker BMI and BSA Data Body Mass Index Body Surface Area  
 37.93 kg/m 2 2.13 m 2 Preferred Pharmacy Pharmacy Name Phone [address] [place] [address], [address] [place] [address] [phone] Your Updated Medication List  
  
   
This list is accurate as of: 1/30/18  1:26 PM.  Always use your most recent med list.  
  
  
  
  
 dilTIAZem 360 mg SR capsule Commonly known as:  TAZTIA XT Take 1 Cap by mouth daily. glipiZIDE SR 5 mg CR tablet Commonly known as:  GLUCOTROL XL  
TAKE ONE TABLET BY MOUTH ONCE DAILY losartan-hydroCHLOROthiazide 100-25 mg per tablet Commonly known as:  HYZAAR  
TAKE ONE TABLET BY MOUTH ONCE DAILY  
  
 metFORMIN 1,000 mg tablet Commonly known as:  GLUCOPHAGE  
TAKE ONE TABLET BY MOUTH TWICE DAILY WITH MEALS  
  
 metoprolol tartrate 25 mg tablet Commonly known as:  LOPRESSOR  
 Take 0.5 Tabs by mouth two (2) times a day. tamoxifen 20 mg tablet Commonly known as:  NOLVADEX Take 1 Tab by mouth daily. VITAMIN D2 50,000 unit capsule Generic drug:  ergocalciferol TAKE ONE CAPSULE BY MOUTH EVERY 14 DAYS Follow-up Instructions Return in about 3 months (around 4/30/2018). To-Do List   
 04/17/2018 1:30 PM  
  Appointment with [address] [place] [address] RADIATION THERAPY ([phone]) This appointment time is simply a place rosenberg and may not reflect the true appointment time. If patient does not know the appointment time given to them at the time of scheduling, they should contact the Radiation Therapy department for detail. Patient Instructions If you have any questions or concerns about today's appointment, the verbal and/or written instructions you were given for follow up care, please call our office at [phone]. [facility] - [address], [address] [facility], [address] [place] [address] 
 
[phone] office [phone] Please provide this summary of care documentation to your next provider. Your primary care clinician is listed as Gabrielle Smith. If you have any questions after today's visit, please call [phone].

## 2018-01-30 NOTE — PROGRESS NOTES
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  DCIS was large measuring about 5 cm. She had to have a margin excised to achieve a negative margin as a second procedure. No sentinel nodes were checked. The tumor was estrogen and progestin receptor positive. The patient is not aware of any new breast lumps in either breast.  She is not having any significant soreness. She denies any edema of the right arm. She also denies any new aches, pains, shortness of breath or headaches. A recent mammogram in December was negative for the right breast.    No Known Allergies  Past Medical History:   Diagnosis Date    Anemia     resolved    Diverticulosis 11/13    DM type 2 (diabetes mellitus, type 2) ([facility]     Hypertension     Internal hemorrhoids 11/13    Menopause     Vitamin D deficiency      Past Surgical History:   Procedure Laterality Date    HX BREAST LUMPECTOMY Right 5/30/2017    right BREAST LUMPECTOMY WITH localization performed by Jamir Schneider MD at [address] [place] [address] HX BREAST LUMPECTOMY Right 6/20/2017    revision right breast to achieve neg margin performed by Jamir Schneider MD at [address] [place] [address] HX COLONOSCOPY  1/13    repeat 1/23 - [place]    HX ORTHOPAEDIC      fluid drained from lt shoulder    HX TUBAL LIGATION       Social History     Social History    Marital status: UNKNOWN     Spouse name: N/A    Number of children: N/A    Years of education: N/A     Occupational History    has custody of grandson      Social History Main Topics    Smoking status: Never Smoker    Smokeless tobacco: Never Used    Alcohol use No    Drug use: No    Sexual activity: Not Currently     Partners: Male      Comment:      Other Topics Concern    None     Social History Narrative     The patient's review of systems has not changed significantly.   She continues to be on the same medication and states her diabetes has been under fairly good control. She continues to take her blood pressure medicine and is tolerating tamoxifen without difficulty. She denies any new cardiovascular, respiratory, gastrointestinal or genitourinary problems. PHYSICAL EXAM    Visit Vitals    /75 (BP 1 Location: Left arm, BP Patient Position: Sitting)    Pulse (!) 109    Temp 98.5 °F (36.9 °C) (Oral)    Resp 16    Ht 5' 4\" (1.626 m)    Wt 100.2 kg (221 lb)    SpO2 100%    BMI 37.93 kg/m2          Constitutional:  Well-developed, well-nourished, no acute distress. Head:  Head, eyes, ears, nose, throat within normal limits. Skin:  No suspicious moles or rashes. Neck:  No masses or adenopathy. The airway appears normal. Thyroid is not enlarged and there are no palpable thyroid nodules. Lungs:  Lungs are clear to auscultation and percussion. No respiratory distress. No chest wall tenderness. Heart:  Heart is regular with no extra heart sounds or murmur heard. Breast Exam: The breasts are free of any discrete tenderness or masses  The skin and nipple areas appear normal. Both axillae are negative. The patient has a very well-healed incision transversely in the right upper breast just above into the outside of the areola. The left breast she has some very slight thickening and about the 5 o'clock position just slightly lateral to the areola without tenderness. Abdomen: The abdomen is soft and nontender without organomegaly or masses. Bowel sounds are active and of normal pitch. There is no abdominal distention. No hernias are evident. Extremities:  No tenderness of the extremities and no significant swelling. Psych:  Alert and oriented. Assessment: #1 5 cm area of DCIS upper outer right breast with estrogen and progesterone receptor positivity. Subsequent repeat surgery to achieve a negative margin. And she underwent radiation therapy. She is now on tamoxifen per Dr. Rachell Jackson. No evidence of recurrent disease. #2 diabetes. #3 hypertension. Recommendations: Patient was encouraged to do self breast examinations. We will plan to see her back in about 3 months. The above was dictated with Sara. There may be unrecognized errors.     Anayeli Higuera MD

## 2018-01-30 NOTE — PATIENT INSTRUCTIONS
If you have any questions or concerns about today's appointment, the verbal and/or written instructions you were given for follow up care, please call our office at [phone].     [facility] - [address]    [phone] office  [phone]

## 2018-01-30 NOTE — PROGRESS NOTES
Major Colin is a 63 y.o. female who presents today for a follow up breast exam for a previous right breast lumpectomy for mixed ductal & lobular carcinomas on 06/20/17. She denies any new breast changes or areas of concern today. 1. Have you been to the ER, urgent care clinic since your last visit? Hospitalized since your last visit? No    2. Have you seen or consulted any other health care providers outside of the [address] [place] [address] since your last visit? Include any pap smears or colon screening.  No

## 2018-02-13 ENCOUNTER — OFFICE VISIT (OUTPATIENT)
Dept: FAMILY MEDICINE CLINIC | Age: 63
End: 2018-02-13

## 2018-02-13 VITALS
BODY MASS INDEX: 37.22 KG/M2 | HEIGHT: 64 IN | DIASTOLIC BLOOD PRESSURE: 70 MMHG | SYSTOLIC BLOOD PRESSURE: 148 MMHG | TEMPERATURE: 97 F | HEART RATE: 96 BPM | WEIGHT: 218 LBS | RESPIRATION RATE: 16 BRPM

## 2018-02-13 DIAGNOSIS — E11.9 CONTROLLED TYPE 2 DIABETES MELLITUS WITHOUT COMPLICATION, WITHOUT LONG-TERM CURRENT USE OF INSULIN (HCC): ICD-10-CM

## 2018-02-13 DIAGNOSIS — I10 ESSENTIAL HYPERTENSION: ICD-10-CM

## 2018-02-13 DIAGNOSIS — E66.01 MORBID OBESITY DUE TO EXCESS CALORIES (HCC): ICD-10-CM

## 2018-02-13 DIAGNOSIS — Z12.4 CERVICAL CANCER SCREENING: Primary | ICD-10-CM

## 2018-02-13 NOTE — PROGRESS NOTES
Sophie Avitia is a 63 y.o. female and presents with Follow Up Chronic Condition; Diabetes; Hypertension; and Other (DCIS)       Subjective:    Diabetes type 2- last A1c 6.9%. bs at home 120-130. No hypoglycemia- pt is aware of sx to monitor for.   htn- on ARB- controlled today. DCIS- s/p resection- following with general surgery and oncology- finished XRT as well. Just started tamoxifen. Obesity- minimal wt loss since last visit   htn- taking meds. No cp or sob.        Assessment/Plan:    Diabetes type 2- controlled-reviewed in detail with pt. Wt loss encouraged again- POC a1c next visit. Obesity- encouraged caloric restriction. Exercise. Goal of 1/2- 1 lb/week wt loss discussed. Referral to dietician placed. Discussed programs/meds/bariatric surgery briefly today as well. DCIS- continue f/u with onc and XRT. Pt aware of tamoxifen duration being 5 years.  Roasia Ped- would like this to be sl lower- pt would like to try diet/exercise first before adding another medication. RTC in 3 months    Orders Placed This Encounter    REFERRAL TO GYNECOLOGY     Referral Priority:   Routine     Referral Type:   Consultation     Referral Reason:   Specialty Services Required     Referral Location:   [facility] [place]     Referred to Provider:   Sheila Valverde MD    REFERRAL TO DIETITIAN     Referral Priority:   Routine     Referral Type:   Consultation     Referral Reason:   Specialty Services Required     Requested Specialty:   Dietitian     Diagnoses and all orders for this visit:    1. Cervical cancer screening  -     REFERRAL TO GYNECOLOGY    2. Controlled type 2 diabetes mellitus without complication, without long-term current use of insulin (HCC)  -     REFERRAL TO DIETITIAN    3. Morbid obesity due to excess calories ([address]  -     REFERRAL TO DIETITIAN    4.  Essential hypertension  -     REFERRAL TO DIETITIAN          ROS:  Negative except as mentioned above  Cardiac- no chest pain or palpitations  Pulmonary- no sob or wheezes  GI- no n/v or diarrhea. SH:  Social History   Substance Use Topics    Smoking status: Never Smoker    Smokeless tobacco: Never Used    Alcohol use No         Medications/Allergies:  Current Outpatient Prescriptions on File Prior to Visit   Medication Sig Dispense Refill    dilTIAZem (TAZTIA XT) 360 mg SR capsule Take 1 Cap by mouth daily. 90 Cap 0    glipiZIDE SR (GLUCOTROL XL) 5 mg CR tablet TAKE ONE TABLET BY MOUTH ONCE DAILY 90 Tab 1    VITAMIN D2 50,000 unit capsule TAKE ONE CAPSULE BY MOUTH EVERY 14 DAYS 4 Cap 0    losartan-hydroCHLOROthiazide (HYZAAR) 100-25 mg per tablet TAKE ONE TABLET BY MOUTH ONCE DAILY 90 Tab 1    metFORMIN (GLUCOPHAGE) 1,000 mg tablet TAKE ONE TABLET BY MOUTH TWICE DAILY WITH MEALS 180 Tab 1    tamoxifen (NOLVADEX) 20 mg tablet Take 1 Tab by mouth daily. 90 Tab 3    metoprolol tartrate (LOPRESSOR) 25 mg tablet Take 0.5 Tabs by mouth two (2) times a day. 60 Tab 0     No current facility-administered medications on file prior to visit. No Known Allergies    Objective:  Visit Vitals    /70    Pulse 96    Temp 97 °F (36.1 °C) (Oral)    Resp 16    Ht 5' 4\" (1.626 m)    Wt 218 lb (98.9 kg)    BMI 37.42 kg/m2    Body mass index is 37.42 kg/(m^2). Constitutional: Well developed, nourished, no distress, alert, obese   CV: S1, S2.  RRR. No murmurs/rubs. No edema. Pulm: No abnormalities on inspection. Clear to auscultation bilaterally. No wheezing/rhonchi. Normal effort. GI: Soft, nontender, nondistended. Normal active bowel sounds. No  masses on palpation. No hepatosplenomegaly.

## 2018-02-13 NOTE — PROGRESS NOTES
1. Have you been to the ER, urgent care clinic since your last visit? Hospitalized since your last visit? No.     2. Have you seen or consulted any other health care providers outside of the [address] [place] [address] since your last visit? Include any pap smears or colon screening. Yes, saw Dr. Sukhi Solorzano in 1/2018.      Chief Complaint   Patient presents with    Follow Up Chronic Condition    Diabetes    Hypertension    Other     DCIS

## 2018-02-13 NOTE — MR AVS SNAPSHOT
Camila Barillas [place] [address] [facility] [place]. [address] [id number] 
[phone] Patient: Mark Ma MRN: [id number] PSC:[id number] Visit Information Date & Time Provider Department Dept. Phone Encounter #  
 2/13/2018  3:15 PM Nash Peña, [address] [place] [address] [phone] [id number] Follow-up Instructions Return in about 3 months (around 5/13/2018) for 30 minute slot. Your Appointments 3/6/2018  9:00 AM  
Office Visit with Hao Eli MD  
[address] [facility] [place]. [address]  
  
   
 [address]  
  
    
 3/20/2018  1:00 PM  
Follow Up with Nestor Kearney MD  
[address] [facility]) Appt Note: 3  month f-up [address] [facility] [address]  
[phone]  
  
   
 [address]  
  
    
 5/1/2018  1:00 PM  
Follow Up with Marc Willams MD  
[address] [facility]) Appt Note: 3 month follow up  
 [address] [facility] [address]  
  
   
 [address] [facility] [address] Upcoming Health Maintenance Date Due  
 PAP AKA CERVICAL CYTOLOGY 8/8/1978 FOOT EXAM Q1 9/13/2017 MEDICARE YEARLY EXAM 3/3/2018 HEMOGLOBIN A1C Q6M 5/3/2018 LIPID PANEL Q1 5/3/2018 EYE EXAM RETINAL OR DILATED Q1 9/5/2018 MICROALBUMIN Q1 11/3/2018 BREAST CANCER SCRN MAMMOGRAM 12/8/2019 DTaP/Tdap/Td series (2 - Td) 10/7/2021 COLONOSCOPY 1/15/2023 Allergies as of 2/13/2018  Review Complete On: 2/13/2018 By: Nash Peña MD  
 No Known Allergies Current Immunizations  Reviewed on 5/22/2017 Name Date Influenza Vaccine 10/1/2015, 11/20/2014 Pneumococcal Polysaccharide (PPSV-23) 3/2/2017  Tdap 10/7/2011 12:00 AM  
  
 Not reviewed this visit You Were Diagnosed With   
  
 Codes Comments Cervical cancer screening    -  Primary ICD-10-CM: Z12.4 ICD-9-CM: V76.2 Controlled type 2 diabetes mellitus without complication, without long-term current use of insulin ([facility].)     ICD-10-CM: E11.9 ICD-9-CM: 250.00 Morbid obesity due to excess calories (HCC)     ICD-10-CM: E66.01 
ICD-9-CM: 278.01 Essential hypertension     ICD-10-CM: I10 
ICD-9-CM: 401.9 Vitals BP Pulse Temp Resp Height(growth percentile) Weight(growth percentile) 148/70 96 97 °F (36.1 °C) (Oral) 16 5' 4\" (1.626 m) 218 lb (98.9 kg) BMI OB Status Smoking Status 37.42 kg/m2 Postmenopausal Never Smoker BMI and BSA Data Body Mass Index Body Surface Area  
 37.42 kg/m 2 2.11 m 2 Preferred Pharmacy Pharmacy Name Phone [address] [place] [address], [address] [place] [address] [phone] Your Updated Medication List  
  
   
This list is accurate as of: 2/13/18  3:35 PM.  Always use your most recent med list.  
  
  
  
  
 dilTIAZem 360 mg SR capsule Commonly known as:  TAZTIA XT Take 1 Cap by mouth daily. glipiZIDE SR 5 mg CR tablet Commonly known as:  GLUCOTROL XL  
TAKE ONE TABLET BY MOUTH ONCE DAILY losartan-hydroCHLOROthiazide 100-25 mg per tablet Commonly known as:  HYZAAR  
TAKE ONE TABLET BY MOUTH ONCE DAILY  
  
 metFORMIN 1,000 mg tablet Commonly known as:  GLUCOPHAGE  
TAKE ONE TABLET BY MOUTH TWICE DAILY WITH MEALS  
  
 metoprolol tartrate 25 mg tablet Commonly known as:  LOPRESSOR Take 0.5 Tabs by mouth two (2) times a day. tamoxifen 20 mg tablet Commonly known as:  NOLVADEX Take 1 Tab by mouth daily. VITAMIN D2 50,000 unit capsule Generic drug:  ergocalciferol TAKE ONE CAPSULE BY MOUTH EVERY 14 DAYS We Performed the Following REFERRAL TO DIETITIAN [GEL01 Custom] Comments: Please evaluate patient for diabetes/obesity/htn REFERRAL TO GYNECOLOGY [REF30 Custom] Comments:  
 Please evaluate patient for pap smear. Follow-up Instructions Return in about 3 months (around 5/13/2018) for 30 minute slot. To-Do List   
 04/17/2018 1:30 PM  
  Appointment with [address] [place] [address] at [facility] RADIATION THERAPY ([phone]) This appointment time is simply a place rosenberg and may not reflect the true appointment time. If patient does not know the appointment time given to them at the time of scheduling, they should contact the Radiation Therapy department for detail. Referral Information Referral ID Referred By Referred To  
  
 [id number] Juan Francisco Royal [facility]   
   [address] Phone: [phone] Fax: [phone] Visits Status Start Date End Date 1 New Request 2/13/18 2/13/19 If your referral has a status of pending review or denied, additional information will be sent to support the outcome of this decision. Referral ID Referred By Referred To  
 [id number] ABIMAEL Arshad Not Available Visits Status Start Date End Date 1 New Request 2/13/18 2/13/19 If your referral has a status of pending review or denied, additional information will be sent to support the outcome of this decision. Patient Instructions Body Mass Index: Care Instructions Your Care Instructions Body mass index (BMI) can help you see if your weight is raising your risk for health problems. It uses a formula to compare how much you weigh with how tall you are. · A BMI lower than 18.5 is considered underweight. · A BMI between 18.5 and 24.9 is considered healthy. · A BMI between 25 and 29.9 is considered overweight. A BMI of 30 or higher is considered obese.  
If your BMI is in the normal range, it means that you have a lower risk for weight-related health problems. If your BMI is in the overweight or obese range, you may be at increased risk for weight-related health problems, such as high blood pressure, heart disease, stroke, arthritis or joint pain, and diabetes. If your BMI is in the underweight range, you may be at increased risk for health problems such as fatigue, lower protection (immunity) against illness, muscle loss, bone loss, hair loss, and hormone problems. BMI is just one measure of your risk for weight-related health problems. You may be at higher risk for health problems if you are not active, you eat an unhealthy diet, or you drink too much alcohol or use tobacco products. Follow-up care is a key part of your treatment and safety. Be sure to make and go to all appointments, and call your doctor if you are having problems. It's also a good idea to know your test results and keep a list of the medicines you take. How can you care for yourself at home? · Practice healthy eating habits. This includes eating plenty of fruits, vegetables, whole grains, lean protein, and low-fat dairy. · If your doctor recommends it, get more exercise. Walking is a good choice. Bit by bit, increase the amount you walk every day. Try for at least 30 minutes on most days of the week. · Do not smoke. Smoking can increase your risk for health problems. If you need help quitting, talk to your doctor about stop-smoking programs and medicines. These can increase your chances of quitting for good. · Limit alcohol to 2 drinks a day for men and 1 drink a day for women. Too much alcohol can cause health problems. If you have a BMI higher than 25 · Your doctor may do other tests to check your risk for weight-related health problems. This may include measuring the distance around your waist. A waist measurement of more than 40 inches in men or 35 inches in women can increase the risk of weight-related health problems. · Talk with your doctor about steps you can take to stay healthy or improve your health. You may need to make lifestyle changes to lose weight and stay healthy, such as changing your diet and getting regular exercise. If you have a BMI lower than 18.5 · Your doctor may do other tests to check your risk for health problems. · Talk with your doctor about steps you can take to stay healthy or improve your health. You may need to make lifestyle changes to gain or maintain weight and stay healthy, such as getting more healthy foods in your diet and doing exercises to build muscle. Where can you learn more? Go to http://staci-alejandro.info/. Enter S176 in the search box to learn more about \"Body Mass Index: Care Instructions. \" Current as of: October 13, 2016 Content Version: 11.4 © 1809-0146 [employer]. Care instructions adapted under license by [employer] (which disclaims liability or warranty for this information). If you have questions about a medical condition or this instruction, always ask your healthcare professional. [place] [address] any warranty or liability for your use of this information. Diabetes Foot Health: Care Instructions Your Care Instructions When you have diabetes, your feet need extra care and attention. Diabetes can damage the nerve endings and blood vessels in your feet, making you less likely to notice when your feet are injured. Diabetes also limits your body's ability to fight infection and get blood to areas that need it. If you get a minor foot injury, it could become an ulcer or a serious infection. With good foot care, you can prevent most of these problems. Caring for your feet can be quick and easy. Most of the care can be done when you are bathing or getting ready for bed. Follow-up care is a key part of your treatment and safety.  Be sure to make and go to all appointments, and call your doctor if you are having problems. It's also a good idea to know your test results and keep a list of the medicines you take. How can you care for yourself at home? · Keep your blood sugar close to normal by watching what and how much you eat, monitoring blood sugar, taking medicines if prescribed, and getting regular exercise. · Do not smoke. Smoking affects blood flow and can make foot problems worse. If you need help quitting, talk to your doctor about stop-smoking programs and medicines. These can increase your chances of quitting for good. · Eat a diet that is low in fats. High fat intake can cause fat to build up in your blood vessels and decrease blood flow. · Inspect your feet daily for blisters, cuts, cracks, or sores. If you cannot see well, use a mirror or have someone help you. · Take care of your feet: 
[facility] AUTHORITY your feet every day. Use warm (not hot) water. Check the water temperature with your wrists or other part of your body, not your feet. ¨ Dry your feet well. Pat them dry. Do not rub the skin on your feet too hard. Dry well between your toes. If the skin on your feet stays moist, bacteria or a fungus can grow, which can lead to infection. ¨ Keep your skin soft. Use moisturizing skin cream to keep the skin on your feet soft and prevent calluses and cracks. But do not put the cream between your toes, and stop using any cream that causes a rash. ¨ Clean underneath your toenails carefully. Do not use a sharp object to clean underneath your toenails. Use the blunt end of a nail file or other rounded tool. ¨ Trim and file your toenails straight across to prevent ingrown toenails. Use a nail clipper, not scissors. Use an emery board to smooth the edges. · Change socks daily. Socks without seams are best, because seams often rub the feet. You can find socks for people with diabetes from specialty catalogs. · Look inside your shoes every day for things like gravel or torn linings, which could cause blisters or sores. · Buy shoes that fit well: 
¨ Look for shoes that have plenty of space around the toes. This helps prevent bunions and blisters. ¨ Try on shoes while wearing the kind of socks you will usually wear with the shoes. ¨ Avoid plastic shoes. They may rub your feet and cause blisters. Good shoes should be made of materials that are flexible and breathable, such as leather or cloth. ¨ Break in new shoes slowly by wearing them for no more than an hour a day for several days. Take extra time to check your feet for red areas, blisters, or other problems after you wear new shoes. · Do not go barefoot. Do not wear sandals, and do not wear shoes with very thin soles. Thin soles are easy to puncture. They also do not protect your feet from hot pavement or cold weather. · Have your doctor check your feet during each visit. If you have a foot problem, see your doctor. Do not try to treat an early foot problem at home. Home remedies or treatments that you can buy without a prescription (such as corn removers) can be harmful. · Always get early treatment for foot problems. A minor irritation can lead to a major problem if not properly cared for early. When should you call for help? Call your doctor now or seek immediate medical care if: 
? · You have a foot sore, an ulcer or break in the skin that is not healing after 4 days, bleeding corns or calluses, or an ingrown toenail. ? · You have blue or black areas, which can mean bruising or blood flow problems. ? · You have peeling skin or tiny blisters between your toes or cracking or oozing of the skin. ? · You have a fever for more than 24 hours and a foot sore. ? · You have new numbness or tingling in your feet that does not go away after you move your feet or change positions. ? · You have unexplained or unusual swelling of the foot or ankle. ?Watch closely for changes in your health, and be sure to contact your doctor if: 
? · You cannot do proper foot care. Where can you learn more? Go to http://staci-alejandro.info/. Enter A739 in the search box to learn more about \"Diabetes Foot Health: Care Instructions. \" Current as of: March 13, 2017 Content Version: 11.4 © 4577-6439 [employer]. Care instructions adapted under license by [employer] (which disclaims liability or warranty for this information). If you have questions about a medical condition or this instruction, always ask your healthcare professional. [address] any warranty or liability for your use of this information. Please provide this summary of care documentation to your next provider. Your primary care clinician is listed as Keenan Gary. If you have any questions after today's visit, please call [phone].

## 2018-02-13 NOTE — PATIENT INSTRUCTIONS
Body Mass Index: Care Instructions  Your Care Instructions    Body mass index (BMI) can help you see if your weight is raising your risk for health problems. It uses a formula to compare how much you weigh with how tall you are. · A BMI lower than 18.5 is considered underweight. · A BMI between 18.5 and 24.9 is considered healthy. · A BMI between 25 and 29.9 is considered overweight. A BMI of 30 or higher is considered obese. If your BMI is in the normal range, it means that you have a lower risk for weight-related health problems. If your BMI is in the overweight or obese range, you may be at increased risk for weight-related health problems, such as high blood pressure, heart disease, stroke, arthritis or joint pain, and diabetes. If your BMI is in the underweight range, you may be at increased risk for health problems such as fatigue, lower protection (immunity) against illness, muscle loss, bone loss, hair loss, and hormone problems. BMI is just one measure of your risk for weight-related health problems. You may be at higher risk for health problems if you are not active, you eat an unhealthy diet, or you drink too much alcohol or use tobacco products. Follow-up care is a key part of your treatment and safety. Be sure to make and go to all appointments, and call your doctor if you are having problems. It's also a good idea to know your test results and keep a list of the medicines you take. How can you care for yourself at home? · Practice healthy eating habits. This includes eating plenty of fruits, vegetables, whole grains, lean protein, and low-fat dairy. · If your doctor recommends it, get more exercise. Walking is a good choice. Bit by bit, increase the amount you walk every day. Try for at least 30 minutes on most days of the week. · Do not smoke. Smoking can increase your risk for health problems. If you need help quitting, talk to your doctor about stop-smoking programs and medicines. These can increase your chances of quitting for good. · Limit alcohol to 2 drinks a day for men and 1 drink a day for women. Too much alcohol can cause health problems. If you have a BMI higher than 25  · Your doctor may do other tests to check your risk for weight-related health problems. This may include measuring the distance around your waist. A waist measurement of more than 40 inches in men or 35 inches in women can increase the risk of weight-related health problems. · Talk with your doctor about steps you can take to stay healthy or improve your health. You may need to make lifestyle changes to lose weight and stay healthy, such as changing your diet and getting regular exercise. If you have a BMI lower than 18.5  · Your doctor may do other tests to check your risk for health problems. · Talk with your doctor about steps you can take to stay healthy or improve your health. You may need to make lifestyle changes to gain or maintain weight and stay healthy, such as getting more healthy foods in your diet and doing exercises to build muscle. Where can you learn more? Go to http://staci-alejandro.info/. Enter S176 in the search box to learn more about \"Body Mass Index: Care Instructions. \"  Current as of: October 13, 2016  Content Version: 11.4  © 4258-7593 Healthwise, Incorporated. Care instructions adapted under license by [employer] (which disclaims liability or warranty for this information). If you have questions about a medical condition or this instruction, always ask your healthcare professional. [place] [address] any warranty or liability for your use of this information. Diabetes Foot Health: Care Instructions  Your Care Instructions    When you have diabetes, your feet need extra care and attention. Diabetes can damage the nerve endings and blood vessels in your feet, making you less likely to notice when your feet are injured.  Diabetes also limits your body's ability to fight infection and get blood to areas that need it. If you get a minor foot injury, it could become an ulcer or a serious infection. With good foot care, you can prevent most of these problems. Caring for your feet can be quick and easy. Most of the care can be done when you are bathing or getting ready for bed. Follow-up care is a key part of your treatment and safety. Be sure to make and go to all appointments, and call your doctor if you are having problems. It's also a good idea to know your test results and keep a list of the medicines you take. How can you care for yourself at home? · Keep your blood sugar close to normal by watching what and how much you eat, monitoring blood sugar, taking medicines if prescribed, and getting regular exercise. · Do not smoke. Smoking affects blood flow and can make foot problems worse. If you need help quitting, talk to your doctor about stop-smoking programs and medicines. These can increase your chances of quitting for good. · Eat a diet that is low in fats. High fat intake can cause fat to build up in your blood vessels and decrease blood flow. · Inspect your feet daily for blisters, cuts, cracks, or sores. If you cannot see well, use a mirror or have someone help you. · Take care of your feet:  [facility] AUTHORITY your feet every day. Use warm (not hot) water. Check the water temperature with your wrists or other part of your body, not your feet. ¨ Dry your feet well. Pat them dry. Do not rub the skin on your feet too hard. Dry well between your toes. If the skin on your feet stays moist, bacteria or a fungus can grow, which can lead to infection. ¨ Keep your skin soft. Use moisturizing skin cream to keep the skin on your feet soft and prevent calluses and cracks. But do not put the cream between your toes, and stop using any cream that causes a rash. ¨ Clean underneath your toenails carefully.  Do not use a sharp object to clean underneath your toenails. Use the blunt end of a nail file or other rounded tool. ¨ Trim and file your toenails straight across to prevent ingrown toenails. Use a nail clipper, not scissors. Use an emery board to smooth the edges. · Change socks daily. Socks without seams are best, because seams often rub the feet. You can find socks for people with diabetes from specialty catalogs. · Look inside your shoes every day for things like gravel or torn linings, which could cause blisters or sores. · Buy shoes that fit well:  ¨ Look for shoes that have plenty of space around the toes. This helps prevent bunions and blisters. ¨ Try on shoes while wearing the kind of socks you will usually wear with the shoes. ¨ Avoid plastic shoes. They may rub your feet and cause blisters. Good shoes should be made of materials that are flexible and breathable, such as leather or cloth. ¨ Break in new shoes slowly by wearing them for no more than an hour a day for several days. Take extra time to check your feet for red areas, blisters, or other problems after you wear new shoes. · Do not go barefoot. Do not wear sandals, and do not wear shoes with very thin soles. Thin soles are easy to puncture. They also do not protect your feet from hot pavement or cold weather. · Have your doctor check your feet during each visit. If you have a foot problem, see your doctor. Do not try to treat an early foot problem at home. Home remedies or treatments that you can buy without a prescription (such as corn removers) can be harmful. · Always get early treatment for foot problems. A minor irritation can lead to a major problem if not properly cared for early. When should you call for help? Call your doctor now or seek immediate medical care if:  ? · You have a foot sore, an ulcer or break in the skin that is not healing after 4 days, bleeding corns or calluses, or an ingrown toenail.    ? · You have blue or black areas, which can mean bruising or blood flow problems. ? · You have peeling skin or tiny blisters between your toes or cracking or oozing of the skin. ? · You have a fever for more than 24 hours and a foot sore. ? · You have new numbness or tingling in your feet that does not go away after you move your feet or change positions. ? · You have unexplained or unusual swelling of the foot or ankle. ? Watch closely for changes in your health, and be sure to contact your doctor if:  ? · You cannot do proper foot care. Where can you learn more? Go to http://staci-alejandro.info/. Enter A739 in the search box to learn more about \"Diabetes Foot Health: Care Instructions. \"  Current as of: March 13, 2017  Content Version: 11.4  © 0008-4549 [employer] Access [employer]. Care instructions adapted under license by [employer] (which disclaims liability or warranty for this information). If you have questions about a medical condition or this instruction, always ask your healthcare professional. [address] any warranty or liability for your use of this information.

## 2018-02-20 RX ORDER — ERGOCALCIFEROL 1.25 MG/1
CAPSULE ORAL
Qty: 4 CAP | Refills: 0 | Status: SHIPPED | OUTPATIENT
Start: 2018-02-20 | End: 2018-05-05 | Stop reason: SDUPTHER

## 2018-03-12 ENCOUNTER — TELEPHONE (OUTPATIENT)
Dept: ONCOLOGY | Age: 63
End: 2018-03-12

## 2018-04-12 ENCOUNTER — OFFICE VISIT (OUTPATIENT)
Dept: FAMILY MEDICINE CLINIC | Age: 63
End: 2018-04-12

## 2018-04-12 RX ORDER — DILTIAZEM HYDROCHLORIDE 360 MG/1
360 CAPSULE, EXTENDED RELEASE ORAL DAILY
Qty: 90 CAP | Refills: 1 | Status: SHIPPED | OUTPATIENT
Start: 2018-04-12 | End: 2019-06-11 | Stop reason: ALTCHOICE

## 2018-04-12 NOTE — PROGRESS NOTES
A user error has taken place: encounter opened in error, closed for administrative reasons. Patient is asking for a refill request on Diltiazem 360mg not Lidocaine. She stated she doesn't need the lidocaine medication and already told the mail order pharmacy not to call regarding. Did not see Dr. Nii Milian due to we just saw her in 2/2018 and has follow up appointment in 5/2018.

## 2018-04-13 ENCOUNTER — TELEPHONE (OUTPATIENT)
Dept: FAMILY MEDICINE CLINIC | Age: 63
End: 2018-04-13

## 2018-04-13 NOTE — TELEPHONE ENCOUNTER
Pharmacist from [facility] states according to their records patient should be on a statin and she is not currently taking one. She states she needs a reason as to why patient is not taking one so she can update their system. Pharmacist contact is [phone]. Please advise.

## 2018-04-17 ENCOUNTER — HOSPITAL ENCOUNTER (OUTPATIENT)
Dept: RADIATION THERAPY | Age: 63
Discharge: HOME | End: 2018-04-17
Payer: MEDICARE

## 2018-04-17 PROCEDURE — 99211 OFF/OP EST MAY X REQ PHY/QHP: CPT

## 2018-05-07 ENCOUNTER — OFFICE VISIT (OUTPATIENT)
Dept: ONCOLOGY | Age: 63
End: 2018-05-07

## 2018-05-07 VITALS
HEIGHT: 64 IN | OXYGEN SATURATION: 100 % | HEART RATE: 88 BPM | DIASTOLIC BLOOD PRESSURE: 72 MMHG | RESPIRATION RATE: 17 BRPM | WEIGHT: 220 LBS | TEMPERATURE: 98 F | BODY MASS INDEX: 37.56 KG/M2 | SYSTOLIC BLOOD PRESSURE: 130 MMHG

## 2018-05-07 DIAGNOSIS — Z79.810 ENCOUNTER FOR MONITORING TAMOXIFEN THERAPY: Primary | ICD-10-CM

## 2018-05-07 DIAGNOSIS — Z51.81 ENCOUNTER FOR MONITORING TAMOXIFEN THERAPY: Primary | ICD-10-CM

## 2018-05-07 RX ORDER — ERGOCALCIFEROL 1.25 MG/1
CAPSULE ORAL
Qty: 4 CAP | Refills: 0 | Status: SHIPPED | OUTPATIENT
Start: 2018-05-07 | End: 2018-08-16 | Stop reason: SDUPTHER

## 2018-05-07 NOTE — PROGRESS NOTES
Hematology/Oncology  Progress Note    Name: Kael Morrow  Date: 2018  : 1955        Ms. Becky Paniagua is a 63y.o. year old female who was seen for follow-up and monitoring. She is receiving tamoxifen chemo-prophylaxis regarding her Breast DCIS diagnosis. She had pTis DCIS/LCIS diagnosed in May 2017 and treated with right breast lumpectomy. This was followed with adjuvant XRT to the right breast - started 2017. Shortly after the breast radiation - she was started on daily tamoxifen at 20mg daily - for chemoprophylaxis. Mrs. Becky Paniagua reports no problems or side effects from her tamoxifen therapy. She is reporting increased hot flushes, and some uncomfortable sleep patterns. Of note, she takes her tamoxifen daily, in the evenings before bedtime. She denies pelvic fullness or vaginal spotting. She is followed closely by her breast surgeon, and her PCP ( followed for hypertension and type II DM). .      Subjective:     Patient has no complaint of skeletal pains, skin lesions, breast changes. See HPI, para, above. ..      Past Medical History:   Diagnosis Date    Anemia     resolved    Diverticulosis     DM type 2 (diabetes mellitus, type 2) ([facility] [address]     Hypertension     Internal hemorrhoids     Menopause     Vitamin D deficiency      Past Surgical History:   Procedure Laterality Date    HX BREAST LUMPECTOMY Right 2017    right BREAST LUMPECTOMY WITH localization performed by Tavo Starks MD at [address] [place] [address] HX BREAST LUMPECTOMY Right 2017    revision right breast to achieve neg margin performed by Tavo Starks MD at [address] [place] [address] HX COLONOSCOPY      [facility]    HX ORTHOPAEDIC      fluid drained from lt shoulder    HX TUBAL LIGATION       Social History     Social History    Marital status: UNKNOWN     Spouse name: N/A    Number of children: N/A    Years of education: N/A     Occupational History    has custody of grandson      Social History Main Topics    Smoking status: Never Smoker    Smokeless tobacco: Never Used    Alcohol use No    Drug use: No    Sexual activity: Not Currently     Partners: Male      Comment:      Other Topics Concern    Not on file     Social History Narrative     Family History   Problem Relation Age of Onset    Stroke Mother     Hypertension Mother     Diabetes Daughter      Current Outpatient Prescriptions   Medication Sig Dispense Refill    dilTIAZem (TAZTIA XT) 360 mg SR capsule Take 1 Cap by mouth daily. 90 Cap 1    glipiZIDE SR (GLUCOTROL XL) 5 mg CR tablet TAKE ONE TABLET BY MOUTH ONCE DAILY 90 Tab 1    losartan-hydroCHLOROthiazide (HYZAAR) 100-25 mg per tablet TAKE ONE TABLET BY MOUTH ONCE DAILY 90 Tab 1    metFORMIN (GLUCOPHAGE) 1,000 mg tablet TAKE ONE TABLET BY MOUTH TWICE DAILY WITH MEALS 180 Tab 1    metoprolol tartrate (LOPRESSOR) 25 mg tablet Take 0.5 Tabs by mouth two (2) times a day. 60 Tab 0    VITAMIN D2 50,000 unit capsule TAKE 1 CAPSULE BY MOUTH EVERY 14 DAYS. 4 Cap 0    tamoxifen (NOLVADEX) 20 mg tablet Take 1 Tab by mouth daily. 90 Tab 3       Review of Systems   Constitutional: Negative. Negative for weight loss. HENT: Negative for ear discharge, ear pain, hearing loss, sinus pain and sore throat. Eyes: Negative for blurred vision, double vision, discharge and redness. Respiratory: Negative for cough, hemoptysis and shortness of breath. Cardiovascular: Negative for chest pain, palpitations, leg swelling and PND. Gastrointestinal: Negative for abdominal pain, blood in stool, constipation, melena, nausea and vomiting. Genitourinary: Negative for dysuria and urgency. Musculoskeletal: Negative for back pain. Skin: Negative for rash. Neurological: Negative for dizziness, focal weakness and seizures. Endo/Heme/Allergies: Does not bruise/bleed easily. Psychiatric/Behavioral: The patient is not nervous/anxious.          Objective:     Visit Vitals    /72 (BP 1 Location: Left arm, BP Patient Position: Sitting)    Pulse 88    Temp 98 °F (36.7 °C) (Oral)    Resp 17    Ht 5' 4\" (1.626 m)    Wt 99.8 kg (220 lb)    SpO2 100%    BMI 37.76 kg/m2         . Physical Exam   Constitutional: She is oriented to person, place, and time. She appears well-nourished. HENT:   Head: Normocephalic. Mouth/Throat: Oropharynx is clear and moist.   Eyes: Pupils are equal, round, and reactive to light. No scleral icterus. Neck: Normal range of motion. Neck supple. Cardiovascular: Normal rate, regular rhythm and normal heart sounds. No murmur heard. Pulmonary/Chest: Effort normal. She has no wheezes. She has no rales. Right breast exhibits no inverted nipple, no mass, no nipple discharge and no skin change (surgical incisions are fully healed. NO skin lumps, bumps or ulcerations. ). Abdominal: Soft. Bowel sounds are normal. She exhibits no distension and no mass. There is no tenderness. Musculoskeletal: Normal range of motion. Lymphadenopathy:     She has no cervical adenopathy. Neurological: She is alert and oriented to person, place, and time. She displays normal reflexes.  Coordination normal.          Results for orders placed or performed during the hospital encounter of 22/22/95   METABOLIC PANEL, BASIC   Result Value Ref Range    Sodium 142 136 - 145 mmol/L    Potassium 3.8 3.5 - 5.5 mmol/L    Chloride 106 100 - 108 mmol/L    CO2 29 21 - 32 mmol/L    Anion gap 7 3.0 - 18 mmol/L    Glucose 113 (H) 74 - 99 mg/dL    BUN 14 7.0 - 18 MG/DL    Creatinine 0.61 0.6 - 1.3 MG/DL    BUN/Creatinine ratio 23 (H) 12 - 20      GFR est AA >60 >60 ml/min/1.73m2    GFR est non-AA >60 >60 ml/min/1.73m2    Calcium 9.3 8.5 - 10.1 MG/DL   HEMOGLOBIN A1C WITH EAG   Result Value Ref Range    Hemoglobin A1c 6.7 (H) 4.2 - 5.6 %    Est. average glucose 146 mg/dL   MICROALBUMIN, UR, RAND W/ MICROALBUMIN/CREA RATIO   Result Value Ref Range    Microalbumin,urine random 0.80 0 - 3.0 MG/DL Creatinine, urine 178.07 (H) 30 - 125 mg/dL    Microalbumin/Creat ratio (mg/g creat) 4 0 - 30 mg/g            Assessment:     1. Encounter for monitoring tamoxifen therapy    2. Mrs. Oumou Philip is well tolerating her tamoxifen therapy. This is to continue for completion of 5 years therapy. Suggested that she try taking her tamoxifen in the morning hours to reduce any sleep disturbance that she is presently noticing. Plan:     Orders Placed This Encounter    CBC WITH AUTOMATED DIFF     Standing Status:   Future     Standing Expiration Date:   3/2/4585    METABOLIC PANEL, COMPREHENSIVE     Standing Status:   Future     Standing Expiration Date:   5/8/2019       Ms. Oumou Philip has a reminder for a \"due or due soon\" health maintenance. I have asked that she contact her primary care provider for follow-up on this health maintenance. I gave her a hand written note for her PCP requesting lab draw for CBC with differential and Metabolic chem panel, per his decision. RTC 4 months.     Terri Haywood MD  5/7/2018

## 2018-05-07 NOTE — MR AVS SNAPSHOT
[facility] 
 
 
 [address] [facility] [address] [address] 
[phone] Patient: Terri Lomax MRN: [id number] DLK:8[birth year] Visit Information Date & Time Provider Department Dept. Phone Encounter #  
 5/7/2018  2:00 PM Mary Armstrong  [facility] [phone] [id number] Your Appointments 5/14/2018  2:30 PM  
Office Visit with Alejandro Farnsworth MD  
[address]) Appt Note: 3 mo f/u;  and f/u  
 [facility] [address]  
  
   
 [address] [facility] 5/15/2018  2:00 PM  
Follow Up with Jacoby Deleon MD  
[address] [facility]) Appt Note: 3 month follow up; r/s  
 [address] [facility] [address]  
  
   
 [address] [facility] [address] [facility]  
  
    
 8/7/2018  2:00 PM  
Follow Up with James Corona MD  
[address] [facility]) Appt Note: 3 month follow up  
 [address] [facility]  
  
   
 [address] [facility] [address] [facility] Upcoming Health Maintenance Date Due  
 PAP AKA CERVICAL CYTOLOGY 8/8/1978 FOOT EXAM Q1 9/13/2017 Pneumococcal 19-64 Highest Risk (2 of 3 - PCV13) 3/2/2018 MEDICARE YEARLY EXAM 3/14/2018 HEMOGLOBIN A1C Q6M 5/3/2018 LIPID PANEL Q1 5/3/2018 Influenza Age 5 to Adult 8/1/2018 EYE EXAM RETINAL OR DILATED Q1 9/5/2018 MICROALBUMIN Q1 11/3/2018 BREAST CANCER SCRN MAMMOGRAM 12/8/2019 DTaP/Tdap/Td series (2 - Td) 10/7/2021 COLONOSCOPY 1/15/2023 Allergies as of 5/7/2018  Review Complete On: 4/12/2018 By: Juan Alberto Hanson No Known Allergies Current Immunizations  Reviewed on 5/22/2017 Name Date Influenza Vaccine 10/1/2015, 11/20/2014 Pneumococcal Polysaccharide (PPSV-23) 3/2/2017 Tdap 10/7/2011 12:00 AM  
  
 Not reviewed this visit Vitals BP Pulse Temp Resp Height(growth percentile) Weight(growth percentile) 130/72 (BP 1 Location: Left arm, BP Patient Position: Sitting) 88 98 °F (36.7 °C) (Oral) 17 5' 4\" (1.626 m) 220 lb (99.8 kg) SpO2 BMI OB Status Smoking Status 100% 37.76 kg/m2 Postmenopausal Never Smoker BMI and BSA Data Body Mass Index Body Surface Area  
 37.76 kg/m 2 2.12 m 2 Preferred Pharmacy Pharmacy Name Phone [address] [place] [address], [address] [place] [address] [phone] Your Updated Medication List  
  
   
This list is accurate as of 5/7/18  3:22 PM.  Always use your most recent med list.  
  
  
  
  
 dilTIAZem 360 mg SR capsule Commonly known as:  TAZTIA XT Take 1 Cap by mouth daily. glipiZIDE SR 5 mg CR tablet Commonly known as:  GLUCOTROL XL  
TAKE ONE TABLET BY MOUTH ONCE DAILY losartan-hydroCHLOROthiazide 100-25 mg per tablet Commonly known as:  HYZAAR  
TAKE ONE TABLET BY MOUTH ONCE DAILY  
  
 metFORMIN 1,000 mg tablet Commonly known as:  GLUCOPHAGE  
TAKE ONE TABLET BY MOUTH TWICE DAILY WITH MEALS  
  
 metoprolol tartrate 25 mg tablet Commonly known as:  LOPRESSOR Take 0.5 Tabs by mouth two (2) times a day. tamoxifen 20 mg tablet Commonly known as:  NOLVADEX Take 1 Tab by mouth daily. VITAMIN D2 50,000 unit capsule Generic drug:  ergocalciferol TAKE 1 CAPSULE BY MOUTH EVERY 14 DAYS. To-Do List   
 10/17/2018 1:00 PM  
  Appointment with [address] [place] [address] RADIATION THERAPY ([phone]) ATTENTION: The Appointment Time in [address] may not reflect your scheduled appointment time as the time is only a place rosenberg. If you have any questions about your appointment time, please call [facility] at [phone]. Please provide this summary of care documentation to your next provider. Your primary care clinician is listed as Natalie Hensley. If you have any questions after today's visit, please call [phone].

## 2018-05-10 RX ORDER — METFORMIN HYDROCHLORIDE 1000 MG/1
TABLET ORAL
Qty: 180 TAB | Refills: 1 | Status: SHIPPED | OUTPATIENT
Start: 2018-05-10 | End: 2018-11-29 | Stop reason: SDUPTHER

## 2018-05-15 ENCOUNTER — OFFICE VISIT (OUTPATIENT)
Dept: SURGERY | Age: 63
End: 2018-05-15

## 2018-05-15 VITALS
DIASTOLIC BLOOD PRESSURE: 76 MMHG | HEIGHT: 64 IN | TEMPERATURE: 96.7 F | RESPIRATION RATE: 18 BRPM | BODY MASS INDEX: 37.66 KG/M2 | SYSTOLIC BLOOD PRESSURE: 146 MMHG | WEIGHT: 220.6 LBS | OXYGEN SATURATION: 100 % | HEART RATE: 94 BPM

## 2018-05-15 DIAGNOSIS — I10 ESSENTIAL HYPERTENSION: ICD-10-CM

## 2018-05-15 DIAGNOSIS — E11.9 CONTROLLED TYPE 2 DIABETES MELLITUS WITHOUT COMPLICATION, WITHOUT LONG-TERM CURRENT USE OF INSULIN (HCC): ICD-10-CM

## 2018-05-15 DIAGNOSIS — D05.11 DUCTAL CARCINOMA IN SITU (DCIS) OF RIGHT BREAST: Primary | ICD-10-CM

## 2018-05-15 NOTE — PROGRESS NOTES
Stacy Martin comes to the office today for 10 month follow-up from a previous right breast lumpectomy for DCIS that measured almost 5 cm. She did have to have a reexcision because one margin was positive but we did achieve negative margins. The tumor was estrogen and progestin receptor positive. Subsequently she received radiation therapy and is now on tamoxifen which she is tolerating well. Her mammogram in December failed to reveal any suspicious areas. The patient denies any new lumps or soreness in either breast.  She denies any right arm edema. She denies any new other aches, pains, shortness of breath or headaches. No Known Allergies  Past Medical History:   Diagnosis Date    Anemia     resolved    Diverticulosis 11/13    DM type 2 (diabetes mellitus, type 2) ([facility] [address]     Hypertension     Internal hemorrhoids 11/13    Menopause     Vitamin D deficiency      Past Surgical History:   Procedure Laterality Date    HX BREAST LUMPECTOMY Right 5/30/2017    right BREAST LUMPECTOMY WITH localization performed by Eliezer Jolly MD at [address] HX BREAST LUMPECTOMY Right 6/20/2017    revision right breast to achieve neg margin performed by Eliezer Jolly MD at [address] HX COLONOSCOPY  1/13    repeat 1/23 - [facility]    HX ORTHOPAEDIC      fluid drained from lt shoulder    HX TUBAL LIGATION       Social History     Social History    Marital status: UNKNOWN     Spouse name: N/A    Number of children: N/A    Years of education: N/A     Occupational History    has custody of grandson      Social History Main Topics    Smoking status: Never Smoker    Smokeless tobacco: Never Used    Alcohol use No    Drug use: No    Sexual activity: Not Currently     Partners: Male      Comment:      Other Topics Concern    None     Social History Narrative     The patient's review of systems has not changed. She can then used to be on medication for hypertension and diabetes.   She denies any new cardiac, respiratory, gastrointestinal or genitourinary problems. PHYSICAL EXAM    Visit Vitals    /76 (BP 1 Location: Left arm, BP Patient Position: Sitting)    Pulse 94    Temp 96.7 °F (35.9 °C) (Oral)    Resp 18    Ht 5' 4\" (1.626 m)    Wt 100.1 kg (220 lb 9.6 oz)    SpO2 100%    BMI 37.87 kg/m2          Constitutional:  Well-developed, well-nourished, no acute distress. Head:  Head, eyes, ears, nose, throat within normal limits. Skin:  No suspicious moles or rashes. Neck:  No masses or adenopathy. The airway appears normal. Thyroid is not enlarged and there are no palpable thyroid nodules. Lungs:  Lungs are clear to auscultation and percussion. No respiratory distress. No chest wall tenderness. Heart:  Heart is regular with no extra heart sounds or murmur heard. Breast Exam: The breasts are free of any discrete tenderness or masses  The skin and nipple areas appear normal. Both axillae are negative. She has a well-healed incision in the upper right breast.  She has some radiation changes with slight discoloration of the skin and thickening. There is some tenderness at and inferior to her previous lumpectomy site. No evidence of recurrent disease. Abdomen: The abdomen is soft and nontender without organomegaly or masses. Bowel sounds are active and of normal pitch. There is no abdominal distention. No hernias are evident. Extremities:  No tenderness of the extremities and no significant swelling. Psych:  Alert and oriented. Assessment: #1 DCIS measuring about 5 cm upper outer right breast excised 10 months ago. We had to re-excise one area to get negative margins. The tumor was estrogen and progestin receptor positive. After receiving radiation therapy she was started on tamoxifen and is doing well without evidence of recurrent disease. #2 diabetes. #3 hypertension. Recommendations:  The patient is scheduled in June to have her annual bilateral mammogram.  Otherwise she needs to be seen back in about 3 months. The above was dictated with Sara. There may be unrecognized errors.     Abi Landeros MD

## 2018-05-15 NOTE — PATIENT INSTRUCTIONS
If you have any questions or concerns about today's appointment, the verbal and/or written instructions you were given for follow up care, please call our office at [phone].     [facility] - [address]    [phone] office  [phone]

## 2018-05-15 NOTE — MR AVS SNAPSHOT
[address] [address] 
[phone] Patient: Yris Medellin MRN: [id number] NQR:9/7/4335 Visit Information Date & Time Provider Department Dept. Phone Encounter #  
 5/15/2018  2:00 PM Elena Garcia MD [address] [phone] [id number] Follow-up Instructions Return in about 4 months (around 9/15/2018). Your Appointments 5/29/2018 10:30 AM  
Office Visit with Johann Wagner MD  
[place] [address] [address] [facility]) Appt Note: 3 mo f/u;  and f/u; r/s  out left message; RESCHEDULED FROM 05/15/2018  
 [facility]. [address]  
  
   
 [address] [address]  
  
    
 8/7/2018  2:00 PM  
Follow Up with Darrian Bean MD  
[address] [place] [address] [address] [facility]) Appt Note: 3 month follow up  
 [address] [facility]  
  
   
 [address] [facility] [address] Upcoming Health Maintenance Date Due  
 PAP AKA CERVICAL CYTOLOGY 8/8/1978 FOOT EXAM Q1 9/13/2017 Pneumococcal 19-64 Highest Risk (2 of 3 - PCV13) 3/2/2018 MEDICARE YEARLY EXAM 3/14/2018 HEMOGLOBIN A1C Q6M 5/3/2018 LIPID PANEL Q1 5/3/2018 Influenza Age 5 to Adult 8/1/2018 EYE EXAM RETINAL OR DILATED Q1 9/5/2018 MICROALBUMIN Q1 11/3/2018 BREAST CANCER SCRN MAMMOGRAM 12/8/2019 DTaP/Tdap/Td series (2 - Td) 10/7/2021 COLONOSCOPY 1/15/2023 Allergies as of 5/15/2018  Review Complete On: 5/15/2018 By: Elena Garcia MD  
 No Known Allergies Current Immunizations  Reviewed on 5/22/2017 Name Date Influenza Vaccine 10/1/2015, 11/20/2014 Pneumococcal Polysaccharide (PPSV-23) 3/2/2017 Tdap 10/7/2011 12:00 AM  
  
 Not reviewed this visit You Were Diagnosed With   
  
 Codes Comments Ductal carcinoma in situ (DCIS) of right breast    -  Primary ICD-10-CM: D05.11 ICD-9-CM: 233.0 Essential hypertension     ICD-10-CM: I10 
ICD-9-CM: 401.9 Controlled type 2 diabetes mellitus without complication, without long-term current use of insulin ([facility]     ICD-10-CM: E11.9 ICD-9-CM: 250.00 Vitals BP Pulse Temp Resp Height(growth percentile) Weight(growth percentile) 146/76 (BP 1 Location: Left arm, BP Patient Position: Sitting) 94 96.7 °F (35.9 °C) (Oral) 18 5' 4\" (1.626 m) 220 lb 9.6 oz (100.1 kg) SpO2 BMI OB Status Smoking Status 100% 37.87 kg/m2 Postmenopausal Never Smoker BMI and BSA Data Body Mass Index Body Surface Area  
 37.87 kg/m 2 2.13 m 2 Preferred Pharmacy Pharmacy Name Phone [address] [place] [address], [address] [place] [address] [phone] Your Updated Medication List  
  
   
This list is accurate as of 5/15/18  2:44 PM.  Always use your most recent med list.  
  
  
  
  
 dilTIAZem 360 mg SR capsule Commonly known as:  TAZTIA XT Take 1 Cap by mouth daily. glipiZIDE SR 5 mg CR tablet Commonly known as:  GLUCOTROL XL  
TAKE ONE TABLET BY MOUTH ONCE DAILY losartan-hydroCHLOROthiazide 100-25 mg per tablet Commonly known as:  HYZAAR  
TAKE ONE TABLET BY MOUTH ONCE DAILY  
  
 metFORMIN 1,000 mg tablet Commonly known as:  GLUCOPHAGE  
TAKE ONE TABLET BY MOUTH TWICE DAILY WITH MEALS  
  
 metoprolol tartrate 25 mg tablet Commonly known as:  LOPRESSOR Take 0.5 Tabs by mouth two (2) times a day. tamoxifen 20 mg tablet Commonly known as:  NOLVADEX Take 1 Tab by mouth daily. VITAMIN D2 50,000 unit capsule Generic drug:  ergocalciferol TAKE 1 CAPSULE BY MOUTH EVERY 14 DAYS. Follow-up Instructions Return in about 4 months (around 9/15/2018). To-Do List   
 06/15/2018   Imaging:  [facility] 3D KARAN W MAMMO BI DX INCL CAD   
  
 10/17/2018 1:00 PM  
 Appointment with [facility] RADIATION THERAPY ([phone]) ATTENTION: The Appointment Time in [address] may not reflect your scheduled appointment time as the time is only a place rosenberg. If you have any questions about your appointment time, please call [facility] Radiation Therapy at [phone]. Patient Instructions If you have any questions or concerns about today's appointment, the verbal and/or written instructions you were given for follow up care, please call our office at [phone]. [facility] [place] [facility], [address] [place], [address] [place] [address] 
 
[phone] office [phone] Please provide this summary of care documentation to your next provider. Your primary care clinician is listed as BILLY Kemp. If you have any questions after today's visit, please call [phone].

## 2018-05-15 NOTE — PROGRESS NOTES
Chief Complaint   Patient presents with    Follow-up     follow up breast exam from previous right breast segmental excision for DCIS. No pain or discomfort noted. 1. Have you been to the ER, urgent care clinic since your last visit? Hospitalized since your last visit? No    2. Have you seen or consulted any other health care providers outside of the [address] [place] [address] since your last visit? Include any pap smears or colon screening.  No

## 2018-05-29 ENCOUNTER — OFFICE VISIT (OUTPATIENT)
Dept: FAMILY MEDICINE CLINIC | Age: 63
End: 2018-05-29

## 2018-05-29 VITALS
SYSTOLIC BLOOD PRESSURE: 137 MMHG | TEMPERATURE: 97 F | DIASTOLIC BLOOD PRESSURE: 63 MMHG | WEIGHT: 221.6 LBS | BODY MASS INDEX: 37.83 KG/M2 | HEIGHT: 64 IN | HEART RATE: 100 BPM | RESPIRATION RATE: 17 BRPM

## 2018-05-29 DIAGNOSIS — Z13.39 SCREENING FOR ALCOHOLISM: ICD-10-CM

## 2018-05-29 DIAGNOSIS — E66.01 MORBID OBESITY DUE TO EXCESS CALORIES (HCC): ICD-10-CM

## 2018-05-29 DIAGNOSIS — E55.9 VITAMIN D DEFICIENCY: ICD-10-CM

## 2018-05-29 DIAGNOSIS — I10 ESSENTIAL HYPERTENSION: ICD-10-CM

## 2018-05-29 DIAGNOSIS — E11.9 CONTROLLED TYPE 2 DIABETES MELLITUS WITHOUT COMPLICATION, WITHOUT LONG-TERM CURRENT USE OF INSULIN (HCC): Primary | ICD-10-CM

## 2018-05-29 DIAGNOSIS — Z00.00 MEDICARE ANNUAL WELLNESS VISIT, SUBSEQUENT: ICD-10-CM

## 2018-05-29 DIAGNOSIS — D75.839 THROMBOCYTOSIS: ICD-10-CM

## 2018-05-29 DIAGNOSIS — Z13.31 SCREENING FOR DEPRESSION: ICD-10-CM

## 2018-05-29 NOTE — MR AVS SNAPSHOT
Camila Barillas [place] [address] [place] [phone] 
[phone] Patient: Marina Gama MRN: [id number] GUERA:[id number] Visit Information Date & Time Provider Department Dept. Phone Encounter #  
 5/29/2018 10:30 AM Florinda Kinney, [address] [place] [address] [phone] [id number] Follow-up Instructions Return in about 3 months (around 8/29/2018) for 30 minute slot- also fasting labs before next visit. .  
  
Your Appointments 8/7/2018  2:00 PM  
Follow Up with Delia Leiva MD  
[address] [facility]) Appt Note: 3 month follow up  
 [phone] [place] [id number] [facility] [address] Upcoming Health Maintenance Date Due  
 FOOT EXAM Q1 9/13/2017 MEDICARE YEARLY EXAM 3/14/2018 HEMOGLOBIN A1C Q6M 5/3/2018 LIPID PANEL Q1 5/3/2018 Pneumococcal 19-64 Highest Risk (2 of 3 - PCV13) 8/30/2018* PAP AKA CERVICAL CYTOLOGY 5/29/2019* Influenza Age 5 to Adult 8/1/2018 EYE EXAM RETINAL OR DILATED Q1 9/5/2018 MICROALBUMIN Q1 11/3/2018 BREAST CANCER SCRN MAMMOGRAM 12/8/2019 DTaP/Tdap/Td series (2 - Td) 10/7/2021 COLONOSCOPY 1/15/2023 *Topic was postponed. The date shown is not the original due date. Allergies as of 5/29/2018  Review Complete On: 5/29/2018 By: Florinda Kinney MD  
 No Known Allergies Current Immunizations  Reviewed on 5/22/2017 Name Date Influenza Vaccine 10/1/2015, 11/20/2014 Pneumococcal Polysaccharide (PPSV-23) 3/2/2017 Tdap 10/7/2011 12:00 AM  
  
 Not reviewed this visit You Were Diagnosed With   
  
 Codes Comments Controlled type 2 diabetes mellitus without complication, without long-term current use of insulin ([facility] [address]    -  Primary ICD-10-CM: E11.9 ICD-9-CM: 250.00  Morbid obesity due to excess calories (HCC)     ICD-10-CM: E66.01 
ICD-9-CM: 278.01   
 Thrombocytosis ([facility] [address]     ICD-10-CM: D47.3 ICD-9-CM: 238.71 Medicare annual wellness visit, subsequent     ICD-10-CM: Z00.00 ICD-9-CM: V70.0 Screening for alcoholism     ICD-10-CM: Z13.89 ICD-9-CM: V79.1 Screening for depression     ICD-10-CM: Z13.89 ICD-9-CM: V79.0 Vitals BP Pulse Temp Resp Height(growth percentile) Weight(growth percentile)  
 137/63 100 97 °F (36.1 °C) (Oral) 17 5' 4\" (1.626 m) 221 lb 9.6 oz (100.5 kg) BMI OB Status Smoking Status 38.04 kg/m2 Postmenopausal Never Smoker Vitals History BMI and BSA Data Body Mass Index Body Surface Area 38.04 kg/m 2 2.13 m 2 Preferred Pharmacy Pharmacy Name Phone 60 [address], [address] [place] [address] [phone] Your Updated Medication List  
  
   
This list is accurate as of 5/29/18 10:59 AM.  Always use your most recent med list.  
  
  
  
  
 dilTIAZem 360 mg SR capsule Commonly known as:  TAZTIA XT Take 1 Cap by mouth daily. glipiZIDE SR 5 mg CR tablet Commonly known as:  GLUCOTROL XL  
TAKE ONE TABLET BY MOUTH ONCE DAILY losartan-hydroCHLOROthiazide 100-25 mg per tablet Commonly known as:  HYZAAR  
TAKE ONE TABLET BY MOUTH ONCE DAILY  
  
 metFORMIN 1,000 mg tablet Commonly known as:  GLUCOPHAGE  
TAKE ONE TABLET BY MOUTH TWICE DAILY WITH MEALS  
  
 metoprolol tartrate 25 mg tablet Commonly known as:  LOPRESSOR Take 0.5 Tabs by mouth two (2) times a day. tamoxifen 20 mg tablet Commonly known as:  NOLVADEX Take 1 Tab by mouth daily. VITAMIN D2 50,000 unit capsule Generic drug:  ergocalciferol TAKE 1 CAPSULE BY MOUTH EVERY 14 DAYS. Follow-up Instructions Return in about 3 months (around 8/29/2018) for 30 minute slot- also fasting labs before next visit. [place] To-Do List   
 05/29/2018 Lab:  CBC W/O DIFF   
  
 05/29/2018   Lab:  HEMOGLOBIN A1C WITH EAG   
  
 05/29/2018 Lab:  LIPID PANEL   
  
 05/29/2018 Lab:  METABOLIC PANEL, COMPREHENSIVE   
  
 06/15/2018 9:30 AM  
  Appointment with [facility]LE BLOOM RM 1 at [facility] ([phone]) PAYMENT  For Non-Medicare patients - $15.00 will be collected from you at the time of your exam.  You will be billed $35.00 from the reading [facility]. OUTSIDE FILMS  - Any outside films related to the study being scheduled should be brought with you on the day of the exam.  If this cannot be done there may be a delay in the reading of the study. MEDICATIONS  - Patient must bring a complete list of all medications currently taking to include prescriptions, over-the-counter meds, herbals, vitamins & any dietary supplements GENERAL INSTRUCTIONS  - On the day of your exam do not use any bath powder, deodorant or lotions on the armpit area. 10/17/2018 1:00 PM  
  Appointment with [address] [place] [address] at [facility] RADIATION THERAPY ([phone]) ATTENTION: The Appointment Time in [address] may not reflect your scheduled appointment time as the time is only a place rosenberg. If you have any questions about your appointment time, please call [facility] Radiation Therapy at [phone]. Patient Instructions Medicare Wellness Visit, Female The best way to live healthy is to have a lifestyle where you eat a well-balanced diet, exercise regularly, limit alcohol use, and quit all forms of tobacco/nicotine, if applicable. Regular preventive services are another way to keep healthy. Preventive services (vaccines, screening tests, monitoring & exams) can help personalize your care plan, which helps you manage your own care. Screening tests can find health problems at the earliest stages, when they are easiest to treat.  
  
Andrez Nobles follows the current, evidence-based guidelines published by the [facility] [place] (USPSTF) when recommending preventive services for our patients. Because we follow these guidelines, sometimes recommendations change over time as research supports it. (For example, mammograms used to be recommended annually. Even though Medicare will still pay for an annual mammogram, the newer guidelines recommend a mammogram every two years for women of average risk.) Of course, you and your provider may decide to screen more often for some diseases, based on your risk and co-morbidities (chronic disease you are already diagnosed with). Preventive services for you include: - Medicare offers their members a free annual wellness visit, which is time for you and your primary care provider to discuss and plan for your preventive service needs. Take advantage of this benefit every year! 
 
-All people over age 72 should receive the recommended pneumonia vaccines. Current USPSTF guidelines recommend a series of two vaccines for the best pneumonia protection.  
 
-All adults should have a yearly flu vaccine and a tetanus vaccine every 10 years. All adults age 61 years should receive a shingles vaccine once in their lifetime.   
 
-A bone mass density test is recommended when a woman turns 65 to screen for osteoporosis. This test is only recommended once as a screening. Some providers will use this same test as a disease monitoring tool if you already have osteoporosis.  
 
-All adults age 38-68 years who are overweight should have a diabetes screening test once every three years.  
 
-Other screening tests & preventive services for persons with diabetes include: an eye exam to screen for diabetic retinopathy, a kidney function test, a foot exam, and stricter control over your cholesterol.  
 
-Cardiovascular screening for adults with routine risk involves an electrocardiogram (ECG) at intervals determined by the provider.  
 
-Colorectal cancer screenings should be done for adults age 54-65 years with normal risk. There are a number of acceptable methods of screening for this type of cancer. Each test has its own benefits and drawbacks. Discuss with your provider what is most appropriate for you during your annual wellness visit. The different tests include: colonoscopy (considered the best screening method), a fecal occult blood test, a fecal DNA test, and sigmoidoscopy. -Breast cancer screenings are recommended every other year for women of normal risk age 54-69 years.  
 
-Cervical cancer screenings for women over age 72 are only recommended with certain risk factors.  
 
-All adults born between [facility] should be screened once for Hepatitis C. Here is a list of your current Health Maintenance items (your personalized list of preventive services) with a due date: 
Health Maintenance Due Topic Date Due  
 Diabetic Foot Care  09/13/2017 Hollie Alonso Annual Well Visit  03/14/2018  Hemoglobin A1C    05/03/2018  Cholesterol Test   05/03/2018 Advance Care Planning: Care Instructions Your Care Instructions It can be hard to live with an illness that cannot be cured. But if your health is getting worse, you may want to make decisions about end-of-life care. Planning for the end of your life does not mean that you are giving up. It is a way to make sure that your wishes are met. Clearly stating your wishes can make it easier for your loved ones. Making plans while you are still able may also ease your mind and make your final days less stressful and more meaningful. Follow-up care is a key part of your treatment and safety. Be sure to make and go to all appointments, and call your doctor if you are having problems. It's also a good idea to know your test results and keep a list of the medicines you take. What can you do to plan for the end of life? · You can bring these issues up with your doctor.  You do not need to wait until your doctor starts the conversation. You might start with \"I would not be willing to live with . Arbutus Ring Arbutus Ring Arbutus Ring \" When you complete this sentence it helps your doctor understand your wishes. · Talk openly and honestly with your doctor. This is the best way to understand the decisions you will need to make as your health changes. Know that you can always change your mind. · Ask your doctor about commonly used life-support measures. These include tube feedings, breathing machines, and fluids given through a vein (IV). Understanding these treatments will help you decide whether you want them. · You may choose to have these life-supporting treatments for a limited time. This allows a trial period to see whether they will help you. You may also decide that you want your doctor to take only certain measures to keep you alive. It is important to spell out these conditions so that your doctor and family understand them. · Talk to your doctor about how long you are likely to live. He or she may be able to give you an idea of what usually happens with your specific illness. · Think about preparing papers that state your wishes. This way there will not be any confusion about what you want. You can change your instructions at any time. Which papers should you prepare? Advance directives are legal papers that tell doctors how you want to be cared for at the end of your life. You do not need a  to write these papers. Ask your doctor or your state health department for information on how to write your advance directives. They may have the forms for each of these types of papers. Make sure your doctor has a copy of these on file, and give a copy to a family member or close friend. · Consider a do-not-resuscitate order (DNR). This order asks that no extra treatments be done if your heart stops or you stop breathing.  Extra treatments may include cardiopulmonary resuscitation (CPR), electrical shock to restart your heart, or a machine to breathe for you. If you decide to have a DNR order, ask your doctor to explain and write it. Place the order in your home where everyone can easily see it. · Consider a living will. A living will explains your wishes about life support and other treatments at the end of your life if you become unable to speak for yourself. Living renae tell doctors to use or not use treatments that would keep you alive. You must have one or two witnesses or a notary present when you sign this form. · Consider a durable power of  for health care. This allows you to name a person to make decisions about your care if you are not able to. Most people ask a close friend or family member. Talk to this person about the kinds of treatments you want and those that you do not want. Make sure this person understands your wishes. These legal papers are simple to change. Tell your doctor what you want to change, and ask him or her to make a note in your medical file. Give your family updated copies of the papers. Where can you learn more? Go to http://staciImmunity Projectalejandro.info/. Enter P184 in the search box to learn more about \"Advance Care Planning: Care Instructions. \" Current as of: November 17, 2016 Content Version: 11.3 © 3798-9407 [employer]. Care instructions adapted under license by [employer] (which disclaims liability or warranty for this information). If you have questions about a medical condition or this instruction, always ask your healthcare professional. [place] [address] any warranty or liability for your use of this information. Camila [place] [address] What is a living will? A living will is a legal form you use to write down the kind of care you want at the end of your life. It is used by the health professionals who will treat you if you aren't able to decide for yourself. If you put your wishes in writing, your loved ones and others will know what kind of care you want. They won't need to guess. This can ease your mind and be helpful to others. A living will is not the same as an estate or property will. An estate will explains what you want to happen with your money and property after you die. Is a living will a legal document? A living will is a legal document. Each state has its own laws about living renae. If you move to another state, make sure that your living will is legal in the state where you now live. Or you might use a universal form that has been approved by many states. This kind of form can sometimes be completed and stored online. Your electronic copy will then be available wherever you have a connection to the Internet. In most cases, doctors will respect your wishes even if you have a form from a different state. · You don't need an  to complete a living will. But legal advice can be helpful if your state's laws are unclear, your health history is complicated, or your family can't agree on what should be in your living will. · You can change your living will at any time. Some people find that their wishes about end-of-life care change as their health changes. · In addition to making a living will, think about completing a medical power of  form. This form lets you name the person you want to make end-of-life treatment decisions for you (your \"health care agent\") if you're not able to. Many hospitals and nursing homes will give you the forms you need to complete a living will and a medical power of . · Your living will is used only if you can't make or communicate decisions for yourself anymore. If you become able to make decisions again, you can accept or refuse any treatment, no matter what you wrote in your living will. · Your state may offer an online registry.  This is a place where you can store your living will online so the doctors and nurses who need to treat you can find it right away. What should you think about when creating a living will? Talk about your end-of-life wishes with your family members and your doctor. Let them know what you want. That way the people making decisions for you won't be surprised by your choices. Think about these questions as you make your living will: · Do you know enough about life support methods that might be used? If not, talk to your doctor so you know what might be done if you can't breathe on your own, your heart stops, or you're unable to swallow. · What things would you still want to be able to do after you receive life-support methods? Would you want to be able to walk? To speak? To eat on your own? To live without the help of machines? · If you have a choice, where do you want to be cared for? In your home? At a hospital or nursing home? · Do you want certain Voodoo practices performed if you become very ill? · If you have a choice at the end of your life, where would you prefer to die? At home? In a hospital or nursing home? Somewhere else? · Would you prefer to be buried or cremated? · Do you want your organs to be donated after you die? What should you do with your living will? · Make sure that your family members and your health care agent have copies of your living will. · Give your doctor a copy of your living will to keep in your medical record. If you have more than one doctor, make sure that each one has a copy. · You may want to put a copy of your living will where it can be easily found. Where can you learn more? Go to http://staci-alejandro.info/. Enter P913 in the search box to learn more about \"Learning About Living Perroy. \" Current as of: August 8, 2016 Content Version: 11.3 © 8528-0151 [employer].  Care instructions adapted under license by [address] (which disclaims liability or warranty for this information). If you have questions about a medical condition or this instruction, always ask your healthcare professional. [address] any warranty or liability for your use of this information. Please be sure to call to make a dietician appointment. In [facility] Location - Phone [phone] Address. [address] Please provide this summary of care documentation to your next provider. Your primary care clinician is listed as BILLY Kemp. If you have any questions after today's visit, please call [phone].

## 2018-05-29 NOTE — PROGRESS NOTES
Terri Lomax is a 63 y.o. female and presents with Annual Wellness Visit; Follow Up Chronic Condition; Diabetes; Hypertension; and Low Blood Sugar       Subjective:    Diabetes type 2- last A1c 6.9%. bs at home \"fine\"- 130 this am.   No hypoglycemia- pt is aware of sx to monitor for.   htn- on ARB- controlled today. No cp or sob. DCIS- s/p resection- following with general surgery and oncology- finished XRT as well. taking tamoxifen. Obesity- minimal wt loss since last visit   htn- taking meds. No cp or sob.         Assessment/Plan:    Diabetes type 2- controlled-reviewed in detail with pt. Wt loss encouraged again- POC a1c next visit. Obesity- encouraged caloric restriction. Exercise. Goal of 1 lb/week wt loss discussed again. Referral to dietician placed but pt didn't hear - given phone number to call today. Discussed programs/meds/bariatric surgery briefly today as well. DCIS- continue f/u with onc and XRT. Pt aware of tamoxifen duration being 5 years. htn- controlled-  pt would like to try diet/exercise at this point.        RTC in 3 months         Orders Placed This Encounter    METABOLIC PANEL, COMPREHENSIVE     Standing Status:   Future     Standing Expiration Date:   5/30/2019    LIPID PANEL     Standing Status:   Future     Standing Expiration Date:   5/30/2019    HEMOGLOBIN A1C WITH EAG     Standing Status:   Future     Standing Expiration Date:   5/30/2019    CBC W/O DIFF     Standing Status:   Future     Standing Expiration Date:   5/30/2019       Diagnoses and all orders for this visit:    1. Controlled type 2 diabetes mellitus without complication, without long-term current use of insulin (HCC)  -     METABOLIC PANEL, COMPREHENSIVE; Future  -     LIPID PANEL; Future  -     HEMOGLOBIN A1C WITH EAG; Future    2. Morbid obesity due to excess calories (HCC)  -     CBC W/O DIFF; Future    3. Thrombocytosis (HCC)  -     CBC W/O DIFF; Future    4. Medicare annual wellness visit, subsequent    5. Screening for alcoholism    6. Screening for depression          ROS:  Negative except as mentioned above  Cardiac- no chest pain or palpitations  Pulmonary- no sob or wheezes  GI- no n/v or diarrhea. SH:  Social History   Substance Use Topics    Smoking status: Never Smoker    Smokeless tobacco: Never Used    Alcohol use No         Medications/Allergies:  Current Outpatient Prescriptions on File Prior to Visit   Medication Sig Dispense Refill    metFORMIN (GLUCOPHAGE) 1,000 mg tablet TAKE ONE TABLET BY MOUTH TWICE DAILY WITH MEALS 180 Tab 1    VITAMIN D2 50,000 unit capsule TAKE 1 CAPSULE BY MOUTH EVERY 14 DAYS. 4 Cap 0    dilTIAZem (TAZTIA XT) 360 mg SR capsule Take 1 Cap by mouth daily. 90 Cap 1    glipiZIDE SR (GLUCOTROL XL) 5 mg CR tablet TAKE ONE TABLET BY MOUTH ONCE DAILY 90 Tab 1    losartan-hydroCHLOROthiazide (HYZAAR) 100-25 mg per tablet TAKE ONE TABLET BY MOUTH ONCE DAILY 90 Tab 1    tamoxifen (NOLVADEX) 20 mg tablet Take 1 Tab by mouth daily. 90 Tab 3    metoprolol tartrate (LOPRESSOR) 25 mg tablet Take 0.5 Tabs by mouth two (2) times a day. 60 Tab 0     No current facility-administered medications on file prior to visit. No Known Allergies    Objective:  Visit Vitals    /63    Pulse 100    Temp 97 °F (36.1 °C) (Oral)    Resp 17    Ht 5' 4\" (1.626 m)    Wt 221 lb 9.6 oz (100.5 kg)    BMI 38.04 kg/m2    Body mass index is 38.04 kg/(m^2). Constitutional: Well developed, nourished, no distress, alert   HENT: Exterior ears and tympanic membranes normal bilaterally. Supple neck. No thyromegaly or lymphadenopathy. Oropharynx clear and moist mucous membranes. Eyes: Conjunctiva normal. PERRL. CV: S1, S2.  RRR. No murmurs/rubs. No thrills palpated. No carotid bruits. Intact distal pulses. No edema. Pulm: No abnormalities on inspection. Clear to auscultation bilaterally. No wheezing/rhonchi. Normal effort. GI: Soft, nontender, nondistended.   Normal active bowel sounds. No  masses on palpation. No hepatosplenomegaly. This is the Subsequent Medicare Annual Wellness Exam, performed 12 months or more after the Initial AWV or the last Subsequent AWV    I have reviewed the patient's medical history in detail and updated the computerized patient record. History     Past Medical History:   Diagnosis Date    Anemia     resolved    Diverticulosis 11/13    DM type 2 (diabetes mellitus, type 2) ([address]     Hypertension     Internal hemorrhoids 11/13    Menopause     Vitamin D deficiency       Past Surgical History:   Procedure Laterality Date    HX BREAST LUMPECTOMY Right 5/30/2017    right BREAST LUMPECTOMY WITH localization performed by Faby Castillo MD at [address] HX BREAST LUMPECTOMY Right 6/20/2017    revision right breast to achieve neg margin performed by Faby Castillo MD at [address] HX COLONOSCOPY  1/13    repeat 1/23 - [facility]    HX ORTHOPAEDIC      fluid drained from lt shoulder    HX TUBAL LIGATION       Current Outpatient Prescriptions   Medication Sig Dispense Refill    metFORMIN (GLUCOPHAGE) 1,000 mg tablet TAKE ONE TABLET BY MOUTH TWICE DAILY WITH MEALS 180 Tab 1    VITAMIN D2 50,000 unit capsule TAKE 1 CAPSULE BY MOUTH EVERY 14 DAYS. 4 Cap 0    dilTIAZem (TAZTIA XT) 360 mg SR capsule Take 1 Cap by mouth daily. 90 Cap 1    glipiZIDE SR (GLUCOTROL XL) 5 mg CR tablet TAKE ONE TABLET BY MOUTH ONCE DAILY 90 Tab 1    losartan-hydroCHLOROthiazide (HYZAAR) 100-25 mg per tablet TAKE ONE TABLET BY MOUTH ONCE DAILY 90 Tab 1    tamoxifen (NOLVADEX) 20 mg tablet Take 1 Tab by mouth daily. 90 Tab 3    metoprolol tartrate (LOPRESSOR) 25 mg tablet Take 0.5 Tabs by mouth two (2) times a day.  61 Tab 0     No Known Allergies  Family History   Problem Relation Age of Onset   [employer] Stroke Mother     Hypertension Mother     Diabetes Daughter      Social History   Substance Use Topics    Smoking status: Never Smoker    Smokeless tobacco: Never Used    Alcohol use No     Patient Active Problem List   Diagnosis Code    Anemia D64.9    Hypertension I10    Vitamin D deficiency E55.9    Breast calcifications on mammogram R92.1    Thrombocytosis ([facility] D47.3    Morbid obesity due to excess calories ([facility]) E66.01    Ductal carcinoma in situ (DCIS) of right breast D05.11    Controlled type 2 diabetes mellitus without complication, without long-term current use of insulin (HCC) E11.9       Depression Risk Factor Screening:     PHQ over the last two weeks 5/29/2018   Little interest or pleasure in doing things Not at all   Feeling down, depressed or hopeless Not at all   Total Score PHQ 2 0     Alcohol Risk Factor Screening: You do not drink alcohol or very rarely. Functional Ability and Level of Safety:   Hearing Loss  Hearing is good. Activities of Daily Living  The home contains: handrails  Patient does total self care    Fall Risk  Fall Risk Assessment, last 12 mths 3/2/2017   Able to walk? No       Abuse Screen  Patient is not abused    Cognitive Screening   Evaluation of Cognitive Function:  Has your family/caregiver stated any concerns about your memory: no  Normal    Patient Care Team   Patient Care Team:  Mariel Hoyt MD as PCP - General (Internal Medicine)  Daisy Carlin MD (Gastroenterology)  Aundrea Barrera MD (Ophthalmology)  Davon Zapien MD (Orthopedic Surgery)  Darlin Soriano MD (General Surgery)  Meredith Booker. Dwight Son as Nurse Navigator  Darlin Soriano MD (General Surgery)    Assessment/Plan   Education and counseling provided:  Are appropriate based on today's review and evaluation  End-of-Life planning (with patient's consent)    Diagnoses and all orders for this visit:    1. Controlled type 2 diabetes mellitus without complication, without long-term current use of insulin (HCC)-controlled continue to follow labs  -     METABOLIC PANEL, COMPREHENSIVE; Future  -     LIPID PANEL;  Future  -     HEMOGLOBIN A1C WITH EAG; Future  -     REFERRAL TO DIETITIAN    2. Morbid obesity due to excess calories (HCC)-patient given phone number for dietitian to call  -     CBC W/O DIFF; Future  -     REFERRAL TO DIETITIAN    3. Thrombocytosis (HCC)-we will recheck CBC with next lab  -     CBC W/O DIFF; Future    4. Medicare annual wellness visit, subsequent    5. Screening for alcoholism    6. Screening for depression    7. Essential hypertension-controlled no changes   -     REFERRAL TO DIETITIAN    8.  Vitamin D deficiency-last level was normal but will plan to recheck with next visit  -     VITAMIN D, 25 HYDROXY; Future        Health Maintenance Due   Topic Date Due    FOOT EXAM Q1  09/13/2017    MEDICARE YEARLY EXAM  03/14/2018    HEMOGLOBIN A1C Q6M  05/03/2018    LIPID PANEL Q1  05/03/2018

## 2018-05-29 NOTE — ACP (ADVANCE CARE PLANNING)
Advance Care Planning (ACP) Provider Conversation Snapshot    Date of ACP Conversation: 05/29/18  Persons included in Conversation:  patient  Length of ACP Conversation in minutes:  <16 minutes (Non-Billable)    Authorized Decision Maker (if patient is incapable of making informed decisions): This person is: Other Legally Authorized Decision Maker (e.g. Next of Kin)          For Patients with Decision Making Capacity:   Values/Goals: Exploration of values, goals, and preferences if recovery is not expected, even with continued medical treatment in the event of:  Imminent death  Severe, permanent brain injury  . \"In these circumstances, what matters most to you? \"  Care focused more on comfort or quality of life.   Ayan Gaxiola Outcomes / Follow-Up Plan:   Recommended completion of Advance Directive form after review of ACP materials and conversation with prospective healthcare agent

## 2018-05-29 NOTE — PROGRESS NOTES
1. Have you been to the ER, urgent care clinic since your last visit? Hospitalized since your last visit? Yes, went to [facility] urgent care around 5/13/2018 pulled a muscle on her right calf. 2. Have you seen or consulted any other health care providers outside of the [address] [place] [address] since your last visit? Include any pap smears or colon screening. Yes, saw Dr. Lisa Natarajan in 5/15/2018.     Chief Complaint   Patient presents with    Annual Wellness Visit    Follow Up Chronic Condition    Diabetes    Hypertension    Low Blood Sugar

## 2018-05-29 NOTE — PATIENT INSTRUCTIONS
Medicare Wellness Visit, Female    The best way to live healthy is to have a lifestyle where you eat a well-balanced diet, exercise regularly, limit alcohol use, and quit all forms of tobacco/nicotine, if applicable. Regular preventive services are another way to keep healthy. Preventive services (vaccines, screening tests, monitoring & exams) can help personalize your care plan, which helps you manage your own care. Screening tests can find health problems at the earliest stages, when they are easiest to treat. [facility] follows the current, evidence-based guidelines published by the [facility]) when recommending preventive services for our patients. Because we follow these guidelines, sometimes recommendations change over time as research supports it. (For example, mammograms used to be recommended annually. Even though Medicare will still pay for an annual mammogram, the newer guidelines recommend a mammogram every two years for women of average risk.)    Of course, you and your provider may decide to screen more often for some diseases, based on your risk and co-morbidities (chronic disease you are already diagnosed with). Preventive services for you include:    - Medicare offers their members a free annual wellness visit, which is time for you and your primary care provider to discuss and plan for your preventive service needs. Take advantage of this benefit every year!    -All people over age 72 should receive the recommended pneumonia vaccines. Current USPSTF guidelines recommend a series of two vaccines for the best pneumonia protection.     -All adults should have a yearly flu vaccine and a tetanus vaccine every 10 years. All adults age 61 years should receive a shingles vaccine once in their lifetime.      -A bone mass density test is recommended when a woman turns 65 to screen for osteoporosis.  This test is only recommended once as a screening. Some providers will use this same test as a disease monitoring tool if you already have osteoporosis. -All adults age 38-68 years who are overweight should have a diabetes screening test once every three years.     -Other screening tests & preventive services for persons with diabetes include: an eye exam to screen for diabetic retinopathy, a kidney function test, a foot exam, and stricter control over your cholesterol.     -Cardiovascular screening for adults with routine risk involves an electrocardiogram (ECG) at intervals determined by the provider.     -Colorectal cancer screenings should be done for adults age 54-65 years with normal risk. There are a number of acceptable methods of screening for this type of cancer. Each test has its own benefits and drawbacks. Discuss with your provider what is most appropriate for you during your annual wellness visit. The different tests include: colonoscopy (considered the best screening method), a fecal occult blood test, a fecal DNA test, and sigmoidoscopy. -Breast cancer screenings are recommended every other year for women of normal risk age 54-69 years.     -Cervical cancer screenings for women over age 72 are only recommended with certain risk factors.     -All adults born between [facility] should be screened once for Hepatitis C. Here is a list of your current Health Maintenance items (your personalized list of preventive services) with a due date:  Health Maintenance Due   Topic Date Due    Diabetic Foot Care  09/13/2017    Annual Well Visit  03/14/2018    Hemoglobin A1C    05/03/2018    Cholesterol Test   05/03/2018          Advance Care Planning: Care Instructions  Your Care Instructions  It can be hard to live with an illness that cannot be cured. But if your health is getting worse, you may want to make decisions about end-of-life care. Planning for the end of your life does not mean that you are giving up.  It is a way to make sure that your wishes are met. Clearly stating your wishes can make it easier for your loved ones. Making plans while you are still able may also ease your mind and make your final days less stressful and more meaningful. Follow-up care is a key part of your treatment and safety. Be sure to make and go to all appointments, and call your doctor if you are having problems. It's also a good idea to know your test results and keep a list of the medicines you take. What can you do to plan for the end of life? · You can bring these issues up with your doctor. You do not need to wait until your doctor starts the conversation. You might start with \"I would not be willing to live with . Pancho Judd Pancho Glasser Pancho Glasser \" When you complete this sentence it helps your doctor understand your wishes. · Talk openly and honestly with your doctor. This is the best way to understand the decisions you will need to make as your health changes. Know that you can always change your mind. · Ask your doctor about commonly used life-support measures. These include tube feedings, breathing machines, and fluids given through a vein (IV). Understanding these treatments will help you decide whether you want them. · You may choose to have these life-supporting treatments for a limited time. This allows a trial period to see whether they will help you. You may also decide that you want your doctor to take only certain measures to keep you alive. It is important to spell out these conditions so that your doctor and family understand them. · Talk to your doctor about how long you are likely to live. He or she may be able to give you an idea of what usually happens with your specific illness. · Think about preparing papers that state your wishes. This way there will not be any confusion about what you want. You can change your instructions at any time. Which papers should you prepare?   Advance directives are legal papers that tell doctors how you want to be cared for at the end of your life. You do not need a  to write these papers. Ask your doctor or your state health department for information on how to write your advance directives. They may have the forms for each of these types of papers. Make sure your doctor has a copy of these on file, and give a copy to a family member or close friend. · Consider a do-not-resuscitate order (DNR). This order asks that no extra treatments be done if your heart stops or you stop breathing. Extra treatments may include cardiopulmonary resuscitation (CPR), electrical shock to restart your heart, or a machine to breathe for you. If you decide to have a DNR order, ask your doctor to explain and write it. Place the order in your home where everyone can easily see it. · Consider a living will. A living will explains your wishes about life support and other treatments at the end of your life if you become unable to speak for yourself. Living renae tell doctors to use or not use treatments that would keep you alive. You must have one or two witnesses or a notary present when you sign this form. · Consider a durable power of  for health care. This allows you to name a person to make decisions about your care if you are not able to. Most people ask a close friend or family member. Talk to this person about the kinds of treatments you want and those that you do not want. Make sure this person understands your wishes. These legal papers are simple to change. Tell your doctor what you want to change, and ask him or her to make a note in your medical file. Give your family updated copies of the papers. Where can you learn more? Go to http://staci-alejandro.info/. Enter P184 in the search box to learn more about \"Advance Care Planning: Care Instructions. \"  Current as of: November 17, 2016  Content Version: 11.3  © 0727-0044 [employer], [employer].  Care instructions adapted under license by [employer] (which disclaims liability or warranty for this information). If you have questions about a medical condition or this instruction, always ask your healthcare professional. [address] any warranty or liability for your use of this information. Learning About Living [place]  What is a living will? A living will is a legal form you use to write down the kind of care you want at the end of your life. It is used by the health professionals who will treat you if you aren't able to decide for yourself. If you put your wishes in writing, your loved ones and others will know what kind of care you want. They won't need to guess. This can ease your mind and be helpful to others. A living will is not the same as an estate or property will. An estate will explains what you want to happen with your money and property after you die. Is a living will a legal document? A living will is a legal document. Each state has its own laws about living renae. If you move to another state, make sure that your living will is legal in the state where you now live. Or you might use a universal form that has been approved by many states. This kind of form can sometimes be completed and stored online. Your electronic copy will then be available wherever you have a connection to the Internet. In most cases, doctors will respect your wishes even if you have a form from a different state. · You don't need an  to complete a living will. But legal advice can be helpful if your state's laws are unclear, your health history is complicated, or your family can't agree on what should be in your living will. · You can change your living will at any time. Some people find that their wishes about end-of-life care change as their health changes. · In addition to making a living will, think about completing a medical power of  form.  This form lets you name the person you want to make end-of-life treatment decisions for you (your \"health care agent\") if you're not able to. Many hospitals and nursing homes will give you the forms you need to complete a living will and a medical power of . · Your living will is used only if you can't make or communicate decisions for yourself anymore. If you become able to make decisions again, you can accept or refuse any treatment, no matter what you wrote in your living will. · Your state may offer an online registry. This is a place where you can store your living will online so the doctors and nurses who need to treat you can find it right away. What should you think about when creating a living will? Talk about your end-of-life wishes with your family members and your doctor. Let them know what you want. That way the people making decisions for you won't be surprised by your choices. Think about these questions as you make your living will:  · Do you know enough about life support methods that might be used? If not, talk to your doctor so you know what might be done if you can't breathe on your own, your heart stops, or you're unable to swallow. · What things would you still want to be able to do after you receive life-support methods? Would you want to be able to walk? To speak? To eat on your own? To live without the help of machines? · If you have a choice, where do you want to be cared for? In your home? At a hospital or nursing home? · Do you want certain Voodoo practices performed if you become very ill? · If you have a choice at the end of your life, where would you prefer to die? At home? In a hospital or nursing home? Somewhere else? · Would you prefer to be buried or cremated? · Do you want your organs to be donated after you die? What should you do with your living will? · Make sure that your family members and your health care agent have copies of your living will. · Give your doctor a copy of your living will to keep in your medical record.  If you have more than one doctor, make sure that each one has a copy. · You may want to put a copy of your living will where it can be easily found. Where can you learn more? Go to http://staci-alejandro.info/. Enter F506 in the search box to learn more about \"Learning About Living [place]. \"  Current as of: August 8, 2016  Content Version: 11.3  © 0014-3456 [employer]. Care instructions adapted under license by [employer] (which disclaims liability or warranty for this information). If you have questions about a medical condition or this instruction, always ask your healthcare professional. [place] [address] any warranty or liability for your use of this information. Please be sure to call to make a dietician appointment. In [facility] Location - Phone [phone]   Address.   [address]

## 2018-06-01 ENCOUNTER — HOSPITAL ENCOUNTER (OUTPATIENT)
Dept: NUTRITION | Age: 63
Discharge: HOME | End: 2018-06-01
Payer: MEDICARE

## 2018-06-01 PROCEDURE — 97802 MEDICAL NUTRITION INDIV IN: CPT

## 2018-06-01 NOTE — PROGRESS NOTES
[address] [place] [address], [place] [address]  Phone: [phone]  Fax: [phone]   Nutrition Assessment - Medical Nutrition Therapy   Outpatient Initial Evaluation         Patient Name: Cassy Wilson : 1955   Treatment Diagnosis: DM, Obesity, HTN   Referral Source: Cathie Rudolph MD Start of Care [facility]): 2018     Gender: female Age: 63 y.o. Ht: 63 in Wt: 220  lb  kg   BMI: 39.0 BMR   Male  BMR Female 1537     Past Medical History includes: DM, HTN, visually impaired, obesity     Pertinent Medications:   Metformin, Glipizide, Hctz, lopressor, Vit D     Biochemical Data:   Lab Results   Component Value Date/Time    Hemoglobin A1c 6.7 (H) 2017 11:35 AM    Hemoglobin A1c (POC) 6.5 2017 10:56 AM     Lab Results   Component Value Date/Time    Sodium 142 2017 11:35 AM    Potassium 3.8 2017 11:35 AM    Chloride 106 2017 11:35 AM    CO2 29 2017 11:35 AM    Anion gap 7 2017 11:35 AM    Glucose 113 (H) 2017 11:35 AM    BUN 14 2017 11:35 AM    Creatinine 0.61 2017 11:35 AM    BUN/Creatinine ratio 23 (H) 2017 11:35 AM    GFR est AA >60 2017 11:35 AM    GFR est non-AA >60 2017 11:35 AM    Calcium 9.3 2017 11:35 AM    Bilirubin, total 0.2 2017 09:28 AM    AST (SGOT) 11 (L) 2017 09:28 AM    Alk.  phosphatase 91 2017 09:28 AM    Protein, total 7.7 2017 09:28 AM    Albumin 4.0 2017 09:28 AM    Globulin 3.7 2017 09:28 AM    A-G Ratio 1.1 2017 09:28 AM    ALT (SGPT) 21 2017 09:28 AM     Lab Results   Component Value Date/Time    Cholesterol, total 170 2017 09:28 AM    HDL Cholesterol 75 (H) 2017 09:28 AM    LDL, calculated 79.2 2017 09:28 AM    VLDL, calculated 15.8 2017 09:28 AM    Triglyceride 79 2017 09:28 AM    CHOL/HDL Ratio 2.3 2017 09:28 AM     Lab Results   Component Value Date/Time    ALT (SGPT) 21 05/03/2017 09:28 AM    AST (SGOT) 11 (L) 05/03/2017 09:28 AM    Alk. phosphatase 91 05/03/2017 09:28 AM    Bilirubin, total 0.2 05/03/2017 09:28 AM     Lab Results   Component Value Date/Time    Creatinine 0.61 11/03/2017 11:35 AM     Lab Results   Component Value Date/Time    BUN 14 11/03/2017 11:35 AM    BUN, POC 12 06/20/2017 06:41 AM     Lab Results   Component Value Date/Time    Microalbumin/Creat ratio (mg/g creat) 4 11/03/2017 11:35 AM    Microalbumin,urine random 0.80 11/03/2017 11:35 AM        Subjective/Assessment:   64 yo female referred for help with wt management, blood sugar and pressure control. Pt reports that she used to walk a lot, but is suffering with knee injury which prevents her from walking. She has not tried physical therapy or any other modes of exercise. She has already been working on blood sugar control, but still needs more help with wt loss. She does test her blood sugars. Pt has not been reading food labels; practiced in apmt. Current Eating Patterns: Pt typically eating 3 meals per day that lack balance and may be over-portioning carbohydrates without realizing. She stopped drinking soda 2 weeks ago and plans to continue. Doesn't do much snack throughout the day until after dinner; she likes to snack on chips. Discussed more balanced snacks and using vegetables or not having a snack if she is not really hungry. Now drinking water, Crystal Light, and 1 coffee with Splenda and 3 creams     Estimate Needs   Calories: 1400  Protein: 90 Carbs: 160 Fat: 45   Kcal/day  g/day  g/day  g/day        percent: 25  45  30               Education & Recommendations provided: Educated pt on the pathophysiology of Type II Diabetes, insulin resistance and the rationale for dietary modifications and increased activity. Educated pt on carbohydrate food sources, counting carbohydrates, label reading, meal timing, and appropriate serving sizes.  Encouraged pt to continue to avoid sugary beverages and focus on balanced meal model with carbohydrate ranges. Handouts Provided: [x]  Carbohydrates  [x]  Protein  [x]  Non-starchy Vegatbles  [x]  Food Label  [x]  Meal and Snack Ideas  []  Food Journals [x]  Diabetes  []  Cholesterol  []  Sodium  []  Gen Nutr Guidelines  []  SBGM Guidelines  []  Others:   Information Reviewed with: pt   Readiness to Change Stage: []  Pre-contemplative    []  Contemplative  []  Preparation               []  Action                  []  Maintenance   Potential Barriers to Learning: []  Decline in memory    []  Language barrier   []  Other:  []  Emotional                  []  Limited mobility  []  Lack of motivation     [] Vision, hearing or cognitive impairment   Expected Compliance: Fair due to difficulty gauging motivation level     Nutritional Goal - To promote lifestyle changes to result in:    [x]  Weight loss  [x]  Improved diabetic control  []  Decreased cholesterol levels  [x]  Decreased blood pressure  []  Weight maintenance []  Preventing any interactions associated with food allergies  []  Adequate weight gain toward goal weight  []  Other:        Patient Goals:   1. Pair carbohydrates with protein whenever possible. 2. Increase non-starchy veg portion to half of plate. 3. Start reading labels for total carbohydrates and try to stay within ranges 30-45g per meal and 10-20g per snack. 4. Substitute herbs, spices and natural flavorings for using salt while cooking. Dietitian Signature: Mar Brunner MS, RD Date: 6/1/2018   Follow-up: Tues.  7/3/18 at 3p Time: 1:47 PM

## 2018-06-15 ENCOUNTER — HOSPITAL ENCOUNTER (OUTPATIENT)
Dept: MAMMOGRAPHY | Age: 63
Discharge: HOME | End: 2018-06-15
Attending: SPECIALIST
Payer: MEDICARE

## 2018-06-15 DIAGNOSIS — D05.11 DUCTAL CARCINOMA IN SITU (DCIS) OF RIGHT BREAST: ICD-10-CM

## 2018-06-15 PROCEDURE — 77062 BREAST TOMOSYNTHESIS BI: CPT

## 2018-07-11 ENCOUNTER — TELEPHONE (OUTPATIENT)
Dept: FAMILY MEDICINE CLINIC | Age: 63
End: 2018-07-11

## 2018-07-11 NOTE — TELEPHONE ENCOUNTER
[address] called  [phone] spoke to [facility] ([facility]) and stated that her Diltiazem 360mg SR is backordered. Wants to know if you can switch her to something else.  Thanks

## 2018-07-19 ENCOUNTER — TELEPHONE (OUTPATIENT)
Dept: FAMILY MEDICINE CLINIC | Age: 63
End: 2018-07-19

## 2018-07-19 NOTE — TELEPHONE ENCOUNTER
I called Nicholas Carlson [phone] and spoke to the [facility] and told her per Dr. Elida Thurston ok to switched her Diltiazem Er 360mg capsule.

## 2018-07-19 NOTE — TELEPHONE ENCOUNTER
julissatcalled about a script that was sent in and they do not have it in but can give her another. She had an accent and I could not understand her very well.  I could not understand the name of the script but it sounded like it started with a t

## 2018-08-06 ENCOUNTER — TELEPHONE (OUTPATIENT)
Dept: ONCOLOGY | Age: 63
End: 2018-08-06

## 2018-08-07 ENCOUNTER — APPOINTMENT (OUTPATIENT)
Dept: INFUSION THERAPY | Age: 63
End: 2018-08-07

## 2018-08-07 ENCOUNTER — OFFICE VISIT (OUTPATIENT)
Dept: ONCOLOGY | Age: 63
End: 2018-08-07

## 2018-08-07 VITALS
WEIGHT: 218 LBS | TEMPERATURE: 97.1 F | RESPIRATION RATE: 17 BRPM | HEIGHT: 64 IN | OXYGEN SATURATION: 97 % | DIASTOLIC BLOOD PRESSURE: 69 MMHG | HEART RATE: 91 BPM | SYSTOLIC BLOOD PRESSURE: 135 MMHG | BODY MASS INDEX: 37.22 KG/M2

## 2018-08-07 DIAGNOSIS — D05.11 DUCTAL CARCINOMA IN SITU (DCIS) OF RIGHT BREAST: Primary | ICD-10-CM

## 2018-08-07 RX ORDER — TAMOXIFEN CITRATE 20 MG/1
20 TABLET ORAL DAILY
Qty: 95 TAB | Refills: 4 | Status: SHIPPED | OUTPATIENT
Start: 2018-08-07 | End: 2019-03-21 | Stop reason: DRUGHIGH

## 2018-08-07 NOTE — PROGRESS NOTES
Identified pt with two pt identifiers(name and ). Reviewed record in preparation for visit and have obtained necessary documentation. Chief Complaint   Patient presents with    Breast Cancer     3mo f/u; R breast        Health Maintenance Due   Topic    FOOT EXAM Q1     HEMOGLOBIN A1C Q6M     LIPID PANEL Q1     Influenza Age 5 to Adult    Zilphia Potter Q1    Ms. Malcolm Shultz has a reminder for a \"due or due soon\" health maintenance. I have asked that she contact her primary care provider for follow-up on this health maintenance. Coordination of Care Questionnaire:  :   1) Have you been to an emergency room, urgent care clinic since your last visit? no   Hospitalized since your last visit? no             2. Have seen or consulted any other health care provider since your last visit? YES  If yes, where when, and reason for visit? 18: Dr. Elida Thurston (PCP)  18: Ed La (nutrition)  6/15/18: mammo    3) Do you have an Advanced Directive/ Living Will in place? NO  If yes, do we have a copy on file NO  If no, would you like information NO    Patient is accompanied by self I have received verbal consent from Chel Byers to discuss any/all medical information while they are present in the room.

## 2018-08-07 NOTE — MR AVS SNAPSHOT
[address] [facility] [address] [address] [phone] Patient: Tracy Trujillo MRN: [id number] ABN:5/2/1858 Visit Information Date & Time Provider Department Dept. Phone Encounter #  
 8/7/2018  2:00 PM Lina Kenny MD [facility] [phone] [id number] Follow-up Instructions Return in about 4 months (around 12/7/2018). Your Appointments 8/22/2018  9:35 AM  
LAB with [facility] [address]) Appt Note: labs [facility] [place]  
  
   
 [address]  
  
    
 8/29/2018 10:00 AM  
Follow Up with Sofía Rivers MD  
[facility] [address] [facility]) Appt Note: 3 mo f/u  
 [facility] [place]  
  
   
 [address] [facility]  
  
    
 9/12/2018  1:00 PM  
Follow Up with Janett Rios MD  
[address] [place] [address] ([facility]) Appt Note: 4 month follow up (Dr. Grider Never seen 05-15-18) [facility] [facility] [address] [place] [address] [address]  
  
    
 12/12/2018  2:00 PM  
Follow Up with Lina Kenny MD  
[facility]) Appt Note: 4 month f-up [id number] [facility] [address]  
  
    
 5/29/2019 10:00 AM  
Office Visit with Sofía Rivers MD  
[facility] [address]) Appt Note: [address] [place] [address] [address]  
  
   
 [address] Upcoming Health Maintenance Date Due  
 FOOT EXAM Q1 9/13/2017 HEMOGLOBIN A1C Q6M 5/3/2018 LIPID PANEL Q1 5/3/2018 Influenza Age 5 to Adult 8/1/2018 EYE EXAM RETINAL OR DILATED Q1 9/5/2018 Pneumococcal 19-64 Highest Risk (2 of 3 - PCV13) 8/30/2018* PAP AKA CERVICAL CYTOLOGY 5/29/2019* MICROALBUMIN Q1 11/3/2018 MEDICARE YEARLY EXAM 5/30/2019 BREAST CANCER SCRN MAMMOGRAM 6/15/2020 DTaP/Tdap/Td series (2 - Td) 10/7/2021 COLONOSCOPY 1/15/2023 *Topic was postponed. The date shown is not the original due date. Allergies as of 8/7/2018  Review Complete On: 8/7/2018 By: Katherine Bang LPN No Known Allergies Current Immunizations  Reviewed on 5/22/2017 Name Date Influenza Vaccine 10/1/2015, 11/20/2014 Pneumococcal Polysaccharide (PPSV-23) 3/2/2017 Tdap 10/7/2011 12:00 AM  
  
 Not reviewed this visit You Were Diagnosed With   
  
 Codes Comments Ductal carcinoma in situ (DCIS) of right breast    -  Primary ICD-10-CM: D05.11 ICD-9-CM: 233.0 Vitals BP Pulse Temp Resp Height(growth percentile) Weight(growth percentile) 135/69 91 97.1 °F (36.2 °C) (Oral) 17 5' 4\" (1.626 m) 218 lb (98.9 kg) SpO2 BMI OB Status Smoking Status 97% 37.42 kg/m2 Postmenopausal Never Smoker Vitals History BMI and BSA Data Body Mass Index Body Surface Area  
 37.42 kg/m 2 2.11 m 2 Preferred Pharmacy Pharmacy Name Phone [address] [place] [address] [phone] Your Updated Medication List  
  
   
This list is accurate as of 8/7/18  2:47 PM.  Always use your most recent med list.  
  
  
  
  
 dilTIAZem 360 mg SR capsule Commonly known as:  TAZTIA XT Take 1 Cap by mouth daily. glipiZIDE SR 5 mg CR tablet Commonly known as:  GLUCOTROL XL  
TAKE ONE TABLET BY MOUTH ONCE DAILY losartan-hydroCHLOROthiazide 100-25 mg per tablet Commonly known as:  HYZAAR  
TAKE ONE TABLET BY MOUTH ONCE DAILY  
  
 metFORMIN 1,000 mg tablet Commonly known as:  GLUCOPHAGE  
TAKE ONE TABLET BY MOUTH TWICE DAILY WITH MEALS  
  
 metoprolol tartrate 25 mg tablet Commonly known as:  LOPRESSOR Take 0.5 Tabs by mouth two (2) times a day. tamoxifen 20 mg tablet Commonly known as:  NOLVADEX Take 1 Tab by mouth daily. VITAMIN D2 50,000 unit capsule Generic drug:  ergocalciferol TAKE 1 CAPSULE BY MOUTH EVERY 14 DAYS. Follow-up Instructions Return in about 4 months (around 12/7/2018). To-Do List   
 08/07/2018 Lab:  CBC WITH AUTOMATED DIFF   
  
 08/07/2018 Lab:  METABOLIC PANEL, COMPREHENSIVE   
  
 10/17/2018 1:00 PM  
  Appointment with [address] [place] [address] RADIATION THERAPY ([phone]) ATTENTION: The Appointment Time in [address] may not reflect your scheduled appointment time as the time is only a place rosenberg. If you have any questions about your appointment time, please call [facility] at [phone]. Introducing [facility]! [employer] introduces Aloompa patient portal. Now you can access parts of your medical record, email your doctor's office, and request medication refills online. 1. In your internet browser, go to [email or web address] 2. Click on the First Time User? Click Here link in the Sign In box. You will see the New Member Sign Up page. 3. Enter your Aloompa Access Code exactly as it appears below. You will not need to use this code after youve completed the sign-up process. If you do not sign up before the expiration date, you must request a new code. · Aloompa Access Code: [email or web address] Expires: 11/4/2018  8:30 AM 
 
4. Enter the last four digits of your Social Security Number (xxxx) and Date of Birth (mm/dd/yyyy) as indicated and click Submit. You will be taken to the next sign-up page. 5. Create a [employer] ID. This will be your [employer] login ID and cannot be changed, so think of one that is secure and easy to remember. 6. Create a [employer] password. You can change your password at any time. 7. Enter your Password Reset Question and Answer. This can be used at a later time if you forget your password. 8. Enter your e-mail address. You will receive e-mail notification when new information is available in [address] 9. Click Sign Up. You can now view and download portions of your medical record. 10. Click the Download Summary menu link to download a portable copy of your medical information. If you have questions, please visit the Frequently Asked Questions section of the Smart Pipe website. Remember, Smart Pipe is NOT to be used for urgent needs. For medical emergencies, dial 911. Now available from your iPhone and Android! Please provide this summary of care documentation to your next provider. Your primary care clinician is listed as BILLY Kemp. If you have any questions after today's visit, please call [phone].

## 2018-08-20 RX ORDER — ERGOCALCIFEROL 1.25 MG/1
CAPSULE ORAL
Qty: 4 CAP | Refills: 0 | Status: SHIPPED | OUTPATIENT
Start: 2018-08-20 | End: 2018-10-11 | Stop reason: SDUPTHER

## 2018-08-22 ENCOUNTER — HOSPITAL ENCOUNTER (OUTPATIENT)
Dept: LAB | Age: 63
Discharge: HOME | End: 2018-08-22
Payer: MEDICARE

## 2018-08-22 DIAGNOSIS — E66.01 MORBID OBESITY DUE TO EXCESS CALORIES (HCC): ICD-10-CM

## 2018-08-22 DIAGNOSIS — E11.9 CONTROLLED TYPE 2 DIABETES MELLITUS WITHOUT COMPLICATION, WITHOUT LONG-TERM CURRENT USE OF INSULIN (HCC): ICD-10-CM

## 2018-08-22 DIAGNOSIS — D75.839 THROMBOCYTOSIS: ICD-10-CM

## 2018-08-22 DIAGNOSIS — E55.9 VITAMIN D DEFICIENCY: ICD-10-CM

## 2018-08-22 DIAGNOSIS — D05.11 DUCTAL CARCINOMA IN SITU (DCIS) OF RIGHT BREAST: ICD-10-CM

## 2018-08-22 LAB
25(OH)D3 SERPL-MCNC: 49.1 NG/ML (ref 30–100)
ALBUMIN SERPL-MCNC: 3.9 G/DL (ref 3.4–5)
ALBUMIN/GLOB SERPL: 1.2 {RATIO} (ref 0.8–1.7)
ALP SERPL-CCNC: 61 U/L (ref 45–117)
ALT SERPL-CCNC: 18 U/L (ref 13–56)
ANION GAP SERPL CALC-SCNC: 9 MMOL/L (ref 3–18)
AST SERPL-CCNC: 19 U/L (ref 15–37)
BILIRUB SERPL-MCNC: 0.3 MG/DL (ref 0.2–1)
BUN SERPL-MCNC: 11 MG/DL (ref 7–18)
BUN/CREAT SERPL: 17 (ref 12–20)
CALCIUM SERPL-MCNC: 8.8 MG/DL (ref 8.5–10.1)
CHLORIDE SERPL-SCNC: 107 MMOL/L (ref 100–108)
CHOLEST SERPL-MCNC: 166 MG/DL
CO2 SERPL-SCNC: 27 MMOL/L (ref 21–32)
CREAT SERPL-MCNC: 0.63 MG/DL (ref 0.6–1.3)
ERYTHROCYTE [DISTWIDTH] IN BLOOD BY AUTOMATED COUNT: 14.3 % (ref 11.6–14.5)
EST. AVERAGE GLUCOSE BLD GHB EST-MCNC: 146 MG/DL
GLOBULIN SER CALC-MCNC: 3.3 G/DL (ref 2–4)
GLUCOSE SERPL-MCNC: 131 MG/DL (ref 74–99)
HBA1C MFR BLD: 6.7 % (ref 4.2–5.6)
HCT VFR BLD AUTO: 37.3 % (ref 35–45)
HDLC SERPL-MCNC: 78 MG/DL (ref 40–60)
HDLC SERPL: 2.1 {RATIO} (ref 0–5)
HGB BLD-MCNC: 12 G/DL (ref 12–16)
LDLC SERPL CALC-MCNC: 68 MG/DL (ref 0–100)
LIPID PROFILE,FLP: ABNORMAL
MCH RBC QN AUTO: 27.4 PG (ref 24–34)
MCHC RBC AUTO-ENTMCNC: 32.2 G/DL (ref 31–37)
MCV RBC AUTO: 85.2 FL (ref 74–97)
PLATELET # BLD AUTO: 416 K/UL (ref 135–420)
PMV BLD AUTO: 10.3 FL (ref 9.2–11.8)
POTASSIUM SERPL-SCNC: 4.1 MMOL/L (ref 3.5–5.5)
PROT SERPL-MCNC: 7.2 G/DL (ref 6.4–8.2)
RBC # BLD AUTO: 4.38 M/UL (ref 4.2–5.3)
SODIUM SERPL-SCNC: 143 MMOL/L (ref 136–145)
TRIGL SERPL-MCNC: 100 MG/DL (ref ?–150)
VLDLC SERPL CALC-MCNC: 20 MG/DL
WBC # BLD AUTO: 9.2 K/UL (ref 4.6–13.2)

## 2018-08-22 PROCEDURE — 80061 LIPID PANEL: CPT | Performed by: INTERNAL MEDICINE

## 2018-08-22 PROCEDURE — 83036 HEMOGLOBIN GLYCOSYLATED A1C: CPT | Performed by: INTERNAL MEDICINE

## 2018-08-22 PROCEDURE — 85027 COMPLETE CBC AUTOMATED: CPT | Performed by: INTERNAL MEDICINE

## 2018-08-22 PROCEDURE — 80053 COMPREHEN METABOLIC PANEL: CPT | Performed by: INTERNAL MEDICINE

## 2018-08-22 PROCEDURE — 82306 VITAMIN D 25 HYDROXY: CPT | Performed by: INTERNAL MEDICINE

## 2018-08-22 PROCEDURE — 36415 COLL VENOUS BLD VENIPUNCTURE: CPT | Performed by: INTERNAL MEDICINE

## 2018-09-20 ENCOUNTER — OFFICE VISIT (OUTPATIENT)
Dept: SURGERY | Age: 63
End: 2018-09-20

## 2018-09-20 VITALS
SYSTOLIC BLOOD PRESSURE: 150 MMHG | WEIGHT: 211.6 LBS | HEIGHT: 64 IN | HEART RATE: 85 BPM | DIASTOLIC BLOOD PRESSURE: 67 MMHG | RESPIRATION RATE: 16 BRPM | BODY MASS INDEX: 36.12 KG/M2 | TEMPERATURE: 97.4 F

## 2018-09-20 DIAGNOSIS — D05.11 DUCTAL CARCINOMA IN SITU (DCIS) OF RIGHT BREAST: Primary | ICD-10-CM

## 2018-09-20 NOTE — PROGRESS NOTES
Chief Complaint   Patient presents with    Follow-up     follow up breast exam from wider excision of right breast lumpectomy 6.20.17. Last mammo 6.15.18. No complaints. 1. Have you been to the ER, urgent care clinic since your last visit? Hospitalized since your last visit? No    2. Have you seen or consulted any other health care providers outside of the [address] [place] [address] since your last visit? Include any pap smears or colon screening.  No

## 2018-09-20 NOTE — PROGRESS NOTES
Vinayak Garber is a 66year old woman s/p right breast lumpectomy for DCIS that measured almost 5 cm 5/30/17. She did have to have a reexcision because one margin was positive but we did achieve negative margins 6/20/17. The tumor was estrogen and progestin receptor positive. Subsequently she received radiation therapy and is now on tamoxifen which she is tolerating well. Her mammogram in December failed to reveal any suspicious areas. The patient denies any new lumps or soreness in either breast.  She denies any right arm edema. She denies any new other aches, pains, shortness of breath or headaches. She is taking Tamoxifen. She reports hot flashes which are not overly bothersome. She is otherwise doing well and is without complaint.      No Known Allergies  Past Medical History:   Diagnosis Date    Anemia     resolved    Diverticulosis 11/13    DM type 2 (diabetes mellitus, type 2) ([facility] [address]     Hypertension     Internal hemorrhoids 11/13    Menopause     Vitamin D deficiency      Past Surgical History:   Procedure Laterality Date    HX BREAST LUMPECTOMY Right 5/30/2017    right BREAST LUMPECTOMY WITH localization performed by Mazin Aranda MD at [address] HX BREAST LUMPECTOMY Right 6/20/2017    revision right breast to achieve neg margin performed by Mazin Aranda MD at [address] HX COLONOSCOPY  1/13    repeat 1/23 - [place]    HX ORTHOPAEDIC      fluid drained from lt shoulder    HX TUBAL LIGATION       Social History     Social History    Marital status:      Spouse name: N/A    Number of children: N/A    Years of education: N/A     Occupational History    has custody of grandson      Social History Main Topics    Smoking status: Never Smoker    Smokeless tobacco: Never Used    Alcohol use No    Drug use: No    Sexual activity: Not Currently     Partners: Male      Comment:      Other Topics Concern    Not on file     Social History Narrative     The patient's review of systems has not changed. She can then used to be on medication for hypertension and diabetes. She denies any new cardiac, respiratory, gastrointestinal or genitourinary problems. PHYSICAL EXAM    Visit Vitals    /67 (BP 1 Location: Left arm, BP Patient Position: Sitting)    Pulse 85    Temp 97.4 °F (36.3 °C) (Oral)    Resp 16    Ht 5' 4\" (1.626 m)    Wt 96 kg (211 lb 9.6 oz)    BMI 36.32 kg/m2          Constitutional:  Well-developed, well-nourished, no acute distress. Head:  Head, eyes, ears, nose, throat within normal limits. Skin:  No suspicious moles or rashes. Neck:  No masses or adenopathy. The airway appears normal. Thyroid is not enlarged and there are no palpable thyroid nodules. Lungs:  Lungs are clear to auscultation and percussion. No respiratory distress. No chest wall tenderness. Heart:  Heart is regular with no extra heart sounds or murmur heard. Breast Exam: The breasts are free of any discrete tenderness or masses  The skin and nipple areas appear normal. Both axillae are negative. She has a well-healed incision in the upper right breast.  She has some radiation changes with slight discoloration of the skin and thickening. There is some tenderness at and inferior to her previous lumpectomy site. No evidence of recurrent disease. Abdomen: The abdomen is soft and nontender without organomegaly or masses. Bowel sounds are active and of normal pitch. There is no abdominal distention. No hernias are evident. Extremities:  No tenderness of the extremities and no significant swelling. Psych:  Alert and oriented. Pathology  6/20/17   BREAST, RIGHT, 10:00 POSITION, PARTIAL MASTECTOMY:   NO RESIDUAL CARCINOMA. MARGINS OF RESECTION WITH NO SIGNIFICANT HISTOPATHOLOGIC ABNORMALITIES. BIOPSY SITE CHANGES. INTRADUCTAL PAPILLOMATA. PROLIFERATIVE FIBROCYSTIC CHANGES. MICROCALCIFICATIONS ARE IDENTIFIED.      5/30/17   RIGHT BREAST MASS, LUMPECTOMY:   EXTENSIVE IN-SITU MAMMARY CARCINOMA (SEE COMMENT). HISTOLOGIC TYPE: MIXED DUCTAL AND LOBULAR. NUCLEAR rdGrdRrdArdDrdErd:rd rd3rd. NECROSIS: NOT IDENTIFIED. ESTIMATED SIZE: APPROXIMATELY 5 CM (INVOLVING APPROXIMATELY ½ OF   SPECIMEN). MARGINS OF RESECTION: POSITIVE AT LATERAL MARGIN, MULTIFOCALLY CLOSE AT   ANTERIOR MARGIN.   LYMPH NODES: NOT SUBMITTED. ESTROGEN RECEPTOR: POSITIVE   PROGESTERONE RECEPTOR: POSITIVE. OTHER PATHOLOGIC FINDINGS: EXTENSIVE SCLEROSING ADENOSIS, FIBROCYSTIC   CHANGE, INTRADUCTAL PAPILLOMAS, BIOPSY SITE CHANGES. AJCC STAGE: pTis. 17   A: BREAST, RIGHT, CORE NEEDLE BIOPSY:   LOBULAR CARCINOMA IN-SITU (SEE MICROSCOPIC DESCRIPTION). ATYPICAL DUCTAL EPITHELIAL HYPERPLASIA. INTRADUCTAL PAPILLOMAS. B: BREAST, RIGHT, CORE NEEDLE BIOPSY:   LOBULAR CARCINOMA IN-SITU (SEE MICROSCOPIC DESCRIPTION). INTRADUCTAL PAPILLARY CARCINOMA (CARCINOMA IN-SITU). GROSS DESCRIPTION:     Imagin/15/18 bilateral mammogram  Stable post lumpectomy changes right breast. Left breast remains stable as well. Patient informed of the results prior to her departure. A result letter will be sent to the patient. The patient will be notified by the [employer] reminder system for scheduling  of her annual screening mammogram.     BI-RADS Assessment Category 2: Benign, Letter 40    Assessment: #1 DCIS measuring about 5 cm upper outer right breast excised 17. We had to re-excise one area to get negative margins. The tumor was estrogen and progestin receptor positive. After receiving radiation therapy she was started on tamoxifen and is doing well without evidence of recurrent disease. #2 diabetes. #3 hypertension. Recommendations: Continue tamoxifen. She is due for bilateral mammogram 2019. We will order at her next visit. Follow up 6 months. Please call sooner with questions or concerns.

## 2018-09-20 NOTE — MR AVS SNAPSHOT
[address] [facility] 
 
 
 [address] [place] [address] [address] [facility] [id number] 
[phone] Patient: Svetlana Waterman MRN: [id number] SAK:8/9/2109 Visit Information Date & Time Provider Department Dept. Phone Encounter #  
 9/20/2018  2:00 PM Lady Renteria MD [address] [place] [phone] [id number] Follow-up Instructions Return in about 6 months (around 3/20/2019). Follow-up and Disposition History Your Appointments 10/2/2018 10:30 AM  
Follow Up with MD Jocelyn Rutherford 23 ([facility]) Appt Note: 3 mo f/u; r/s'ed from 08/29 at the request of the pt; r/s; pt r/s from 9-18-18  
 [facility] [place]. [address]  
  
   
 [address]  
  
    
 12/12/2018  2:00 PM  
Follow Up with Syeda Ye MD  
[address] [place] [address]) Appt Note: 4 month f-up; r/s to Dr Catina Gudino  
 2055 [facility] [address] 4750 [facility]  
  
   
 [address] [address]  
  
    
 5/29/2019 10:00 AM  
Office Visit with Jory Garcia MD  
[facility] [address] [place] [address]) Appt Note: [address] [place]. [address]  
  
   
 [address] 710 [facility] [address] Upcoming Health Maintenance Date Due  
 FOOT EXAM Q1 9/13/2017 Pneumococcal 19-64 Highest Risk (2 of 3 - PCV13) 3/2/2018 Influenza Age 5 to Adult 8/1/2018 EYE EXAM RETINAL OR DILATED Q1 9/5/2018 PAP AKA CERVICAL CYTOLOGY 5/29/2019* MICROALBUMIN Q1 11/3/2018 HEMOGLOBIN A1C Q6M 2/22/2019 MEDICARE YEARLY EXAM 5/30/2019 LIPID PANEL Q1 8/22/2019 BREAST CANCER SCRN MAMMOGRAM 6/15/2020 DTaP/Tdap/Td series (2 - Td) 10/7/2021 COLONOSCOPY 1/15/2023 *Topic was postponed. The date shown is not the original due date. Allergies as of 9/20/2018  Review Complete On: 9/20/2018 By: Yahaira Bailon MD  
 No Known Allergies Current Immunizations  Reviewed on 5/22/2017 Name Date Influenza Vaccine 10/1/2015, 11/20/2014 Pneumococcal Polysaccharide (PPSV-23) 3/2/2017 Tdap 10/7/2011 12:00 AM  
  
 Not reviewed this visit You Were Diagnosed With   
  
 Codes Comments Ductal carcinoma in situ (DCIS) of right breast    -  Primary ICD-10-CM: D05.11 ICD-9-CM: 233.0 Vitals BP Pulse Temp Resp Height(growth percentile) Weight(growth percentile) 150/67 (BP 1 Location: Left arm, BP Patient Position: Sitting) 85 97.4 °F (36.3 °C) (Oral) 16 5' 4\" (1.626 m) 211 lb 9.6 oz (96 kg) BMI OB Status Smoking Status 36.32 kg/m2 Postmenopausal Never Smoker Vitals History BMI and BSA Data Body Mass Index Body Surface Area  
 36.32 kg/m 2 2.08 m 2 Preferred Pharmacy Pharmacy Name Phone [address] [place] [address], [address] [place] [address] [phone] Your Updated Medication List  
  
   
This list is accurate as of 9/20/18  2:06 PM.  Always use your most recent med list.  
  
  
  
  
 dilTIAZem 360 mg SR capsule Commonly known as:  TAZTIA XT Take 1 Cap by mouth daily. glipiZIDE SR 5 mg CR tablet Commonly known as:  GLUCOTROL XL  
TAKE ONE TABLET BY MOUTH ONCE DAILY losartan-hydroCHLOROthiazide 100-25 mg per tablet Commonly known as:  HYZAAR  
TAKE ONE TABLET BY MOUTH ONCE DAILY  
  
 metFORMIN 1,000 mg tablet Commonly known as:  GLUCOPHAGE  
TAKE ONE TABLET BY MOUTH TWICE DAILY WITH MEALS  
  
 metoprolol tartrate 25 mg tablet Commonly known as:  LOPRESSOR Take 0.5 Tabs by mouth two (2) times a day. * tamoxifen 20 mg tablet Commonly known as:  NOLVADEX Take 1 Tab by mouth daily. * tamoxifen 20 mg tablet Commonly known as:  NOLVADEX Take 1 Tab by mouth daily. VITAMIN D2 50,000 unit capsule Generic drug:  ergocalciferol TAKE ONE CAPSULE BY MOUTH EVERY 14 DAYS * Notice: This list has 2 medication(s) that are the same as other medications prescribed for you. Read the directions carefully, and ask your doctor or other care provider to review them with you. Follow-up Instructions Return in about 6 months (around 3/20/2019). To-Do List   
 10/17/2018 1:00 PM  
  Appointment with [address] [place] [address] RADIATION THERAPY ([phone]) ATTENTION: The Appointment Time in [address] may not reflect your scheduled appointment time as the time is only a place rosenberg. If you have any questions about your appointment time, please call [facility] at [phone]. Introducing [facility] SERVICES! Jason Young introduces [employer] patient portal. Now you can access parts of your medical record, email your doctor's office, and request medication refills online. 1. In your internet browser, go to [email or web address] 2. Click on the First Time User? Click Here link in the Sign In box. You will see the New Member Sign Up page. 3. Enter your [employer] Access Code exactly as it appears below. You will not need to use this code after youve completed the sign-up process. If you do not sign up before the expiration date, you must request a new code. · [employer] Access Code: [email or web address] Expires: 11/4/2018  8:30 AM 
 
4. Enter the last four digits of your Social Security Number (xxxx) and Date of Birth (mm/dd/yyyy) as indicated and click Submit. You will be taken to the next sign-up page. 5. Create a [employer] ID. This will be your [employer] login ID and cannot be changed, so think of one that is secure and easy to remember. 6. Create a [employer] password. You can change your password at any time. 7. Enter your Password Reset Question and Answer. This can be used at a later time if you forget your password. 8. Enter your e-mail address. You will receive e-mail notification when new information is available in [address] 9. Click Sign Up. You can now view and download portions of your medical record. 10. Click the Download Summary menu link to download a portable copy of your medical information. If you have questions, please visit the Frequently Asked Questions section of the valuklik website. Remember, [employer]uklik is NOT to be used for urgent needs. For medical emergencies, dial 911. Now available from your iPhone and Android! Please provide this summary of care documentation to your next provider. Your primary care clinician is listed as BILLY Kemp. If you have any questions after today's visit, please call [phone].

## 2018-09-20 NOTE — LETTER
9/20/2018 1:55 PM 
 
Patient:  Ludivina Sharma YOB: 1955 Date of Visit: 9/20/2018 Claire Ayala MD 
[facility] [address] [address] VIA In Basket Dear Claire Ayala MD, 
 
 
I had the pleasure of seeing Ms. Ludivina Sharma in my office today for follow up of her right breast DCIS. She is overall doing quite well. I am including a copy of my office visit today. If you have questions, please do not hesitate to call me. I look forward to following Ms. Alce Caal along with you and will keep you updated as to her progress. Sincerely, Jasvir Fu MD

## 2018-10-11 ENCOUNTER — OFFICE VISIT (OUTPATIENT)
Dept: FAMILY MEDICINE CLINIC | Age: 63
End: 2018-10-11

## 2018-10-11 VITALS
HEART RATE: 90 BPM | TEMPERATURE: 98.1 F | SYSTOLIC BLOOD PRESSURE: 139 MMHG | HEIGHT: 64 IN | BODY MASS INDEX: 36.19 KG/M2 | DIASTOLIC BLOOD PRESSURE: 67 MMHG | WEIGHT: 212 LBS | RESPIRATION RATE: 16 BRPM

## 2018-10-11 DIAGNOSIS — E11.9 CONTROLLED TYPE 2 DIABETES MELLITUS WITHOUT COMPLICATION, WITHOUT LONG-TERM CURRENT USE OF INSULIN (HCC): ICD-10-CM

## 2018-10-11 DIAGNOSIS — D05.11 DUCTAL CARCINOMA IN SITU (DCIS) OF RIGHT BREAST: ICD-10-CM

## 2018-10-11 DIAGNOSIS — E55.9 VITAMIN D DEFICIENCY: Primary | ICD-10-CM

## 2018-10-11 DIAGNOSIS — I10 ESSENTIAL HYPERTENSION: ICD-10-CM

## 2018-10-11 RX ORDER — ERGOCALCIFEROL 1.25 MG/1
CAPSULE ORAL
Qty: 12 CAP | Refills: 1 | Status: SHIPPED | OUTPATIENT
Start: 2018-10-11 | End: 2019-12-23

## 2018-10-11 NOTE — MR AVS SNAPSHOT
Cam[facility] [place] [address] [facility] [address] [place] [address] [address] 
[phone] Patient: Vinay Eli MRN: [id number] Visit Information Date & Time Provider Department Dept. Phone Encounter #  
 10/11/2018  3:15 PM Johnnie Stevens, [facility] [phone] [id number] Follow-up Instructions Return in about 4 months (around 2019) for 30 minute slot. Your Appointments 10/11/2018  3:15 PM  
Follow Up with MD Jocelyn Corbin 23 ([address]) Appt Note: 3 mo f/u; r/s'ed from  at the request of the pt; r/s; pt r/s from 18; r/s  
 [facility] [place] [address]  
  
   
 [address] 710 [facility] 951  
  
    
 2018  2:00 PM  
Follow Up with Elenora Leyden, MD  
[address] [place] [address] 3651 [facility]) Appt Note: 4 month f-up; r/s to Dr Iqbal Poster  
  [facility] [address] 4750 [facility]  
  
   
 [id number] [place] [address]  
  
    
 2019 10:00 AM  
Office Visit with MD Jocelyn Corbin 23 [address] [place] [address]) Appt Note: 295 [facility] 68 [facility] [place] [address]  
  
   
 [address] 710 [facility] 951 Upcoming Health Maintenance Date Due Shingrix Vaccine Age 50> (1 of 2) 2007 FOOT EXAM Q1 2017 Pneumococcal 19-64 Highest Risk (2 of 3 - PCV13) 3/2/2018 Influenza Age 5 to Adult 2018 EYE EXAM RETINAL OR DILATED Q1 2018 MICROALBUMIN Q1 11/3/2018 PAP AKA CERVICAL CYTOLOGY 2019* HEMOGLOBIN A1C Q6M 2019 MEDICARE YEARLY EXAM 2019 LIPID PANEL Q1 2019 BREAST CANCER SCRN MAMMOGRAM 6/15/2020 DTaP/Tdap/Td series (2 - Td) 10/7/2021 COLONOSCOPY 1/15/2023 *Topic was postponed. The date shown is not the original due date. Allergies as of 10/11/2018  Review Complete On: 10/11/2018 By: Fercho Cerda MD  
 No Known Allergies Current Immunizations  Reviewed on 10/11/2018 Name Date Influenza Vaccine 10/1/2015, 11/20/2014 Influenza Vaccine [facility] Grooms) 9/27/2018 Pneumococcal Polysaccharide (PPSV-23) 3/2/2017 Tdap 10/7/2011 12:00 AM  
  
 Reviewed by Bird Langley on 10/11/2018 at  2:10 PM  
 Reviewed by Fercho Cerda MD on 10/11/2018 at  2:42 PM  
You Were Diagnosed With   
  
 Codes Comments Vitamin D deficiency    -  Primary ICD-10-CM: E55.9 ICD-9-CM: 268.9 Controlled type 2 diabetes mellitus without complication, without long-term current use of insulin ([facility] 75.)     ICD-10-CM: E11.9 ICD-9-CM: 250.00 Vitals BP Pulse Temp Resp Height(growth percentile) Weight(growth percentile) 139/67 90 98.1 °F (36.7 °C) (Oral) 16 5' 4\" (1.626 m) 212 lb (96.2 kg) BMI OB Status Smoking Status 36.39 kg/m2 Postmenopausal Never Smoker Vitals History BMI and BSA Data Body Mass Index Body Surface Area  
 36.39 kg/m 2 2.08 m 2 Preferred Pharmacy Pharmacy Name Phone [address] [place] [address], [address] [place] [address] [phone] Your Updated Medication List  
  
   
This list is accurate as of 10/11/18  2:46 PM.  Always use your most recent med list.  
  
  
  
  
 dilTIAZem 360 mg SR capsule Commonly known as:  TAZTIA XT Take 1 Cap by mouth daily. ergocalciferol 50,000 unit capsule Commonly known as:  VITAMIN D2  
TAKE ONE CAPSULE BY MOUTH EVERY 14 DAYS  
  
 glipiZIDE SR 5 mg CR tablet Commonly known as:  GLUCOTROL XL  
TAKE ONE TABLET BY MOUTH ONCE DAILY losartan-hydroCHLOROthiazide 100-25 mg per tablet Commonly known as:  HYZAAR  
TAKE ONE TABLET BY MOUTH ONCE DAILY  
  
 metFORMIN 1,000 mg tablet Commonly known as:  GLUCOPHAGE  
TAKE ONE TABLET BY MOUTH TWICE DAILY WITH MEALS  
  
 metoprolol tartrate 25 mg tablet Commonly known as:  LOPRESSOR Take 0.5 Tabs by mouth two (2) times a day. * tamoxifen 20 mg tablet Commonly known as:  NOLVADEX Take 1 Tab by mouth daily. * tamoxifen 20 mg tablet Commonly known as:  NOLVADEX Take 1 Tab by mouth daily. * varicella-zoster recombinant (PF) 50 mcg/0.5 mL Susr injection Commonly known as:  SHINGRIX (PF)  
0.5 mL by IntraMUSCular route once for 1 dose. To be done 2-6 months after initial vaccination. * varicella-zoster recombinant (PF) 50 mcg/0.5 mL Susr injection Commonly known as:  SHINGRIX (PF)  
0.5 mL by IntraMUSCular route once for 1 dose. * Notice: This list has 4 medication(s) that are the same as other medications prescribed for you. Read the directions carefully, and ask your doctor or other care provider to review them with you. Prescriptions Printed Refills  
 varicella-zoster recombinant, PF, (SHINGRIX, PF,) 50 mcg/0.5 mL susr injection 0 Si.5 mL by IntraMUSCular route once for 1 dose. To be done 2-6 months after initial vaccination. Class: Print Route: IntraMUSCular  
 varicella-zoster recombinant, PF, (SHINGRIX, PF,) 50 mcg/0.5 mL susr injection 0 Si.5 mL by IntraMUSCular route once for 1 dose. Class: Print Route: IntraMUSCular Prescriptions Sent to Pharmacy Refills  
 ergocalciferol (VITAMIN D2) 50,000 unit capsule 1 Sig: TAKE ONE CAPSULE BY MOUTH EVERY 14 DAYS Class: Normal  
 Pharmacy: [address] [place] [address], [address] [place] [address] #: [phone] Follow-up Instructions Return in about 4 months (around 2019) for 30 minute slot. To-Do List   
 10/11/2018 Lab:  HEMOGLOBIN A1C WITH EAG   
  
 10/11/2018 Lab:  VITAMIN D, 25 HYDROXY   
  
 10/17/2018 1:00 PM  
  Appointment with [address] [place] [address] RADIATION THERAPY ([phone]) ATTENTION: The Appointment Time in [address] may not reflect your scheduled appointment time as the time is only a place rosenberg. If you have any questions about your appointment time, please call [facility] at [phone]. Patient Instructions Nutrition Tips for Diabetes: After Your Visit Your Care Instructions A healthy diet is important to manage diabetes. It helps you lose weight (if you need to) and keep it off. It gives you the nutrition and energy your body needs and helps prevent heart disease. But a diet for diabetes does not mean that you have to eat special foods. You can eat what your family eats, including occasional sweets and other favorites. But you do have to pay attention to how often you eat and how much you eat of certain foods. The right plan for you will give you meals that help you keep your blood sugar at healthy levels. Try to eat a variety of foods and to spread carbohydrate throughout the day. Carbohydrate raises blood sugar higher and more quickly than any other nutrient does. Carbohydrate is found in sugar, breads and cereals, fruit, starchy vegetables such as potatoes and corn, and milk and yogurt. You may want to work with a dietitian or diabetes educator to help you plan meals and snacks. A dietitian or diabetes educator also can help you lose weight if that is one of your goals. The following tips can help you enjoy your meals and stay healthy. Follow-up care is a key part of your treatment and safety. Be sure to make and go to all appointments, and call your doctor if you are having problems. Its also a good idea to know your test results and keep a list of the medicines you take. How can you care for yourself at home? · Learn which foods have carbohydrate and how much carbohydrate to eat. A dietitian or diabetes educator can help you learn to keep track of how much carbohydrate you eat. · Spread carbohydrate throughout the day. Eat some carbohydrate at all meals, but do not eat too much at any one time. · Plan meals to include food from all the food groups. These are the food groups and some example portion sizes: ¨ Grains: 1 slice of bread (1 ounce), ½ cup of cooked cereal, and 1/3 cup of cooked pasta or rice. These have about 15 grams of carbohydrate in a serving. Choose whole grains such as whole wheat bread or crackers, oatmeal, and brown rice more often than refined grains. ¨ Fruit: 1 small fresh fruit, such as an apple or orange; ½ of a banana; ½ cup of chopped, cooked, or canned fruit; ½ cup of fruit juice; 1 cup of melon or raspberries; and 2 tablespoons of dried fruit. These have about 15 grams of carbohydrate in a serving. ¨ Dairy: 1 cup of nonfat or low-fat milk and 2/3 cup of plain yogurt. These have about 15 grams of carbohydrate in a serving. ¨ Protein foods: Beef, chicken, turkey, fish, eggs, tofu, cheese, cottage cheese, and peanut butter. A serving size of meat is 3 ounces, which is about the size of a deck of cards. Examples of meat substitute serving sizes (equal to 1 ounce of meat) are 1/4 cup of cottage cheese, 1 egg, 1 tablespoon of peanut butter, and ½ cup of tofu. These have very little or no carbohydrate per serving. ¨ Vegetables: Starchy vegetables such as ½ cup of cooked dried beans, peas, potatoes, or corn have about 15 grams of carbohydrate. Nonstarchy vegetables have very little carbohydrate, such as 1 cup of raw leafy vegetables (such as spinach), ½ cup of other vegetables (cooked or chopped), and 3/4 cup of vegetable juice. · Use the plate format to plan meals. It is a good, quick way to make sure that you have a balanced meal. It also helps you spread carbohydrate throughout the day. You divide your plate by types of foods.  Put vegetables on half the plate, meat or meat substitutes on one-quarter of the plate, and a grain or starchy vegetable (such as brown rice or a potato) in the final quarter of the plate. To this you can add a small piece of fruit and 1 cup of milk or yogurt, depending on how much carbohydrate you are supposed to eat at a meal. 
· Talk to your dietitian or diabetes educator about ways to add limited amounts of sweets into your meal plan. You can eat these foods now and then, as long as you include the amount of carbohydrate they have in your daily carbohydrate allowance. · If you drink alcohol, limit it to no more than 1 drink a day for women and 2 drinks a day for men. If you are pregnant, no amount of alcohol is known to be safe. · Protein, fat, and fiber do not raise blood sugar as much as carbohydrate does. If you eat a lot of these nutrients in a meal, your blood sugar will rise more slowly than it would otherwise. · Limit saturated fats, such as those from meat and dairy products. Try to replace it with monounsaturated fat, such as olive oil. This is a healthier choice because people who have diabetes are at higher-than-average risk of heart disease. But use a modest amount of olive oil. A tablespoon of olive oil has 14 grams of fat and 120 calories. · Exercise lowers blood sugar. If you take insulin by shots or pump, you can use less than you would if you were not exercising. Keep in mind that timing matters. If you exercise within 1 hour after a meal, your body may need less insulin for that meal than it would if you exercised 3 hours after the meal. Test your blood sugar to find out how exercise affects your need for insulin. · Exercise on most days of the week. Aim for at least 30 minutes. Exercise helps you stay at a healthy weight and helps your body use insulin. Walking is an easy way to get exercise. Gradually increase the amount you walk every day. You also may want to swim, bike, or do other activities. When you eat out · Learn to estimate the serving sizes of foods that have carbohydrate. If you measure food at home, it will be easier to estimate the amount in a serving of restaurant food. · If the meal you order has too much carbohydrate (such as potatoes, corn, or baked beans), ask to have a low-carbohydrate food instead. Ask for a salad or green vegetables. · If you use insulin, check your blood sugar before and after eating out to help you plan how much to eat in the future. · If you eat more carbohydrate at a meal than you had planned, take a walk or do other exercise. This will help lower your blood sugar. Where can you learn more? Go to [employer].be Enter S176 in the search box to learn more about \"Nutrition Tips for Diabetes: After Your Visit. \"  
© 3548-3883 Healthwise, Incorporated. Care instructions adapted under license by [facility] (which disclaims liability or warranty for this information). This care instruction is for use with your licensed healthcare professional. If you have questions about a medical condition or this instruction, always ask your healthcare professional. [address] any warranty or liability for your use of this information. Content Version: 20.8.959036; Current as of: June 4, 2014 Introducing [facility] SERVICES! [facility] introduces [employer]Made[employer] patient portal. Now you can access parts of your medical record, email your doctor's office, and request medication refills online. 1. In your internet browser, go to [email or web address] 2. Click on the First Time User? Click Here link in the Sign In box. You will see the New Member Sign Up page. 3. Enter your SocialMadeSimple Access Code exactly as it appears below. You will not need to use this code after youve completed the sign-up process. If you do not sign up before the expiration date, you must request a new code.  
 
· [employer]Made[employer] Access Code: [email or web address] 
 Expires: 11/4/2018  8:30 AM 
 
4. Enter the last four digits of your Social Security Number (xxxx) and Date of Birth (mm/dd/yyyy) as indicated and click Submit. You will be taken to the next sign-up page. 5. Create a Wave[employer] ID. This will be your [employer] login ID and cannot be changed, so think of one that is secure and easy to remember. 6. Create a [employer] password. You can change your password at any time. 7. Enter your Password Reset Question and Answer. This can be used at a later time if you forget your password. 8. Enter your e-mail address. You will receive e-mail notification when new information is available in [address] 9. Click Sign Up. You can now view and download portions of your medical record. 10. Click the Download Summary menu link to download a portable copy of your medical information. If you have questions, please visit the Frequently Asked Questions section of the [employer] website. Remember, [employer] is NOT to be used for urgent needs. For medical emergencies, dial 911. Now available from your iPhone and Android! Please provide this summary of care documentation to your next provider. Your primary care clinician is listed as BILLY Kemp. If you have any questions after today's visit, please call [phone].

## 2018-10-11 NOTE — PROGRESS NOTES
1. Have you been to the ER, urgent care clinic since your last visit? Hospitalized since your last visit? No.     2. Have you seen or consulted any other health care providers outside of the [address] [place] [address] since your last visit? Include any pap smears or colon screening. Yes, Dr. Vieyra Course on 9/20/2018.      Chief Complaint   Patient presents with    Follow Up Chronic Condition    Diabetes    Hypertension    Other     DCIS right breast

## 2018-10-11 NOTE — PATIENT INSTRUCTIONS

## 2018-10-11 NOTE — PROGRESS NOTES
Autumn Lowe is a 66 y.o. female and presents with Follow Up Chronic Condition; Diabetes; Hypertension; and Other (DCIS right breast)       Subjective:    Diabetes type 2- last A1c 6.7% 8/22/18.  bs at home \"fine\"- 130 this am.   No hypoglycemia- pt is aware of sx to monitor for.   htn- on ARB- controlled today. No cp or sob. DCIS- s/p resection- following with general surgery and oncology- finished XRT as well. taking tamoxifen. Obesity- minimal wt loss since last visit   htn- taking meds. No cp or sob.        Assessment/Plan:    Diabetes type 2- controlled-reviewed in detail with pt. Diet and exercise and Wt loss encouraged again- following labs. Obesity- encouraged caloric restriction. Exercise. Goal of 1 lb/week wt loss discussed again. Saw dietician   DCIS- continue f/u with onc and XRT. Pt aware of tamoxifen duration being 5 years. htn- controlled-  pt would like to try diet/exercise at this point.    hm- shingrix shot discussed and rx given.       RTC in 4 months -- will do POC A1c then. Diagnoses and all orders for this visit:    1. Vitamin D deficiency  -     VITAMIN D, 25 HYDROXY; Future    2. Controlled type 2 diabetes mellitus without complication, without long-term current use of insulin (HCC)  -     HEMOGLOBIN A1C WITH EAG; Future    3. Ductal carcinoma in situ (DCIS) of right breast    4. Essential hypertension    Other orders  -     ergocalciferol (VITAMIN D2) 50,000 unit capsule; TAKE ONE CAPSULE BY MOUTH EVERY 14 DAYS  -     varicella-zoster recombinant, PF, (SHINGRIX, PF,) 50 mcg/0.5 mL susr injection; 0.5 mL by IntraMUSCular route once for 1 dose. To be done 2-6 months after initial vaccination.  -     varicella-zoster recombinant, PF, (SHINGRIX, PF,) 50 mcg/0.5 mL susr injection; 0.5 mL by IntraMUSCular route once for 1 dose.           Orders Placed This Encounter    VITAMIN D, 25 HYDROXY     Standing Status:   Future     Standing Expiration Date:   10/11/2019    HEMOGLOBIN A1C WITH EAG     Standing Status:   Future     Standing Expiration Date:   10/12/2019    ergocalciferol (VITAMIN D2) 50,000 unit capsule     Sig: TAKE ONE CAPSULE BY MOUTH EVERY 14 DAYS     Dispense:  12 Cap     Refill:  1     Please consider 90 day supplies to promote better adherence    varicella-zoster recombinant, PF, (SHINGRIX, PF,) 50 mcg/0.5 mL susr injection     Si.5 mL by IntraMUSCular route once for 1 dose. To be done 2-6 months after initial vaccination. Dispense:  0.5 mL     Refill:  0    varicella-zoster recombinant, PF, (SHINGRIX, PF,) 50 mcg/0.5 mL susr injection     Si.5 mL by IntraMUSCular route once for 1 dose. Dispense:  0.5 mL     Refill:  0           ROS:  Negative except as mentioned above  Cardiac- no chest pain or palpitations  Pulmonary- no sob or wheezes  GI- no n/v or diarrhea. SH:  Social History   Substance Use Topics    Smoking status: Never Smoker    Smokeless tobacco: Never Used    Alcohol use No         Medications/Allergies:  Current Outpatient Prescriptions on File Prior to Visit   Medication Sig Dispense Refill    VITAMIN D2 50,000 unit capsule TAKE ONE CAPSULE BY MOUTH EVERY 14 DAYS 4 Cap 0    tamoxifen (NOLVADEX) 20 mg tablet Take 1 Tab by mouth daily. 95 Tab 4    metFORMIN (GLUCOPHAGE) 1,000 mg tablet TAKE ONE TABLET BY MOUTH TWICE DAILY WITH MEALS 180 Tab 1    dilTIAZem (TAZTIA XT) 360 mg SR capsule Take 1 Cap by mouth daily. 90 Cap 1    glipiZIDE SR (GLUCOTROL XL) 5 mg CR tablet TAKE ONE TABLET BY MOUTH ONCE DAILY 90 Tab 1    losartan-hydroCHLOROthiazide (HYZAAR) 100-25 mg per tablet TAKE ONE TABLET BY MOUTH ONCE DAILY 90 Tab 1    tamoxifen (NOLVADEX) 20 mg tablet Take 1 Tab by mouth daily. 90 Tab 3    metoprolol tartrate (LOPRESSOR) 25 mg tablet Take 0.5 Tabs by mouth two (2) times a day. 60 Tab 0     No current facility-administered medications on file prior to visit.            No Known Allergies    Objective:  Visit Vitals    /67    Pulse 90    Temp 98.1 °F (36.7 °C) (Oral)    Resp 16    Ht 5' 4\" (1.626 m)    Wt 212 lb (96.2 kg)    BMI 36.39 kg/m2    Body mass index is 36.39 kg/(m^2). Constitutional: Well developed, nourished, no distress, alert   CV: S1, S2.  RRR. No murmurs/rubs. No thrills palpated. No carotid bruits. Intact distal pulses. No edema. Pulm: No abnormalities on inspection. Clear to auscultation bilaterally. No wheezing/rhonchi. Normal effort. GI: Soft, nontender, nondistended. Normal active bowel sounds. No  masses on palpation. No hepatosplenomegaly.        Diabetic foot exam:     Left Foot:   Visual Exam: normal    Pulse DP: 2+ (normal)   Filament test: normal sensation         Right Foot:   Visual Exam: normal    Pulse DP: 2+ (normal)   Filament test: normal sensation

## 2018-10-15 RX ORDER — DILTIAZEM HYDROCHLORIDE 360 MG/1
CAPSULE, EXTENDED RELEASE ORAL
Qty: 90 CAP | Refills: 0 | Status: SHIPPED | OUTPATIENT
Start: 2018-10-15 | End: 2018-10-19 | Stop reason: SDUPTHER

## 2018-10-17 ENCOUNTER — HOSPITAL ENCOUNTER (OUTPATIENT)
Dept: RADIATION THERAPY | Age: 63
Discharge: HOME | End: 2018-10-17
Payer: MEDICARE

## 2018-10-17 PROCEDURE — 99211 OFF/OP EST MAY X REQ PHY/QHP: CPT

## 2018-12-04 RX ORDER — METFORMIN HYDROCHLORIDE 1000 MG/1
TABLET ORAL
Qty: 180 TAB | Refills: 1 | Status: SHIPPED | OUTPATIENT
Start: 2018-12-04 | End: 2019-05-28 | Stop reason: SDUPTHER

## 2018-12-11 ENCOUNTER — TELEPHONE (OUTPATIENT)
Dept: ONCOLOGY | Age: 63
End: 2018-12-11

## 2018-12-11 NOTE — TELEPHONE ENCOUNTER
Left message on voicemail reminding patient of appointment on 12/12/18 @ 2pm, she was advised she would be having labs done and to arrive @ 1:30 pm

## 2019-01-02 ENCOUNTER — TELEPHONE (OUTPATIENT)
Dept: ONCOLOGY | Age: 64
End: 2019-01-02

## 2019-01-03 RX ORDER — GLIPIZIDE 5 MG/1
TABLET, FILM COATED, EXTENDED RELEASE ORAL
Qty: 90 TAB | Refills: 1 | Status: SHIPPED | OUTPATIENT
Start: 2019-01-03 | End: 2019-03-11 | Stop reason: SDUPTHER

## 2019-01-22 ENCOUNTER — TELEPHONE (OUTPATIENT)
Dept: FAMILY MEDICINE CLINIC | Age: 64
End: 2019-01-22

## 2019-01-22 NOTE — TELEPHONE ENCOUNTER
Gustavo Whitmore called and stated that Hyzarr 100/25 MG is not available. Per pharmacy you could send separate prescriptions for medications. Patient is at pharmacy for refill.

## 2019-02-06 RX ORDER — LOSARTAN POTASSIUM 100 MG/1
100 TABLET ORAL DAILY
Qty: 90 TAB | Refills: 1 | Status: SHIPPED | OUTPATIENT
Start: 2019-02-06 | End: 2019-08-21 | Stop reason: SDUPTHER

## 2019-02-06 RX ORDER — HYDROCHLOROTHIAZIDE 25 MG/1
25 TABLET ORAL DAILY
Qty: 90 TAB | Refills: 1 | Status: SHIPPED | OUTPATIENT
Start: 2019-02-06 | End: 2019-08-21 | Stop reason: SDUPTHER

## 2019-03-11 RX ORDER — GLIPIZIDE 5 MG/1
TABLET, FILM COATED, EXTENDED RELEASE ORAL
Qty: 90 TAB | Refills: 1 | Status: SHIPPED | OUTPATIENT
Start: 2019-03-11 | End: 2020-01-07

## 2019-03-21 ENCOUNTER — OFFICE VISIT (OUTPATIENT)
Dept: ONCOLOGY | Age: 64
End: 2019-03-21

## 2019-03-21 VITALS
SYSTOLIC BLOOD PRESSURE: 138 MMHG | HEART RATE: 91 BPM | DIASTOLIC BLOOD PRESSURE: 73 MMHG | TEMPERATURE: 98.5 F | BODY MASS INDEX: 36.25 KG/M2 | RESPIRATION RATE: 18 BRPM | OXYGEN SATURATION: 99 % | WEIGHT: 211.2 LBS

## 2019-03-21 DIAGNOSIS — D05.11 DUCTAL CARCINOMA IN SITU (DCIS) OF RIGHT BREAST: Primary | ICD-10-CM

## 2019-03-21 DIAGNOSIS — D75.839 THROMBOCYTOSIS: ICD-10-CM

## 2019-03-21 DIAGNOSIS — D64.9 ANEMIA, UNSPECIFIED TYPE: ICD-10-CM

## 2019-03-21 DIAGNOSIS — E55.9 VITAMIN D DEFICIENCY: ICD-10-CM

## 2019-03-21 RX ORDER — TAMOXIFEN CITRATE 10 MG/1
5 TABLET, FILM COATED ORAL DAILY
Qty: 90 TAB | Refills: 4 | Status: SHIPPED | OUTPATIENT
Start: 2019-03-21 | End: 2020-02-07 | Stop reason: SDUPTHER

## 2019-03-21 NOTE — PROGRESS NOTES
Stacy Martin is a 66 y.o. 1955 female DCIS+LCIS of right breast s/p Lumpectomy by Dr. Sushila Henriquez in 06/2017 followed by adjuvant radiation completed 08/2017, started Tamoxifen since 09/2017 is now here for  follow up visit. Mammogram done on Staci 15, 2018 was benign, BI-RADS 2. INTERIM HISTORY:    Past Medical History:   Diagnosis Date    Anemia     resolved    Diverticulosis 11/13    DM type 2 (diabetes mellitus, type 2) ([address]     Hypertension     Internal hemorrhoids 11/13    Menopause     Vitamin D deficiency      Past Surgical History:   Procedure Laterality Date    HX BREAST LUMPECTOMY Right 5/30/2017    right BREAST LUMPECTOMY WITH localization performed by Eliezer Jolly MD at [address] HX BREAST LUMPECTOMY Right 6/20/2017    revision right breast to achieve neg margin performed by Eliezer Jolly MD at 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 90 Tab 1    hydroCHLOROthiazide (HYDRODIURIL) 25 mg tablet Take 1 Tab by mouth daily.  90 Tab 1    amLODIPine-benazepril (LOTREL) 10-40 mg per capsule Take 1 Cap by Mouth Once a Day.  atenolol (TENORMIN) 50 mg tablet 50 mg.      guaiFENesin-codeine (ROBITUSSIN AC) 100-10 mg/5 mL solution 5 mL.  diclofenac EC (VOLTAREN) 75 mg EC tablet 75 mg.      FLUZONE QUAD 8488-5853, PF, syrg injection TO BE ADMINISTERED BY PHARMACIST FOR IMMUNIZATION  0    potassium chloride (KLOR-CON) 10 mEq tablet 10 mEq.  metFORMIN (GLUCOPHAGE) 1,000 mg tablet TAKE 1 TABLET BY MOUTH TWICE DAILY WITH MEALS 180 Tab 1    dilTIAZem (TIAZAC) 360 mg ER capsule TAKE 1 CAPSULE BY MOUTH ONCE DAILY 90 Cap 1    losartan-hydroCHLOROthiazide (HYZAAR) 100-25 mg per tablet TAKE ONE TABLET BY MOUTH ONCE DAILY 90 Tab 1    ergocalciferol (VITAMIN D2) 50,000 unit capsule TAKE ONE CAPSULE BY MOUTH EVERY 14 DAYS 12 Cap 1    tamoxifen (NOLVADEX) 20 mg tablet Take 1 Tab by mouth daily. 95 Tab 4    dilTIAZem (TAZTIA XT) 360 mg SR capsule Take 1 Cap by mouth daily. 90 Cap 1    tamoxifen (NOLVADEX) 20 mg tablet Take 1 Tab by mouth daily. 90 Tab 3    metoprolol tartrate (LOPRESSOR) 25 mg tablet Take 0.5 Tabs by mouth two (2) times a day. 60 Tab 0       No Known Allergies    Review of Systems    A comprehensive review of systems was negative except for: 1/10 headache today. Has hotflashes.      Objective:  Visit Vitals  /73 (BP 1 Location: Left arm, BP Patient Position: Sitting)   Pulse 91   Temp 98.5 °F (36.9 °C) (Oral)   Resp 18   Wt 95.8 kg (211 lb 3.2 oz)   SpO2 99%   BMI 36.25 kg/m²       Physical Exam:   General appearance - alert, well appearing, and in no distress  Mental status - alert, oriented to person, place, and time  EYE-ADILENE, EOMI  ENT-ENT exam normal, no neck nodes or sinus tenderness  Mouth - mucous membranes moist, pharynx normal without lesions  Neck - supple, no significant adenopathy   Chest - clear to auscultation, no wheezes, rales or rhonchi, symmetric air entry   Heart - normal rate and regular rhythm, +MOY 3/6 best heart in left 2nd ICS  Abdomen - soft, nontender, nondistended, no masses or organomegaly  Lymph- no adenopathy palpable  Ext-no pedal edema noted  Skin-Warm and dry. Neuro -alert, oriented, normal speech, no focal findings or movement disorder noted  Breast - Well healed right breast surgical scar. No mass felt in both breast      No results found for this or any previous visit. Lab Results  Lab Results   Component Value Date/Time    WBC 9.2 08/22/2018 08:21 AM    HGB 12.0 08/22/2018 08:21 AM    HCT 37.3 08/22/2018 08:21 AM    PLATELET 588 94/04/7040 08:21 AM    MCV 85.2 08/22/2018 08:21 AM       Lab Results   Component Value Date/Time    Sodium 143 08/22/2018 08:21 AM    Potassium 4.1 08/22/2018 08:21 AM    Chloride 107 08/22/2018 08:21 AM    CO2 27 08/22/2018 08:21 AM    Anion gap 9 08/22/2018 08:21 AM    Glucose 131 (H) 08/22/2018 08:21 AM    BUN 11 08/22/2018 08:21 AM    Creatinine 0.63 08/22/2018 08:21 AM    BUN/Creatinine ratio 17 08/22/2018 08:21 AM    GFR est AA >60 08/22/2018 08:21 AM    GFR est non-AA >60 08/22/2018 08:21 AM    Calcium 8.8 08/22/2018 08:21 AM    AST (SGOT) 19 08/22/2018 08:21 AM    Alk. phosphatase 61 08/22/2018 08:21 AM    Protein, total 7.2 08/22/2018 08:21 AM    Albumin 3.9 08/22/2018 08:21 AM    Globulin 3.3 08/22/2018 08:21 AM    A-G Ratio 1.2 08/22/2018 08:21 AM    ALT (SGPT) 18 08/22/2018 08:21 AM     F/U with PCP for health maintenance    Assessment/Plan:  1. Ductal carcinoma in situ (DCIS) of right breast  Has been on tamoxifen since September 2017. Tolerating well. No vaginal bleeding. Has mild hotflashes. Discuss about GUILLEN-01 trial results. Had a long discussion with patient today regarding her history of DCIS and atypical ductal hyperplasia.   I told her that this past December 2018, during the sign and continue annual breast cancer symposium ([facility]), the GUILLEN-01 trial showed that low-dose tamoxifen significantly reduce the risk of a new endocrine disease following surgery and woman diagnosed with breast intraepithelial neoplasia but it did not cause more serious adverse events compared with placebo. GUILLEN-01 trial, a total of 500 participants were enrolled between 2008 and 2015 from 14 centers in [facility] and randomized to receive ether tamoxifen 5 mg per day or placebo for 3 years and then at least 2 years of follow-up. Eligible patients were women younger than 76years of age who had IEN (Intraepithelial neoplasm). At a median follow-up of 5.1 years, 42 primary events had occurred. There was a 52% reduction in the cumulative risk of developing a recurrence in the low-dose tamoxifen arm compared with placebo (hazard ratio [HR] = 0.48; 95% confidence interval [CI], 0.26-0.92; P = .024). Likewise, there was a 75% reduction in the risk of contralateral breast cancer with low-dose tamoxifen (HR = 0.24; 95% CI, 0.07 - 0.87; P= .018). Vaginal dryness and musculoskeletal pain in the low-dose tamoxifen arm was not statistically significantly different from the placebo arm. Daily hot flash frequency was greater in the low-dose tamoxifen arm as compared with the placebo arm (P = .05). In the low-dose tamoxifen arm, 1 case of stage 1 endometrial cancer and 1 case of deep vein thrombosis (DVT) occurred. In the placebo arm, 1 case of pulmonary embolism occurred. Patient agrees to be switched to low dose Tamoxifen 5 mg po daily or 10 mg every other day to complete 3 years on September 2020, then follow-up for 2 more years. 2. Thrombocytosis ([address]  Resolved as per CBC of 8/22/18. Repeat CBC with differential today. 3. Vitamin D deficiency  Continue replacement. 4. Anemia, unspecified type  Resolved as per CBC of 8/22/18. H&H was 12.0/37.3 with MCV of 85. Repeat CBC with differential today. RTC 6months with CBC, CMP.              Oleg Carvajal MD

## 2019-03-21 NOTE — PROGRESS NOTES
Mona Rangel is a 66 y.o. female presenting today for a follow-up appointment. Patient is ambulatory with no assistive devices and reports pain of 1/10 in head. Chief Complaint   Patient presents with    Breast Mass     Right breast DCIS       Visit Vitals  /73 (BP 1 Location: Left arm, BP Patient Position: Sitting)   Pulse 91   Temp 98.5 °F (36.9 °C) (Oral)   Resp 18   Wt 211 lb 3.2 oz (95.8 kg)   SpO2 99%   BMI 36.25 kg/m²       Current Outpatient Medications   Medication Sig    tamoxifen (NOLVADEX) 10 mg tablet Take 0.5 Tabs by mouth daily.  glipiZIDE SR (GLUCOTROL XL) 5 mg CR tablet 1 tablet daily    losartan (COZAAR) 100 mg tablet Take 1 Tab by mouth daily.  hydroCHLOROthiazide (HYDRODIURIL) 25 mg tablet Take 1 Tab by mouth daily.  guaiFENesin-codeine (ROBITUSSIN AC) 100-10 mg/5 mL solution 5 mL.  FLUZONE QUAD 0264-9677, PF, syrg injection TO BE ADMINISTERED BY PHARMACIST FOR IMMUNIZATION    metFORMIN (GLUCOPHAGE) 1,000 mg tablet TAKE 1 TABLET BY MOUTH TWICE DAILY WITH MEALS    dilTIAZem (TIAZAC) 360 mg ER capsule TAKE 1 CAPSULE BY MOUTH ONCE DAILY    ergocalciferol (VITAMIN D2) 50,000 unit capsule TAKE ONE CAPSULE BY MOUTH EVERY 14 DAYS    dilTIAZem (TAZTIA XT) 360 mg SR capsule Take 1 Cap by mouth daily.  metoprolol tartrate (LOPRESSOR) 25 mg tablet Take 0.5 Tabs by mouth two (2) times a day.  amLODIPine-benazepril (LOTREL) 10-40 mg per capsule Take 1 Cap by Mouth Once a Day.  atenolol (TENORMIN) 50 mg tablet 50 mg.    diclofenac EC (VOLTAREN) 75 mg EC tablet 75 mg.  potassium chloride (KLOR-CON) 10 mEq tablet 10 mEq.  losartan-hydroCHLOROthiazide (HYZAAR) 100-25 mg per tablet TAKE ONE TABLET BY MOUTH ONCE DAILY     No current facility-administered medications for this visit. Medications no longer taking/discontinued: potassium    Is unsure if taking atenolol and Lotrel    Fall Risk Assessment, last 12 mths 8/7/2018   Able to walk? Yes   Fall in past 12 months? No       3 most recent PHQ Screens 10/11/2018   Little interest or pleasure in doing things Not at all   Feeling down, depressed, irritable, or hopeless Not at all   Total Score PHQ 2 0       Abuse Screening Questionnaire 8/7/2018   Do you ever feel afraid of your partner? N   Are you in a relationship with someone who physically or mentally threatens you? N   Is it safe for you to go home?  Y       Learning Assessment 7/13/2017   PRIMARY LEARNER Patient   HIGHEST LEVEL OF EDUCATION - PRIMARY LEARNER  DID NOT GRADUATE HIGH SCHOOL   BARRIERS PRIMARY LEARNER NONE   CO-LEARNER CAREGIVER No   PRIMARY LANGUAGE ENGLISH    NEED No   LEARNER PREFERENCE PRIMARY LISTENING     DEMONSTRATION   LEARNING SPECIAL TOPICS no   ANSWERED BY self   RELATIONSHIP SELF

## 2019-03-27 ENCOUNTER — OFFICE VISIT (OUTPATIENT)
Dept: SURGERY | Age: 64
End: 2019-03-27

## 2019-03-27 VITALS
SYSTOLIC BLOOD PRESSURE: 142 MMHG | TEMPERATURE: 97.7 F | HEART RATE: 96 BPM | BODY MASS INDEX: 36.02 KG/M2 | WEIGHT: 211 LBS | DIASTOLIC BLOOD PRESSURE: 74 MMHG | OXYGEN SATURATION: 99 % | HEIGHT: 64 IN | RESPIRATION RATE: 20 BRPM

## 2019-03-27 DIAGNOSIS — D05.11 DUCTAL CARCINOMA IN SITU (DCIS) OF RIGHT BREAST: Primary | ICD-10-CM

## 2019-03-27 NOTE — PATIENT INSTRUCTIONS
If you have any questions or concerns about today's appointment, the verbal and/or written instructions you were given for follow up care, please call our office at [phone].     [facility]  [address] [place] [address], [address]    [phone] office  [phone]

## 2019-03-27 NOTE — PROGRESS NOTES
Stacy Martin is a 66year old woman with right breast ER/WI positive Stage 0 (Tis) DCIS s/p lumpectomy by Dr. Sushila Henriquez 5/30/17. The DCIS spanned approximately 5 cm and reexcision was performed demonstrating no residual disease 6/20/17. She completed radiation therapy and is now on tamoxifen which she is tolerating well. The patient denies any new lumps or soreness in either breast.  She denies any right arm edema. She denies any new other aches, pains, shortness of breath or headaches. She is taking Tamoxifen. She reports hot flashes which are not overly bothersome. She is otherwise doing well and is without complaint. No Known Allergies  Past Medical History:   Diagnosis Date    Anemia     resolved    Diverticulosis 11/13    DM type 2 (diabetes mellitus, type 2) ([facility]     Hypertension     Internal hemorrhoids 11/13    Menopause     Vitamin D deficiency      Past Surgical History:   Procedure Laterality Date    HX BREAST LUMPECTOMY Right 5/30/2017    right BREAST LUMPECTOMY WITH localization performed by Eliezer Jolly MD at [address] HX BREAST LUMPECTOMY Right 6/20/2017    revision right breast to achieve neg margin performed by Eliezer Jolly MD at 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   She can then used to be on medication for hypertension and diabetes. She denies any new cardiac, respiratory, gastrointestinal or genitourinary problems. PHYSICAL EXAM    Visit Vitals  /74 (BP 1 Location: Left arm, BP Patient Position: Sitting)   Pulse 96   Temp 97.7 °F (36.5 °C) (Oral)   Resp 20   Ht 5' 4\" (1.626 m)   Wt 95.7 kg (211 lb)   SpO2 99%   BMI 36.22 kg/m²          Constitutional:  Well-developed, well-nourished, no acute distress. Head:  Head, eyes, ears, nose, throat within normal limits. Skin:  No suspicious moles or rashes. Neck:  No masses or adenopathy. The airway appears normal.   Lungs:  Lungs are clear  Heart:  Heart is regular    Abdomen: The abdomen is soft   Extremities:  No tenderness of the extremities and no significant swelling. Psych:  Alert and oriented. Breasts:   Right: Examined in both the supine and upright positions. There was no supraclavicular, infraclavicular, or axillary lympadenopathy. There were no dominant masses, no skin changes, no asymmetry identified well healed surgical scar, chronic radiation sequelae  Left: Examined in both the supine and upright positions. There was no supraclavicular, infraclavicular, or axillary lympadenopathy. There were no dominant masses, no skin changes, no asymmetry identified        Pathology  6/20/17   BREAST, RIGHT, 10:00 POSITION, PARTIAL MASTECTOMY:   NO RESIDUAL CARCINOMA. MARGINS OF RESECTION WITH NO SIGNIFICANT HISTOPATHOLOGIC ABNORMALITIES. BIOPSY SITE CHANGES. INTRADUCTAL PAPILLOMATA. PROLIFERATIVE FIBROCYSTIC CHANGES. MICROCALCIFICATIONS ARE IDENTIFIED. 5/30/17   RIGHT BREAST MASS, LUMPECTOMY:   EXTENSIVE IN-SITU MAMMARY CARCINOMA (SEE COMMENT). HISTOLOGIC TYPE: MIXED DUCTAL AND LOBULAR. NUCLEAR stGstRstAstDstEst:st st1st. NECROSIS: NOT IDENTIFIED. ESTIMATED SIZE: APPROXIMATELY 5 CM (INVOLVING APPROXIMATELY ½ OF   SPECIMEN). MARGINS OF RESECTION: POSITIVE AT LATERAL MARGIN, MULTIFOCALLY CLOSE AT   ANTERIOR MARGIN.   LYMPH NODES: NOT SUBMITTED.    ESTROGEN RECEPTOR: POSITIVE   PROGESTERONE RECEPTOR: POSITIVE. OTHER PATHOLOGIC FINDINGS: EXTENSIVE SCLEROSING ADENOSIS, FIBROCYSTIC   CHANGE, INTRADUCTAL PAPILLOMAS, BIOPSY SITE CHANGES. AJCC STAGE: pTis. 17   A: BREAST, RIGHT, CORE NEEDLE BIOPSY:   LOBULAR CARCINOMA IN-SITU (SEE MICROSCOPIC DESCRIPTION). ATYPICAL DUCTAL EPITHELIAL HYPERPLASIA. INTRADUCTAL PAPILLOMAS. B: BREAST, RIGHT, CORE NEEDLE BIOPSY:   LOBULAR CARCINOMA IN-SITU (SEE MICROSCOPIC DESCRIPTION). INTRADUCTAL PAPILLARY CARCINOMA (CARCINOMA IN-SITU). GROSS DESCRIPTION:     Imagin/15/18 bilateral mammogram  Stable post lumpectomy changes right breast. Left breast remains stable as well. Patient informed of the results prior to her departure. A result letter will be sent to the patient. The patient will be notified by the [facility] reminder system for scheduling  of her annual screening mammogram.     BI-RADS Assessment Category 2: Benign, Letter 40      Patient Active Problem List   Diagnosis Code    Anemia D64.9    Hypertension I10    Vitamin D deficiency E55.9    Breast calcifications on mammogram R92.1    Thrombocytosis ([address] D47.3    Morbid obesity due to excess calories (HCC) E66.01    Ductal carcinoma in situ (DCIS) of right breast D05.11    Controlled type 2 diabetes mellitus without complication, without long-term current use of insulin [facility]) E11.9         66year old woman with right breast ER/IL positive Stage 0 (Tis) DCIS s/p lumpectomy by Dr. Ebonie Gonzalez 17. Continue Tamoxifen. She is due for bilateral mammogram 2019. Follow up after imaging then will transition to 6 months. Please call sooner with questions or concerns.

## 2019-03-27 NOTE — PROGRESS NOTES
1. Have you been to the ER, urgent care clinic since your last visit? Hospitalized since your last visit? No    2. Have you seen or consulted any other health care providers outside of the [address] [place] [address] since your last visit? Include any pap smears or colon screening. No     Patient presents for annual breast exam with hx of right breast DCIS. She reports no concerns at today's visit.

## 2019-03-27 NOTE — LETTER
3/27/2019 1:56 PM 
 
Patient:  Araceli Villalpando YOB: 1955 Date of Visit: 3/27/2019 Jono Kumar MD 
[facility] [address] [place] [id number] VIA In Basket Dear Jono Kumar MD, 
 
 
I had the pleasure of seeing Ms. Araceli Villalpando in my office today for follow up of her right breast DCIS in transition from Dr. Ezequiel May. I am including a copy of my office visit today. If you have questions, please do not hesitate to call me. I look forward to following Ms. Briana Lewis along with you and will keep you updated as to her progress. Sincerely, Madalyn Estrada MD

## 2019-04-03 ENCOUNTER — OFFICE VISIT (OUTPATIENT)
Dept: FAMILY MEDICINE CLINIC | Age: 64
End: 2019-04-03

## 2019-04-03 VITALS
DIASTOLIC BLOOD PRESSURE: 63 MMHG | SYSTOLIC BLOOD PRESSURE: 136 MMHG | TEMPERATURE: 97.3 F | HEART RATE: 91 BPM | OXYGEN SATURATION: 99 % | WEIGHT: 212 LBS | HEIGHT: 64 IN | BODY MASS INDEX: 36.19 KG/M2 | RESPIRATION RATE: 16 BRPM

## 2019-04-03 DIAGNOSIS — E66.01 MORBID OBESITY DUE TO EXCESS CALORIES (HCC): ICD-10-CM

## 2019-04-03 DIAGNOSIS — I10 ESSENTIAL HYPERTENSION: ICD-10-CM

## 2019-04-03 DIAGNOSIS — E11.9 CONTROLLED TYPE 2 DIABETES MELLITUS WITHOUT COMPLICATION, WITHOUT LONG-TERM CURRENT USE OF INSULIN (HCC): Primary | ICD-10-CM

## 2019-04-03 LAB
HBA1C MFR BLD HPLC: 6.2 %
MICROALBUMIN UR TEST STR-MCNC: 10 MG/L
MICROALBUMIN/CREAT RATIO POC: <30 MG/G

## 2019-04-03 NOTE — PATIENT INSTRUCTIONS
Body Mass Index: Care Instructions  Your Care Instructions    Body mass index (BMI) can help you see if your weight is raising your risk for health problems. It uses a formula to compare how much you weigh with how tall you are. · A BMI lower than 18.5 is considered underweight. · A BMI between 18.5 and 24.9 is considered healthy. · A BMI between 25 and 29.9 is considered overweight. A BMI of 30 or higher is considered obese. If your BMI is in the normal range, it means that you have a lower risk for weight-related health problems. If your BMI is in the overweight or obese range, you may be at increased risk for weight-related health problems, such as high blood pressure, heart disease, stroke, arthritis or joint pain, and diabetes. If your BMI is in the underweight range, you may be at increased risk for health problems such as fatigue, lower protection (immunity) against illness, muscle loss, bone loss, hair loss, and hormone problems. BMI is just one measure of your risk for weight-related health problems. You may be at higher risk for health problems if you are not active, you eat an unhealthy diet, or you drink too much alcohol or use tobacco products. Follow-up care is a key part of your treatment and safety. Be sure to make and go to all appointments, and call your doctor if you are having problems. It's also a good idea to know your test results and keep a list of the medicines you take. How can you care for yourself at home? · Practice healthy eating habits. This includes eating plenty of fruits, vegetables, whole grains, lean protein, and low-fat dairy. · If your doctor recommends it, get more exercise. Walking is a good choice. Bit by bit, increase the amount you walk every day. Try for at least 30 minutes on most days of the week. · Do not smoke. Smoking can increase your risk for health problems. If you need help quitting, talk to your doctor about stop-smoking programs and medicines. These can increase your chances of quitting for good. · Limit alcohol to 2 drinks a day for men and 1 drink a day for women. Too much alcohol can cause health problems. If you have a BMI higher than 25  · Your doctor may do other tests to check your risk for weight-related health problems. This may include measuring the distance around your waist. A waist measurement of more than 40 inches in men or 35 inches in women can increase the risk of weight-related health problems. · Talk with your doctor about steps you can take to stay healthy or improve your health. You may need to make lifestyle changes to lose weight and stay healthy, such as changing your diet and getting regular exercise. If you have a BMI lower than 18.5  · Your doctor may do other tests to check your risk for health problems. · Talk with your doctor about steps you can take to stay healthy or improve your health. You may need to make lifestyle changes to gain or maintain weight and stay healthy, such as getting more healthy foods in your diet and doing exercises to build muscle. Where can you learn more? Go to http://staci-alejandro.info/. Enter S176 in the search box to learn more about \"Body Mass Index: Care Instructions. \"  Current as of: June 25, 2018  Content Version: 11.9  © 7318-1795 [employer]. Care instructions adapted under license by [employer] (which disclaims liability or warranty for this information). If you have questions about a medical condition or this instruction, always ask your healthcare professional. [address] any warranty or liability for your use of this information.

## 2019-04-03 NOTE — PROGRESS NOTES
Trudi Magana is a 66 y.o. female and presents with Follow Up Chronic Condition; Diabetes; Hypertension; Obesity; and Other (DCIS)       Subjective:  Missed f/u due to daughters illness. Diabetes type 2- last A1c 6.7% 8/22/18.  bs at home \"good\"- 130-140 usually.   No hypoglycemia- pt is aware of sx to monitor for.   htn- on ARB- controlled today. No cp or sob. DCIS- seeing Dr. Glover Headings- s/p resection- following with general surgery and oncology- finished XRT as well. taking tamoxifen. Obesity- no weight change since last visit            Assessment/Plan:    Diabetes type 2- controlled-reviewed in detail with pt. Diet and exercise and Wt loss encouraged again- following labs. She would like to see dietician again- referral placed. Obesity- encouraged caloric restriction. Exercise. Goal of 1 lb/week wt loss discussed again. DCIS- continue f/u with onc and XRT. Pt aware of tamoxifen duration being 5 years. htn- not controlled-diet discussed. Continue to follow - wt loss encouraged as she is not far off.    hm- shingrix shot had first one done.       RTC in 4 month    Diagnoses and all orders for this visit:    1. Controlled type 2 diabetes mellitus without complication, without long-term current use of insulin ([facility])  -     REFERRAL TO DIETITIAN  -     AMB POC HEMOGLOBIN A1C  -     AMB POC URINE, MICROALBUMIN, SEMIQUANTITATIVE    2. Essential hypertension  -     REFERRAL TO DIETITIAN    3.  Morbid obesity due to excess calories ([address]  -     REFERRAL TO DIETITIAN          Orders Placed This Encounter    REFERRAL TO DIETITIAN     Referral Priority:   Routine     Referral Type:   Consultation     Referral Reason:   Specialty Services Required     Requested Specialty:   Dietitian     Number of Visits Requested:   1    AMB POC HEMOGLOBIN A1C    AMB POC URINE, MICROALBUMIN, SEMIQUANTITATIVE           ROS:  Negative except as mentioned above  Cardiac- no chest pain or palpitations  Pulmonary- no sob or wheezes  GI- no n/v or diarrhea. SH:  Social History     Tobacco Use    Smoking status: Never Smoker    Smokeless tobacco: Never Used   Substance Use Topics    Alcohol use: No    Drug use: No         Medications/Allergies:  Current Outpatient Medications on File Prior to Visit   Medication Sig Dispense Refill    tamoxifen (NOLVADEX) 10 mg tablet Take 0.5 Tabs by mouth daily. 90 Tab 4    glipiZIDE SR (GLUCOTROL XL) 5 mg CR tablet 1 tablet daily 90 Tab 1    losartan (COZAAR) 100 mg tablet Take 1 Tab by mouth daily. 90 Tab 1    hydroCHLOROthiazide (HYDRODIURIL) 25 mg tablet Take 1 Tab by mouth daily. 90 Tab 1    atenolol (TENORMIN) 50 mg tablet 50 mg.      guaiFENesin-codeine (ROBITUSSIN AC) 100-10 mg/5 mL solution 5 mL.  diclofenac EC (VOLTAREN) 75 mg EC tablet 75 mg.      FLUZONE QUAD 8571-4775, PF, syrg injection TO BE ADMINISTERED BY PHARMACIST FOR IMMUNIZATION  0    potassium chloride (KLOR-CON) 10 mEq tablet 10 mEq.  metFORMIN (GLUCOPHAGE) 1,000 mg tablet TAKE 1 TABLET BY MOUTH TWICE DAILY WITH MEALS 180 Tab 1    ergocalciferol (VITAMIN D2) 50,000 unit capsule TAKE ONE CAPSULE BY MOUTH EVERY 14 DAYS 12 Cap 1    dilTIAZem (TAZTIA XT) 360 mg SR capsule Take 1 Cap by mouth daily. 90 Cap 1    metoprolol tartrate (LOPRESSOR) 25 mg tablet Take 0.5 Tabs by mouth two (2) times a day. 60 Tab 0     No current facility-administered medications on file prior to visit. No Known Allergies    Objective:  Visit Vitals  /65   Pulse 94   Temp 97.3 °F (36.3 °C) (Oral)   Resp 16   Ht 5' 4\" (1.626 m)   Wt 212 lb (96.2 kg)   SpO2 99%   BMI 36.39 kg/m²    Body mass index is 36.39 kg/m². Constitutional: Well developed, nourished, no distress, alert   CV: S1, S2.  RRR. No murmurs/rubs. No thrills palpated. No carotid bruits. Intact distal pulses. No edema. Pulm: No abnormalities on inspection. Clear to auscultation bilaterally. No wheezing/rhonchi. Normal effort.      GI: Soft, nontender, nondistended. Normal active bowel sounds. No  masses on palpation. No hepatosplenomegaly.

## 2019-04-03 NOTE — PROGRESS NOTES
1. Have you been to the ER, urgent care clinic since your last visit? Hospitalized since your last visit? 2. Have you seen or consulted any other health care providers outside of the [address] [place] [address] since your last visit? Include any pap smears or colon screening. Yes, saw Breast surgeon in 2/2019 and Oncologist in 3/21/2019.     Chief Complaint   Patient presents with    Follow Up Chronic Condition    Diabetes    Hypertension    Obesity    Other     DCIS

## 2019-04-17 ENCOUNTER — HOSPITAL ENCOUNTER (OUTPATIENT)
Dept: RADIATION THERAPY | Age: 64
Discharge: HOME | End: 2019-04-17
Payer: MEDICARE

## 2019-04-17 PROCEDURE — 99211 OFF/OP EST MAY X REQ PHY/QHP: CPT

## 2019-05-31 RX ORDER — METFORMIN HYDROCHLORIDE 1000 MG/1
TABLET ORAL
Qty: 180 TAB | Refills: 1 | Status: SHIPPED | OUTPATIENT
Start: 2019-05-31 | End: 2019-08-09 | Stop reason: SDUPTHER

## 2019-06-11 ENCOUNTER — OFFICE VISIT (OUTPATIENT)
Dept: FAMILY MEDICINE CLINIC | Age: 64
End: 2019-06-11

## 2019-06-11 VITALS
HEIGHT: 64 IN | HEART RATE: 95 BPM | SYSTOLIC BLOOD PRESSURE: 143 MMHG | TEMPERATURE: 96.6 F | DIASTOLIC BLOOD PRESSURE: 65 MMHG | BODY MASS INDEX: 35.68 KG/M2 | RESPIRATION RATE: 18 BRPM | WEIGHT: 209 LBS

## 2019-06-11 DIAGNOSIS — E11.9 CONTROLLED TYPE 2 DIABETES MELLITUS WITHOUT COMPLICATION, WITHOUT LONG-TERM CURRENT USE OF INSULIN (HCC): ICD-10-CM

## 2019-06-11 DIAGNOSIS — Z01.818 PREOPERATIVE EXAMINATION: Primary | ICD-10-CM

## 2019-06-11 DIAGNOSIS — I10 ESSENTIAL HYPERTENSION: ICD-10-CM

## 2019-06-11 RX ORDER — METOPROLOL SUCCINATE 25 MG/1
25 TABLET, EXTENDED RELEASE ORAL DAILY
Qty: 90 TAB | Refills: 1 | Status: SHIPPED | OUTPATIENT
Start: 2019-06-11 | End: 2020-08-24 | Stop reason: SDUPTHER

## 2019-06-11 NOTE — PROGRESS NOTES
1. Have you been to the ER, urgent care clinic since your last visit? Hospitalized since your last visit? No.     2. Have you seen or consulted any other health care providers outside of the [address] [place] [address] since your last visit? Include any pap smears or colon screening. Yes, sw her ophthalmologist in 4/2019. Chief Complaint   Patient presents with    Pre-op Exam     Cataract Surgery on 6/19/2019 with Dr. Alf Belcher.

## 2019-06-11 NOTE — PROGRESS NOTES
Svetlana Waterman is a 66 y.o. female and presents for pre-operative evaluation. Subjective: This patient was seen at the request of Dr. Helio Jackman for pre-operative evaluation for planned procedure: cataract extraction/IOL on 6/19/19    [address] [place] [address]    Cardiac factors include:  DMII    METs: able to tolerate over 4 mets of activity without cp/sob/palp/n/v/diaphoresis. ROS:  Constitutional: No recent weight change. No weakness/fatigue. No f/c. Skin: No rashes, change in nails/hair, itching   HENT: No HA, dizziness. No hearing loss/tinnitus. No nasal congestion/discharge. Eyes: No change in vision, double/blurred vision or eye pain/redness. Cardiovascular: No CP/palpitations. No TY/orthopnea/PND. Respiratory: No cough/sputum, dyspnea, wheezing. Gastointestinal: No dysphagia, reflux. No n/v. No constipation/diarrhea. No melena/rectal bleeding. Genitourinary: No dysuria, urinary hesitancy, nocturia, hematuria. No incontinence. Musculoskeletal: No joint pain/stiffness. No muscle pain/tenderness. Heme: No h/o anemia. No easy bleeding/bruising. Neurological: No seizures/numbness/weakness. No paresthesias.      PMH:  Patient Active Problem List   Diagnosis Code    Anemia D64.9    Hypertension I10    Vitamin D deficiency E55.9    Breast calcifications on mammogram R92.1    Thrombocytosis ([facility]) D47.3    Morbid obesity due to excess calories ([facility]) E66.01    Ductal carcinoma in situ (DCIS) of right breast D05.11    Controlled type 2 diabetes mellitus without complication, without long-term current use of insulin ([facility]) E11.9       PSH:  Past Surgical History:   Procedure Laterality Date    HX BREAST LUMPECTOMY Right 5/30/2017    right BREAST LUMPECTOMY WITH localization performed by Marquez Kelly MD at [address] HX BREAST LUMPECTOMY Right 6/20/2017    revision right breast to achieve neg margin performed by Marquez Kelly MD at [address] HX COLONOSCOPY  1/13    repeat 1/23 - [place]    HX ORTHOPAEDIC      fluid drained from lt shoulder    HX TUBAL LIGATION          SH:  Social History     Tobacco Use    Smoking status: Never Smoker    Smokeless tobacco: Never Used   Substance Use Topics    Alcohol use: No    Drug use: No       FH:  Family History   Problem Relation Age of Onset   [address] Stroke Mother     Hypertension Mother     Diabetes Daughter        Medications/Allergies:  Current Outpatient Medications on File Prior to Visit   Medication Sig Dispense Refill    metFORMIN (GLUCOPHAGE) 1,000 mg tablet TAKE 1 TABLET BY MOUTH TWICE DAILY WITH MEALS 180 Tab 1    tamoxifen (NOLVADEX) 10 mg tablet Take 0.5 Tabs by mouth daily. 90 Tab 4    glipiZIDE SR (GLUCOTROL XL) 5 mg CR tablet 1 tablet daily 90 Tab 1    losartan (COZAAR) 100 mg tablet Take 1 Tab by mouth daily. 90 Tab 1    hydroCHLOROthiazide (HYDRODIURIL) 25 mg tablet Take 1 Tab by mouth daily. 90 Tab 1    ergocalciferol (VITAMIN D2) 50,000 unit capsule TAKE ONE CAPSULE BY MOUTH EVERY 14 DAYS 12 Cap 1    guaiFENesin-codeine (ROBITUSSIN AC) 100-10 mg/5 mL solution 5 mL.  diclofenac EC (VOLTAREN) 75 mg EC tablet 75 mg.      FLUZONE QUAD 7324-6569, PF, syrg injection TO BE ADMINISTERED BY PHARMACIST FOR IMMUNIZATION  0    potassium chloride (KLOR-CON) 10 mEq tablet 10 mEq.  dilTIAZem (TAZTIA XT) 360 mg SR capsule Take 1 Cap by mouth daily. 90 Cap 1     No current facility-administered medications on file prior to visit. No Known Allergies    Objective:  Visit Vitals  /65   Pulse 95   Temp 96.6 °F (35.9 °C) (Oral)   Resp 18   Ht 5' 4\" (1.626 m)   Wt 209 lb (94.8 kg)   BMI 35.87 kg/m²    Body mass index is 35.87 kg/m². Gen: Well developed, nourished, no distress, alert, obese habitus   HENT: Exterior ears and tympanic membranes normal bilaterally. Supple neck. No thyromegaly or lymphadenopathy. Oropharynx clear and moist mucous membranes. Eyes: Conjunctiva normal. PERRL. CV: S1, S2.  RRR.   No murmurs/rubs. No thrills palpated. No carotid bruits. Intact distal pulses. No edema. Pulm: No abnormalities on inspection. Clear to auscultation bilaterally. No wheezing/rhonchi. Normal effort. GI: Soft, nontender, nondistended. Normal active bowel sounds. No  masses on palpation. No hepatosplenomegaly. MS: Gait normal.  Joints without deformity/tenderness. Strength intact bilateral upper and lower ext. Normal ROM all extremities. Neuro: A/O x 3.  CN 2-12 intact. 2+DTR. No focal motor or sensory deficits. Speech normal.   Skin: No lesions/rashes on inspection. Psych: Appropriate affect, judgement and insight. Short-term memory intact. Labwork and Ancillary Studies:    CBC w/Diff  Lab Results   Component Value Date/Time    WBC 9.2 2018 08:21 AM    HCT 37.3 2018 08:21 AM    MCV 85.2 2018 08:21 AM        Basic Metabolic Profile  Lab Results   Component Value Date     2018    CO2 27 2018    BUN 11 2018         EK19  NSR, no ischemic changes. Assessment/Plan:    The patient is low risk for planned procedure and may proceed to OR without further testing. The following recommendations were made for medication regimen prior to surgery:    Continue taking HTN meds prior to surgery. Hold metformin and other oral hypoglycemic agents on the day of surgery. .  Hold NSAIDs 3 days prior to surgery.

## 2019-06-14 ENCOUNTER — OFFICE VISIT (OUTPATIENT)
Dept: FAMILY MEDICINE CLINIC | Age: 64
End: 2019-06-14

## 2019-06-14 NOTE — PROGRESS NOTES
A user error has taken place: encounter opened in error, closed for administrative reasons. Patient rescheduled her appointment.

## 2019-06-25 ENCOUNTER — HOSPITAL ENCOUNTER (OUTPATIENT)
Dept: MAMMOGRAPHY | Age: 64
Discharge: HOME | End: 2019-06-25
Attending: SURGERY
Payer: MEDICARE

## 2019-06-25 DIAGNOSIS — Z12.31 VISIT FOR SCREENING MAMMOGRAM: ICD-10-CM

## 2019-06-25 PROCEDURE — 77063 BREAST TOMOSYNTHESIS BI: CPT

## 2019-06-27 ENCOUNTER — TELEPHONE (OUTPATIENT)
Dept: SURGERY | Age: 64
End: 2019-06-27

## 2019-06-27 NOTE — TELEPHONE ENCOUNTER
Called patient per Dr. Riaz Alberts instruction to let her know there were no areas of concern seen on mammogram. Patient verbalized understanding. Reminded patient of upcoming appointment on 7/3/19.         ----- Message from Demond Alas MD sent at 6/26/2019  8:44 AM EDT -----  Please let her know there were no areas of concern seen on mammogram.  We will see her July 3 as scheduled.   Thanks    ----- Message -----  From: James, Rad Results In  Sent: 6/26/2019   8:37 AM  To: Demond Alas MD

## 2019-07-03 RX ORDER — DILTIAZEM HYDROCHLORIDE 360 MG/1
CAPSULE, EXTENDED RELEASE ORAL
Qty: 90 CAP | Refills: 1 | Status: SHIPPED | OUTPATIENT
Start: 2019-07-03 | End: 2020-01-31

## 2019-07-18 ENCOUNTER — OFFICE VISIT (OUTPATIENT)
Dept: SURGERY | Age: 64
End: 2019-07-18

## 2019-07-18 VITALS
BODY MASS INDEX: 35.51 KG/M2 | HEART RATE: 86 BPM | DIASTOLIC BLOOD PRESSURE: 53 MMHG | HEIGHT: 64 IN | OXYGEN SATURATION: 99 % | SYSTOLIC BLOOD PRESSURE: 136 MMHG | WEIGHT: 208 LBS | TEMPERATURE: 98.1 F

## 2019-07-18 DIAGNOSIS — Z12.39 BREAST SCREENING, UNSPECIFIED: ICD-10-CM

## 2019-07-18 DIAGNOSIS — D05.11 DUCTAL CARCINOMA IN SITU (DCIS) OF RIGHT BREAST: Primary | ICD-10-CM

## 2019-07-18 NOTE — LETTER
7/18/2019 12:54 PM 
 
Patient:  Patsy Kaplan YOB: 1955 Date of Visit: 7/18/2019 Emmanuel Small MD 
[facility] [address] [id number] VIA In Basket Dear Emmanuel Small MD, 
 
 
I had the pleasure of seeing Ms. Patsy Kaplan in my office today for follow up of her right breast DCIS. I am including a copy of my office visit today. If you have questions, please do not hesitate to call me. I look forward to following Ms. Edi Galeas along with you and will keep you updated as to her progress. Sincerely, Nicole Peralta MD

## 2019-07-18 NOTE — PROGRESS NOTES
Brady Dick is a 66year old woman with right breast ER/GA positive Stage 0 (Tis) DCIS s/p lumpectomy by Dr. Loly Soliz 5/30/17. The DCIS spanned approximately 5 cm and reexcision was performed demonstrating no residual disease 6/20/17. She completed radiation therapy and is now on low dose tamoxifen per Dr. Carrie Rodney to finish September 2020. The patient denies any new lumps or soreness in either breast.  She denies any right arm edema. She denies any new other aches, pains, shortness of breath or headaches. She is taking Tamoxifen. She reports hot flashes which are not overly bothersome. She is otherwise doing well and is without complaint. No Known Allergies  Past Medical History:   Diagnosis Date    Anemia     resolved    Diverticulosis 11/13    DM type 2 (diabetes mellitus, type 2) ([facility]     Hypertension     Internal hemorrhoids 11/13    Menopause     Vitamin D deficiency      Past Surgical History:   Procedure Laterality Date    HX BREAST LUMPECTOMY Right 5/30/2017    right BREAST LUMPECTOMY WITH localization performed by Juan R Raya MD at [address] HX BREAST LUMPECTOMY Right 6/20/2017    revision right breast to achieve neg margin performed by Juan R Raya MD at 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   She can then used to be on medication for hypertension and diabetes. She denies any new cardiac, respiratory, gastrointestinal or genitourinary problems. PHYSICAL EXAM    Visit Vitals  /53 (BP 1 Location: Left arm, BP Patient Position: Sitting)   Pulse 86   Temp 98.1 °F (36.7 °C) (Oral)   Ht 5' 4\" (1.626 m)   Wt 94.3 kg (208 lb)   SpO2 99%   BMI 35.70 kg/m²          Constitutional:  Well-developed, well-nourished, no acute distress. Head:  Head, eyes, ears, nose, throat within normal limits. Skin:  No suspicious moles or rashes. Neck:  No masses or adenopathy. The airway appears normal.   Lungs:  Lungs are clear  Heart:  Heart is regular    Abdomen: The abdomen is soft   Extremities:  No tenderness of the extremities and no significant swelling. Psych:  Alert and oriented. Breasts:   Right: Examined in both the supine and upright positions. There was no supraclavicular, infraclavicular, or axillary lympadenopathy. There were no dominant masses, no skin changes, no asymmetry identified well healed surgical scar, chronic radiation sequelae   Left: Examined in both the supine and upright positions. There was no supraclavicular, infraclavicular, or axillary lympadenopathy. There were no dominant masses, no skin changes, no asymmetry identified      Imagin19 bilateral mammogram  No mammographic evidence of malignancy. Recommend annual screening mammography  and clinical breast examination.     A result letter will be sent to the patient.     The patient will be notified for scheduling of her annual screening mammogram.     Assessment Category 2: Benign. (letter 40NM)       6/15/18 bilateral mammogram  Stable post lumpectomy changes right breast. Left breast remains stable as well. Patient informed of the results prior to her departure. A result letter will be sent to the patient.   The patient will be notified by the [facility] reminder system for scheduling  of her annual screening mammogram.     BI-RADS Assessment Category 2: Benign, Letter 40    Pathology  6/20/17   BREAST, RIGHT, 10:00 POSITION, PARTIAL MASTECTOMY:   NO RESIDUAL CARCINOMA. MARGINS OF RESECTION WITH NO SIGNIFICANT HISTOPATHOLOGIC ABNORMALITIES. BIOPSY SITE CHANGES. INTRADUCTAL PAPILLOMATA. PROLIFERATIVE FIBROCYSTIC CHANGES. MICROCALCIFICATIONS ARE IDENTIFIED. 5/30/17   RIGHT BREAST MASS, LUMPECTOMY:   EXTENSIVE IN-SITU MAMMARY CARCINOMA (SEE COMMENT). HISTOLOGIC TYPE: MIXED DUCTAL AND LOBULAR. NUCLEAR stGstRstAstDstEst:st st1st. NECROSIS: NOT IDENTIFIED. ESTIMATED SIZE: APPROXIMATELY 5 CM (INVOLVING APPROXIMATELY ½ OF   SPECIMEN). MARGINS OF RESECTION: POSITIVE AT LATERAL MARGIN, MULTIFOCALLY CLOSE AT   ANTERIOR MARGIN.   LYMPH NODES: NOT SUBMITTED. ESTROGEN RECEPTOR: POSITIVE   PROGESTERONE RECEPTOR: POSITIVE. OTHER PATHOLOGIC FINDINGS: EXTENSIVE SCLEROSING ADENOSIS, FIBROCYSTIC   CHANGE, INTRADUCTAL PAPILLOMAS, BIOPSY SITE CHANGES. AJCC STAGE: pTis. 5/16/17   A: BREAST, RIGHT, CORE NEEDLE BIOPSY:   LOBULAR CARCINOMA IN-SITU (SEE MICROSCOPIC DESCRIPTION). ATYPICAL DUCTAL EPITHELIAL HYPERPLASIA. INTRADUCTAL PAPILLOMAS. B: BREAST, RIGHT, CORE NEEDLE BIOPSY:   LOBULAR CARCINOMA IN-SITU (SEE MICROSCOPIC DESCRIPTION). INTRADUCTAL PAPILLARY CARCINOMA (CARCINOMA IN-SITU). GROSS DESCRIPTION:         Patient Active Problem List   Diagnosis Code    Anemia D64.9    Hypertension I10    Vitamin D deficiency E55.9    Breast calcifications on mammogram R92.1    Thrombocytosis ([address] D47.3    Morbid obesity due to excess calories ([facility]) E66.01    Ductal carcinoma in situ (DCIS) of right breast D05.11    Controlled type 2 diabetes mellitus without complication, without long-term current use of insulin ([facility]) E11.9    Breast screening, unspecified Z12.31         66year old woman with right breast ER/WV positive Stage 0 (Tis) DCIS s/p lumpectomy by Dr. Sri Razo 5/30/17. Continue Tamoxifen per Dr. Howard Sanders.   She is due for bilateral mammogram June 2020. Follow up after imaging. Please call sooner with questions or concerns.

## 2019-07-18 NOTE — PROGRESS NOTES
Patsy Kaplan is a 66 y.o. female (: 1955) presenting to address:    Chief Complaint   Patient presents with    Other     Breast follow up. Had mommogram done last month        Medication list and allergies have been reviewed with Patsy Rosaura and updated as of today's date. I have gone over all Medical, Surgical and Social History with Patsy Kaplan and updated/added the information accordingly. 1. Have you been to the ER, Urgent Care or Hospitalized since your last visit? NO      2. Have you followed up with your PCP or any other Physicians since your procedure/ last office visit?    YES

## 2019-07-29 ENCOUNTER — HOSPITAL ENCOUNTER (OUTPATIENT)
Dept: LAB | Age: 64
Discharge: HOME | End: 2019-07-29
Payer: MEDICARE

## 2019-07-29 DIAGNOSIS — E11.9 CONTROLLED TYPE 2 DIABETES MELLITUS WITHOUT COMPLICATION, WITHOUT LONG-TERM CURRENT USE OF INSULIN (HCC): ICD-10-CM

## 2019-07-29 DIAGNOSIS — E55.9 VITAMIN D DEFICIENCY: ICD-10-CM

## 2019-07-29 LAB
EST. AVERAGE GLUCOSE BLD GHB EST-MCNC: 137 MG/DL
HBA1C MFR BLD: 6.4 % (ref 4.2–5.6)

## 2019-07-29 PROCEDURE — 83036 HEMOGLOBIN GLYCOSYLATED A1C: CPT

## 2019-07-29 PROCEDURE — 82306 VITAMIN D 25 HYDROXY: CPT

## 2019-07-29 PROCEDURE — 36415 COLL VENOUS BLD VENIPUNCTURE: CPT

## 2019-07-30 LAB — 25(OH)D3 SERPL-MCNC: 56 NG/ML (ref 30–100)

## 2019-08-09 ENCOUNTER — OFFICE VISIT (OUTPATIENT)
Dept: FAMILY MEDICINE CLINIC | Age: 64
End: 2019-08-09

## 2019-08-09 VITALS
SYSTOLIC BLOOD PRESSURE: 138 MMHG | HEART RATE: 81 BPM | HEIGHT: 64 IN | OXYGEN SATURATION: 98 % | TEMPERATURE: 97.7 F | RESPIRATION RATE: 16 BRPM | WEIGHT: 212 LBS | BODY MASS INDEX: 36.19 KG/M2 | DIASTOLIC BLOOD PRESSURE: 68 MMHG

## 2019-08-09 DIAGNOSIS — E11.9 CONTROLLED TYPE 2 DIABETES MELLITUS WITHOUT COMPLICATION, WITHOUT LONG-TERM CURRENT USE OF INSULIN (HCC): Primary | ICD-10-CM

## 2019-08-09 RX ORDER — METFORMIN HYDROCHLORIDE 1000 MG/1
TABLET ORAL
Qty: 180 TAB | Refills: 1 | Status: SHIPPED | OUTPATIENT
Start: 2019-08-09 | End: 2020-06-18 | Stop reason: SDUPTHER

## 2019-08-09 NOTE — PATIENT INSTRUCTIONS
Body Mass Index: Care Instructions  Your Care Instructions    Body mass index (BMI) can help you see if your weight is raising your risk for health problems. It uses a formula to compare how much you weigh with how tall you are. · A BMI lower than 18.5 is considered underweight. · A BMI between 18.5 and 24.9 is considered healthy. · A BMI between 25 and 29.9 is considered overweight. A BMI of 30 or higher is considered obese. If your BMI is in the normal range, it means that you have a lower risk for weight-related health problems. If your BMI is in the overweight or obese range, you may be at increased risk for weight-related health problems, such as high blood pressure, heart disease, stroke, arthritis or joint pain, and diabetes. If your BMI is in the underweight range, you may be at increased risk for health problems such as fatigue, lower protection (immunity) against illness, muscle loss, bone loss, hair loss, and hormone problems. BMI is just one measure of your risk for weight-related health problems. You may be at higher risk for health problems if you are not active, you eat an unhealthy diet, or you drink too much alcohol or use tobacco products. Follow-up care is a key part of your treatment and safety. Be sure to make and go to all appointments, and call your doctor if you are having problems. It's also a good idea to know your test results and keep a list of the medicines you take. How can you care for yourself at home? · Practice healthy eating habits. This includes eating plenty of fruits, vegetables, whole grains, lean protein, and low-fat dairy. · If your doctor recommends it, get more exercise. Walking is a good choice. Bit by bit, increase the amount you walk every day. Try for at least 30 minutes on most days of the week. · Do not smoke. Smoking can increase your risk for health problems. If you need help quitting, talk to your doctor about stop-smoking programs and medicines. These can increase your chances of quitting for good. · Limit alcohol to 2 drinks a day for men and 1 drink a day for women. Too much alcohol can cause health problems. If you have a BMI higher than 25  · Your doctor may do other tests to check your risk for weight-related health problems. This may include measuring the distance around your waist. A waist measurement of more than 40 inches in men or 35 inches in women can increase the risk of weight-related health problems. · Talk with your doctor about steps you can take to stay healthy or improve your health. You may need to make lifestyle changes to lose weight and stay healthy, such as changing your diet and getting regular exercise. If you have a BMI lower than 18.5  · Your doctor may do other tests to check your risk for health problems. · Talk with your doctor about steps you can take to stay healthy or improve your health. You may need to make lifestyle changes to gain or maintain weight and stay healthy, such as getting more healthy foods in your diet and doing exercises to build muscle. Where can you learn more? Go to http://staci-alejandro.info/. Enter S176 in the search box to learn more about \"Body Mass Index: Care Instructions. \"  Current as of: March 28, 2019  Content Version: 12.1  © 6377-6358 [employer]. Care instructions adapted under license by [employer] (which disclaims liability or warranty for this information). If you have questions about a medical condition or this instruction, always ask your healthcare professional. [address] any warranty or liability for your use of this information.

## 2019-08-09 NOTE — PROGRESS NOTES
1. Have you been to the ER, urgent care clinic since your last visit? Hospitalized since your last visit? No.     2. Have you seen or consulted any other health care providers outside of the [address] [place] [address] since your last visit? Include any pap smears or colon screening. Yes, saw Dr. Matteo Henriquez in 7/2019. Chief Complaint   Patient presents with    Diabetes    Hypertension    Obesity    Other     DC! S seeing Dr. Matteo Henriquez.      Results     labs

## 2019-08-09 NOTE — PROGRESS NOTES
Asad Dan is a 60 y.o. female and presents with Diabetes; Hypertension; Obesity; Other (DC! S seeing Dr. Orestes Garnica. ); and Results (labs)       Subjective:  Missed f/u due to daughters illness. Diabetes type 2- last A1c 6.7% 8/22/18.  bs at home \"good\"- 130-140 usually.   No hypoglycemia- pt is aware of sx to monitor for.   htn- on ARB- controlled today. No cp or sob. DCIS- seeing Dr. Orestes Garnica- s/p resection- following with general surgery and oncology- finished XRT as well. taking tamoxifen. Obesity- no weight change since last visit            Assessment/Plan:    Diabetes type 2- controlled-reviewed in detail with pt. Diet and exercise and Wt loss encouraged again- following labs. She would like to see dietician again- referral placed. Obesity- encouraged caloric restriction. Exercise. Goal of 1 lb/week wt loss discussed again. DCIS- continue f/u with onc and XRT. Pt aware of tamoxifen duration being 5 years. htn- not controlled-diet discussed. Continue to follow - wt loss encouraged as she is not far off.    hm- shingrix shot had first one done.       RTC in 4 month    Diagnoses and all orders for this visit:    1. Controlled type 2 diabetes mellitus without complication, without long-term current use of insulin (HCC)    Other orders  -     metFORMIN (GLUCOPHAGE) 1,000 mg tablet; TAKE 1 TABLET BY MOUTH TWICE DAILY WITH MEALS          Orders Placed This Encounter    metFORMIN (GLUCOPHAGE) 1,000 mg tablet     Sig: TAKE 1 TABLET BY MOUTH TWICE DAILY WITH MEALS     Dispense:  180 Tab     Refill:  1           ROS:  Negative except as mentioned above  Cardiac- no chest pain or palpitations  Pulmonary- no sob or wheezes  GI- no n/v or diarrhea.       SH:  Social History     Tobacco Use    Smoking status: Never Smoker    Smokeless tobacco: Never Used   Substance Use Topics    Alcohol use: No    Drug use: No         Medications/Allergies:  Current Outpatient Medications on File Prior to Visit   Medication Sig Dispense Refill    dilTIAZem (TIAZAC) 360 mg ER capsule TAKE 1 CAPSULE BY MOUTH ONCE DAILY 90 Cap 1    metoprolol succinate (TOPROL-XL) 25 mg XL tablet Take 1 Tab by mouth daily. 90 Tab 1    tamoxifen (NOLVADEX) 10 mg tablet Take 0.5 Tabs by mouth daily. 90 Tab 4    glipiZIDE SR (GLUCOTROL XL) 5 mg CR tablet 1 tablet daily 90 Tab 1    losartan (COZAAR) 100 mg tablet Take 1 Tab by mouth daily. 90 Tab 1    hydroCHLOROthiazide (HYDRODIURIL) 25 mg tablet Take 1 Tab by mouth daily. 90 Tab 1    ergocalciferol (VITAMIN D2) 50,000 unit capsule TAKE ONE CAPSULE BY MOUTH EVERY 14 DAYS 12 Cap 1     No current facility-administered medications on file prior to visit. No Known Allergies    Objective:  Visit Vitals  /73   Pulse 87   Temp 97.7 °F (36.5 °C) (Oral)   Resp 16   Ht 5' 4\" (1.626 m)   Wt 212 lb (96.2 kg)   SpO2 98%   BMI 36.39 kg/m²    Body mass index is 36.39 kg/m². Constitutional: Well developed, nourished, no distress, alert   CV: S1, S2.  RRR. No murmurs/rubs. No thrills palpated. No carotid bruits. Intact distal pulses. No edema. Pulm: No abnormalities on inspection. Clear to auscultation bilaterally. No wheezing/rhonchi. Normal effort. GI: Soft, nontender, nondistended. Normal active bowel sounds. No  masses on palpation. No hepatosplenomegaly.

## 2019-08-21 RX ORDER — HYDROCHLOROTHIAZIDE 25 MG/1
TABLET ORAL
Qty: 90 TAB | Refills: 1 | Status: SHIPPED | OUTPATIENT
Start: 2019-08-21 | End: 2020-04-13

## 2019-08-21 RX ORDER — LOSARTAN POTASSIUM 100 MG/1
TABLET ORAL
Qty: 90 TAB | Refills: 1 | Status: SHIPPED | OUTPATIENT
Start: 2019-08-21 | End: 2020-03-06

## 2019-09-19 PROBLEM — Z12.39 BREAST SCREENING, UNSPECIFIED: Status: RESOLVED | Noted: 2019-07-18 | Resolved: 2019-09-19

## 2019-09-23 ENCOUNTER — OFFICE VISIT (OUTPATIENT)
Dept: ONCOLOGY | Age: 64
End: 2019-09-23

## 2019-09-23 ENCOUNTER — HOSPITAL ENCOUNTER (OUTPATIENT)
Dept: INFUSION THERAPY | Age: 64
Discharge: HOME | End: 2019-09-23
Payer: MEDICARE

## 2019-09-23 VITALS
WEIGHT: 210 LBS | BODY MASS INDEX: 35.85 KG/M2 | OXYGEN SATURATION: 99 % | HEIGHT: 64 IN | TEMPERATURE: 98.5 F | HEART RATE: 78 BPM | SYSTOLIC BLOOD PRESSURE: 134 MMHG | DIASTOLIC BLOOD PRESSURE: 73 MMHG

## 2019-09-23 VITALS
DIASTOLIC BLOOD PRESSURE: 73 MMHG | OXYGEN SATURATION: 99 % | HEART RATE: 78 BPM | HEIGHT: 64 IN | WEIGHT: 210 LBS | TEMPERATURE: 98.5 F | SYSTOLIC BLOOD PRESSURE: 134 MMHG | BODY MASS INDEX: 35.85 KG/M2

## 2019-09-23 DIAGNOSIS — E55.9 VITAMIN D DEFICIENCY: ICD-10-CM

## 2019-09-23 DIAGNOSIS — D64.9 ANEMIA, UNSPECIFIED TYPE: ICD-10-CM

## 2019-09-23 DIAGNOSIS — D05.11 DUCTAL CARCINOMA IN SITU (DCIS) OF RIGHT BREAST: ICD-10-CM

## 2019-09-23 DIAGNOSIS — D05.11 DUCTAL CARCINOMA IN SITU (DCIS) OF RIGHT BREAST: Primary | ICD-10-CM

## 2019-09-23 DIAGNOSIS — D75.839 THROMBOCYTOSIS: ICD-10-CM

## 2019-09-23 LAB
ALBUMIN SERPL-MCNC: 3.9 G/DL (ref 3.4–5)
ALBUMIN/GLOB SERPL: 1 {RATIO} (ref 0.8–1.7)
ALP SERPL-CCNC: 63 U/L (ref 45–117)
ALT SERPL-CCNC: 20 U/L (ref 13–56)
ANION GAP SERPL CALC-SCNC: 7 MMOL/L (ref 3–18)
AST SERPL-CCNC: 14 U/L (ref 10–38)
BASO+EOS+MONOS # BLD AUTO: 0.8 K/UL (ref 0–2.3)
BASO+EOS+MONOS NFR BLD AUTO: 7 % (ref 0.1–17)
BILIRUB SERPL-MCNC: 0.2 MG/DL (ref 0.2–1)
BUN SERPL-MCNC: 16 MG/DL (ref 7–18)
BUN/CREAT SERPL: 21 (ref 12–20)
CALCIUM SERPL-MCNC: 9.6 MG/DL (ref 8.5–10.1)
CHLORIDE SERPL-SCNC: 104 MMOL/L (ref 100–111)
CO2 SERPL-SCNC: 30 MMOL/L (ref 21–32)
CREAT SERPL-MCNC: 0.76 MG/DL (ref 0.6–1.3)
DIFFERENTIAL METHOD BLD: ABNORMAL
ERYTHROCYTE [DISTWIDTH] IN BLOOD BY AUTOMATED COUNT: 13.6 % (ref 11.5–14.5)
FERRITIN SERPL-MCNC: 94 NG/ML (ref 8–388)
GLOBULIN SER CALC-MCNC: 3.9 G/DL (ref 2–4)
GLUCOSE SERPL-MCNC: 83 MG/DL (ref 74–99)
HCT VFR BLD AUTO: 38.5 % (ref 36–48)
HGB BLD-MCNC: 12.1 G/DL (ref 12–16)
IRON SATN MFR SERPL: 13 %
IRON SERPL-MCNC: 52 UG/DL (ref 50–175)
LYMPHOCYTES # BLD: 2 K/UL (ref 1.1–5.9)
LYMPHOCYTES NFR BLD: 19 % (ref 14–44)
MCH RBC QN AUTO: 27.1 PG (ref 25–35)
MCHC RBC AUTO-ENTMCNC: 31.4 G/DL (ref 31–37)
MCV RBC AUTO: 86.1 FL (ref 78–102)
NEUTS SEG # BLD: 7.7 K/UL (ref 1.8–9.5)
NEUTS SEG NFR BLD: 74 % (ref 40–70)
PLATELET # BLD AUTO: 437 K/UL (ref 140–440)
POTASSIUM SERPL-SCNC: 3.5 MMOL/L (ref 3.5–5.5)
PROT SERPL-MCNC: 7.8 G/DL (ref 6.4–8.2)
RBC # BLD AUTO: 4.47 M/UL (ref 4.1–5.1)
SODIUM SERPL-SCNC: 141 MMOL/L (ref 136–145)
TIBC SERPL-MCNC: 386 UG/DL (ref 250–450)
WBC # BLD AUTO: 10.5 K/UL (ref 4.5–13)

## 2019-09-23 PROCEDURE — 85025 COMPLETE CBC W/AUTO DIFF WBC: CPT

## 2019-09-23 PROCEDURE — 82728 ASSAY OF FERRITIN: CPT

## 2019-09-23 PROCEDURE — 80053 COMPREHEN METABOLIC PANEL: CPT

## 2019-09-23 PROCEDURE — 83540 ASSAY OF IRON: CPT

## 2019-09-23 PROCEDURE — 36415 COLL VENOUS BLD VENIPUNCTURE: CPT

## 2019-09-23 NOTE — PROGRESS NOTES
Hematology/Oncology  Progress Note    Name: Khai Sanchez  Date: 2019  : 1955    Jacoby Cifuentes MD     Ms. Eduarda Tucker is a 60y.o. year old female who was seen for DCIS + LCIS of the right breast.     Current therapy: Tamoxifen 10 mg po daily. Subjective:     Patient is here today for her 6 month follow up. She said that she is doing well. Her energy level is great. She is eating and sleeping well. She has no physical complaints to report today. She is doing her self breast examination regularly and has not noticed any changes. Her most recent mammogram was on 2019 which showed no mammographic evidence for malignancy. Past medical history, family history, and social history: these were reviewed and remains unchanged.     Past Medical History:   Diagnosis Date    Anemia     resolved    Diverticulosis     DM type 2 [facility]diabetes mellitus, type 2) ([facility] [address]     Hypertension     Internal hemorrhoids     Menopause     Vitamin D deficiency      Past Surgical History:   Procedure Laterality Date    HX BREAST LUMPECTOMY Right 2017    right BREAST LUMPECTOMY WITH localization performed by Alanna Scott MD at [address] [email or web address] HX BREAST LUMPECTOMY Right 2017    revision right breast to achieve neg margin performed by Alanna Scott MD at [address] [employer] [facility] HX COLONOSCOPY      repeat  - [facility]    HX ORTHOPAEDIC      fluid drained from lt shoulder    HX TUBAL LIGATION       Social History     Socioeconomic History    Marital status:      Spouse name: Not on file    Number of children: Not on file    Years of education: Not on file    Highest education level: Not on file   Occupational History    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   Social Needs    Financial resource strain: Not on file    Food insecurity:     Worry: Not on file     Inability: Not on file   [employer] needs:     Medical: Not on file     Non-medical: Not on file   Tobacco Use    Smoking status: Never Smoker    Smokeless tobacco: Never Used   Substance and Sexual Activity    Alcohol use: No    Drug use: No    Sexual activity: Not Currently     Partners: Male     Comment:    Lifestyle    Physical activity:     Days per week: Not on file     Minutes per session: Not on file    Stress: Not on file   Relationships    Social connections:     Talks on phone: Not on file     Gets together: Not on file     Attends Christian service: Not on file     Active member of club or organization: Not on file     Attends meetings of clubs or organizations: Not on file     Relationship status: Not on file    Intimate partner violence:     Fear of current or ex partner: Not on file     Emotionally abused: Not on file     Physically abused: Not on file     Forced sexual activity: Not on file   Other Topics Concern    Not on file   Social History Narrative    Not on file     Family History   Problem Relation Age of Onset    Stroke Mother     Hypertension Mother     Diabetes Daughter      Current Outpatient Medications   Medication Sig Dispense Refill    losartan (COZAAR) 100 mg tablet TAKE 1 TABLET BY MOUTH ONCE DAILY 90 Tab 1    hydroCHLOROthiazide (HYDRODIURIL) 25 mg tablet TAKE 1 TABLET BY MOUTH ONCE DAILY 90 Tab 1    metFORMIN (GLUCOPHAGE) 1,000 mg tablet TAKE 1 TABLET BY MOUTH TWICE DAILY WITH MEALS 180 Tab 1    dilTIAZem (TIAZAC) 360 mg ER capsule TAKE 1 CAPSULE BY MOUTH ONCE DAILY 90 Cap 1    metoprolol succinate (TOPROL-XL) 25 mg XL tablet Take 1 Tab by mouth daily. 90 Tab 1    tamoxifen (NOLVADEX) 10 mg tablet Take 0.5 Tabs by mouth daily. 90 Tab 4    glipiZIDE SR (GLUCOTROL XL) 5 mg CR tablet 1 tablet daily 90 Tab 1    ergocalciferol (VITAMIN D2) 50,000 unit capsule TAKE ONE CAPSULE BY MOUTH EVERY 14 DAYS 12 Cap 1       Review of Systems  Constitutional: The patient has no acute distress or discomfort.   HEENT: The patient denies recent head trauma, eye pain, blurred vision,  hearing deficit, oropharyngeal mucosal pain or lesions, and the patient denies throat pain or discomfort. Lymphatics: The patient denies palpable peripheral lymphadenopathy. Hematologic: The patient denies having bruising, bleeding, or progressive fatigue. Respiratory: Patient denies having shortness of breath, cough, sputum production, fever, or dyspnea on exertion. Cardiovascular: The patient denies having leg pain, leg swelling, heart palpitations, chest permit, chest pain, or lightheadedness. The patient denies having dyspnea on exertion. Gastrointestinal: The patient denies having nausea, emesis, or diarrhea. The patient denies having any hematemesis or blood in the stool. Genitourinary: Patient denies having urinary urgency, frequency, or dysuria. The patient denies having blood in the urine. Psychological: The patient denies having symptoms of nervousness, anxiety, depression, or thoughts of harming self. Skin: Patient denies having skin rashes, skin, ulcerations, or unexplained itching or pruritus. Musculoskeletal: The patient denies having pain in the joints or bones. Objective:     Visit Vitals  /73   Pulse 78   Temp 98.5 °F (36.9 °C)   Ht 5' 4\" (1.626 m)   Wt 95.3 kg (210 lb)   SpO2 99%   BMI 36.05 kg/m²     ECOG PS=0  Physical Exam:   Gen. Appearance: The patient is in no acute distress. Skin: There is no bruise or rash. HEENT: The exam is unremarkable. Neck: Supple without lymphadenopathy or thyromegaly. Lungs: Clear to auscultation and percussion; there are no wheezes or rhonchi. Heart: Regular rate and rhythm; there are no murmurs, gallops, or rubs. Abdomen: Bowel sounds are present and normal.  There is no guarding, tenderness, or hepatosplenomegaly. Extremities: There is no clubbing, cyanosis, or edema. Neurologic: There are no focal neurologic deficits. Lymphatics: There is no palpable peripheral lymphadenopathy.  Musculoskeletal: The patient has full range of motion at all joints. There is no evidence of joint deformity or effusions. There is no focal joint tenderness. Psychological/psychiatric: There is no clinical evidence of anxiety, depression, or melancholy. Lab data:      No results found for this or any previous visit. Assessment:     1. Ductal carcinoma in situ (DCIS) of right breast    2. Anemia, unspecified type    3. Thrombocytosis ([address]    4. Vitamin D deficiency          Plan:     1. Ductal carcinoma in situ of right breast: Patient is currently on Tamoxifen 10 mg by mouth daily. Patient reports no side effects from it. She will continue taking the Tamoxifen as prescribed. She will also continue doing SBE on a monthly basis and will report if she notices any changes. She will continue her annual mammogram.    2. Anemia: CBC, Iron profile, Ferritin level and CMP were ordered today. She will be given iron supplement as needed. Iron treatment will depend on the results of the iron profile and Ferritin levels. Patient is not taking any iron supplement. 3. Thrombocytosis: CBC was ordered today. Will give treatment accordingly if needed. 4.Vitamin D deficiency: The most recent Vitamin D level was on 7/30/2019 which was sufficient at 56.0 ng/mL. Patient said that she takes Vitamin D supplement every other week. Orders Placed This Encounter    CBC WITH AUTOMATED DIFF     Standing Status:   Future     Standing Expiration Date:   8/98/6159    METABOLIC PANEL, COMPREHENSIVE     Standing Status:   Future     Number of Occurrences:   1     Standing Expiration Date:   9/23/2020    IRON PROFILE     Standing Status:   Future     Number of Occurrences:   1     Standing Expiration Date:   9/23/2020    FERRITIN     Standing Status:   Future     Number of Occurrences:   1     Standing Expiration Date:   9/23/2020       Marsha Jim NP  9/23/2019      Please note: This document has been produced using voice recognition software.   Unrecognized errors in transcription may be present.

## 2019-10-16 RX ORDER — METFORMIN HYDROCHLORIDE 1000 MG/1
TABLET ORAL
Qty: 180 TAB | Refills: 1 | Status: SHIPPED | OUTPATIENT
Start: 2019-10-16 | End: 2020-07-09 | Stop reason: SDUPTHER

## 2019-10-23 ENCOUNTER — HOSPITAL ENCOUNTER (OUTPATIENT)
Dept: RADIATION THERAPY | Age: 64
Discharge: HOME | End: 2019-10-23
Payer: MEDICARE

## 2019-10-23 PROCEDURE — 99211 OFF/OP EST MAY X REQ PHY/QHP: CPT

## 2019-12-09 DIAGNOSIS — D75.839 THROMBOCYTOSIS: ICD-10-CM

## 2019-12-09 DIAGNOSIS — E55.9 VITAMIN D DEFICIENCY: ICD-10-CM

## 2019-12-09 DIAGNOSIS — E11.9 CONTROLLED TYPE 2 DIABETES MELLITUS WITHOUT COMPLICATION, WITHOUT LONG-TERM CURRENT USE OF INSULIN (HCC): Primary | ICD-10-CM

## 2019-12-23 RX ORDER — ERGOCALCIFEROL 1.25 MG/1
CAPSULE ORAL
Qty: 4 CAP | Refills: 0 | Status: SHIPPED | OUTPATIENT
Start: 2019-12-23 | End: 2020-03-23

## 2020-01-07 RX ORDER — GLIPIZIDE 5 MG/1
TABLET, FILM COATED, EXTENDED RELEASE ORAL
Qty: 90 TAB | Refills: 0 | Status: SHIPPED | OUTPATIENT
Start: 2020-01-07 | End: 2020-03-30

## 2020-01-31 RX ORDER — DILTIAZEM HYDROCHLORIDE 360 MG/1
CAPSULE, EXTENDED RELEASE ORAL
Qty: 90 CAP | Refills: 0 | Status: SHIPPED | OUTPATIENT
Start: 2020-01-31 | End: 2020-04-29

## 2020-02-07 ENCOUNTER — TELEPHONE (OUTPATIENT)
Dept: ONCOLOGY | Age: 65
End: 2020-02-07

## 2020-02-07 DIAGNOSIS — D05.11 DUCTAL CARCINOMA IN SITU (DCIS) OF RIGHT BREAST: ICD-10-CM

## 2020-02-07 RX ORDER — TAMOXIFEN CITRATE 10 MG/1
5 TABLET, FILM COATED ORAL DAILY
Qty: 90 TAB | Refills: 4 | Status: SHIPPED | OUTPATIENT
Start: 2020-02-07 | End: 2020-09-29

## 2020-03-05 NOTE — H&P (VIEW-ONLY)
History of present illness    Sue Sepulveda comes to the office today for evaluation because of a recent screening mammogram that showed an area of calcifications in the upper outer quadrant of the right breast which had increased compared to a mammogram from 2013. The patient has not been aware of any breast tenderness or lumps. She has no family history of breast cancer or ovarian cancer. She has never had any previous breast problems before. Her menarche occurred at the age of 17. She is  4 para 4 and was 14years old at the time of her first pregnancy. She did not breast-feed. She took birth control pills for a total of about 1 year. Her last menstrual period was at about the age of 50. No Known Allergies  Past Medical History:   Diagnosis Date    Anemia     resolved    Diverticulosis     DM type 2 (diabetes mellitus, type 2) ([facility] [address]     Hypertension     Internal hemorrhoids     Menopause     Vitamin D deficiency      Past Surgical History:   Procedure Laterality Date    HX COLONOSCOPY      repeat  - [facility]    HX ORTHOPAEDIC      fluid drained from lt shoulder    HX TUBAL LIGATION       Social History     Social History    Marital status: UNKNOWN     Spouse name: N/A    Number of children: N/A    Years of education: N/A     Occupational History    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 Skin:  No recent rashes, dermatitis or abnormal moles. HEENT:  No changes in vision, vertigo, epistaxis, dysphasia, or hoarseness. Cardiac:  No chest pain, palpitations, or edema.   History of hypertension     Respiratory: No chronic cough, shortness of breath, wheezing, hemoptysis, or history of sleep apnea. Breasts/GYN:  No history of recent breast lumps or nipple discharge. Mammograms are up to date. No GYN-type problems. See the history of present illness. History of tubal ligation     Gastrointestinal:  No significant food intolerances, no recent vomiting, no chronic abdominal pain, no change in bowel habits, no melena. No history of GERD. Genitourinary:  No history of hematuria, dysuria, frequency, or stress urinary incontinence. No nocturia. Musculoskeletal: No weakness, joint pains, or arthritis. Endocrine:  No history of thyroid disease. Hx. Diabetes type II. Lymph/hemo:  No history of blood transfusions or easy bruising. Hx anemia. History of hypovitaminosis D     Neuro: No dizziness or headaches or fainting. PHYSICAL EXAM    Visit Vitals    /75 (BP 1 Location: Right arm, BP Patient Position: Sitting)    Pulse 100    Temp 97.9 °F (36.6 °C) (Oral)    Resp 17    Ht 5' 3\" (1.6 m)    Wt 104.1 kg (229 lb 6.4 oz)    SpO2 99%    BMI 40.64 kg/m2        Constitutional:  Well-developed, well-nourished, no acute distress. Patient is morbidly obese     Head:  Head, eyes, ears, nose, throat within normal limits. Skin:  No suspicious moles or rashes. Neck:  No masses or adenopathy. The airway appears normal. Thyroid is not enlarged and there are no palpable thyroid nodules. Lungs:  Lungs are clear to auscultation and percussion. No respiratory distress. No chest wall tenderness. Heart:  Heart is regular with no extra heart sounds or murmur heard. Breast Exam: The breasts are free of any discrete tenderness or masses  The skin and nipple areas appear normal. Both axillae are negative. She does have some slight thickening in the upper outer quadrant of the right breast but no discrete mass. Abdomen: The abdomen is soft and nontender without organomegaly or masses.  Bowel sounds are No active and of normal pitch. There is no abdominal distention. No hernias are evident. Small scar at the umbilicus from previous tubal ligation. Extremities:  No tenderness of the extremities and no significant swelling. Psych:  Alert and oriented. Assessment: #1 suspicious calcifications upper outer quadrant right breast with no previous significant risk factors for breast cancer. #2 obesity. #3 hypertension. #4 type 2 diabetes. #5 low vitamin D level. Recommendations: I recommend that the patient undergo a stereotactic biopsy of the calcifications in the right breast.  Risks and complications were explained to the patient and she is willing to proceed with this. The above was done with voice recognition and there may be unrecognized errors.     Yoana Paige MD

## 2020-03-06 RX ORDER — LOSARTAN POTASSIUM 100 MG/1
TABLET ORAL
Qty: 90 TAB | Refills: 0 | Status: SHIPPED | OUTPATIENT
Start: 2020-03-06 | End: 2020-03-30

## 2020-03-30 RX ORDER — LOSARTAN POTASSIUM 100 MG/1
TABLET ORAL
Qty: 90 TAB | Refills: 0 | Status: SHIPPED | OUTPATIENT
Start: 2020-03-30 | End: 2020-08-24 | Stop reason: SDUPTHER

## 2020-03-30 RX ORDER — GLIPIZIDE 5 MG/1
TABLET, FILM COATED, EXTENDED RELEASE ORAL
Qty: 90 TAB | Refills: 0 | Status: SHIPPED | OUTPATIENT
Start: 2020-03-30 | End: 2020-06-26

## 2020-04-06 ENCOUNTER — TELEPHONE (OUTPATIENT)
Dept: FAMILY MEDICINE CLINIC | Age: 65
End: 2020-04-06

## 2020-04-06 NOTE — TELEPHONE ENCOUNTER
I left her a voicemail message letting her know she is overdue for follow up and need to schedule an appointment. I also mailed her a letter.

## 2020-04-13 RX ORDER — HYDROCHLOROTHIAZIDE 25 MG/1
TABLET ORAL
Qty: 90 TAB | Refills: 0 | Status: SHIPPED | OUTPATIENT
Start: 2020-04-13 | End: 2020-07-22

## 2020-04-22 ENCOUNTER — HOSPITAL ENCOUNTER (OUTPATIENT)
Dept: RADIATION THERAPY | Age: 65
Discharge: HOME | End: 2020-04-22

## 2020-04-27 ENCOUNTER — VIRTUAL VISIT (OUTPATIENT)
Dept: FAMILY MEDICINE CLINIC | Age: 65
End: 2020-04-27

## 2020-04-27 NOTE — PROGRESS NOTES
1. Have you been to the ER, urgent care clinic since your last visit? Hospitalized since your last visit? No    2. Have you seen or consulted any other health care providers outside of the [address] [place] [address] since your last visit? Include any pap smears or colon screening. Yes, had a Virtual Visit with her Oncologist on 4/23/2020.     Chief Complaint   Patient presents with    Follow Up Chronic Condition

## 2020-04-29 RX ORDER — DILTIAZEM HYDROCHLORIDE 360 MG/1
CAPSULE, EXTENDED RELEASE ORAL
Qty: 90 CAP | Refills: 0 | Status: SHIPPED | OUTPATIENT
Start: 2020-04-29 | End: 2020-09-29 | Stop reason: SDUPTHER

## 2020-06-18 ENCOUNTER — VIRTUAL VISIT (OUTPATIENT)
Dept: FAMILY MEDICINE CLINIC | Age: 65
End: 2020-06-18

## 2020-06-18 DIAGNOSIS — Z13.31 SCREENING FOR DEPRESSION: ICD-10-CM

## 2020-06-18 DIAGNOSIS — E55.9 VITAMIN D DEFICIENCY: ICD-10-CM

## 2020-06-18 DIAGNOSIS — E11.9 CONTROLLED TYPE 2 DIABETES MELLITUS WITHOUT COMPLICATION, WITHOUT LONG-TERM CURRENT USE OF INSULIN (HCC): Primary | ICD-10-CM

## 2020-06-18 DIAGNOSIS — Z00.00 MEDICARE ANNUAL WELLNESS VISIT, SUBSEQUENT: ICD-10-CM

## 2020-06-18 DIAGNOSIS — D75.839 THROMBOCYTOSIS: ICD-10-CM

## 2020-06-18 DIAGNOSIS — I10 ESSENTIAL HYPERTENSION: ICD-10-CM

## 2020-06-18 DIAGNOSIS — E66.01 MORBID OBESITY DUE TO EXCESS CALORIES (HCC): ICD-10-CM

## 2020-06-18 DIAGNOSIS — Z13.39 SCREENING FOR ALCOHOLISM: ICD-10-CM

## 2020-06-18 RX ORDER — METFORMIN HYDROCHLORIDE 1000 MG/1
TABLET ORAL
Qty: 60 TAB | Refills: 0 | Status: SHIPPED | OUTPATIENT
Start: 2020-06-18 | End: 2020-09-29 | Stop reason: SDUPTHER

## 2020-06-18 NOTE — PROGRESS NOTES
1. Have you been to the ER, urgent care clinic since your last visit? Hospitalized since your last visit? No    2. Have you seen or consulted any other health care providers outside of the [address] [place] [address] since your last visit? Include any pap smears or colon screening. Yes, had a virtual visit with her Oncologist in 5/2020. Chief Complaint   Patient presents with    Annual Wellness Visit    Diabetes     Home BS been ranging 130's-140's.

## 2020-06-18 NOTE — PROGRESS NOTES
Edgardo Das is a 60 y.o. female who was seen by synchronous (real-time) audio-video technology on 6/18/2020. Consent: Edgardo Das, who was seen by synchronous (real-time) audio-video technology, and/or her healthcare decision maker, is aware that this patient-initiated, Telehealth encounter on 6/18/2020 is a billable service, with coverage as determined by her insurance carrier. She is aware that she may receive a bill and has provided verbal consent to proceed: Yes. Assessment & Plan:   Diagnoses and all orders for this visit:    1. Controlled type 2 diabetes mellitus without complication, without long-term current use of insulin (HCC)  -     METABOLIC PANEL, COMPREHENSIVE; Future  -     LIPID PANEL; Future  -     MICROALBUMIN, UR, RAND W/ MICROALB/CREAT RATIO; Future    2. Essential hypertension-continue medications but will check blood pressure when she comes in for labs    3. Vitamin D deficiency-we will recheck   -     VITAMIN D, 25 HYDROXY; Future    4. Morbid obesity due to excess calories (HCC)-encouraged weight loss with caloric restriction to body mass index under 25 if possible    5. Thrombocytosis (HCC)-resolved on previous lab testing but will recheck  -     CBC W/O DIFF; Future    Other orders  -     metFORMIN (GLUCOPHAGE) 1,000 mg tablet; TAKE 1 TABLET BY MOUTH TWICE DAILY WITH MEALS      Discussed follow-up in 1 month and if all labs are good we will follow-up them 5 months after that. Will attempt to do blood pressure and weight check when she comes in for labs    712  Subjective:   Edgardo Das is a 60 y.o. female who was seen for Annual Wellness Visit and Diabetes (Home BS been ranging 130's-140's.)  Diabetesno polyuria or polydipsia. Patient is overdue for follow-up and this was discussed in detail relating the importance of these to maintain control of her medical problems.   She is taking her medications and testing home blood sugars which she reports are in the 1 30-1 40 range.  Hypertensionno chest pain or shortness of breath. History of breast cancerfollowing with oncology for this and has mammogram next week  Vitamin D deficiencyno bone pain or cramping  Obesitypatient reports weight is 208 pounds which is 2 pounds less than  Prior to Admission medications    Medication Sig Start Date End Date Taking? Authorizing Provider   dilTIAZem ER (CARDIZEM CD) 360 mg capsule Take 1 capsule by mouth once daily 4/29/20  Yes Rich George MD   hydroCHLOROthiazide (HYDRODIURIL) 25 mg tablet Take 1 tablet by mouth once daily 4/13/20  Yes Rich George MD   losartan (COZAAR) 100 mg tablet Take 1 tablet by mouth once daily 3/30/20  Yes Rich George MD   glipiZIDE SR (GLUCOTROL XL) 5 mg CR tablet Take 1 tablet by mouth once daily 3/30/20  Yes Rich George MD   ergocalciferol (Vitamin D2) 1,250 mcg (50,000 unit) capsule TAKE 1 CAPSULE BY MOUTH EVERY TWO WEEKS 3/23/20  Yes Rich George MD   tamoxifen (NOLVADEX) 10 mg tablet Take 0.5 Tabs by mouth daily. 2/7/20  Yes Tracy Baez MD   metFORMIN (GLUCOPHAGE) 1,000 mg tablet TAKE 1 TABLET BY MOUTH TWICE DAILY WITH MEALS 8/9/19  Yes Rich George MD   metoprolol succinate (TOPROL-XL) 25 mg XL tablet Take 1 Tab by mouth daily.  6/11/19  Yes Rich George MD   metFORMIN (GLUCOPHAGE) 1,000 mg tablet TAKE 1 TABLET BY MOUTH TWICE DAILY WITH MEALS 10/16/19   Rich George MD     No Known Allergies    Patient Active Problem List   Diagnosis Code    Anemia D64.9    Hypertension I10    Vitamin D deficiency E55.9    Breast calcifications on mammogram R92.1    Thrombocytosis ([address] D47.3    Morbid obesity due to excess calories (HCC) E66.01    Ductal carcinoma in situ (DCIS) of right breast D05.11    Controlled type 2 diabetes mellitus without complication, without long-term current use of insulin (HCC) E11.9     Patient Active Problem List    Diagnosis Date Noted    Controlled type 2 diabetes mellitus without complication, without long-term current use of insulin ([facility] 08/22/2017    Ductal carcinoma in situ (DCIS) of right breast 05/23/2017    Thrombocytosis ([facility] 05/13/2017    Morbid obesity due to excess calories ([facility] 05/13/2017    Breast calcifications on mammogram 05/04/2017    Vitamin D deficiency     Anemia     Hypertension      Current Outpatient Medications   Medication Sig Dispense Refill    metFORMIN (GLUCOPHAGE) 1,000 mg tablet TAKE 1 TABLET BY MOUTH TWICE DAILY WITH MEALS 60 Tab 0    dilTIAZem ER (CARDIZEM CD) 360 mg capsule Take 1 capsule by mouth once daily 90 Cap 0    hydroCHLOROthiazide (HYDRODIURIL) 25 mg tablet Take 1 tablet by mouth once daily 90 Tab 0    losartan (COZAAR) 100 mg tablet Take 1 tablet by mouth once daily 90 Tab 0    glipiZIDE SR (GLUCOTROL XL) 5 mg CR tablet Take 1 tablet by mouth once daily 90 Tab 0    ergocalciferol (Vitamin D2) 1,250 mcg (50,000 unit) capsule TAKE 1 CAPSULE BY MOUTH EVERY TWO WEEKS 12 Cap 1    tamoxifen (NOLVADEX) 10 mg tablet Take 0.5 Tabs by mouth daily. 90 Tab 4    metoprolol succinate (TOPROL-XL) 25 mg XL tablet Take 1 Tab by mouth daily.  90 Tab 1    metFORMIN (GLUCOPHAGE) 1,000 mg tablet TAKE 1 TABLET BY MOUTH TWICE DAILY WITH MEALS 180 Tab 1     No Known Allergies  Past Medical History:   Diagnosis Date    Anemia     resolved    Diverticulosis 11/13    DM type 2 (diabetes mellitus, type 2) ([facility]     Hypertension     Internal hemorrhoids 11/13    Menopause     Vitamin D deficiency      Past Surgical History:   Procedure Laterality Date    HX BREAST LUMPECTOMY Right 5/30/2017    right BREAST LUMPECTOMY WITH localization performed by Jimena Aguero MD at [address] [place] [address] HX BREAST LUMPECTOMY Right 6/20/2017    revision right breast to achieve neg margin performed by Jimena Aguero MD at [address] [place] [address] HX COLONOSCOPY  1/13    repeat 1/23 - [facility]    HX ORTHOPAEDIC      fluid drained from lt shoulder    HX TUBAL LIGATION Family History   Problem Relation Age of Onset   Pamela Harris Stroke Mother     Hypertension Mother     Diabetes Daughter      Social History     Tobacco Use    Smoking status: Never Smoker    Smokeless tobacco: Never Used   Substance Use Topics    Alcohol use: No       ROS  Cardiac- no chest pain or palpitations  Pulmonary- no sob or wheezes  GI- no n/v or diarrhea. Objective: There were no vitals taken for this visit. General: alert, cooperative, no distress   Mental  status: normal mood, behavior, speech, dress, motor activity, and thought processes, able to follow commands   HENT: NCAT   Neck: no visualized mass   Resp: no respiratory distress   Neuro: no gross deficits   Skin: no discoloration or lesions of concern on visible areas   Psychiatric: normal affect, consistent with stated mood, no evidence of hallucinations     Additional exam findings: We discussed the expected course, resolution and complications of the diagnosis(es) in detail. Medication risks, benefits, costs, interactions, and alternatives were discussed as indicated. I advised her to contact the office if her condition worsens, changes or fails to improve as anticipated. She expressed understanding with the diagnosis(es) and plan. Isabelle Arzate is a 60 y.o. female who was evaluated by a video visit encounter for concerns as above. Patient identification was verified prior to start of the visit. A caregiver was present when appropriate. Due to this being a TeleHealth encounter (During [facility] public health emergency), evaluation of the following organ systems was limited: Vitals/Constitutional/EENT/Resp/CV/GI//MS/Neuro/Skin/Heme-Lymph-Imm.   Pursuant to the emergency declaration under the [facility] waiver authority and the [employer] and [employer] Act, this Virtual  Visit was conducted, with patient's (and/or legal guardian's) consent, to reduce the patient's risk of exposure to COVID-19 and provide necessary medical care. Services were provided through a video synchronous discussion virtually to substitute for in-person clinic visit. Patient and provider were located at their individual homes. Seema Ray MD      This is the Subsequent Medicare Annual Wellness Exam, performed 12 months or more after the Initial AWV or the last Subsequent AWV    Consent: Jahaira Mcwilliams, who was seen by synchronous (real-time) audio-video technology, and/or her healthcare decision maker, is aware that this patient-initiated, Telehealth encounter on 6/18/2020 is a billable service. While AWVs are fully covered by Medicare, any services rendered on this date that are not included in an AWV are subject to additional billing, with coverage as determined by her insurance carrier. She is aware that she may receive a bill for any such additional services and has provided verbal consent to proceed: Yes. I have reviewed the patient's medical history in detail and updated the computerized patient record.      History     Patient Active Problem List   Diagnosis Code    Anemia D64.9    Hypertension I10    Vitamin D deficiency E55.9    Breast calcifications on mammogram R92.1    Thrombocytosis ([facility] D47.3    Morbid obesity due to excess calories (HCC) E66.01    Ductal carcinoma in situ (DCIS) of right breast D05.11    Controlled type 2 diabetes mellitus without complication, without long-term current use of insulin (HCC) E11.9     Past Medical History:   Diagnosis Date    Anemia     resolved    Diverticulosis 11/13    DM type 2 (diabetes mellitus, type 2) ([address]     Hypertension     Internal hemorrhoids 11/13    Menopause     Vitamin D deficiency       Past Surgical History:   Procedure Laterality Date    HX BREAST LUMPECTOMY Right 5/30/2017    right BREAST LUMPECTOMY WITH localization performed by Noemí Aguirre MD at [facility]    HX BREAST LUMPECTOMY Right 6/20/2017    revision right breast to achieve neg margin performed by Yodit Choi MD at [address] HX COLONOSCOPY  1/13    repeat 1/23 - [facility]    HX ORTHOPAEDIC      fluid drained from lt shoulder    HX TUBAL LIGATION       Current Outpatient Medications   Medication Sig Dispense Refill    metFORMIN (GLUCOPHAGE) 1,000 mg tablet TAKE 1 TABLET BY MOUTH TWICE DAILY WITH MEALS 60 Tab 0    dilTIAZem ER (CARDIZEM CD) 360 mg capsule Take 1 capsule by mouth once daily 90 Cap 0    hydroCHLOROthiazide (HYDRODIURIL) 25 mg tablet Take 1 tablet by mouth once daily 90 Tab 0    losartan (COZAAR) 100 mg tablet Take 1 tablet by mouth once daily 90 Tab 0    glipiZIDE SR (GLUCOTROL XL) 5 mg CR tablet Take 1 tablet by mouth once daily 90 Tab 0    ergocalciferol (Vitamin D2) 1,250 mcg (50,000 unit) capsule TAKE 1 CAPSULE BY MOUTH EVERY TWO WEEKS 12 Cap 1    tamoxifen (NOLVADEX) 10 mg tablet Take 0.5 Tabs by mouth daily. 90 Tab 4    metoprolol succinate (TOPROL-XL) 25 mg XL tablet Take 1 Tab by mouth daily. 90 Tab 1    metFORMIN (GLUCOPHAGE) 1,000 mg tablet TAKE 1 TABLET BY MOUTH TWICE DAILY WITH MEALS 180 Tab 1     No Known Allergies    Family History   Problem Relation Age of Onset   [address] [facility] [address] Stroke Mother     Hypertension Mother     Diabetes Daughter      Social History     Tobacco Use    Smoking status: Never Smoker    Smokeless tobacco: Never Used   Substance Use Topics    Alcohol use: No       Depression Risk Factor Screening:     3 most recent PHQ Screens 6/18/2020   Little interest or pleasure in doing things Not at all   Feeling down, depressed, irritable, or hopeless Not at all   Total Score PHQ 2 0       Alcohol Risk Factor Screening:   Do you average 1 drink per night or more than 7 drinks a week:  No    On any one occasion in the past three months have you have had more than 3 drinks containing alcohol:  No      Functional Ability and Level of Safety:   Hearing: Hearing is good.      Activities of Daily Living: The home contains: no safety equipment. Patient does total self care     Ambulation: with no difficulty     Fall Risk:  Fall Risk Assessment, last 12 mths 6/18/2020   Able to walk? Yes   Fall in past 12 months? No     Abuse Screen:  Patient is not abused       Cognitive Screening   Has your family/caregiver stated any concerns about your memory: no    Cognitive Screening: Normal - 3 word recall    Patient Care Team   Patient Care Team:  Ally Rebollar MD as PCP - General (Internal Medicine)  Ally Rebollar MD as PCP - [facility]  Misha Alejandra MD (Gastroenterology)  Mynor Solitario MD (Ophthalmology)  Giovanni Tee MD (Orthopedic Surgery)  Zuleima Patton MD (General Surgery)  Josey Garner, RN as Nurse Navigator  Zuleima Patton MD (General Surgery)    Assessment/Plan   Education and counseling provided:  Are appropriate based on today's review and evaluation  End-of-Life planning (with patient's consent)    Diagnoses and all orders for this visit:    1. Controlled type 2 diabetes mellitus without complication, without long-term current use of insulin (HCC)  -     METABOLIC PANEL, COMPREHENSIVE; Future  -     LIPID PANEL; Future  -     MICROALBUMIN, UR, RAND W/ MICROALB/CREAT RATIO; Future    2. Essential hypertension    3. Vitamin D deficiency  -     VITAMIN D, 25 HYDROXY; Future    4. Morbid obesity due to excess calories ([address]    5. Thrombocytosis (HCC)  -     CBC W/O DIFF; Future    6. Medicare annual wellness visit, subsequent    7. Screening for alcoholism    8.  Screening for depression    Other orders  -     metFORMIN (GLUCOPHAGE) 1,000 mg tablet; TAKE 1 TABLET BY MOUTH TWICE DAILY WITH MEALS        Health Maintenance Due   Topic Date Due    PAP AKA CERVICAL CYTOLOGY  08/08/1978    Medicare Yearly Exam  05/30/2019    Lipid Screen  08/22/2019    Foot Exam Q1  10/11/2019    MICROALBUMIN Q1  04/03/2020    Breast Cancer Screen Mammogram  06/25/2020       Jose Sorensen Suman Vargas is a 60 y.o. female who was evaluated by an audio-video encounter for concerns as above. Patient identification was verified prior to start of the visit. A caregiver was present when appropriate. Due to this being a TeleHealth encounter (During QJYUA-38 public health emergency), evaluation of the following organ systems was limited: Vitals/Constitutional/EENT/Resp/CV/GI//MS/Neuro/Skin/Heme-Lymph-Imm. Pursuant to the emergency declaration under the [address] [place] [address], [facility] waiver authority and the [employer] and [employer] Act, this Virtual Visit was conducted, with patient's (and/or legal guardian's) consent, to reduce the patient's risk of exposure to COVID-19 and provide necessary medical care. Services were provided through a synchronous discussion virtually to substitute for in-person clinic visit. I was at home. The patient was at home.       Allison Rankin MD

## 2020-06-18 NOTE — PATIENT INSTRUCTIONS
Body Mass Index: Care Instructions Your Care Instructions Body mass index (BMI) can help you see if your weight is raising your risk for health problems. It uses a formula to compare how much you weigh with how tall you are. · A BMI lower than 18.5 is considered underweight. · A BMI between 18.5 and 24.9 is considered healthy. · A BMI between 25 and 29.9 is considered overweight. A BMI of 30 or higher is considered obese. If your BMI is in the normal range, it means that you have a lower risk for weight-related health problems. If your BMI is in the overweight or obese range, you may be at increased risk for weight-related health problems, such as high blood pressure, heart disease, stroke, arthritis or joint pain, and diabetes. If your BMI is in the underweight range, you may be at increased risk for health problems such as fatigue, lower protection (immunity) against illness, muscle loss, bone loss, hair loss, and hormone problems. BMI is just one measure of your risk for weight-related health problems. You may be at higher risk for health problems if you are not active, you eat an unhealthy diet, or you drink too much alcohol or use tobacco products. Follow-up care is a key part of your treatment and safety. Be sure to make and go to all appointments, and call your doctor if you are having problems. It's also a good idea to know your test results and keep a list of the medicines you take. How can you care for yourself at home? · Practice healthy eating habits. This includes eating plenty of fruits, vegetables, whole grains, lean protein, and low-fat dairy. · If your doctor recommends it, get more exercise. Walking is a good choice. Bit by bit, increase the amount you walk every day. Try for at least 30 minutes on most days of the week. · Do not smoke. Smoking can increase your risk for health problems.  If you need help quitting, talk to your doctor about stop-smoking programs and medicines. These can increase your chances of quitting for good. · Limit alcohol to 2 drinks a day for men and 1 drink a day for women. Too much alcohol can cause health problems. If you have a BMI higher than 25 · Your doctor may do other tests to check your risk for weight-related health problems. This may include measuring the distance around your waist. A waist measurement of more than 40 inches in men or 35 inches in women can increase the risk of weight-related health problems. · Talk with your doctor about steps you can take to stay healthy or improve your health. You may need to make lifestyle changes to lose weight and stay healthy, such as changing your diet and getting regular exercise. If you have a BMI lower than 18.5 · Your doctor may do other tests to check your risk for health problems. · Talk with your doctor about steps you can take to stay healthy or improve your health. You may need to make lifestyle changes to gain or maintain weight and stay healthy, such as getting more healthy foods in your diet and doing exercises to build muscle. Where can you learn more? Go to http://staci-alejandro.info/ Enter S176 in the search box to learn more about \"Body Mass Index: Care Instructions. \" Current as of: December 11, 2019               Content Version: 12.5 © 9752-7297 [employer]. Care instructions adapted under license by [employer] (which disclaims liability or warranty for this information). If you have questions about a medical condition or this instruction, always ask your healthcare professional. [place] [address] any warranty or liability for your use of this information. Medicare Wellness Visit, Female The best way to live healthy is to have a lifestyle where you eat a well-balanced diet, exercise regularly, limit alcohol use, and quit all forms of tobacco/nicotine, if applicable. Regular preventive services are another way to keep healthy. Preventive services (vaccines, screening tests, monitoring & exams) can help personalize your care plan, which helps you manage your own care. Screening tests can find health problems at the earliest stages, when they are easiest to treat. [facility] follows the current, evidence-based guidelines published by the [facility] ([facility]) when recommending preventive services for our patients. Because we follow these guidelines, sometimes recommendations change over time as research supports it. (For example, mammograms used to be recommended annually. Even though Medicare will still pay for an annual mammogram, the newer guidelines recommend a mammogram every two years for women of average risk). Of course, you and your doctor may decide to screen more often for some diseases, based on your risk and your co-morbidities (chronic disease you are already diagnosed with). Preventive services for you include: - Medicare offers their members a free annual wellness visit, which is time for you and your primary care provider to discuss and plan for your preventive service needs. Take advantage of this benefit every year! 
-All adults over the age of 72 should receive the recommended pneumonia vaccines. Current USPSTF guidelines recommend a series of two vaccines for the best pneumonia protection.  
-All adults should have a flu vaccine yearly and a tetanus vaccine every 10 years.  
-All adults age 48 and older should receive the shingles vaccines (series of two vaccines).      
-All adults age 38-68 who are overweight should have a diabetes screening test once every three years.  
-All adults born between [birth year] and 1963 should be screened once for Hepatitis C. 
-Other screening tests and preventive services for persons with diabetes include: an eye exam to screen for diabetic retinopathy, a kidney function test, a foot exam, and stricter control over your cholesterol.  
-Cardiovascular screening for adults with routine risk involves an electrocardiogram (ECG) at intervals determined by your doctor.  
-Colorectal cancer screenings should be done for adults age 54-65 with no increased risk factors for colorectal cancer. There are a number of acceptable methods of screening for this type of cancer. Each test has its own benefits and drawbacks. Discuss with your doctor what is most appropriate for you during your annual wellness visit. The different tests include: colonoscopy (considered the best screening method), a fecal occult blood test, a fecal DNA test, and sigmoidoscopy. 
 
-A bone mass density test is recommended when a woman turns 65 to screen for osteoporosis. This test is only recommended one time, as a screening. Some providers will use this same test as a disease monitoring tool if you already have osteoporosis. -Breast cancer screenings are recommended every other year for women of normal risk, age 54-69. 
-Cervical cancer screenings for women over age 72 are only recommended with certain risk factors. Here is a list of your current Health Maintenance items (your personalized list of preventive services) with a due date: 
Health Maintenance Due Topic Date Due  
 Pap Test  08/08/1978 Debbie Ferreira Annual Well Visit  05/30/2019  Cholesterol Test   08/22/2019 Debbie Ferreira Diabetic Foot Care  10/11/2019  Albumin Urine Test  04/03/2020  Mammogram  06/25/2020

## 2020-06-18 NOTE — ACP (ADVANCE CARE PLANNING)
Advance Care Planning       Advance Care Planning (ACP) Physician/NP/PA (Provider) Conversation        Date of ACP Conversation: 6/18/2020    Conversation Conducted with:   Patient with Decision Making Mitesh Nobles Maker:    Current Designated Health Care Decision Maker:   (If there is a valid [place] named in the [address]" box in the ACP activity, but it is not visible above, be sure to open that field and then select the health care decision maker relationship (ie \"primary\") in the blank space to the right of the name.)    Note: Assess and validate information in current ACP documents, as indicated. If no Authorized Decision Maker has previously been identified, then patient chooses [place]:  \"Who would you like to name as your primary health care decision-maker? \"    Name: Mel Cook  Relationship:     Note: If the relationship of these Decision-Makers to the patient does NOT follow your state's Next of Kin hierarchy, recommend that patient complete ACP document that meets state-specific requirements to allow them to act on the patient's behalf when appropriate. Care Preferences:    Hospitalization: \"If your health worsens and it becomes clear that your chance of recovery is unlikely, what would your preference be regarding hospitalization? \"  If the patient would want hospitalization, answer \"yes\". If the patient would prefer comfort-focused treatment without hospitalization, answer \"no\". yes      Ventilation: \"If you were in your present state of health and suddenly became very ill and were unable to breathe on your own, what would your preference be about the use of a ventilator (breathing machine) if it was available to you? \"    If patient would desire the use of a ventilator (breathing machine), answer \"yes\", if not answer \"no\":yes    \"If your health worsens and it becomes clear that your chance of recovery is unlikely, what would your preference be about the use of a ventilator (breathing machine) if it was available to you? \"   no      Resuscitation:  \"CPR works best to restart the heart when there is a sudden event, like a heart attack, in someone who is otherwise healthy. Unfortunately, CPR does not typically restart the heart for people who have serious health conditions or who are very sick. \"    \"In the event your heart stopped as a result of an underlying serious health condition, would you want attempts to be made to restart your heart (answer \"yes\" for attempt to resuscitate) or would you prefer a natural death (answer \"no\" for do not attempt to resuscitate)? \"   yes    NOTE: If the patient has a valid advance directive AND provides care preference(s) that are inconsistent with that prior directive, advise the patient to consider either: creating a new advance directive that complies with state-specific requirements; or, if that is not possible, orally revoking that prior directive in accordance with state-specific requirements, which must be documented in the EHR.     Conversation Outcomes / Follow-Up Plan:   Recommended completion of Advance Directive      Length of Voluntary ACP Conversation in minutes:  <16 minutes (Non-Billable)      Carmen Penny MD

## 2020-06-26 RX ORDER — GLIPIZIDE 5 MG/1
TABLET, FILM COATED, EXTENDED RELEASE ORAL
Qty: 90 TAB | Refills: 0 | Status: SHIPPED | OUTPATIENT
Start: 2020-06-26 | End: 2020-08-24 | Stop reason: SDUPTHER

## 2020-06-29 ENCOUNTER — HOSPITAL ENCOUNTER (OUTPATIENT)
Dept: MAMMOGRAPHY | Age: 65
Discharge: HOME | End: 2020-06-29
Attending: SURGERY
Payer: MEDICARE

## 2020-06-29 DIAGNOSIS — Z12.31 VISIT FOR SCREENING MAMMOGRAM: ICD-10-CM

## 2020-06-29 PROCEDURE — 77063 BREAST TOMOSYNTHESIS BI: CPT

## 2020-06-30 ENCOUNTER — HOSPITAL ENCOUNTER (OUTPATIENT)
Dept: LAB | Age: 65
Discharge: HOME | End: 2020-06-30
Payer: MEDICARE

## 2020-06-30 DIAGNOSIS — E11.9 CONTROLLED TYPE 2 DIABETES MELLITUS WITHOUT COMPLICATION, WITHOUT LONG-TERM CURRENT USE OF INSULIN (HCC): ICD-10-CM

## 2020-06-30 DIAGNOSIS — E55.9 VITAMIN D DEFICIENCY: ICD-10-CM

## 2020-06-30 DIAGNOSIS — D75.839 THROMBOCYTOSIS: ICD-10-CM

## 2020-06-30 LAB
25(OH)D3 SERPL-MCNC: 49.3 NG/ML (ref 30–100)
ALBUMIN SERPL-MCNC: 4.1 G/DL (ref 3.4–5)
ALBUMIN/GLOB SERPL: 1.2 {RATIO} (ref 0.8–1.7)
ALP SERPL-CCNC: 66 U/L (ref 45–117)
ALT SERPL-CCNC: 19 U/L (ref 13–56)
ANION GAP SERPL CALC-SCNC: 9 MMOL/L (ref 3–18)
AST SERPL-CCNC: 15 U/L (ref 10–38)
BILIRUB SERPL-MCNC: 0.2 MG/DL (ref 0.2–1)
BUN SERPL-MCNC: 14 MG/DL (ref 7–18)
BUN/CREAT SERPL: 19 (ref 12–20)
CALCIUM SERPL-MCNC: 9.3 MG/DL (ref 8.5–10.1)
CHLORIDE SERPL-SCNC: 109 MMOL/L (ref 100–111)
CHOLEST SERPL-MCNC: 193 MG/DL
CO2 SERPL-SCNC: 25 MMOL/L (ref 21–32)
CREAT SERPL-MCNC: 0.73 MG/DL (ref 0.6–1.3)
CREAT UR-MCNC: 173 MG/DL (ref 30–125)
ERYTHROCYTE [DISTWIDTH] IN BLOOD BY AUTOMATED COUNT: 16 % (ref 11.6–14.5)
GLOBULIN SER CALC-MCNC: 3.3 G/DL (ref 2–4)
GLUCOSE SERPL-MCNC: 98 MG/DL (ref 74–99)
HCT VFR BLD AUTO: 36.7 % (ref 35–45)
HDLC SERPL-MCNC: 100 MG/DL (ref 40–60)
HDLC SERPL: 1.9 {RATIO} (ref 0–5)
HGB BLD-MCNC: 11.5 G/DL (ref 12–16)
LDLC SERPL CALC-MCNC: 74.2 MG/DL (ref 0–100)
LIPID PROFILE,FLP: ABNORMAL
MCH RBC QN AUTO: 26.3 PG (ref 24–34)
MCHC RBC AUTO-ENTMCNC: 31.3 G/DL (ref 31–37)
MCV RBC AUTO: 84 FL (ref 74–97)
MICROALBUMIN UR-MCNC: 1.08 MG/DL (ref 0–3)
MICROALBUMIN/CREAT UR-RTO: 6 MG/G (ref 0–30)
PLATELET # BLD AUTO: 478 K/UL (ref 135–420)
PMV BLD AUTO: 9.8 FL (ref 9.2–11.8)
POTASSIUM SERPL-SCNC: 4 MMOL/L (ref 3.5–5.5)
PROT SERPL-MCNC: 7.4 G/DL (ref 6.4–8.2)
RBC # BLD AUTO: 4.37 M/UL (ref 4.2–5.3)
SODIUM SERPL-SCNC: 143 MMOL/L (ref 136–145)
TRIGL SERPL-MCNC: 94 MG/DL (ref ?–150)
VLDLC SERPL CALC-MCNC: 18.8 MG/DL
WBC # BLD AUTO: 11.5 K/UL (ref 4.6–13.2)

## 2020-06-30 PROCEDURE — 82306 VITAMIN D 25 HYDROXY: CPT

## 2020-06-30 PROCEDURE — 80053 COMPREHEN METABOLIC PANEL: CPT

## 2020-06-30 PROCEDURE — 80061 LIPID PANEL: CPT

## 2020-06-30 PROCEDURE — 82043 UR ALBUMIN QUANTITATIVE: CPT

## 2020-06-30 PROCEDURE — 85027 COMPLETE CBC AUTOMATED: CPT

## 2020-06-30 PROCEDURE — 36415 COLL VENOUS BLD VENIPUNCTURE: CPT

## 2020-07-01 ENCOUNTER — TELEPHONE (OUTPATIENT)
Dept: SURGERY | Age: 65
End: 2020-07-01

## 2020-07-01 NOTE — TELEPHONE ENCOUNTER
Called patient per Dr. Jessy Nicholson to let her know there were no areas of concern seen on imaging. Patient verbalized understanding. Appt arranged for annual breast exam.     ----- Message from May Baez MD sent at 6/30/2020  9:12 AM EDT -----  Please let her know there were no areas of concern seen on imaging.   Thanks     ----- Message -----  From: James, Rad Results In  Sent: 6/30/2020   9:07 AM EDT  To: May Baez MD

## 2020-07-13 RX ORDER — METFORMIN HYDROCHLORIDE 1000 MG/1
TABLET ORAL
Qty: 180 TAB | Refills: 0 | Status: SHIPPED | OUTPATIENT
Start: 2020-07-13 | End: 2020-09-29

## 2020-08-11 RX ORDER — HYDROCHLOROTHIAZIDE 25 MG/1
TABLET ORAL
Qty: 90 TAB | Refills: 0 | Status: SHIPPED | OUTPATIENT
Start: 2020-08-11 | End: 2020-08-24 | Stop reason: SDUPTHER

## 2020-08-24 ENCOUNTER — VIRTUAL VISIT (OUTPATIENT)
Dept: FAMILY MEDICINE CLINIC | Age: 65
End: 2020-08-24

## 2020-08-24 RX ORDER — METOPROLOL SUCCINATE 25 MG/1
25 TABLET, EXTENDED RELEASE ORAL DAILY
Qty: 90 TAB | Refills: 1 | Status: SHIPPED | OUTPATIENT
Start: 2020-08-24 | End: 2020-09-29 | Stop reason: SDUPTHER

## 2020-08-24 RX ORDER — GLIPIZIDE 5 MG/1
TABLET, FILM COATED, EXTENDED RELEASE ORAL
Qty: 90 TAB | Refills: 0 | Status: SHIPPED | OUTPATIENT
Start: 2020-08-24 | End: 2020-09-29 | Stop reason: SDUPTHER

## 2020-08-24 RX ORDER — LOSARTAN POTASSIUM 100 MG/1
TABLET ORAL
Qty: 90 TAB | Refills: 0 | Status: SHIPPED | OUTPATIENT
Start: 2020-08-24 | End: 2020-09-29 | Stop reason: SDUPTHER

## 2020-08-24 RX ORDER — HYDROCHLOROTHIAZIDE 25 MG/1
TABLET ORAL
Qty: 90 TAB | Refills: 0 | Status: SHIPPED | OUTPATIENT
Start: 2020-08-24 | End: 2020-09-29 | Stop reason: SDUPTHER

## 2020-08-24 RX ORDER — DILTIAZEM HYDROCHLORIDE 360 MG/1
CAPSULE, EXTENDED RELEASE ORAL
Qty: 90 CAP | Refills: 0 | Status: CANCELLED | OUTPATIENT
Start: 2020-08-24

## 2020-08-24 NOTE — PROGRESS NOTES
1. Have you been to the ER, urgent care clinic since your last visit? Hospitalized since your last visit? No.     2. Have you seen or consulted any other health care providers outside of the [address] [place] [address] since your last visit? Include any pap smears or colon screening.  No.        Chief Complaint   Patient presents with    Follow Up Chronic Condition    Hypertension    Medication Refill

## 2020-08-27 ENCOUNTER — VIRTUAL VISIT (OUTPATIENT)
Dept: FAMILY MEDICINE CLINIC | Age: 65
End: 2020-08-27

## 2020-08-27 NOTE — PROGRESS NOTES
A user error has taken place: encounter opened in error, closed for administrative reasons. I called patient several times and left voice messages but no answer.

## 2020-09-22 ENCOUNTER — VIRTUAL VISIT (OUTPATIENT)
Age: 65
End: 2020-09-22
Payer: MEDICARE

## 2020-09-22 DIAGNOSIS — D05.11 DUCTAL CARCINOMA IN SITU (DCIS) OF RIGHT BREAST: Primary | ICD-10-CM

## 2020-09-22 DIAGNOSIS — D75.839 THROMBOCYTOSIS: ICD-10-CM

## 2020-09-22 DIAGNOSIS — E55.9 VITAMIN D DEFICIENCY: ICD-10-CM

## 2020-09-22 DIAGNOSIS — D64.9 ANEMIA, UNSPECIFIED TYPE: ICD-10-CM

## 2020-09-22 PROCEDURE — 99442 PR PHYS/QHP TELEPHONE EVALUATION 11-20 MIN: CPT | Performed by: INTERNAL MEDICINE

## 2020-09-22 NOTE — PROGRESS NOTES
Annmarie Neff is a 63 y.o. female who was seen by synchronous (real-time) audio- technology on 9/22/2020. Assessment & Plan:   Diagnoses and all orders for this visit:    1. Ductal carcinoma in situ (DCIS) of right breast    2. Thrombocytosis ([address]    3. Vitamin D deficiency    4. Anemia, unspecified type        The complexity of medical decision making for this visit is moderate       1. Ductal carcinoma in situ (DCIS) of right breast  Has been on tamoxifen since September 2017. Tolerating well. No vaginal bleeding. Has mild hotflashes. Discuss about GUILLEN01 trial results.        Patient agrees to be switched to low dose Tamoxifen 5 mg po daily or 10 mg every other day to complete 3 years on September 2020, then follow-up for 2 more years. *Mammogram in June 2020 negative Birads2        2. Thrombocytosis ([address]  Resolved as per CBC of 8/22/18. Repeat CBC with differential today.     3. Vitamin D deficiency  Continue replacement.     4. Anemia, unspecified type  Recheck labs in 6 months     RTC 6months with CBC, CMP.         I spent at least 15 minutes on this visit with this established patient. 712  Subjective:   Annmarie Neff is a 63 y.o. 1955 female DCIS+LCIS of right breast s/p Lumpectomy by Dr. Casper Breaux in 06/2017 followed by adjuvant radiation completed 08/2017, started Tamoxifen since 09/2017 is now here for  follow up visit. On review of systems she denies any fevers, chills, shortness of breath, nausea vomiting or abdominal pain. No focal neurologic deficit. All other point of review of system have been reviewed and were negative. ECOG performance status 0. Independent with ADLs and IADLs. Prior to Admission medications    Medication Sig Start Date End Date Taking? Authorizing Provider   hydroCHLOROthiazide (HYDRODIURIL) 25 mg tablet Take 1 tablet by mouth once daily 8/24/20   Pearson Habermann, MD   glipiZIDE SR (GLUCOTROL XL) 5 mg CR tablet 1 tablet bu mouth daily.  8/24/20   Pearson Habermann, MD   losartan (COZAAR) 100 mg tablet Take 1 tablet by mouth once daily 8/24/20   Que Guzman MD   metoprolol succinate (TOPROL-XL) 25 mg XL tablet Take 1 Tab by mouth daily. 8/24/20   Que Guzman MD   metFORMIN (GLUCOPHAGE) 1,000 mg tablet TAKE 1 TABLET BY MOUTH TWICE DAILY WITH MEALS 7/13/20   Que Guzman MD   metFORMIN (GLUCOPHAGE) 1,000 mg tablet TAKE 1 TABLET BY MOUTH TWICE DAILY WITH MEALS 6/18/20   Que Guzman MD   dilTIAZem ER (CARDIZEM CD) 360 mg capsule Take 1 capsule by mouth once daily 4/29/20   Que Guzman MD   ergocalciferol (Vitamin D2) 1,250 mcg (50,000 unit) capsule TAKE 1 CAPSULE BY MOUTH EVERY TWO WEEKS 3/23/20   Que Guzman MD   tamoxifen (NOLVADEX) 10 mg tablet Take 0.5 Tabs by mouth daily. 2/7/20   Nina Angeles MD     Patient Active Problem List   Diagnosis Code    Anemia D64.9    Hypertension I10    Vitamin D deficiency E55.9    Breast calcifications on mammogram R92.1    Thrombocytosis ([address] D47.3    Morbid obesity due to excess calories (HCC) E66.01    Ductal carcinoma in situ (DCIS) of right breast D05.11    Controlled type 2 diabetes mellitus without complication, without long-term current use of insulin ([address] E11.9     Patient Active Problem List    Diagnosis Date Noted    Controlled type 2 diabetes mellitus without complication, without long-term current use of insulin ([address] 08/22/2017    Ductal carcinoma in situ (DCIS) of right breast 05/23/2017    Thrombocytosis ([address] 05/13/2017    Morbid obesity due to excess calories ([address] 05/13/2017    Breast calcifications on mammogram 05/04/2017    Vitamin D deficiency     Anemia     Hypertension      Current Outpatient Medications   Medication Sig Dispense Refill    hydroCHLOROthiazide (HYDRODIURIL) 25 mg tablet Take 1 tablet by mouth once daily 90 Tab 0    glipiZIDE SR (GLUCOTROL XL) 5 mg CR tablet 1 tablet bu mouth daily.  90 Tab 0    losartan (COZAAR) 100 mg tablet Take 1 tablet by mouth once daily 90 Tab 0    metoprolol succinate (TOPROL-XL) 25 mg XL tablet Take 1 Tab by mouth daily. 90 Tab 1    metFORMIN (GLUCOPHAGE) 1,000 mg tablet TAKE 1 TABLET BY MOUTH TWICE DAILY WITH MEALS 180 Tab 0    metFORMIN (GLUCOPHAGE) 1,000 mg tablet TAKE 1 TABLET BY MOUTH TWICE DAILY WITH MEALS 60 Tab 0    dilTIAZem ER (CARDIZEM CD) 360 mg capsule Take 1 capsule by mouth once daily 90 Cap 0    ergocalciferol (Vitamin D2) 1,250 mcg (50,000 unit) capsule TAKE 1 CAPSULE BY MOUTH EVERY TWO WEEKS 12 Cap 1    tamoxifen (NOLVADEX) 10 mg tablet Take 0.5 Tabs by mouth daily. 90 Tab 4     No Known Allergies  Past Medical History:   Diagnosis Date    Anemia     resolved    [facility]     Diverticulosis 11/13    Hypertension     Internal hemorrhoids 11/13    Menopause     Radiation therapy complication     Vitamin D deficiency      Past Surgical History:   Procedure Laterality Date    HX BREAST LUMPECTOMY Right 5/30/2017    right BREAST LUMPECTOMY WITH localization performed by Salas Pedersen MD at [address] [place] [address] HX BREAST LUMPECTOMY Right 6/20/2017    revision right breast to achieve neg margin performed by Salas Pedersen MD at [address] [place] [address] HX COLONOSCOPY  1/13    repeat 1/23 - [facility]    HX ORTHOPAEDIC      fluid drained from lt shoulder    HX TUBAL LIGATION       Family History   Problem Relation Age of Onset    Stroke Mother     Hypertension Mother     Diabetes Daughter      Social History     Tobacco Use    Smoking status: Never Smoker    Smokeless tobacco: Never Used   Substance Use Topics    Alcohol use: No       ROS    Objective:   No flowsheet data found. General: alert, cooperative, no distress     Additional exam findings: We discussed the expected course, resolution and complications of the diagnosis(es) in detail. Medication risks, benefits, costs, interactions, and alternatives were discussed as indicated.   I advised her to contact the office if her condition worsens, changes or fails to improve as anticipated. She expressed understanding with the diagnosis(es) and plan. Jayjay Steele, who was evaluated through a patient-initiated, synchronous (real-time) audio- encounter, and/or her healthcare decision maker, is aware that it is a billable service, with coverage as determined by her insurance carrier. She provided verbal consent to proceed: Yes, and patient identification was verified. It was conducted pursuant to the emergency declaration under the [address] [place] [address] authority and the [employer] and [employer] Act. A caregiver was present when appropriate. Ability to conduct physical exam was limited. I was at home. The patient was at home.       Sharon Caal MD

## 2020-09-22 NOTE — PROGRESS NOTES
Eric Lopez is a 63 y.o. female presenting today for a follow-up appointment via Telehealth. Identified patient using two identifiers (full name and ). Patient denies any complaints. Chief Complaint   Patient presents with    Breast Cancer       Current Outpatient Medications   Medication Sig    hydroCHLOROthiazide (HYDRODIURIL) 25 mg tablet Take 1 tablet by mouth once daily    glipiZIDE SR (GLUCOTROL XL) 5 mg CR tablet 1 tablet bu mouth daily.  losartan (COZAAR) 100 mg tablet Take 1 tablet by mouth once daily    metoprolol succinate (TOPROL-XL) 25 mg XL tablet Take 1 Tab by mouth daily.  metFORMIN (GLUCOPHAGE) 1,000 mg tablet TAKE 1 TABLET BY MOUTH TWICE DAILY WITH MEALS    metFORMIN (GLUCOPHAGE) 1,000 mg tablet TAKE 1 TABLET BY MOUTH TWICE DAILY WITH MEALS    dilTIAZem ER (CARDIZEM CD) 360 mg capsule Take 1 capsule by mouth once daily    ergocalciferol (Vitamin D2) 1,250 mcg (50,000 unit) capsule TAKE 1 CAPSULE BY MOUTH EVERY TWO WEEKS    tamoxifen (NOLVADEX) 10 mg tablet Take 0.5 Tabs by mouth daily. No current facility-administered medications for this visit. Fall Risk Assessment, last 12 mths 2020   Able to walk? Yes   Fall in past 12 months? No       3 most recent PHQ Screens 2020   Little interest or pleasure in doing things Not at all   Feeling down, depressed, irritable, or hopeless Not at all   Total Score PHQ 2 0       Abuse Screening Questionnaire 2019   Do you ever feel afraid of your partner? N   Are you in a relationship with someone who physically or mentally threatens you? N   Is it safe for you to go home? -       1. Have you been to the ER, urgent care clinic since your last visit? Hospitalized since your last visit? No    2. Have you seen or consulted any other health care providers outside of the [address] [place] [address] since your last visit? Include any pap smears or colon screening.  No

## 2020-09-29 ENCOUNTER — VIRTUAL VISIT (OUTPATIENT)
Dept: FAMILY MEDICINE CLINIC | Age: 65
End: 2020-09-29
Payer: MEDICARE

## 2020-09-29 DIAGNOSIS — E66.01 MORBID OBESITY DUE TO EXCESS CALORIES (HCC): ICD-10-CM

## 2020-09-29 DIAGNOSIS — I10 ESSENTIAL HYPERTENSION: Primary | ICD-10-CM

## 2020-09-29 DIAGNOSIS — E11.9 CONTROLLED TYPE 2 DIABETES MELLITUS WITHOUT COMPLICATION, WITHOUT LONG-TERM CURRENT USE OF INSULIN (HCC): ICD-10-CM

## 2020-09-29 DIAGNOSIS — D64.9 ANEMIA, UNSPECIFIED TYPE: ICD-10-CM

## 2020-09-29 PROBLEM — D75.839 THROMBOCYTOSIS: Status: RESOLVED | Noted: 2017-05-13 | Resolved: 2020-09-29

## 2020-09-29 PROCEDURE — 3017F COLORECTAL CA SCREEN DOC REV: CPT | Performed by: INTERNAL MEDICINE

## 2020-09-29 PROCEDURE — 3046F HEMOGLOBIN A1C LEVEL >9.0%: CPT | Performed by: INTERNAL MEDICINE

## 2020-09-29 PROCEDURE — G8756 NO BP MEASURE DOC: HCPCS | Performed by: INTERNAL MEDICINE

## 2020-09-29 PROCEDURE — G8421 BMI NOT CALCULATED: HCPCS | Performed by: INTERNAL MEDICINE

## 2020-09-29 PROCEDURE — G8428 CUR MEDS NOT DOCUMENT: HCPCS | Performed by: INTERNAL MEDICINE

## 2020-09-29 PROCEDURE — G8432 DEP SCR NOT DOC, RNG: HCPCS | Performed by: INTERNAL MEDICINE

## 2020-09-29 PROCEDURE — 2022F DILAT RTA XM EVC RTNOPTHY: CPT | Performed by: INTERNAL MEDICINE

## 2020-09-29 PROCEDURE — 99443 PR PHYS/QHP TELEPHONE EVALUATION 21-30 MIN: CPT | Performed by: INTERNAL MEDICINE

## 2020-09-29 PROCEDURE — G9899 SCRN MAM PERF RSLTS DOC: HCPCS | Performed by: INTERNAL MEDICINE

## 2020-09-29 RX ORDER — LOSARTAN POTASSIUM 100 MG/1
TABLET ORAL
Qty: 90 TAB | Refills: 1 | Status: SHIPPED | OUTPATIENT
Start: 2020-09-29 | End: 2021-03-16 | Stop reason: SDUPTHER

## 2020-09-29 RX ORDER — HYDROCHLOROTHIAZIDE 25 MG/1
TABLET ORAL
Qty: 90 TAB | Refills: 1 | Status: SHIPPED | OUTPATIENT
Start: 2020-09-29 | End: 2021-03-16 | Stop reason: SDUPTHER

## 2020-09-29 RX ORDER — METOPROLOL SUCCINATE 25 MG/1
25 TABLET, EXTENDED RELEASE ORAL DAILY
Qty: 90 TAB | Refills: 1 | Status: SHIPPED | OUTPATIENT
Start: 2020-09-29 | End: 2021-03-16 | Stop reason: SDUPTHER

## 2020-09-29 RX ORDER — METFORMIN HYDROCHLORIDE 1000 MG/1
TABLET ORAL
Qty: 180 TAB | Refills: 1 | Status: SHIPPED | OUTPATIENT
Start: 2020-09-29 | End: 2021-03-16 | Stop reason: SDUPTHER

## 2020-09-29 RX ORDER — GLIPIZIDE 5 MG/1
TABLET, FILM COATED, EXTENDED RELEASE ORAL
Qty: 90 TAB | Refills: 1 | Status: SHIPPED | OUTPATIENT
Start: 2020-09-29 | End: 2021-03-16 | Stop reason: SDUPTHER

## 2020-09-29 RX ORDER — DILTIAZEM HYDROCHLORIDE 360 MG/1
CAPSULE, EXTENDED RELEASE ORAL
Qty: 90 CAP | Refills: 1 | Status: SHIPPED | OUTPATIENT
Start: 2020-09-29 | End: 2021-03-16 | Stop reason: SDUPTHER

## 2020-09-29 NOTE — PROGRESS NOTES
1. Have you been to the ER, urgent care clinic since your last visit? Hospitalized since your last visit? No.     2. Have you seen or consulted any other health care providers outside of the [address] [place] [address] since your last visit? Include any pap smears or colon screening.  No.

## 2020-09-29 NOTE — PROGRESS NOTES
Ruth Lowry is a 63 y.o. female, evaluated via audio-only technology on 9/29/2020 for Follow Up Chronic Condition  . Assessment & Plan:   Diagnoses and all orders for this visit:    1. Essential hypertension  -     METABOLIC PANEL, COMPREHENSIVE; Future    2. Controlled type 2 diabetes mellitus without complication, without long-term current use of insulin (HCC)  -     HEMOGLOBIN A1C WITH EAG; Future    3. Morbid obesity due to excess calories ([address]    4. Anemia, unspecified type  -     CBC W/O DIFF; Future  -     IRON PROFILE; Future  -     FERRITIN; Future      No longer on tamoxifen she reports as her oncologist told her she no longer needed-discussed potential interaction with diltiazem and she will ensure she did not take these 2 together if the tamoxifen is resumed for some reason. Return in 3 months with labs priorpatient asked to call the office if she does not hear from us by tomorrow    Other orders  -     losartan (COZAAR) 100 mg tablet; Take 1 tablet by mouth once daily  -     hydroCHLOROthiazide (HYDRODIURIL) 25 mg tablet; Take 1 tablet by mouth once daily  -     dilTIAZem ER (CARDIZEM CD) 360 mg capsule; Take 1 capsule by mouth once daily  -     metoprolol succinate (TOPROL-XL) 25 mg XL tablet; Take 1 Tab by mouth daily. -     metFORMIN (GLUCOPHAGE) 1,000 mg tablet; TAKE 1 TABLET BY MOUTH TWICE DAILY WITH MEALS  -     glipiZIDE SR (GLUCOTROL XL) 5 mg CR tablet; 1 tablet bu mouth daily. F/u in 3 months. 12  Subjective:     Obesity- down to 199lbs. Exercising more. DM- no hypoglycemia- home bs running 115.   htn- taking meds. No cp or sob. No home reading. rtc 3 months with labs. Prior to Admission medications    Medication Sig Start Date End Date Taking? Authorizing Provider   hydroCHLOROthiazide (HYDRODIURIL) 25 mg tablet Take 1 tablet by mouth once daily 8/24/20  Yes Kishan Gallardo MD   glipiZIDE SR (GLUCOTROL XL) 5 mg CR tablet 1 tablet bu mouth daily.  8/24/20  Yes Janeen Dragon, MD   losartan (COZAAR) 100 mg tablet Take 1 tablet by mouth once daily 8/24/20  Yes Janeen Ruiz MD   metoprolol succinate (TOPROL-XL) 25 mg XL tablet Take 1 Tab by mouth daily. 8/24/20  Yes Janeen Ruiz MD   metFORMIN (GLUCOPHAGE) 1,000 mg tablet TAKE 1 TABLET BY MOUTH TWICE DAILY WITH MEALS 6/18/20  Yes Janeen Ruiz MD   dilTIAZem ER (CARDIZEM CD) 360 mg capsule Take 1 capsule by mouth once daily 4/29/20  Yes Janeen Ruiz MD   ergocalciferol (Vitamin D2) 1,250 mcg (50,000 unit) capsule TAKE 1 CAPSULE BY MOUTH EVERY TWO WEEKS 3/23/20  Yes Janeen Ruiz MD   tamoxifen (NOLVADEX) 10 mg tablet Take 0.5 Tabs by mouth daily. 2/7/20  Yes Teri Torres MD   metFORMIN (GLUCOPHAGE) 1,000 mg tablet TAKE 1 TABLET BY MOUTH TWICE DAILY WITH MEALS 7/13/20   Janeen Ruiz MD     Patient Active Problem List   Diagnosis Code    Anemia D64.9    Hypertension I10    Vitamin D deficiency E55.9    Breast calcifications on mammogram R92.1    Thrombocytosis ([address] D47.3    Morbid obesity due to excess calories (HCC) E66.01    Ductal carcinoma in situ (DCIS) of right breast D05.11    Controlled type 2 diabetes mellitus without complication, without long-term current use of insulin ([address] E11.9     Patient Active Problem List    Diagnosis Date Noted    Controlled type 2 diabetes mellitus without complication, without long-term current use of insulin ([address] 08/22/2017    Ductal carcinoma in situ (DCIS) of right breast 05/23/2017    Thrombocytosis ([address] 05/13/2017    Morbid obesity due to excess calories ([address] 05/13/2017    Breast calcifications on mammogram 05/04/2017    Vitamin D deficiency     Anemia     Hypertension      Current Outpatient Medications   Medication Sig Dispense Refill    hydroCHLOROthiazide (HYDRODIURIL) 25 mg tablet Take 1 tablet by mouth once daily 90 Tab 0    glipiZIDE SR (GLUCOTROL XL) 5 mg CR tablet 1 tablet bu mouth daily.  90 Tab 0    losartan (COZAAR) 100 mg tablet Take 1 tablet by mouth once daily 90 Tab 0    metoprolol succinate (TOPROL-XL) 25 mg XL tablet Take 1 Tab by mouth daily. 90 Tab 1    metFORMIN (GLUCOPHAGE) 1,000 mg tablet TAKE 1 TABLET BY MOUTH TWICE DAILY WITH MEALS 60 Tab 0    dilTIAZem ER (CARDIZEM CD) 360 mg capsule Take 1 capsule by mouth once daily 90 Cap 0    ergocalciferol (Vitamin D2) 1,250 mcg (50,000 unit) capsule TAKE 1 CAPSULE BY MOUTH EVERY TWO WEEKS 12 Cap 1    tamoxifen (NOLVADEX) 10 mg tablet Take 0.5 Tabs by mouth daily. 90 Tab 4    metFORMIN (GLUCOPHAGE) 1,000 mg tablet TAKE 1 TABLET BY MOUTH TWICE DAILY WITH MEALS 180 Tab 0     No Known Allergies  Past Medical History:   Diagnosis Date    Anemia     resolved    [facility]     Diverticulosis 11/13    Hypertension     Internal hemorrhoids 11/13    Menopause     Radiation therapy complication     Vitamin D deficiency      Past Surgical History:   Procedure Laterality Date    HX BREAST LUMPECTOMY Right 5/30/2017    right BREAST LUMPECTOMY WITH localization performed by Porter Salgado MD at [address] [place] [address] HX BREAST LUMPECTOMY Right 6/20/2017    revision right breast to achieve neg margin performed by Porter Salgado MD at [address] [place] [address] HX COLONOSCOPY  1/13    repeat 1/23 - [facility]    HX ORTHOPAEDIC      fluid drained from lt shoulder    HX TUBAL LIGATION       Family History   Problem Relation Age of Onset    Stroke Mother     Hypertension Mother     Diabetes Daughter      Social History     Tobacco Use    Smoking status: Never Smoker    Smokeless tobacco: Never Used   Substance Use Topics    Alcohol use: No       ROS  Cardiac- no chest pain or palpitations  Pulmonary- no sob or wheezes  GI- no n/v or diarrhea. No flowsheet data found.      Patrice Trujillo, who was evaluated through a patient-initiated, synchronous (real-time) audio only encounter, and/or her healthcare decision maker, is aware that it is a billable service, with coverage as determined by her insurance carrier. She provided verbal consent to proceed: Yes. She has not had a related appointment within my department in the past 7 days or scheduled within the next 24 hours.       Total Time: minutes: 21-30 minutes    Krystina Delgado MD

## 2020-10-07 NOTE — PROGRESS NOTES
Angel Lomax is a 63year old woman with right breast ER/MS positive Stage 0 (Tis) DCIS s/p lumpectomy by Dr. Dori Burch 5/30/17. The DCIS spanned approximately 5 cm and reexcision was performed demonstrating no residual disease 6/20/17. She completed radiation therapy and is now on low dose tamoxifen per Dr. Deandra Beal, completed September 2020. The patient denies any new lumps or soreness in either breast.  She denies any right arm edema. She denies any new other aches, pains, shortness of breath or headaches. She reports hot flashes which are not overly bothersome. She is otherwise doing well and is without complaint. No Known Allergies  Past Medical History:   Diagnosis Date    Anemia     resolved    Breast cancer ([facility] [address]     Diverticulosis 11/13    Hypertension     Internal hemorrhoids 11/13    Menopause     Radiation therapy complication     Vitamin D deficiency      Past Surgical History:   Procedure Laterality Date    HX BREAST LUMPECTOMY Right 5/30/2017    right BREAST LUMPECTOMY WITH localization performed by Kam Mccabe MD at [address] [place] [address] HX BREAST LUMPECTOMY Right 6/20/2017    revision right breast to achieve neg margin performed by Kam Mccabe MD at [address] [place] [address] HX COLONOSCOPY  1/13    repeat 1/23 - [facility]    HX ORTHOPAEDIC      fluid drained from lt shoulder    HX TUBAL LIGATION       Social History     Socioeconomic History    Marital status:      Spouse name: Not on file    Number of children: Not on file    Years of education: Not on file    Highest education level: Not on file   Occupational History    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   She can then used to be on medication for hypertension and diabetes. She denies any new cardiac, respiratory, gastrointestinal or genitourinary problems. PHYSICAL EXAM    Visit Vitals  BP (!) 153/82   Pulse 86   Temp 97.8 °F (36.6 °C) (Oral)   Resp 18   Ht 5' 4\" (1.626 m)   Wt 94.8 kg (209 lb)   BMI 35.87 kg/m²          Constitutional:  Well-developed, well-nourished, no acute distress. Head:  Head, eyes, ears, nose, throat within normal limits. Skin:  No suspicious moles or rashes. Neck:  No masses or adenopathy. The airway appears normal.   Lungs:  Lungs are clear  Heart:  Heart is regular    Abdomen: The abdomen is soft   Extremities:  No tenderness of the extremities and no significant swelling. Psych:  Alert and oriented. Breasts:   Right: Examined in both the supine and upright positions. There was no supraclavicular, infraclavicular, or axillary lympadenopathy. There were no dominant masses, no skin changes, no asymmetry identified well healed surgical scar, chronic radiation sequelae including tethering with abduction of arm  Left: Examined in both the supine and upright positions. There was no supraclavicular, infraclavicular, or axillary lympadenopathy. There were no dominant masses, no skin changes, no asymmetry identified      Imagin20 bilateral mammogram  No mammographic evidence of malignancy. Recommend annual screening mammography  and clinical breast examination.     A result letter will be sent to the patient.     The patient will be notified by the [employer] reminder system for scheduling  of her annual screening mammogram.     Assessment Category 2: Benign      19 bilateral mammogram  No mammographic evidence of malignancy.  Recommend annual screening mammography  and clinical breast examination.     A result letter will be sent to the patient.     The patient will be notified for scheduling of her annual screening mammogram.     Assessment Category 2: Benign. (letter 40NM)       6/15/18 bilateral mammogram  Stable post lumpectomy changes right breast. Left breast remains stable as well. Patient informed of the results prior to her departure. A result letter will be sent to the patient. The patient will be notified by the [facility] reminder system for scheduling  of her annual screening mammogram.     BI-RADS Assessment Category 2: Benign, Letter 40    Pathology  6/20/17   BREAST, RIGHT, 10:00 POSITION, PARTIAL MASTECTOMY:   NO RESIDUAL CARCINOMA. MARGINS OF RESECTION WITH NO SIGNIFICANT HISTOPATHOLOGIC ABNORMALITIES. BIOPSY SITE CHANGES. INTRADUCTAL PAPILLOMATA. PROLIFERATIVE FIBROCYSTIC CHANGES. MICROCALCIFICATIONS ARE IDENTIFIED. 5/30/17   RIGHT BREAST MASS, LUMPECTOMY:   EXTENSIVE IN-SITU MAMMARY CARCINOMA (SEE COMMENT). HISTOLOGIC TYPE: MIXED DUCTAL AND LOBULAR. NUCLEAR rdGrdRrdArdDrdErd:rd rd3rd. NECROSIS: NOT IDENTIFIED. ESTIMATED SIZE: APPROXIMATELY 5 CM (INVOLVING APPROXIMATELY ½ OF   SPECIMEN). MARGINS OF RESECTION: POSITIVE AT LATERAL MARGIN, MULTIFOCALLY CLOSE AT   ANTERIOR MARGIN.   LYMPH NODES: NOT SUBMITTED. ESTROGEN RECEPTOR: POSITIVE   PROGESTERONE RECEPTOR: POSITIVE. OTHER PATHOLOGIC FINDINGS: EXTENSIVE SCLEROSING ADENOSIS, FIBROCYSTIC   CHANGE, INTRADUCTAL PAPILLOMAS, BIOPSY SITE CHANGES. AJCC STAGE: pTis. 5/16/17   A: BREAST, RIGHT, CORE NEEDLE BIOPSY:   LOBULAR CARCINOMA IN-SITU (SEE MICROSCOPIC DESCRIPTION). ATYPICAL DUCTAL EPITHELIAL HYPERPLASIA. INTRADUCTAL PAPILLOMAS. B: BREAST, RIGHT, CORE NEEDLE BIOPSY:   LOBULAR CARCINOMA IN-SITU (SEE MICROSCOPIC DESCRIPTION). INTRADUCTAL PAPILLARY CARCINOMA (CARCINOMA IN-SITU).    GROSS DESCRIPTION:         Patient Active Problem List   Diagnosis Code    Anemia D64.9    Hypertension I10    Vitamin D deficiency E55.9    Breast calcifications on mammogram R92.1    Morbid obesity due to excess calories (HCC) E66.01    Ductal carcinoma in situ (DCIS) of right breast D05.11    Controlled type 2 diabetes mellitus without complication, without long-term current use of insulin [facility]) E11.9         63year old woman with right breast ER/MD positive Stage 0 (Tis) DCIS s/p lumpectomy by Dr. Dat Rodriguez 5/30/17. I have recommended annual 3D mammogram and clinical breast exam.   She is due for bilateral mammogram June 2021. Follow up after imaging. Please call sooner with questions or concerns.

## 2020-10-08 ENCOUNTER — OFFICE VISIT (OUTPATIENT)
Dept: SURGERY | Age: 65
End: 2020-10-08
Payer: MEDICARE

## 2020-10-08 VITALS
SYSTOLIC BLOOD PRESSURE: 153 MMHG | RESPIRATION RATE: 18 BRPM | HEART RATE: 86 BPM | BODY MASS INDEX: 35.68 KG/M2 | DIASTOLIC BLOOD PRESSURE: 82 MMHG | WEIGHT: 209 LBS | TEMPERATURE: 97.8 F | HEIGHT: 64 IN

## 2020-10-08 DIAGNOSIS — D05.11 DUCTAL CARCINOMA IN SITU (DCIS) OF RIGHT BREAST: Primary | ICD-10-CM

## 2020-10-08 PROCEDURE — 99213 OFFICE O/P EST LOW 20 MIN: CPT | Performed by: SURGERY

## 2020-10-08 PROCEDURE — G8419 CALC BMI OUT NRM PARAM NOF/U: HCPCS | Performed by: SURGERY

## 2020-10-08 PROCEDURE — G8754 DIAS BP LESS 90: HCPCS | Performed by: SURGERY

## 2020-10-08 PROCEDURE — G9899 SCRN MAM PERF RSLTS DOC: HCPCS | Performed by: SURGERY

## 2020-10-08 PROCEDURE — G8427 DOCREV CUR MEDS BY ELIG CLIN: HCPCS | Performed by: SURGERY

## 2020-10-08 PROCEDURE — G8432 DEP SCR NOT DOC, RNG: HCPCS | Performed by: SURGERY

## 2020-10-08 PROCEDURE — G8753 SYS BP > OR = 140: HCPCS | Performed by: SURGERY

## 2020-10-08 PROCEDURE — 3017F COLORECTAL CA SCREEN DOC REV: CPT | Performed by: SURGERY

## 2020-10-08 NOTE — LETTER
10/8/2020 8:55 AM 
 
Patient:  Cristopher Pedersen YOB: 1955 Date of Visit: 10/8/2020 Alayna Kline MD 
[address] [place] [id number] VIA In Basket Dear Alayna Kline MD, 
 
 
I had the pleasure of seeing Ms. Cristopher Pedersen in my office today for her breast cancer. I am including a copy of my office visit today. If you have questions, please do not hesitate to call me. I look forward to following Ms. Scherer Lan along with you and will keep you updated as to her progress. Sincerely, Jana Rajan MD

## 2020-10-08 NOTE — PROGRESS NOTES
1. Have you been to the ER, urgent care clinic since your last visit? Hospitalized since your last visit? No    2. Have you seen or consulted any other health care providers outside of the [address] [place] [address] since your last visit? Include any pap smears or colon screening. No     Patient presents for follow up right breast ER/OR positive Stage 0 (Tis) DCIS s/p lumpectomy by Dr. Mendez Keita 5/30/17.

## 2021-01-04 ENCOUNTER — HOSPITAL ENCOUNTER (OUTPATIENT)
Dept: LAB | Age: 66
Discharge: HOME | End: 2021-01-04
Payer: MEDICARE

## 2021-01-04 ENCOUNTER — APPOINTMENT (OUTPATIENT)
Dept: FAMILY MEDICINE CLINIC | Age: 66
End: 2021-01-04

## 2021-01-04 DIAGNOSIS — E11.9 CONTROLLED TYPE 2 DIABETES MELLITUS WITHOUT COMPLICATION, WITHOUT LONG-TERM CURRENT USE OF INSULIN (HCC): ICD-10-CM

## 2021-01-04 DIAGNOSIS — D64.9 ANEMIA, UNSPECIFIED TYPE: ICD-10-CM

## 2021-01-04 DIAGNOSIS — I10 ESSENTIAL HYPERTENSION: ICD-10-CM

## 2021-01-04 LAB
ALBUMIN SERPL-MCNC: 3.6 G/DL (ref 3.4–5)
ALBUMIN/GLOB SERPL: 1 {RATIO} (ref 0.8–1.7)
ALP SERPL-CCNC: 91 U/L (ref 45–117)
ALT SERPL-CCNC: 33 U/L (ref 13–56)
ANION GAP SERPL CALC-SCNC: 11 MMOL/L (ref 3–18)
AST SERPL-CCNC: 26 U/L (ref 10–38)
BILIRUB SERPL-MCNC: 0.3 MG/DL (ref 0.2–1)
BUN SERPL-MCNC: 14 MG/DL (ref 7–18)
BUN/CREAT SERPL: 22 (ref 12–20)
CALCIUM SERPL-MCNC: 9 MG/DL (ref 8.5–10.1)
CHLORIDE SERPL-SCNC: 105 MMOL/L (ref 100–111)
CO2 SERPL-SCNC: 26 MMOL/L (ref 21–32)
CREAT SERPL-MCNC: 0.65 MG/DL (ref 0.6–1.3)
ERYTHROCYTE [DISTWIDTH] IN BLOOD BY AUTOMATED COUNT: 15.1 % (ref 11.6–14.5)
FERRITIN SERPL-MCNC: 205 NG/ML (ref 8–388)
GLOBULIN SER CALC-MCNC: 3.7 G/DL (ref 2–4)
GLUCOSE SERPL-MCNC: 96 MG/DL (ref 74–99)
HCT VFR BLD AUTO: 37.4 % (ref 35–45)
HGB BLD-MCNC: 12.1 G/DL (ref 12–16)
IRON SATN MFR SERPL: 5 % (ref 20–50)
IRON SERPL-MCNC: 18 UG/DL (ref 50–175)
MCH RBC QN AUTO: 26.6 PG (ref 24–34)
MCHC RBC AUTO-ENTMCNC: 32.4 G/DL (ref 31–37)
MCV RBC AUTO: 82.2 FL (ref 74–97)
PLATELET # BLD AUTO: 408 K/UL (ref 135–420)
PMV BLD AUTO: 9.9 FL (ref 9.2–11.8)
POTASSIUM SERPL-SCNC: 3.3 MMOL/L (ref 3.5–5.5)
PROT SERPL-MCNC: 7.3 G/DL (ref 6.4–8.2)
RBC # BLD AUTO: 4.55 M/UL (ref 4.2–5.3)
SODIUM SERPL-SCNC: 142 MMOL/L (ref 136–145)
TIBC SERPL-MCNC: 333 UG/DL (ref 250–450)
WBC # BLD AUTO: 9.5 K/UL (ref 4.6–13.2)

## 2021-01-04 PROCEDURE — 85027 COMPLETE CBC AUTOMATED: CPT

## 2021-01-04 PROCEDURE — 83550 IRON BINDING TEST: CPT

## 2021-01-04 PROCEDURE — 83036 HEMOGLOBIN GLYCOSYLATED A1C: CPT

## 2021-01-04 PROCEDURE — 82728 ASSAY OF FERRITIN: CPT

## 2021-01-04 PROCEDURE — 80053 COMPREHEN METABOLIC PANEL: CPT

## 2021-01-05 LAB
EST. AVERAGE GLUCOSE BLD GHB EST-MCNC: 143 MG/DL
HBA1C MFR BLD: 6.6 % (ref 4.2–5.6)

## 2021-03-16 ENCOUNTER — OFFICE VISIT (OUTPATIENT)
Dept: FAMILY MEDICINE CLINIC | Age: 66
End: 2021-03-16
Payer: MEDICARE

## 2021-03-16 VITALS
SYSTOLIC BLOOD PRESSURE: 151 MMHG | WEIGHT: 215 LBS | TEMPERATURE: 98.3 F | RESPIRATION RATE: 16 BRPM | BODY MASS INDEX: 36.7 KG/M2 | HEIGHT: 64 IN | OXYGEN SATURATION: 100 % | HEART RATE: 88 BPM | DIASTOLIC BLOOD PRESSURE: 63 MMHG

## 2021-03-16 DIAGNOSIS — E11.9 CONTROLLED TYPE 2 DIABETES MELLITUS WITHOUT COMPLICATION, WITHOUT LONG-TERM CURRENT USE OF INSULIN (HCC): Primary | ICD-10-CM

## 2021-03-16 DIAGNOSIS — E66.01 MORBID OBESITY DUE TO EXCESS CALORIES (HCC): ICD-10-CM

## 2021-03-16 DIAGNOSIS — E55.9 VITAMIN D DEFICIENCY: ICD-10-CM

## 2021-03-16 DIAGNOSIS — I10 ESSENTIAL HYPERTENSION: ICD-10-CM

## 2021-03-16 DIAGNOSIS — D64.9 ANEMIA, UNSPECIFIED TYPE: ICD-10-CM

## 2021-03-16 DIAGNOSIS — E61.1 IRON DEFICIENCY: ICD-10-CM

## 2021-03-16 PROCEDURE — 2022F DILAT RTA XM EVC RTNOPTHY: CPT | Performed by: NURSE PRACTITIONER

## 2021-03-16 PROCEDURE — 99214 OFFICE O/P EST MOD 30 MIN: CPT | Performed by: NURSE PRACTITIONER

## 2021-03-16 PROCEDURE — G8417 CALC BMI ABV UP PARAM F/U: HCPCS | Performed by: NURSE PRACTITIONER

## 2021-03-16 PROCEDURE — G8753 SYS BP > OR = 140: HCPCS | Performed by: NURSE PRACTITIONER

## 2021-03-16 PROCEDURE — G0463 HOSPITAL OUTPT CLINIC VISIT: HCPCS | Performed by: NURSE PRACTITIONER

## 2021-03-16 PROCEDURE — G9899 SCRN MAM PERF RSLTS DOC: HCPCS | Performed by: NURSE PRACTITIONER

## 2021-03-16 PROCEDURE — 3017F COLORECTAL CA SCREEN DOC REV: CPT | Performed by: NURSE PRACTITIONER

## 2021-03-16 PROCEDURE — 3044F HG A1C LEVEL LT 7.0%: CPT | Performed by: NURSE PRACTITIONER

## 2021-03-16 PROCEDURE — G8427 DOCREV CUR MEDS BY ELIG CLIN: HCPCS | Performed by: NURSE PRACTITIONER

## 2021-03-16 PROCEDURE — G8754 DIAS BP LESS 90: HCPCS | Performed by: NURSE PRACTITIONER

## 2021-03-16 PROCEDURE — G8432 DEP SCR NOT DOC, RNG: HCPCS | Performed by: NURSE PRACTITIONER

## 2021-03-16 RX ORDER — DILTIAZEM HYDROCHLORIDE 360 MG/1
CAPSULE, EXTENDED RELEASE ORAL
Qty: 90 CAP | Refills: 1 | Status: SHIPPED | OUTPATIENT
Start: 2021-03-16 | End: 2021-06-16 | Stop reason: SDUPTHER

## 2021-03-16 RX ORDER — METOPROLOL SUCCINATE 25 MG/1
25 TABLET, EXTENDED RELEASE ORAL DAILY
Qty: 90 TAB | Refills: 1 | Status: SHIPPED | OUTPATIENT
Start: 2021-03-16 | End: 2021-06-16 | Stop reason: SDUPTHER

## 2021-03-16 RX ORDER — FERROUS SULFATE 325(65) MG
325 TABLET, DELAYED RELEASE (ENTERIC COATED) ORAL
Qty: 90 TAB | Refills: 1 | Status: SHIPPED | OUTPATIENT
Start: 2021-03-16 | End: 2021-06-16 | Stop reason: SDUPTHER

## 2021-03-16 RX ORDER — METFORMIN HYDROCHLORIDE 1000 MG/1
TABLET ORAL
Qty: 180 TAB | Refills: 1 | Status: SHIPPED | OUTPATIENT
Start: 2021-03-16 | End: 2021-06-16 | Stop reason: SDUPTHER

## 2021-03-16 RX ORDER — LOSARTAN POTASSIUM 100 MG/1
TABLET ORAL
Qty: 90 TAB | Refills: 1 | Status: SHIPPED | OUTPATIENT
Start: 2021-03-16 | End: 2021-06-16 | Stop reason: SDUPTHER

## 2021-03-16 RX ORDER — GLIPIZIDE 5 MG/1
TABLET, FILM COATED, EXTENDED RELEASE ORAL
Qty: 90 TAB | Refills: 1 | Status: SHIPPED | OUTPATIENT
Start: 2021-03-16 | End: 2021-06-16 | Stop reason: SDUPTHER

## 2021-03-16 RX ORDER — HYDROCHLOROTHIAZIDE 25 MG/1
TABLET ORAL
Qty: 90 TAB | Refills: 1 | Status: SHIPPED | OUTPATIENT
Start: 2021-03-16 | End: 2021-06-01 | Stop reason: SDUPTHER

## 2021-03-16 NOTE — PROGRESS NOTES
[place]               [address]               [phone]      Mireya García is a 63 y.o. female and presents with Establish Care, Hypertension, and Diabetes       Assessment/Plan:    Diagnoses and all orders for this visit:    1. Controlled type 2 diabetes mellitus without complication, without long-term current use of insulin (HCC)  -     glipiZIDE SR (GLUCOTROL XL) 5 mg CR tablet; 1 tablet bu mouth daily. -     metFORMIN (GLUCOPHAGE) 1,000 mg tablet; TAKE 1 TABLET BY MOUTH TWICE DAILY WITH MEALS  Endorses medication compliance  Denies signs and symptoms of hyper or hypoglycemia  Refills provided  At blood work done January 4, 2021:  Lab Results   Component Value Date/Time    Hemoglobin A1c 6.6 (H) 01/04/2021 11:24 AM    Hemoglobin A1c (POC) 6.2 04/03/2019 12:00 PM   Continue same medications    2. Essential hypertension  -     dilTIAZem ER (CARDIZEM CD) 360 mg capsule; Take 1 capsule by mouth once daily  -     hydroCHLOROthiazide (HYDRODIURIL) 25 mg tablet; Take 1 tablet by mouth once daily  -     losartan (COZAAR) 100 mg tablet; Take 1 tablet by mouth once daily  -     metoprolol succinate (TOPROL-XL) 25 mg XL tablet; Take 1 Tab by mouth daily. Endorses medication compliance  Unable to check her blood pressures at home  Refills provided  Had labs drawn in January hepatic and renal function are stable    3. Vitamin D deficiency  Endorses medication compliance with once weekly vitamin D, levels have been stable, continue the same medications    4. Anemia, unspecified type  Labs checked in January showed a stable H&H    5. Iron deficiency  -     ferrous sulfate (IRON) 325 mg (65 mg iron) EC tablet; Take 1 Tab by mouth Daily (before breakfast). Refill provided    6.  Morbid obesity due to excess calories [facility])  Assessment & Plan:  Uncontrolled, based on history, physical exam and review of pertinent labs, studies and medications; meds reconciled; lifestyle modifications recommended. Discussed the patient's BMI. The BMI follow up plan is as follows:     dietary management education, guidance, and counseling  encourage exercise  monitor weight  prescribed dietary intake  Discussed portion control  Eat fresh or frozen fruits and vegetables, stay away from canned  Limit eating out and fast food  Practice meal planning    Visit today to establish care, prior patient of Dr. Dimas Dandy    Follow-up and Dispositions    · Return in about 3 months (around 6/16/2021) for [address], DM, HTN, 30 min, VV. Subjective:     DMII-   Patient reports medication compliance Daily  Diabetic diet compliance most of the time  Patient monitors blood sugars regularly Daily   Reports am fasting sugars range 104   Denies hypoglycemic episodes yes  Denies polyuria, polydipsia, paraesthesia, vision changes? yes     Diabetic Foot and Eye Exam HM Status   Topic Date Due    Eye Exam  04/22/2021    Diabetic Foot Care  03/16/2022     Hemoglobin A1c   Date Value Ref Range Status   01/04/2021 6.6 (H) 4.2 - 5.6 % Final     Comment:     (NOTE)  HbA1C Interpretive Ranges  <5.7              Normal  5.7 - 6.4         Consider Prediabetes  >6.5              Consider Diabetes     ]  Creatinine, urine   Date Value Ref Range Status   06/30/2020 173.00 (H) 30 - 125 mg/dL Final     Microalbumin/Creat ratio (mg/g creat)   Date Value Ref Range Status   06/30/2020 6 0 - 30 mg/g Final   11/03/2017 4 0 - 30 mg/g Final   09/13/2016 7 0 - 30 mg/g Final     Microalb/Creat ratio (ug/mg creat.)   Date Value Ref Range Status   02/11/2015 5.4 0.0 - 30.0 mg/g creat Final     Key Antihyperglycemic Medications             glipiZIDE SR (GLUCOTROL XL) 5 mg CR tablet (Taking) 1 tablet bu mouth daily. metFORMIN (GLUCOPHAGE) 1,000 mg tablet (Taking) TAKE 1 TABLET BY MOUTH TWICE DAILY WITH MEALS        Hypertension:   Patient reports taking medications as instructed. yes   Medication side effects noted. no  Headache upon wakening.  no   Home BP monitoring in range of does not check    Do you experience chest pain/pressure or SOB with exertion? no  Maintain a low salt diet? yes  Key CAD CHF Meds             dilTIAZem ER (CARDIZEM CD) 360 mg capsule (Taking) Take 1 capsule by mouth once daily    hydroCHLOROthiazide (HYDRODIURIL) 25 mg tablet (Taking) Take 1 tablet by mouth once daily    losartan (COZAAR) 100 mg tablet (Taking) Take 1 tablet by mouth once daily    metoprolol succinate (TOPROL-XL) 25 mg XL tablet (Taking) Take 1 Tab by mouth daily. ROS:     ROS  As stated in HPI, otherwise all others negative. The problem list was updated as a part of today's visit. Patient Active Problem List   Diagnosis Code    Anemia D64.9    Hypertension I10    Vitamin D deficiency E55.9    Breast calcifications on mammogram R92.1    Morbid obesity due to excess calories (HCC) E66.01    Ductal carcinoma in situ (DCIS) of right breast D05.11    Controlled type 2 diabetes mellitus without complication, without long-term current use of insulin (HCC) E11.9       The PSH, FH were reviewed. SH:  Social History     Tobacco Use    Smoking status: Never Smoker    Smokeless tobacco: Never Used   Substance Use Topics    Alcohol use: No    Drug use: No       Medications/Allergies:  Current Outpatient Medications on File Prior to Visit   Medication Sig Dispense Refill    ergocalciferol (Vitamin D2) 1,250 mcg (50,000 unit) capsule TAKE 1 CAPSULE BY MOUTH EVERY TWO WEEKS 12 Cap 1     No current facility-administered medications on file prior to visit. No Known Allergies    Objective:  Visit Vitals  BP (!) 151/63   Pulse 88   Temp 98.3 °F (36.8 °C) (Oral)   Resp 16   Ht 5' 4\" (1.626 m)   Wt 215 lb (97.5 kg)   SpO2 100%   BMI 36.90 kg/m²    Body mass index is 36.9 kg/m². Physical assessment  Physical Exam  Vitals signs and nursing note reviewed.    Eyes:      Conjunctiva/sclera: Conjunctivae normal.      Pupils: Pupils are equal, round, and reactive to light. Neck:      Musculoskeletal: Normal range of motion. Vascular: No JVD. Cardiovascular:      Rate and Rhythm: Normal rate and regular rhythm. Heart sounds: Normal heart sounds. No murmur. No friction rub. No gallop. Pulmonary:      Effort: Pulmonary effort is normal.      Breath sounds: Normal breath sounds. Musculoskeletal: Normal range of motion. Feet:      Comments: Diabetic foot exam:     Left: Reflexes 2+     Vibratory sensation normal    Proprioception normal   Filament test normal sensation with micro filament   Pulse DP: 2+ (normal)   Deformities: None  Right: Reflexes 2+   Vibratory sensation normal   Proprioception normal   Filament test normal sensation with micro filament   Pulse DP: 2+ (normal)   Deformities: None  Skin:     General: Skin is warm and dry. Neurological:      Mental Status: She is alert and oriented to person, place, and time.    Psychiatric:         Mood and Affect: Affect normal.         Cognition and Memory: Memory normal.         Judgment: Judgment normal.           Labwork and Ancillary Studies:    CBC w/Diff  Lab Results   Component Value Date/Time    WBC 9.5 01/04/2021 11:24 AM    HGB 12.1 01/04/2021 11:24 AM    PLATELET 677 85/05/8917 11:24 AM         Basic Metabolic Profile  Lab Results   Component Value Date/Time    Sodium 142 01/04/2021 11:24 AM    Potassium 3.3 (L) 01/04/2021 11:24 AM    Chloride 105 01/04/2021 11:24 AM    CO2 26 01/04/2021 11:24 AM    Anion gap 11 01/04/2021 11:24 AM    Glucose 96 01/04/2021 11:24 AM    BUN 14 01/04/2021 11:24 AM    Creatinine 0.65 01/04/2021 11:24 AM    BUN/Creatinine ratio 22 (H) 01/04/2021 11:24 AM    GFR est AA >60 01/04/2021 11:24 AM    GFR est non-AA >60 01/04/2021 11:24 AM    Calcium 9.0 01/04/2021 11:24 AM        Cholesterol  Lab Results   Component Value Date/Time    Cholesterol, total 193 06/30/2020 10:38 AM    HDL Cholesterol 100 (H) 06/30/2020 10:38 AM    LDL, calculated 74.2 06/30/2020 10:38 AM    Triglyceride 94 06/30/2020 10:38 AM    CHOL/HDL Ratio 1.9 06/30/2020 10:38 AM       Health Maintenance:   Health Maintenance   Topic Date Due    PAP AKA CERVICAL CYTOLOGY  Never done    COVID-19 Vaccine (2 - Pfizer 2-dose series) 03/29/2021    Eye Exam Retinal or Dilated  04/22/2021    Medicare Yearly Exam  06/19/2021    Breast Cancer Screen Mammogram  06/29/2021    MICROALBUMIN Q1  06/30/2021    Lipid Screen  06/30/2021    DTaP/Tdap/Td series (2 - Td) 10/07/2021    A1C test (Diabetic or Prediabetic)  01/04/2022    Foot Exam Q1  03/16/2022    Colorectal Cancer Screening Combo  01/30/2023    Hepatitis C Screening  Completed    Shingrix Vaccine Age 50>  Completed    Flu Vaccine  Completed    Pneumococcal 0-64 years  Completed       I have discussed the diagnosis with the patient and the intended plan as seen in the above orders. The patient has received an After-Visit Summary and questions were answered concerning future plans. An After Visit Summary was printed and given to the patient. All diagnosis have been discussed with the patient and all of the patient's questions have been answered. Follow-up and Dispositions    · Return in about 3 months (around 6/16/2021) for [address], DM, HTN, 30 min, VV. Estefania Espinoza, [facility]  [address] [facility]   [address] [facility] [address]  [address]

## 2021-03-16 NOTE — PATIENT INSTRUCTIONS
Learning About High-Potassium Foods  What foods are high in potassium? The foods you eat contain nutrients, such as vitamins and minerals. Potassium is a nutrient. Your body needs the right amount to stay healthy and work as it should. You can use the list below to help you make choices about which foods to eat. Foods are high in potassium if they have more than 200 mg per serving. Fruits  · Apricots, 2 raw  · Avocado, ½ fruit  · Banana, 1 medium  · Artie, 1 fruit  · Nectarine, 1 fruit  · Orange, 1 fruit  · Prunes, 5 fruits  · Raisins, ¼ cup  Vegetables  · Artichoke, 1 medium  · Beets, ½ cup  · Broccoli, ½ cup  · Kale (raw), 1 cup  · Potato with skin, 1 medium  · Spinach, ½ cup  · Sweet potato, 1 medium  · Tomato sauce, ½ cup  · Zucchini, ½ cup  Dairy and dairy alternatives  · Milk, 1 cup  · Soy milk, 1 cup  · Yogurt, 6 oz  Meats and other protein foods  · Beans (lima, navy, white), ½ cup  · Beef, ground, 3 oz  · Chicken, 3 oz  · Fish (halibut, tuna, cod, snapper), 3 oz  · Nuts (almonds, hazelnuts, [place], cashew, pistachios), 1 oz  · Peanut butter, 2 Tbsp  · Peanuts, 1 oz  · [place] 3 oz  Seasonings  · Salt substitutes  Work with your doctor to find out how much of this nutrient you need. Depending on your health, you may need more or less of it in your diet. Where can you learn more? Go to http://www.gray.com/  Enter P450 in the search box to learn more about \"Learning About High-Potassium Foods. \"  Current as of: August 22, 2019               Content Version: 12.6  © 6748-6088 [employer]. Care instructions adapted under license by [employer] (which disclaims liability or warranty for this information). If you have questions about a medical condition or this instruction, always ask your healthcare professional. [place] [address] any warranty or liability for your use of this information.          Learning About High-Iron Foods  What foods are high in iron? The foods you eat contain nutrients, such as vitamins and minerals. Iron is a nutrient. Your body needs the right amount to stay healthy and work as it should. You can use the list below to help you make choices about which foods to eat. Here are some foods that contain iron. They have 1 to 2 milligrams of iron per serving. Fruits  · Figs (dried), 5 figs  Vegetables  · Asparagus (canned), 6 frye  · Lizzie, beet, Swiss chard, or turnip greens, 1 cup  · Dried peas, cooked, ½ cup  · Seaweed, spirulina (dried), ¼ cup  · Spinach, (cooked) ½ cup or (raw) 1 cup  Grains  · Cereals, fortified with iron, 1 cup  · Grits (instant, cooked), fortified with iron, ½ cup  Meats and other protein foods  · Beans (kidney, lima, navy, white), canned or cooked, ½ cup  · Beef or lamb, 3 oz  · Chicken giblets, 3 oz  · Chickpeas (garbanzo beans), ½ cup  · Liver of beef, lamb, or pork, 3 oz  · Oysters (cooked), 3 oz  · Sardines (canned), 3 oz  · Soybeans (boiled), ½ cup  · Tofu (firm), ½ cup  Work with your doctor to find out how much of this nutrient you need. Depending on your health, you may need more or less of it in your diet. Current as of: August 22, 2019               Content Version: 12.6  © 4119-4235 [employer]. Care instructions adapted under license by [employer] (which disclaims liability or warranty for this information). If you have questions about a medical condition or this instruction, always ask your healthcare professional. [address] any warranty or liability for your use of this information.

## 2021-03-16 NOTE — PROGRESS NOTES
Chief Complaint   Patient presents with    Establish Care    Hypertension    Diabetes       1. Have you been to the ER, urgent care clinic since your last visit? Hospitalized since your last visit? No    2. Have you seen or consulted any other health care providers outside of the [address] [place] [address] since your last visit? Include any pap smears or colon screening. No    Chief Complaint   Patient presents with   [address]    Hypertension    Diabetes       3 most recent PHQ Screens 3/16/2021   Little interest or pleasure in doing things Not at all   Feeling down, depressed, irritable, or hopeless Not at all   Total Score PHQ 2 0     Fall Risk Assessment, last 12 mths 3/16/2021   Able to walk? Yes   Fall in past 12 months? 0   Do you feel unsteady?  0   Are you worried about falling 0               Learning Assessment 7/13/2017   PRIMARY LEARNER Patient   HIGHEST LEVEL OF EDUCATION - PRIMARY LEARNER  DID NOT GRADUATE HIGH SCHOOL   BARRIERS PRIMARY LEARNER NONE   CO-LEARNER CAREGIVER No   PRIMARY LANGUAGE ENGLISH    NEED No   LEARNER PREFERENCE PRIMARY LISTENING     DEMONSTRATION   LEARNING SPECIAL TOPICS no   ANSWERED BY self   RELATIONSHIP SELF     Visit Vitals  BP (!) 151/62   Pulse 91   Temp 98.3 °F (36.8 °C) (Oral)   Resp 16   Ht 5' 4\" (1.626 m)   Wt 215 lb (97.5 kg)   SpO2 100%   BMI 36.90 kg/m²

## 2021-03-22 ENCOUNTER — VIRTUAL VISIT (OUTPATIENT)
Age: 66
End: 2021-03-22
Payer: MEDICARE

## 2021-03-22 DIAGNOSIS — E55.9 VITAMIN D DEFICIENCY: Primary | ICD-10-CM

## 2021-03-22 DIAGNOSIS — D64.9 ANEMIA, UNSPECIFIED TYPE: ICD-10-CM

## 2021-03-22 PROCEDURE — G9899 SCRN MAM PERF RSLTS DOC: HCPCS | Performed by: INTERNAL MEDICINE

## 2021-03-22 PROCEDURE — G8417 CALC BMI ABV UP PARAM F/U: HCPCS | Performed by: INTERNAL MEDICINE

## 2021-03-22 PROCEDURE — 99442 PR PHYS/QHP TELEPHONE EVALUATION 11-20 MIN: CPT | Performed by: INTERNAL MEDICINE

## 2021-03-22 PROCEDURE — G8428 CUR MEDS NOT DOCUMENT: HCPCS | Performed by: INTERNAL MEDICINE

## 2021-03-22 PROCEDURE — G0463 HOSPITAL OUTPT CLINIC VISIT: HCPCS | Performed by: INTERNAL MEDICINE

## 2021-03-22 PROCEDURE — G8432 DEP SCR NOT DOC, RNG: HCPCS | Performed by: INTERNAL MEDICINE

## 2021-03-22 PROCEDURE — G8756 NO BP MEASURE DOC: HCPCS | Performed by: INTERNAL MEDICINE

## 2021-03-22 PROCEDURE — 3017F COLORECTAL CA SCREEN DOC REV: CPT | Performed by: INTERNAL MEDICINE

## 2021-03-22 NOTE — PROGRESS NOTES
Olympia Lesches is a 63 y.o. female who was seen by synchronous (real-time) audio- technology on 3/22/2021. PCP: Lindsay Feliciano MD    Assessment & Plan:     The complexity of medical decision making for this visit is moderate     1. Ductal carcinoma in situ (DCIS) of right breast  --Patient completed low dose Tamoxifen in September 2020  --Mammogram in June 2020 negative Birads2. Follow up mammogram in June 2021. Patient states PCP orders this yearly or as recommended.     2. Thrombocytosis ([address]  --01/04/2021: CBC showed WBC 9.5K/uL, hemoglobin 12.1g/dL with hematocrit of 37.4. Platelet 876,513.     3. Vitamin D deficiency  --Continue Vitamin D supplementation PO daily per PCP.     4. Anemia, unspecified type  --01/04/2021: CBC showed WBC 9.5K/uL, hemoglobin 12.1g/dL with hematocrit of 37.4. Platelet 369,993. Ferritin 205. Iron Profile showed Iron 18ug/dL with TSAT 5%. BUN and Creatinine 14 and 0.65 respectively. Patient was advised by PCP to take oral iron daily  --Check ESR and CRP, and TSH  --Check CBC, CMP, Iron Profile and Ferritin   --If TSAT is low and CRP or ESR elevated will give Iron Infusion in the form of Injectafer 750mg IV x 2 doses 1 week apart. Patient verbalized understanding. I spent at least 20 minutes on this visit with this established patient. 712  Subjective:   Ms. Olympia Lesches is a 63 y.o. female who was evaluated via audio-only technology. She has a history of DCIS+LCIS of right breast s/p Lumpectomy by Dr. Michelle Huber in 06/2017 followed by adjuvant radiation completed 08/2017. She states she completed her Tamoxifen therapy in Sept 2020. On review of systems she denies any fevers, chills, shortness of breath, nausea vomiting or abdominal pain. No focal neurologic deficit. All other point of review of system have been reviewed and were negative. ECOG performance status 0. Independent with ADLs and IADLs. Prior to Admission medications    Medication Sig Start Date End Date Taking? Authorizing Provider   dilTIAZem ER (CARDIZEM CD) 360 mg capsule Take 1 capsule by mouth once daily 3/16/21   Marlen Fu NP   glipiZIDE SR (GLUCOTROL XL) 5 mg CR tablet 1 tablet bu mouth daily. 3/16/21   Marlen Fu NP   hydroCHLOROthiazide (HYDRODIURIL) 25 mg tablet Take 1 tablet by mouth once daily 3/16/21   Marlen Fu NP   losartan (COZAAR) 100 mg tablet Take 1 tablet by mouth once daily 3/16/21   Marlen Fu NP   metFORMIN (GLUCOPHAGE) 1,000 mg tablet TAKE 1 TABLET BY MOUTH TWICE DAILY WITH MEALS 3/16/21   Marlen Fu NP   metoprolol succinate (TOPROL-XL) 25 mg XL tablet Take 1 Tab by mouth daily. 3/16/21   Marlen Fu NP   ferrous sulfate (IRON) 325 mg (65 mg iron) EC tablet Take 1 Tab by mouth Daily (before breakfast).  3/16/21   Marlen Fu NP   ergocalciferol (Vitamin D2) 1,250 mcg (50,000 unit) capsule TAKE 1 CAPSULE BY MOUTH EVERY TWO WEEKS 3/23/20   Aminah Lara MD     Patient Active Problem List   Diagnosis Code    Anemia D64.9    Hypertension I10    Vitamin D deficiency E55.9    Breast calcifications on mammogram R92.1    Morbid obesity due to excess calories ([address] E66.01    Ductal carcinoma in situ (DCIS) of right breast D05.11    Controlled type 2 diabetes mellitus without complication, without long-term current use of insulin ([address] E11.9     Patient Active Problem List    Diagnosis Date Noted    Controlled type 2 diabetes mellitus without complication, without long-term current use of insulin ([address] 08/22/2017    Ductal carcinoma in situ (DCIS) of right breast 05/23/2017    Morbid obesity due to excess calories ([address] 05/13/2017    Breast calcifications on mammogram 05/04/2017    Vitamin D deficiency     Anemia     Hypertension      Current Outpatient Medications   Medication Sig Dispense Refill    dilTIAZem ER (CARDIZEM CD) 360 mg capsule Take 1 capsule by mouth once daily 90 Cap 1    glipiZIDE SR (GLUCOTROL XL) 5 mg CR tablet 1 tablet bu mouth daily. 90 Tab 1    hydroCHLOROthiazide (HYDRODIURIL) 25 mg tablet Take 1 tablet by mouth once daily 90 Tab 1    losartan (COZAAR) 100 mg tablet Take 1 tablet by mouth once daily 90 Tab 1    metFORMIN (GLUCOPHAGE) 1,000 mg tablet TAKE 1 TABLET BY MOUTH TWICE DAILY WITH MEALS 180 Tab 1    metoprolol succinate (TOPROL-XL) 25 mg XL tablet Take 1 Tab by mouth daily. 90 Tab 1    ferrous sulfate (IRON) 325 mg (65 mg iron) EC tablet Take 1 Tab by mouth Daily (before breakfast). 90 Tab 1    ergocalciferol (Vitamin D2) 1,250 mcg (50,000 unit) capsule TAKE 1 CAPSULE BY MOUTH EVERY TWO WEEKS 12 Cap 1     No Known Allergies  Past Medical History:   Diagnosis Date    Anemia     resolved    Breast cancer ([facility]     Diverticulosis 11/13    Hypertension     Internal hemorrhoids 11/13    Menopause     Radiation therapy complication     Vitamin D deficiency      Past Surgical History:   Procedure Laterality Date    HX BREAST LUMPECTOMY Right 5/30/2017    right BREAST LUMPECTOMY WITH localization performed by Elvis Alvarez MD at [address] [place] [address] HX BREAST LUMPECTOMY Right 6/20/2017    revision right breast to achieve neg margin performed by Elvis Alvarez MD at [address] [place] [address] HX COLONOSCOPY  1/13    repeat 1/23 - [facility]    HX ORTHOPAEDIC      fluid drained from lt shoulder    HX TUBAL LIGATION       Family History   Problem Relation Age of Onset    Stroke Mother     Hypertension Mother     Diabetes Daughter      Social History     Tobacco Use    Smoking status: Never Smoker    Smokeless tobacco: Never Used   Substance Use Topics    Alcohol use: No       Review of Systems   All other systems reviewed and are negative. Objective:   No flowsheet data found.    General: alert, cooperative, no distress     Labs:   Lab Results   Component Value Date/Time    WBC 9.5 01/04/2021 11:24 AM    Hemoglobin, POC 13.9 06/20/2017 06:41 AM    HGB 12.1 01/04/2021 11:24 AM    Hematocrit, POC 41 06/20/2017 06:41 AM    HCT 37.4 01/04/2021 11:24 AM    PLATELET 340 48/30/9546 11:24 AM    MCV 82.2 01/04/2021 11:24 AM     Lab Results   Component Value Date/Time    Sodium 142 01/04/2021 11:24 AM    Potassium 3.3 (L) 01/04/2021 11:24 AM    Chloride 105 01/04/2021 11:24 AM    CO2 26 01/04/2021 11:24 AM    Anion gap 11 01/04/2021 11:24 AM    Glucose 96 01/04/2021 11:24 AM    BUN 14 01/04/2021 11:24 AM    Creatinine 0.65 01/04/2021 11:24 AM    BUN/Creatinine ratio 22 (H) 01/04/2021 11:24 AM    GFR est AA >60 01/04/2021 11:24 AM    GFR est non-AA >60 01/04/2021 11:24 AM    Calcium 9.0 01/04/2021 11:24 AM    Bilirubin, total 0.3 01/04/2021 11:24 AM    Alk. phosphatase 91 01/04/2021 11:24 AM    Protein, total 7.3 01/04/2021 11:24 AM    Albumin 3.6 01/04/2021 11:24 AM    Globulin 3.7 01/04/2021 11:24 AM    A-G Ratio 1.0 01/04/2021 11:24 AM    ALT (SGPT) 33 01/04/2021 11:24 AM    AST (SGOT) 26 01/04/2021 11:24 AM     Lab Results   Component Value Date/Time    Iron 18 (L) 01/04/2021 11:24 AM    TIBC 333 01/04/2021 11:24 AM    Iron % saturation 5 (L) 01/04/2021 11:24 AM    Ferritin 205 01/04/2021 11:24 AM         We discussed the expected course, resolution and complications of the diagnosis(es) in detail. Medication risks, benefits, costs, interactions, and alternatives were discussed as indicated. I advised her to contact the office if her condition worsens, changes or fails to improve as anticipated. She expressed understanding with the diagnosis(es) and plan. Paris Thomas, who was evaluated through a patient-initiated, synchronous (real-time) audio- encounter, and/or her healthcare decision maker, is aware that it is a billable service, with coverage as determined by her insurance carrier. She provided verbal consent to proceed: Yes, and patient identification was verified.  It was conducted pursuant to the emergency declaration under the [facility] 1135 waiver authority and the Coronavirus Preparedness and Response Supplemental Appropriations Act. A caregiver was present when appropriate. Ability to conduct physical exam was limited. I was in the office. The patient was at home.       Orders Placed This Encounter    SED RATE (ESR)     Standing Status:   Future     Standing Expiration Date:   3/23/2022    C REACTIVE PROTEIN, QT     Standing Status:   Future     Standing Expiration Date:   3/23/2022    IRON PROFILE     Standing Status:   Future     Standing Expiration Date:   3/23/2022    FERRITIN     Standing Status:   Future     Standing Expiration Date:   3/23/2022    TSH+FREE T4     Standing Status:   Future     Standing Expiration Date:   3/23/2022         Kishore Harley MD  03/22/21

## 2021-03-26 ENCOUNTER — HOSPITAL ENCOUNTER (OUTPATIENT)
Dept: INFUSION THERAPY | Age: 66
Discharge: HOME | End: 2021-03-26
Payer: MEDICARE

## 2021-03-26 VITALS
SYSTOLIC BLOOD PRESSURE: 133 MMHG | TEMPERATURE: 99.2 F | HEART RATE: 82 BPM | OXYGEN SATURATION: 99 % | DIASTOLIC BLOOD PRESSURE: 76 MMHG

## 2021-03-26 DIAGNOSIS — D64.9 ANEMIA, UNSPECIFIED TYPE: ICD-10-CM

## 2021-03-26 LAB
CRP SERPL-MCNC: 1.4 MG/DL (ref 0–0.3)
ERYTHROCYTE [SEDIMENTATION RATE] IN BLOOD: 18 MM/HR (ref 0–30)
FERRITIN SERPL-MCNC: 30 NG/ML (ref 8–388)
IRON SATN MFR SERPL: 16 % (ref 20–50)
IRON SERPL-MCNC: 58 UG/DL (ref 50–175)
T4 FREE SERPL-MCNC: 1 NG/DL (ref 0.7–1.5)
TIBC SERPL-MCNC: 374 UG/DL (ref 250–450)
TSH SERPL DL<=0.05 MIU/L-ACNC: 1.49 UIU/ML (ref 0.36–3.74)

## 2021-03-26 PROCEDURE — 36415 COLL VENOUS BLD VENIPUNCTURE: CPT

## 2021-03-26 PROCEDURE — 83540 ASSAY OF IRON: CPT

## 2021-03-26 PROCEDURE — 84443 ASSAY THYROID STIM HORMONE: CPT

## 2021-03-26 PROCEDURE — 86140 C-REACTIVE PROTEIN: CPT

## 2021-03-26 PROCEDURE — 82728 ASSAY OF FERRITIN: CPT

## 2021-03-26 PROCEDURE — 85652 RBC SED RATE AUTOMATED: CPT

## 2021-03-26 PROCEDURE — 84439 ASSAY OF FREE THYROXINE: CPT

## 2021-03-26 NOTE — PROGRESS NOTES
[facility] Progress Note    Date: 2021    Name: Sheryl Ward    MRN: [id number]         : 1955    Peripheral Lab Draw      Ms. Horowitz to [facility], ambulatory at 12 accompanied by self. Pt was assessed and education was provided. Ms. Ted Acevedo vitals were reviewed and patient was observed for 5 minutes prior to treatment. Visit Vitals  /76 (BP 1 Location: Left arm, BP Patient Position: Sitting)   Pulse 82   Temp 99.2 °F (37.3 °C)   SpO2 99%       Blood obtained peripherally from left arm x 1 attempt with butterfly needle and sent to lab for Sed rate, Cmp, Iron Profile, Ferritin, Free T4, Tsh and C Reactive protein per written orders. No bleeding or hematoma noted at site. Gauze and coban applied. Ms. Trav Mancera tolerated the phlebotomy, and had no complaints. Patient armband removed and shredded. Ms. Trav Mancera was discharged from [place] [address] in stable condition at 1315.      Tess Oropeza Phlebotomist PCT  2021  2:00 PM

## 2021-04-05 RX ORDER — EPINEPHRINE 1 MG/ML
0.3 INJECTION, SOLUTION, CONCENTRATE INTRAVENOUS AS NEEDED
Status: CANCELLED | OUTPATIENT
Start: 2021-04-12

## 2021-04-05 RX ORDER — HYDROCORTISONE SODIUM SUCCINATE 100 MG/2ML
100 INJECTION, POWDER, FOR SOLUTION INTRAMUSCULAR; INTRAVENOUS AS NEEDED
Status: CANCELLED | OUTPATIENT
Start: 2021-04-12

## 2021-04-05 RX ORDER — ALBUTEROL SULFATE 0.83 MG/ML
2.5 SOLUTION RESPIRATORY (INHALATION) AS NEEDED
Status: CANCELLED
Start: 2021-04-12

## 2021-04-05 RX ORDER — SODIUM CHLORIDE 0.9 % (FLUSH) 0.9 %
10 SYRINGE (ML) INJECTION AS NEEDED
Status: CANCELLED | OUTPATIENT
Start: 2021-04-12

## 2021-04-05 RX ORDER — ONDANSETRON 2 MG/ML
8 INJECTION INTRAMUSCULAR; INTRAVENOUS AS NEEDED
Status: CANCELLED | OUTPATIENT
Start: 2021-04-12

## 2021-04-05 RX ORDER — DIPHENHYDRAMINE HYDROCHLORIDE 50 MG/ML
25 INJECTION, SOLUTION INTRAMUSCULAR; INTRAVENOUS AS NEEDED
Status: CANCELLED
Start: 2021-04-12

## 2021-04-05 RX ORDER — ACETAMINOPHEN 325 MG/1
650 TABLET ORAL AS NEEDED
Status: CANCELLED
Start: 2021-04-12

## 2021-04-05 RX ORDER — DIPHENHYDRAMINE HYDROCHLORIDE 50 MG/ML
50 INJECTION, SOLUTION INTRAMUSCULAR; INTRAVENOUS AS NEEDED
Status: CANCELLED
Start: 2021-04-12

## 2021-04-05 RX ORDER — HEPARIN 100 UNIT/ML
300-500 SYRINGE INTRAVENOUS AS NEEDED
Status: CANCELLED
Start: 2021-04-12

## 2021-04-12 ENCOUNTER — HOSPITAL ENCOUNTER (OUTPATIENT)
Dept: INFUSION THERAPY | Age: 66
Discharge: HOME | End: 2021-04-12
Payer: MEDICARE

## 2021-04-12 VITALS
OXYGEN SATURATION: 98 % | RESPIRATION RATE: 18 BRPM | SYSTOLIC BLOOD PRESSURE: 141 MMHG | HEART RATE: 76 BPM | TEMPERATURE: 99 F | DIASTOLIC BLOOD PRESSURE: 78 MMHG

## 2021-04-12 DIAGNOSIS — D64.9 ANEMIA, UNSPECIFIED TYPE: ICD-10-CM

## 2021-04-12 DIAGNOSIS — D64.9 ANEMIA, UNSPECIFIED TYPE: Primary | ICD-10-CM

## 2021-04-12 PROCEDURE — 74011250636 HC RX REV CODE- 250/636: Performed by: INTERNAL MEDICINE

## 2021-04-12 PROCEDURE — 96365 THER/PROPH/DIAG IV INF INIT: CPT

## 2021-04-12 RX ORDER — SODIUM CHLORIDE 9 MG/ML
25 INJECTION, SOLUTION INTRAVENOUS CONTINUOUS
Status: CANCELLED | OUTPATIENT
Start: 2021-04-19

## 2021-04-12 RX ORDER — ONDANSETRON 2 MG/ML
8 INJECTION INTRAMUSCULAR; INTRAVENOUS AS NEEDED
Status: CANCELLED | OUTPATIENT
Start: 2021-04-19

## 2021-04-12 RX ORDER — ACETAMINOPHEN 325 MG/1
650 TABLET ORAL AS NEEDED
Status: CANCELLED
Start: 2021-04-19

## 2021-04-12 RX ORDER — EPINEPHRINE 1 MG/ML
0.3 INJECTION, SOLUTION, CONCENTRATE INTRAVENOUS AS NEEDED
Status: CANCELLED | OUTPATIENT
Start: 2021-04-19

## 2021-04-12 RX ORDER — DIPHENHYDRAMINE HYDROCHLORIDE 50 MG/ML
50 INJECTION, SOLUTION INTRAMUSCULAR; INTRAVENOUS AS NEEDED
Status: CANCELLED
Start: 2021-04-19

## 2021-04-12 RX ORDER — ALBUTEROL SULFATE 0.83 MG/ML
2.5 SOLUTION RESPIRATORY (INHALATION) AS NEEDED
Status: CANCELLED
Start: 2021-04-19

## 2021-04-12 RX ORDER — SODIUM CHLORIDE 9 MG/ML
25 INJECTION, SOLUTION INTRAVENOUS CONTINUOUS
Status: DISPENSED | OUTPATIENT
Start: 2021-04-12 | End: 2021-04-12

## 2021-04-12 RX ORDER — DIPHENHYDRAMINE HYDROCHLORIDE 50 MG/ML
25 INJECTION, SOLUTION INTRAMUSCULAR; INTRAVENOUS AS NEEDED
Status: CANCELLED
Start: 2021-04-19

## 2021-04-12 RX ORDER — SODIUM CHLORIDE 9 MG/ML
10 INJECTION INTRAMUSCULAR; INTRAVENOUS; SUBCUTANEOUS AS NEEDED
Status: ACTIVE | OUTPATIENT
Start: 2021-04-12 | End: 2021-04-12

## 2021-04-12 RX ORDER — SODIUM CHLORIDE 0.9 % (FLUSH) 0.9 %
10 SYRINGE (ML) INJECTION AS NEEDED
Status: CANCELLED | OUTPATIENT
Start: 2021-04-19

## 2021-04-12 RX ORDER — HYDROCORTISONE SODIUM SUCCINATE 100 MG/2ML
100 INJECTION, POWDER, FOR SOLUTION INTRAMUSCULAR; INTRAVENOUS AS NEEDED
Status: CANCELLED | OUTPATIENT
Start: 2021-04-19

## 2021-04-12 RX ORDER — SODIUM CHLORIDE 9 MG/ML
10 INJECTION INTRAMUSCULAR; INTRAVENOUS; SUBCUTANEOUS AS NEEDED
Status: CANCELLED | OUTPATIENT
Start: 2021-04-19

## 2021-04-12 RX ORDER — HEPARIN 100 UNIT/ML
300-500 SYRINGE INTRAVENOUS AS NEEDED
Status: CANCELLED
Start: 2021-04-19

## 2021-04-12 RX ADMIN — FERRIC CARBOXYMALTOSE INJECTION 750 MG: 50 INJECTION, SOLUTION INTRAVENOUS at 11:30

## 2021-04-12 RX ADMIN — SODIUM CHLORIDE 25 ML/HR: 0.9 INJECTION, SOLUTION INTRAVENOUS at 11:30

## 2021-04-12 RX ADMIN — SODIUM CHLORIDE 10 ML: 9 INJECTION INTRAMUSCULAR; INTRAVENOUS; SUBCUTANEOUS at 11:54

## 2021-04-12 NOTE — PROGRESS NOTES
[facility] Progress Note    Date: 2021    Name: Alfredo Mehta    MRN: [id number]         : 1955    Injectafer Infusion 1 of 2    Ms. Horowitz to [facility], ambulatory, at 1120. Pt was assessed and education was provided. Ms. Dakotah Ojeda vitals were reviewed and patient was observed for 5 minutes prior to treatment. Visit Vitals  BP (!) 146/77 (BP 1 Location: Left upper arm, BP Patient Position: Sitting)   Pulse 77   Temp 98.5 °F (36.9 °C)   Resp 18   SpO2 99%   Breastfeeding No       22g PIV placed in left antecubital x1 attempt. PIV flushed easily and had brisk blood return. Injectafer 750mg/250mL NS was infused at 750mL/hr over 20 minutes as ordered. Ms. Rj Vega tolerated the infusion, and had no complaints. VS remained stable. Ms. Rj Vega was observed for 30 minutes post-infusion. PIV flushed with NS 10 ml and removed. No bleeding or hematoma noted at site. Gauze and coban applied. Reviewed discharge instructions with patient, including expected side effects (abdominal cramping, nausea, changes in color of urine or feces) and signs of allergic reaction requiring medical attention (itching/hives/rashes, SOB, chest pain, lip/tongue/facial swelling). Patient given printed copy to take home. Patient verbalized understanding of discharge instructions. Patient education printed, signed and scanned into patient chart. Patient armband removed and shredded. Ms. Rj Vega was discharged from [place] [address] in stable condition at 1235.  She is to return on 2021 at 1300 for Injectafer Infusion 2 of [address], RN  2021  1235

## 2021-04-19 ENCOUNTER — HOSPITAL ENCOUNTER (OUTPATIENT)
Dept: INFUSION THERAPY | Age: 66
Discharge: HOME | End: 2021-04-19
Payer: MEDICARE

## 2021-04-19 VITALS
SYSTOLIC BLOOD PRESSURE: 133 MMHG | TEMPERATURE: 98.8 F | HEART RATE: 83 BPM | DIASTOLIC BLOOD PRESSURE: 72 MMHG | RESPIRATION RATE: 18 BRPM | OXYGEN SATURATION: 99 %

## 2021-04-19 DIAGNOSIS — D64.9 ANEMIA, UNSPECIFIED TYPE: ICD-10-CM

## 2021-04-19 DIAGNOSIS — D64.9 ANEMIA, UNSPECIFIED TYPE: Primary | ICD-10-CM

## 2021-04-19 PROCEDURE — 74011250636 HC RX REV CODE- 250/636: Performed by: INTERNAL MEDICINE

## 2021-04-19 PROCEDURE — 96365 THER/PROPH/DIAG IV INF INIT: CPT

## 2021-04-19 RX ORDER — ALBUTEROL SULFATE 0.83 MG/ML
2.5 SOLUTION RESPIRATORY (INHALATION) AS NEEDED
Status: CANCELLED
Start: 2021-04-26

## 2021-04-19 RX ORDER — SODIUM CHLORIDE 0.9 % (FLUSH) 0.9 %
10 SYRINGE (ML) INJECTION AS NEEDED
Status: CANCELLED | OUTPATIENT
Start: 2021-04-26

## 2021-04-19 RX ORDER — DIPHENHYDRAMINE HYDROCHLORIDE 50 MG/ML
50 INJECTION, SOLUTION INTRAMUSCULAR; INTRAVENOUS AS NEEDED
Status: CANCELLED
Start: 2021-04-26

## 2021-04-19 RX ORDER — HEPARIN 100 UNIT/ML
300-500 SYRINGE INTRAVENOUS AS NEEDED
Status: CANCELLED
Start: 2021-04-26

## 2021-04-19 RX ORDER — SODIUM CHLORIDE 9 MG/ML
25 INJECTION, SOLUTION INTRAVENOUS CONTINUOUS
Status: CANCELLED | OUTPATIENT
Start: 2021-04-26

## 2021-04-19 RX ORDER — EPINEPHRINE 1 MG/ML
0.3 INJECTION, SOLUTION, CONCENTRATE INTRAVENOUS AS NEEDED
Status: CANCELLED | OUTPATIENT
Start: 2021-04-26

## 2021-04-19 RX ORDER — SODIUM CHLORIDE 9 MG/ML
10 INJECTION INTRAMUSCULAR; INTRAVENOUS; SUBCUTANEOUS AS NEEDED
Status: DISCONTINUED | OUTPATIENT
Start: 2021-04-19 | End: 2021-04-20 | Stop reason: HOSPADM

## 2021-04-19 RX ORDER — DIPHENHYDRAMINE HYDROCHLORIDE 50 MG/ML
25 INJECTION, SOLUTION INTRAMUSCULAR; INTRAVENOUS AS NEEDED
Status: CANCELLED
Start: 2021-04-26

## 2021-04-19 RX ORDER — SODIUM CHLORIDE 9 MG/ML
10 INJECTION INTRAMUSCULAR; INTRAVENOUS; SUBCUTANEOUS AS NEEDED
Status: CANCELLED | OUTPATIENT
Start: 2021-04-26

## 2021-04-19 RX ORDER — HYDROCORTISONE SODIUM SUCCINATE 100 MG/2ML
100 INJECTION, POWDER, FOR SOLUTION INTRAMUSCULAR; INTRAVENOUS AS NEEDED
Status: CANCELLED | OUTPATIENT
Start: 2021-04-26

## 2021-04-19 RX ORDER — SODIUM CHLORIDE 9 MG/ML
25 INJECTION, SOLUTION INTRAVENOUS CONTINUOUS
Status: DISCONTINUED | OUTPATIENT
Start: 2021-04-19 | End: 2021-04-20 | Stop reason: HOSPADM

## 2021-04-19 RX ORDER — ACETAMINOPHEN 325 MG/1
650 TABLET ORAL AS NEEDED
Status: CANCELLED
Start: 2021-04-26

## 2021-04-19 RX ORDER — ONDANSETRON 2 MG/ML
8 INJECTION INTRAMUSCULAR; INTRAVENOUS AS NEEDED
Status: CANCELLED | OUTPATIENT
Start: 2021-04-26

## 2021-04-19 RX ADMIN — SODIUM CHLORIDE 10 ML: 9 INJECTION INTRAMUSCULAR; INTRAVENOUS; SUBCUTANEOUS at 13:52

## 2021-04-19 RX ADMIN — FERRIC CARBOXYMALTOSE INJECTION 750 MG: 50 INJECTION, SOLUTION INTRAVENOUS at 13:27

## 2021-04-19 RX ADMIN — SODIUM CHLORIDE 25 ML/HR: 0.9 INJECTION, SOLUTION INTRAVENOUS at 13:27

## 2021-04-19 NOTE — PROGRESS NOTES
[facility] Progress Note    Date: 2021    Name: Brit Daniel    MRN: [id number]         : 1955    Injectafer Infusion 2 of 2    Ms. Chantal Knox to [facility], at 1315. Pt was assessed and education was provided. Ms. Lizzy Paulson vitals were reviewed and patient was observed for 5 minutes prior to treatment. Visit Vitals  /72 (BP 1 Location: Left upper arm, BP Patient Position: Sitting)   Pulse 83   Temp 98.8 °F (37.1 °C)   Resp 18   SpO2 99%       22g PIV placed in left antecubital x1 attempt. PIV flushed easily and had brisk blood return. Injectafer 750mg/250mL NS was infused at 750mL/hr over 20 minutes as ordered. Ms. Chantal Knox tolerated the infusion, and had no complaints. VS remained stable. Ms. Chantal Knox was observed for 30 minutes post-infusion. PIV flushed with NS 10 ml and removed. No bleeding or hematoma noted at site. Gauze and coban applied. Patient armband removed and shredded. Ms. Chantal Knox was discharged from [place] [address] in stable condition at [phone]. She is to follow-up with referring provider.     Rae Mcknight RN  2021  9775

## 2021-06-01 DIAGNOSIS — I10 ESSENTIAL HYPERTENSION: ICD-10-CM

## 2021-06-01 RX ORDER — HYDROCHLOROTHIAZIDE 25 MG/1
TABLET ORAL
Qty: 90 TABLET | Refills: 1 | Status: SHIPPED | OUTPATIENT
Start: 2021-06-01 | End: 2021-06-16 | Stop reason: SDUPTHER

## 2021-06-16 ENCOUNTER — VIRTUAL VISIT (OUTPATIENT)
Dept: FAMILY MEDICINE CLINIC | Age: 66
End: 2021-06-16
Payer: MEDICARE

## 2021-06-16 DIAGNOSIS — I10 ESSENTIAL HYPERTENSION: ICD-10-CM

## 2021-06-16 DIAGNOSIS — Z00.00 MEDICARE ANNUAL WELLNESS VISIT, SUBSEQUENT: Primary | ICD-10-CM

## 2021-06-16 DIAGNOSIS — E61.1 IRON DEFICIENCY: ICD-10-CM

## 2021-06-16 DIAGNOSIS — E11.9 CONTROLLED TYPE 2 DIABETES MELLITUS WITHOUT COMPLICATION, WITHOUT LONG-TERM CURRENT USE OF INSULIN (HCC): ICD-10-CM

## 2021-06-16 DIAGNOSIS — Z71.89 ADVANCED CARE PLANNING/COUNSELING DISCUSSION: ICD-10-CM

## 2021-06-16 DIAGNOSIS — E55.9 VITAMIN D DEFICIENCY: ICD-10-CM

## 2021-06-16 PROCEDURE — G8432 DEP SCR NOT DOC, RNG: HCPCS | Performed by: NURSE PRACTITIONER

## 2021-06-16 PROCEDURE — 3044F HG A1C LEVEL LT 7.0%: CPT | Performed by: NURSE PRACTITIONER

## 2021-06-16 PROCEDURE — G9899 SCRN MAM PERF RSLTS DOC: HCPCS | Performed by: NURSE PRACTITIONER

## 2021-06-16 PROCEDURE — G8417 CALC BMI ABV UP PARAM F/U: HCPCS | Performed by: NURSE PRACTITIONER

## 2021-06-16 PROCEDURE — 2022F DILAT RTA XM EVC RTNOPTHY: CPT | Performed by: NURSE PRACTITIONER

## 2021-06-16 PROCEDURE — G0439 PPPS, SUBSEQ VISIT: HCPCS | Performed by: NURSE PRACTITIONER

## 2021-06-16 PROCEDURE — 99214 OFFICE O/P EST MOD 30 MIN: CPT | Performed by: NURSE PRACTITIONER

## 2021-06-16 PROCEDURE — G8427 DOCREV CUR MEDS BY ELIG CLIN: HCPCS | Performed by: NURSE PRACTITIONER

## 2021-06-16 PROCEDURE — 3017F COLORECTAL CA SCREEN DOC REV: CPT | Performed by: NURSE PRACTITIONER

## 2021-06-16 PROCEDURE — G8756 NO BP MEASURE DOC: HCPCS | Performed by: NURSE PRACTITIONER

## 2021-06-16 RX ORDER — FERROUS SULFATE 325(65) MG
325 TABLET, DELAYED RELEASE (ENTERIC COATED) ORAL
Qty: 90 TABLET | Refills: 1 | Status: SHIPPED | OUTPATIENT
Start: 2021-06-16 | End: 2022-02-27

## 2021-06-16 RX ORDER — HYDROCHLOROTHIAZIDE 25 MG/1
TABLET ORAL
Qty: 90 TABLET | Refills: 1 | Status: SHIPPED | OUTPATIENT
Start: 2021-06-16 | End: 2021-11-21

## 2021-06-16 RX ORDER — GLIPIZIDE 5 MG/1
TABLET, FILM COATED, EXTENDED RELEASE ORAL
Qty: 90 TABLET | Refills: 1 | Status: SHIPPED | OUTPATIENT
Start: 2021-06-16 | End: 2022-05-10 | Stop reason: SDUPTHER

## 2021-06-16 RX ORDER — LOSARTAN POTASSIUM 100 MG/1
TABLET ORAL
Qty: 90 TABLET | Refills: 1 | Status: SHIPPED | OUTPATIENT
Start: 2021-06-16 | End: 2021-12-07

## 2021-06-16 RX ORDER — DILTIAZEM HYDROCHLORIDE 360 MG/1
CAPSULE, EXTENDED RELEASE ORAL
Qty: 90 CAPSULE | Refills: 1 | Status: SHIPPED | OUTPATIENT
Start: 2021-06-16 | End: 2022-03-24

## 2021-06-16 RX ORDER — METFORMIN HYDROCHLORIDE 1000 MG/1
TABLET ORAL
Qty: 180 TABLET | Refills: 1 | Status: SHIPPED | OUTPATIENT
Start: 2021-06-16 | End: 2022-05-10 | Stop reason: SDUPTHER

## 2021-06-16 RX ORDER — METOPROLOL SUCCINATE 25 MG/1
25 TABLET, EXTENDED RELEASE ORAL DAILY
Qty: 90 TABLET | Refills: 1 | Status: SHIPPED | OUTPATIENT
Start: 2021-06-16 | End: 2021-08-31

## 2021-06-16 NOTE — PATIENT INSTRUCTIONS
Medicare Wellness Visit, Female     The best way to live healthy is to have a lifestyle where you eat a well-balanced diet, exercise regularly, limit alcohol use, and quit all forms of tobacco/nicotine, if applicable. Regular preventive services are another way to keep healthy. Preventive services (vaccines, screening tests, monitoring & exams) can help personalize your care plan, which helps you manage your own care. Screening tests can find health problems at the earliest stages, when they are easiest to treat. [facility] follows the current, evidence-based guidelines published by the [facility] ([facility]) when recommending preventive services for our patients. Because we follow these guidelines, sometimes recommendations change over time as research supports it. (For example, mammograms used to be recommended annually. Even though Medicare will still pay for an annual mammogram, the newer guidelines recommend a mammogram every two years for women of average risk). Of course, you and your doctor may decide to screen more often for some diseases, based on your risk and your co-morbidities (chronic disease you are already diagnosed with). Preventive services for you include:  - Medicare offers their members a free annual wellness visit, which is time for you and your primary care provider to discuss and plan for your preventive service needs. Take advantage of this benefit every year!  -All adults over the age of 72 should receive the recommended pneumonia vaccines. Current USPSTF guidelines recommend a series of two vaccines for the best pneumonia protection.   -All adults should have a flu vaccine yearly and a tetanus vaccine every 10 years.   -All adults age 48 and older should receive the shingles vaccines (series of two vaccines).       -All adults age 38-68 who are overweight should have a diabetes screening test once every three years.   -All adults born between [birth year] and 1963 should be screened once for Hepatitis C.  -Other screening tests and preventive services for persons with diabetes include: an eye exam to screen for diabetic retinopathy, a kidney function test, a foot exam, and stricter control over your cholesterol.   -Cardiovascular screening for adults with routine risk involves an electrocardiogram (ECG) at intervals determined by your doctor.   -Colorectal cancer screenings should be done for adults age 54-65 with no increased risk factors for colorectal cancer. There are a number of acceptable methods of screening for this type of cancer. Each test has its own benefits and drawbacks. Discuss with your doctor what is most appropriate for you during your annual wellness visit. The different tests include: colonoscopy (considered the best screening method), a fecal occult blood test, a fecal DNA test, and sigmoidoscopy.    -A bone mass density test is recommended when a woman turns 65 to screen for osteoporosis. This test is only recommended one time, as a screening. Some providers will use this same test as a disease monitoring tool if you already have osteoporosis. -Breast cancer screenings are recommended every other year for women of normal risk, age 54-69.  -Cervical cancer screenings for women over age 72 are only recommended with certain risk factors.      Here is a list of your current Health Maintenance items (your personalized list of preventive services) with a due date:  Health Maintenance Due   Topic Date Due    Pap Test  Never done    Eye Exam  04/22/2021    Mammogram  06/29/2021    Albumin Urine Test  06/30/2021    Cholesterol Test   06/30/2021

## 2021-06-16 NOTE — PROGRESS NOTES
Please call this number [phone]    1. Have you been to the ER, urgent care clinic since your last visit? Hospitalized since your last visit? No    2. Have you seen or consulted any other health care providers outside of the [address] [place] [address] since your last visit? Include any pap smears or colon screening.  Yes Knee specialist on 6/7/21       Health Maintenance Due   Topic Date Due    PAP AKA CERVICAL CYTOLOGY  Never done    Eye Exam Retinal or Dilated  04/22/2021    Medicare Yearly Exam  06/19/2021    Breast Cancer Screen Mammogram  06/29/2021    MICROALBUMIN Q1  06/30/2021    Lipid Screen  06/30/2021

## 2021-06-16 NOTE — ACP (ADVANCE CARE PLANNING)
Advance Care Planning     Advance Care Planning (ACP) Physician/NP/PA Conversation      Date of Conversation: 6/16/2021  Conducted with: Patient with Decision Making Capacity    Healthcare Decision Maker:     Primary Decision Maker: Margarette Sender - Sister  Click here to complete Networked Organisms including selection of the [employer] Scientific Relationship (ie \"Primary\")  Today we documented Decision Maker(s) consistent with Legal Next of Kin hierarchy. Care Preferences:    Hospitalization: \"If your health worsens and it becomes clear that your chance of recovery is unlikely, what would be your preference regarding hospitalization? \"  The patient would prefer hospitalization. Ventilation: \"If you were unable to breathe on your own and your chance of recovery was unlikely, what would be your preference about the use of a ventilator (breathing machine) if it was available to you? \"   The patient would NOT desire the use of a ventilator. Resuscitation: \"In the event your heart stopped as a result of an underlying serious health condition, would you want attempts to be made to restart your heart, or would you prefer a natural death? \"   Yes, attempt to resuscitate.     Additional topics discussed: ventilation preferences, hospitalization preferences and resuscitation preferences    Conversation Outcomes / Follow-Up Plan:   ACP in process - information provided, considering goals and options  Reviewed DNR/DNI and patient elects Full Code (Attempt Resuscitation)     Length of Voluntary ACP Conversation in minutes:  <16 minutes (Non-Billable)    Garcia Henry NP

## 2021-06-16 NOTE — PROGRESS NOTES
[address] [place] [address]               [phone]      Estela Parker is a 63 y.o. female who was seen by synchronous (real-time) audio-video technology on 6/16/2021. Consent: Estela Parker, who was seen by synchronous (real-time) audio-video technology, and/or her healthcare decision maker, is aware that this patient-initiated, Telehealth encounter on 6/16/2021 is a billable service, with coverage as determined by her insurance carrier. She is aware that she may receive a bill and has provided verbal consent to proceed: Yes. Assessment & Plan:   Diagnoses and all orders for this visit:    1. Medicare annual wellness visit, subsequent  Completed today to include EtOH and depression screening  2. Advanced care planning/counseling discussion  Discussion completed and documented in notes, healthcare decision-makers verified and updated as needed  3. Controlled type 2 diabetes mellitus without complication, without long-term current use of insulin (HCC)  -     glipiZIDE SR (GLUCOTROL XL) 5 mg CR tablet; 1 tablet bu mouth daily. -     metFORMIN (GLUCOPHAGE) 1,000 mg tablet; TAKE 1 TABLET BY MOUTH TWICE DAILY WITH MEALS  -     HEMOGLOBIN A1C WITH EAG; Future  -     MICROALBUMIN, UR, RAND W/ MICROALB/CREAT RATIO; Future  -     LIPID PANEL; Future  Endorses medication compliance, Refill provided, Have asked him/her to come in for follow-up labs and Denies signs and symptoms of hyper or hypoglycemia   4. Essential hypertension  -     dilTIAZem ER (CARDIZEM CD) 360 mg capsule; Take 1 capsule by mouth once daily  -     hydroCHLOROthiazide (HYDRODIURIL) 25 mg tablet; Take 1 tablet by mouth once daily  -     losartan (COZAAR) 100 mg tablet; Take 1 tablet by mouth once daily  -     metoprolol succinate (TOPROL-XL) 25 mg XL tablet; Take 1 Tablet by mouth daily.  -     METABOLIC PANEL, COMPREHENSIVE;  Future  Endorses medication compliance, Refill provided, Have asked him/her to come in for follow-up labs and Blood pressure stable, continue same medications   5. Iron deficiency  -     ferrous sulfate (IRON) 325 mg (65 mg iron) EC tablet; Take 1 Tablet by mouth Daily (before breakfast). Refill provided   6. Vitamin D deficiency  -     VITAMIN D, 25 HYDROXY; Future  Have asked him/her to come in for follow-up labs     Follow-up and Dispositions    Return in about 3 months (around 9/16/2021) for DM, HLD, HTN, 30 min, office only, AND, labs prior. 712  Subjective:     Health Maintenance Due   Topic Date Due    PAP AKA CERVICAL CYTOLOGY  Never done    Eye Exam Retinal or Dilated  04/22/2021    MICROALBUMIN Q1  06/30/2021    Lipid Screen  06/30/2021             Leif Syed is a 63 y.o. female who was seen for   Follow Up Chronic Condition, Hypertension, Diabetes, and Cholesterol Problem    DMII-   Patient reports medication compliance Daily  Diabetic diet compliance most of the time  Patient monitors blood sugars regularly Daily   Reports am fasting sugars range 130-140   Denies hypoglycemic episodes yes  Denies polyuria, polydipsia, paraesthesia, vision changes?  yes     Diabetic Foot and Eye Exam HM Status   Topic Date Due    Eye Exam  04/22/2021    Diabetic Foot Care  03/25/2022     Hemoglobin A1c   Date Value Ref Range Status   01/04/2021 6.6 (H) 4.2 - 5.6 % Final     Comment:     (NOTE)  HbA1C Interpretive Ranges  <5.7              Normal  5.7 - 6.4         Consider Prediabetes  >6.5              Consider Diabetes     ]  Creatinine, urine   Date Value Ref Range Status   06/30/2020 173.00 (H) 30 - 125 mg/dL Final     Microalbumin/Creat ratio (mg/g creat)   Date Value Ref Range Status   06/30/2020 6 0 - 30 mg/g Final   11/03/2017 4 0 - 30 mg/g Final   09/13/2016 7 0 - 30 mg/g Final     Microalb/Creat ratio (ug/mg creat.)   Date Value Ref Range Status   02/11/2015 5.4 0.0 - 30.0 mg/g creat Final     Key Antihyperglycemic Medications             glipiZIDE SR (GLUCOTROL XL) 5 mg CR tablet (Taking) 1 tablet bu mouth daily. metFORMIN (GLUCOPHAGE) 1,000 mg tablet (Taking) TAKE 1 TABLET BY MOUTH TWICE DAILY WITH MEALS        Hypertension:   Patient reports taking medications as instructed. yes   Medication side effects noted. no  Headache upon wakening. no   Home BP monitoring in range of unable to check. Do you experience chest pain/pressure or SOB with exertion? no  Maintain a low salt diet? yes  Key CAD CHF Meds             dilTIAZem ER (CARDIZEM CD) 360 mg capsule (Taking) Take 1 capsule by mouth once daily    hydroCHLOROthiazide (HYDRODIURIL) 25 mg tablet (Taking) Take 1 tablet by mouth once daily    losartan (COZAAR) 100 mg tablet (Taking) Take 1 tablet by mouth once daily    metoprolol succinate (TOPROL-XL) 25 mg XL tablet (Taking) Take 1 Tablet by mouth daily. HLD:  Has been compliant with meds  not on medications  Compliant with low-fat diet. most of the time    Denies myalgias or other side effects. N/A  Cholesterol, total   Date Value Ref Range Status   06/30/2020 193 <200 MG/DL Final     Triglyceride   Date Value Ref Range Status   06/30/2020 94 <150 MG/DL Final     Comment:     The drugs N-acetylcysteine (NAC) and  Metamiszole have been found to cause falsely  low results in this chemical assay. Please  be sure to submit blood samples obtained  BEFORE administration of either of these  drugs to assure correct results. HDL Cholesterol   Date Value Ref Range Status   06/30/2020 100 (H) 40 - 60 MG/DL Final     LDL, calculated   Date Value Ref Range Status   06/30/2020 74.2 0 - 100 MG/DL Final   ]  Key Antihyperlipidemia Meds     The patient is on no antihyperlipidemia meds. Prior to Admission medications    Medication Sig Start Date End Date Taking? Authorizing Provider   ferrous sulfate (IRON) 325 mg (65 mg iron) EC tablet Take 1 Tablet by mouth Daily (before breakfast).  6/16/21  Yes Nela Gregorio NP   glipiZIDE SR (GLUCOTROL XL) 5 mg CR tablet 1 tablet bu mouth daily. 6/16/21  Yes Sonya Nice NP   metFORMIN (GLUCOPHAGE) 1,000 mg tablet TAKE 1 TABLET BY MOUTH TWICE DAILY WITH MEALS 6/16/21  Yes Sonya Nice NP   dilTIAZem ER (CARDIZEM CD) 360 mg capsule Take 1 capsule by mouth once daily 6/16/21  Yes Sonya Nice NP   hydroCHLOROthiazide (HYDRODIURIL) 25 mg tablet Take 1 tablet by mouth once daily 6/16/21  Yes Sonya Nice NP   losartan (COZAAR) 100 mg tablet Take 1 tablet by mouth once daily 6/16/21  Yes Sonya Nice NP   metoprolol succinate (TOPROL-XL) 25 mg XL tablet Take 1 Tablet by mouth daily.  6/16/21  Yes Sonya Nice NP   ergocalciferol (Vitamin D2) 1,250 mcg (50,000 unit) capsule TAKE 1 CAPSULE BY MOUTH EVERY TWO WEEKS 3/23/20  Yes Marya Ribeiro MD     No Known Allergies    Patient Active Problem List   Diagnosis Code    Anemia D64.9    Hypertension I10    Vitamin D deficiency E55.9    Breast calcifications on mammogram R92.1    Morbid obesity due to excess calories (HCC) E66.01    Ductal carcinoma in situ (DCIS) of right breast D05.11    Controlled type 2 diabetes mellitus without complication, without long-term current use of insulin ([facility] [address] E11.9     Past Surgical History:   Procedure Laterality Date    HX BREAST LUMPECTOMY Right 5/30/2017    right BREAST LUMPECTOMY WITH localization performed by Rosalinda Gay MD at [address] [place] [address] HX BREAST LUMPECTOMY Right 6/20/2017    revision right breast to achieve neg margin performed by Rosalinda Gay MD at [address] [place] [address] HX COLONOSCOPY  1/13    repeat 1/[facility]    HX ORTHOPAEDIC      fluid drained from lt shoulder    HX TUBAL LIGATION       Family History   Problem Relation Age of Onset    Stroke Mother     Hypertension Mother     Diabetes Daughter      Social History     Tobacco Use    Smoking status: Never Smoker    Smokeless tobacco: Never Used   Substance Use Topics    Alcohol use: No       ROS  As stated in HPI, otherwise all others negative. Objective: There were no vitals taken for this visit. General: alert, cooperative, no distress   Mental  status: normal mood, behavior, speech, dress, motor activity, and thought processes, able to follow commands   HENT: NCAT   Neck: no visualized mass   Resp: no respiratory distress   Neuro: no gross deficits   Skin: no discoloration or lesions of concern on visible areas   Psychiatric: normal affect, consistent with stated mood, no evidence of hallucinations     Additional exam findings: We discussed the expected course, resolution and complications of the diagnosis(es) in detail. Medication risks, benefits, costs, interactions, and alternatives were discussed as indicated. I advised her to contact the office if her condition worsens, changes or fails to improve as anticipated. She expressed understanding with the diagnosis(es) and plan. Rena Ricardo is a 63 y.o. female who was evaluated by a video visit encounter for concerns as above. Patient identification was verified prior to start of the visit. A caregiver was present when appropriate. Due to this being a TeleHealth encounter (During [facility]- public health emergency), evaluation of the following organ systems was limited: Vitals/Constitutional/EENT/Resp/CV/GI//MS/Neuro/Skin/Heme-Lymph-Imm. Pursuant to the emergency declaration under the [facility], [address]5 waiver authority and the [employer] and [employer] Act, this Virtual  Visit was conducted, with patient's (and/or legal guardian's) consent, to reduce the patient's risk of exposure to COVID-19 and provide necessary medical care. Services were provided through a video synchronous discussion virtually to substitute for in-person clinic visit. Patient and provider were located at their individual homes. An After Visit Summary was printed and given to the patient.     All diagnosis have been discussed with the patient and all of the patient's questions have been answered. Follow-up and Dispositions    Return in about 3 months (around 9/16/2021) for DM, HLD, HTN, 30 min, office only, AND, labs prior. Mikael Martinez, [facility] [id number] [address] [facility], [facility]    This is the Subsequent Medicare Annual Wellness Exam, performed 12 months or more after the Initial AWV or the last Subsequent AWV    I have reviewed the patient's medical history in detail and updated the computerized patient record. Assessment/Plan   Education and counseling provided:  Are appropriate based on today's review and evaluation  End-of-Life planning (with patient's consent)    1. Medicare annual wellness visit, subsequent  2. Advanced care planning/counseling discussion  3. Controlled type 2 diabetes mellitus without complication, without long-term current use of insulin (HCC)  -     glipiZIDE SR (GLUCOTROL XL) 5 mg CR tablet; 1 tablet bu mouth daily. , Normal, Disp-90 Tablet, R-1  -     metFORMIN (GLUCOPHAGE) 1,000 mg tablet; TAKE 1 TABLET BY MOUTH TWICE DAILY WITH MEALS, Normal, Disp-180 Tablet, R-1  -     HEMOGLOBIN A1C WITH EAG; Future  -     MICROALBUMIN, UR, RAND W/ MICROALB/CREAT RATIO; Future  -     LIPID PANEL; Future  4. Essential hypertension  -     dilTIAZem ER (CARDIZEM CD) 360 mg capsule; Take 1 capsule by mouth once daily, Normal, Disp-90 Capsule, R-1  -     hydroCHLOROthiazide (HYDRODIURIL) 25 mg tablet; Take 1 tablet by mouth once daily, Normal, Disp-90 Tablet, R-1  -     losartan (COZAAR) 100 mg tablet; Take 1 tablet by mouth once daily, Normal, Disp-90 Tablet, R-1  -     metoprolol succinate (TOPROL-XL) 25 mg XL tablet; Take 1 Tablet by mouth daily. , Normal, Disp-90 Tablet, R-1  -     METABOLIC PANEL, COMPREHENSIVE; Future  5. Iron deficiency  -     ferrous sulfate (IRON) 325 mg (65 mg iron) EC tablet;  Take 1 Tablet by mouth Daily (before breakfast). , Normal, Disp-90 Tablet, R-1  6. Vitamin D deficiency  -     VITAMIN D, 25 HYDROXY; Future       Depression Risk Factor Screening:     3 most recent PHQ Screens 6/16/2021   Little interest or pleasure in doing things Not at all   Feeling down, depressed, irritable, or hopeless Not at all   Total Score PHQ 2 0       Alcohol Risk Screen    Do you average more than 1 drink per night or more than 7 drinks a week:  No    On any one occasion in the past three months have you have had more than 3 drinks containing alcohol:  No        Functional Ability and Level of Safety:    Hearing: Hearing is good. Activities of Daily Living: The home contains: no safety equipment. Patient needs help with:  transportation      Ambulation: with no difficulty     Fall Risk:  Fall Risk Assessment, last 12 mths 3/16/2021   Able to walk? Yes   Fall in past 12 months? 0   Do you feel unsteady?  0   Are you worried about falling 0      Abuse Screen:  Patient is not abused       Cognitive Screening    Has your family/caregiver stated any concerns about your memory: no    Cognitive Screening: Normal - three word recall: 3/3    Health Maintenance Due     Health Maintenance Due   Topic Date Due    PAP AKA CERVICAL CYTOLOGY  Never done    Eye Exam Retinal or Dilated  04/22/2021    MICROALBUMIN Q1  06/30/2021    Lipid Screen  06/30/2021       Patient Care Team   Patient Care Team:  Maninder Villalba NP as PCP - General (Nurse Practitioner)  Maninder Villalba NP as PCP - [facility] Empaneled Provider  Datne Tinoco MD (Gastroenterology)  Radha Higgins MD (Ophthalmology)  Neal Hutchison MD (Orthopedic Surgery)  George See MD (General Surgery)  José Turcios RN as Nurse Navigator  George See MD (General Surgery)    History     Patient Active Problem List   Diagnosis Code    Anemia D64.9    Hypertension I10    Vitamin D deficiency E55.9    Breast calcifications on mammogram R92.1    Morbid obesity due to excess calories (HCC) E66.01    Ductal carcinoma in situ (DCIS) of right breast D05.11    Controlled type 2 diabetes mellitus without complication, without long-term current use of insulin (HCC) E11.9     Past Medical History:   Diagnosis Date    Anemia     resolved    Breast cancer ([address]     Diverticulosis 11/13    Hypertension     Internal hemorrhoids 11/13    Menopause     Radiation therapy complication     Vitamin D deficiency       Past Surgical History:   Procedure Laterality Date    HX BREAST LUMPECTOMY Right 5/30/2017    right BREAST LUMPECTOMY WITH localization performed by Sophia Mcardle, MD at [address] HX BREAST LUMPECTOMY Right 6/20/2017    revision right breast to achieve neg margin performed by Sophia Mcardle, MD at [address] HX COLONOSCOPY  1/13    repeat 1/23 [facility] [place]    HX ORTHOPAEDIC      fluid drained from lt shoulder    HX TUBAL LIGATION       Current Outpatient Medications   Medication Sig Dispense Refill    ferrous sulfate (IRON) 325 mg (65 mg iron) EC tablet Take 1 Tablet by mouth Daily (before breakfast). 90 Tablet 1    glipiZIDE SR (GLUCOTROL XL) 5 mg CR tablet 1 tablet bu mouth daily. 90 Tablet 1    metFORMIN (GLUCOPHAGE) 1,000 mg tablet TAKE 1 TABLET BY MOUTH TWICE DAILY WITH MEALS 180 Tablet 1    dilTIAZem ER (CARDIZEM CD) 360 mg capsule Take 1 capsule by mouth once daily 90 Capsule 1    hydroCHLOROthiazide (HYDRODIURIL) 25 mg tablet Take 1 tablet by mouth once daily 90 Tablet 1    losartan (COZAAR) 100 mg tablet Take 1 tablet by mouth once daily 90 Tablet 1    metoprolol succinate (TOPROL-XL) 25 mg XL tablet Take 1 Tablet by mouth daily.  90 Tablet 1    ergocalciferol (Vitamin D2) 1,250 mcg (50,000 unit) capsule TAKE 1 CAPSULE BY MOUTH EVERY TWO WEEKS 12 Cap 1     No Known Allergies    Family History   Problem Relation Age of Onset    Stroke Mother     Hypertension Mother     Diabetes Daughter      Social History     Tobacco Use    Smoking status: Never Smoker    Smokeless tobacco: Never Used   Substance Use Topics    Alcohol use: No       Claudette Hayes, who was evaluated through a synchronous (real-time) audio-video encounter, and/or her healthcare decision maker, is aware that it is a billable service, with coverage as determined by her insurance carrier. She provided verbal consent to proceed: Yes, and patient identification was verified. It was conducted pursuant to the emergency declaration under the [address] [place] [address] and the [employer] and [employer] General Act. A caregiver was present when appropriate. Ability to conduct physical exam was limited. I was in the office. The patient was at home.     Neil Story NP

## 2021-06-22 ENCOUNTER — TELEPHONE (OUTPATIENT)
Age: 66
End: 2021-06-22

## 2021-06-22 DIAGNOSIS — D75.839 THROMBOCYTOSIS: ICD-10-CM

## 2021-06-22 DIAGNOSIS — D64.9 ANEMIA, UNSPECIFIED TYPE: ICD-10-CM

## 2021-06-22 DIAGNOSIS — E55.9 VITAMIN D DEFICIENCY: ICD-10-CM

## 2021-06-22 DIAGNOSIS — D05.11 DUCTAL CARCINOMA IN SITU (DCIS) OF RIGHT BREAST: Primary | ICD-10-CM

## 2021-06-22 DIAGNOSIS — D64.9 ANEMIA, UNSPECIFIED TYPE: Primary | ICD-10-CM

## 2021-06-23 DIAGNOSIS — D64.9 ANEMIA, UNSPECIFIED TYPE: ICD-10-CM

## 2021-06-24 ENCOUNTER — TELEPHONE (OUTPATIENT)
Age: 66
End: 2021-06-24

## 2021-06-24 NOTE — TELEPHONE ENCOUNTER
Ms Hua Donna stated she will call back to schedule an appointment for labs, she has too much going on

## 2021-07-07 ENCOUNTER — HOSPITAL ENCOUNTER (OUTPATIENT)
Dept: WOMENS IMAGING | Age: 66
Discharge: HOME | End: 2021-07-07
Attending: SURGERY
Payer: MEDICARE

## 2021-07-07 DIAGNOSIS — Z12.31 BREAST CANCER SCREENING BY MAMMOGRAM: ICD-10-CM

## 2021-07-07 PROCEDURE — 77063 BREAST TOMOSYNTHESIS BI: CPT

## 2021-07-16 ENCOUNTER — TELEPHONE (OUTPATIENT)
Dept: SURGERY | Age: 66
End: 2021-07-16

## 2021-07-16 NOTE — TELEPHONE ENCOUNTER
[id number] [facility] per Dr. Oniel Bear to notify her that there were no areas of concern seen on recent imaging. Patient verbalized understanding.     ----- Message from Alyce Whittaker MD sent at 7/7/2021  1:04 PM EDT -----  Please let her know there were no areas of concern seen on imaging.   Thanks     ----- Message -----  From: James, Rad Results In  Sent: 7/7/2021  12:04 PM EDT  To: Alyce Whittaker MD

## 2021-08-16 ENCOUNTER — TELEPHONE (OUTPATIENT)
Age: 66
End: 2021-08-16

## 2021-08-19 ENCOUNTER — TELEPHONE (OUTPATIENT)
Age: 66
End: 2021-08-19

## 2021-09-10 ENCOUNTER — APPOINTMENT (OUTPATIENT)
Dept: INFUSION THERAPY | Age: 66
End: 2021-09-10

## 2021-09-10 ENCOUNTER — HOSPITAL ENCOUNTER (OUTPATIENT)
Dept: LAB | Age: 66
Discharge: HOME | End: 2021-09-10

## 2021-09-11 LAB
25(OH)D3 SERPL-MCNC: 37.3 NG/ML (ref 32–100)
25(OH)D3 SERPL-MCNC: 43 NG/ML (ref 32–100)
A-G RATIO,AGRAT: 1.5 RATIO (ref 1.1–2.6)
A-G RATIO,AGRAT: 1.7 RATIO (ref 1.1–2.6)
ALBUMIN SERPL-MCNC: 4.7 G/DL (ref 3.5–5)
ALBUMIN SERPL-MCNC: 4.9 G/DL (ref 3.5–5)
ALP SERPL-CCNC: 101 U/L (ref 40–120)
ALP SERPL-CCNC: 104 U/L (ref 40–120)
ALT SERPL-CCNC: 21 U/L (ref 5–40)
ALT SERPL-CCNC: 23 U/L (ref 5–40)
ANION GAP SERPL CALC-SCNC: 15 MMOL/L (ref 3–15)
ANION GAP SERPL CALC-SCNC: 16 MMOL/L (ref 3–15)
AST SERPL W P-5'-P-CCNC: 20 U/L (ref 10–37)
AST SERPL W P-5'-P-CCNC: 20 U/L (ref 10–37)
AVG GLU, 10930: 156 MG/DL (ref 91–123)
BILIRUB SERPL-MCNC: 0.2 MG/DL (ref 0.2–1.2)
BILIRUB SERPL-MCNC: 0.2 MG/DL (ref 0.2–1.2)
BUN SERPL-MCNC: 14 MG/DL (ref 6–22)
BUN SERPL-MCNC: 15 MG/DL (ref 6–22)
CALCIUM SERPL-MCNC: 10.4 MG/DL (ref 8.4–10.5)
CALCIUM SERPL-MCNC: 10.7 MG/DL (ref 8.4–10.5)
CHLORIDE SERPL-SCNC: 101 MMOL/L (ref 98–110)
CHLORIDE SERPL-SCNC: 102 MMOL/L (ref 98–110)
CHOLEST SERPL-MCNC: 186 MG/DL (ref 110–200)
CO2 SERPL-SCNC: 25 MMOL/L (ref 20–32)
CO2 SERPL-SCNC: 26 MMOL/L (ref 20–32)
CREAT SERPL-MCNC: 0.6 MG/DL (ref 0.8–1.4)
CREAT SERPL-MCNC: 0.6 MG/DL (ref 0.8–1.4)
CREATININE, URINE: 54 MG/DL
EOS ABS-DIF,2069: 0.5 K/UL (ref 0–0.5)
EOSINOPHILS C MAN (DIFF), 1067: 4 % (ref 0–6)
ERYTHROCYTE [DISTWIDTH] IN BLOOD BY AUTOMATED COUNT: 14.4 % (ref 10–15.5)
FE % SATURATION,PSAT: 23 % (ref 20–50)
FERRITIN SERPL-MCNC: 678 NG/ML (ref 10–291)
GFRAA, 66117: >60
GFRAA, 66117: >60
GFRNA, 66118: >60
GFRNA, 66118: >60
GLOBULIN,GLOB: 2.9 G/DL (ref 2–4)
GLOBULIN,GLOB: 3.2 G/DL (ref 2–4)
GLUCOSE SERPL-MCNC: 82 MG/DL (ref 70–99)
GLUCOSE SERPL-MCNC: 83 MG/DL (ref 70–99)
HBA1C MFR BLD HPLC: 7.1 % (ref 4.8–5.6)
HCT VFR BLD AUTO: 43.9 % (ref 35.1–48)
HDLC SERPL-MCNC: 2.3 MG/DL (ref 0–5)
HDLC SERPL-MCNC: 81 MG/DL
HGB BLD-MCNC: 13.3 G/DL (ref 11.7–16)
IRON,IRN: 74 MCG/DL (ref 30–160)
LDL/HDL RATIO,LDHD: 0.8
LDLC SERPL CALC-MCNC: 67 MG/DL (ref 50–99)
LYMPHOCYTES C MAN (DIFF), 1065: 26 % (ref 20–45)
LYMPHS ABS-DIF,2105: 3.4 K/UL (ref 1–4.8)
MCH RBC QN AUTO: 28 PG (ref 26–34)
MCHC RBC AUTO-ENTMCNC: 30 G/DL (ref 31–36)
MCV RBC AUTO: 93 FL (ref 81–99)
MICROALB/CREAT RATIO, 140286: NORMAL
MICROALBUMIN,URINE RANDOM 140054: <12 MG/L (ref 0.1–17)
MONOCYTES ABS-DIF,2141: 1.1 K/UL (ref 0.1–1)
MONOCYTES C MAN (DIFF), 1066: 8 % (ref 3–12)
NEUTROPHILS ABS,2156: 8.2 K/UL (ref 1.8–7.7)
NEUTROPHILS C MAN (DIFF), 1064: 62 % (ref 40–75)
NON-HDL CHOLESTEROL, 011976: 105 MG/DL
NORMOCHROMIC CELLAVISION, 1078: ABNORMAL
NORMOCYTIC CELLAVISION, 1079: ABNORMAL
PLATELET # BLD AUTO: 500 K/UL (ref 140–440)
PMV BLD AUTO: 10.4 FL (ref 9–13)
POTASSIUM SERPL-SCNC: 4.3 MMOL/L (ref 3.5–5.5)
POTASSIUM SERPL-SCNC: 4.5 MMOL/L (ref 3.5–5.5)
PROT SERPL-MCNC: 7.8 G/DL (ref 6.2–8.1)
PROT SERPL-MCNC: 7.9 G/DL (ref 6.2–8.1)
RBC # BLD AUTO: 4.7 M/UL (ref 3.8–5.2)
SMEAR EVAL, 1131: ABNORMAL
SODIUM SERPL-SCNC: 142 MMOL/L (ref 133–145)
SODIUM SERPL-SCNC: 143 MMOL/L (ref 133–145)
TIBC,TIBC: 323 MCG/DL (ref 228–428)
TRIGL SERPL-MCNC: 189 MG/DL (ref 40–149)
UIBC SERPL-MCNC: 249 MCG/DL (ref 110–370)
VLDLC SERPL CALC-MCNC: 38 MG/DL (ref 8–30)
WBC # BLD AUTO: 13.2 K/UL (ref 4–11)

## 2021-11-20 DIAGNOSIS — I10 ESSENTIAL HYPERTENSION: ICD-10-CM

## 2021-11-21 RX ORDER — HYDROCHLOROTHIAZIDE 25 MG/1
TABLET ORAL
Qty: 90 TABLET | Refills: 0 | Status: SHIPPED | OUTPATIENT
Start: 2021-11-21 | End: 2021-11-30

## 2021-11-30 DIAGNOSIS — I10 ESSENTIAL HYPERTENSION: ICD-10-CM

## 2021-11-30 RX ORDER — HYDROCHLOROTHIAZIDE 25 MG/1
TABLET ORAL
Qty: 90 TABLET | Refills: 0 | Status: SHIPPED | OUTPATIENT
Start: 2021-11-30 | End: 2022-05-10 | Stop reason: SDUPTHER

## 2021-12-06 DIAGNOSIS — I10 ESSENTIAL HYPERTENSION: ICD-10-CM

## 2021-12-07 RX ORDER — LOSARTAN POTASSIUM 100 MG/1
TABLET ORAL
Qty: 90 TABLET | Refills: 0 | Status: SHIPPED | OUTPATIENT
Start: 2021-12-07 | End: 2022-02-27

## 2022-02-25 DIAGNOSIS — I10 ESSENTIAL HYPERTENSION: ICD-10-CM

## 2022-02-25 DIAGNOSIS — E61.1 IRON DEFICIENCY: ICD-10-CM

## 2022-02-27 RX ORDER — FERROUS SULFATE 325(65) MG
TABLET ORAL
Qty: 90 TABLET | Refills: 0 | Status: SHIPPED | OUTPATIENT
Start: 2022-02-27 | End: 2022-06-13 | Stop reason: SDUPTHER

## 2022-02-27 RX ORDER — LOSARTAN POTASSIUM 100 MG/1
TABLET ORAL
Qty: 90 TABLET | Refills: 0 | Status: SHIPPED | OUTPATIENT
Start: 2022-02-27 | End: 2022-05-10 | Stop reason: SDUPTHER

## 2022-03-18 PROBLEM — R92.1 BREAST CALCIFICATIONS ON MAMMOGRAM: Status: ACTIVE | Noted: 2017-05-04

## 2022-03-19 PROBLEM — E66.01 MORBID OBESITY DUE TO EXCESS CALORIES (HCC): Status: ACTIVE | Noted: 2017-05-13

## 2022-03-19 PROBLEM — E11.9 CONTROLLED TYPE 2 DIABETES MELLITUS WITHOUT COMPLICATION, WITHOUT LONG-TERM CURRENT USE OF INSULIN (HCC): Status: ACTIVE | Noted: 2017-08-22

## 2022-03-20 PROBLEM — D05.11 DUCTAL CARCINOMA IN SITU (DCIS) OF RIGHT BREAST: Status: ACTIVE | Noted: 2017-05-23

## 2022-03-24 DIAGNOSIS — I10 ESSENTIAL HYPERTENSION: ICD-10-CM

## 2022-03-24 RX ORDER — DILTIAZEM HYDROCHLORIDE 360 MG/1
CAPSULE, EXTENDED RELEASE ORAL
Qty: 90 CAPSULE | Refills: 0 | Status: SHIPPED | OUTPATIENT
Start: 2022-03-24 | End: 2022-05-10 | Stop reason: SDUPTHER

## 2022-05-10 ENCOUNTER — OFFICE VISIT (OUTPATIENT)
Dept: FAMILY MEDICINE CLINIC | Age: 67
End: 2022-05-10
Payer: MEDICARE

## 2022-05-10 VITALS
SYSTOLIC BLOOD PRESSURE: 150 MMHG | HEART RATE: 85 BPM | WEIGHT: 230 LBS | OXYGEN SATURATION: 100 % | RESPIRATION RATE: 18 BRPM | TEMPERATURE: 98 F | DIASTOLIC BLOOD PRESSURE: 80 MMHG | HEIGHT: 64 IN | BODY MASS INDEX: 39.27 KG/M2

## 2022-05-10 DIAGNOSIS — Z23 ENCOUNTER FOR IMMUNIZATION: ICD-10-CM

## 2022-05-10 DIAGNOSIS — E11.9 CONTROLLED TYPE 2 DIABETES MELLITUS WITHOUT COMPLICATION, WITHOUT LONG-TERM CURRENT USE OF INSULIN (HCC): Primary | ICD-10-CM

## 2022-05-10 DIAGNOSIS — I10 PRIMARY HYPERTENSION: ICD-10-CM

## 2022-05-10 PROCEDURE — 90732 PPSV23 VACC 2 YRS+ SUBQ/IM: CPT | Performed by: NURSE PRACTITIONER

## 2022-05-10 PROCEDURE — G8754 DIAS BP LESS 90: HCPCS | Performed by: NURSE PRACTITIONER

## 2022-05-10 PROCEDURE — 2022F DILAT RTA XM EVC RTNOPTHY: CPT | Performed by: NURSE PRACTITIONER

## 2022-05-10 PROCEDURE — G9899 SCRN MAM PERF RSLTS DOC: HCPCS | Performed by: NURSE PRACTITIONER

## 2022-05-10 PROCEDURE — G8427 DOCREV CUR MEDS BY ELIG CLIN: HCPCS | Performed by: NURSE PRACTITIONER

## 2022-05-10 PROCEDURE — G8432 DEP SCR NOT DOC, RNG: HCPCS | Performed by: NURSE PRACTITIONER

## 2022-05-10 PROCEDURE — 3017F COLORECTAL CA SCREEN DOC REV: CPT | Performed by: NURSE PRACTITIONER

## 2022-05-10 PROCEDURE — 3051F HG A1C>EQUAL 7.0%<8.0%: CPT | Performed by: NURSE PRACTITIONER

## 2022-05-10 PROCEDURE — 99214 OFFICE O/P EST MOD 30 MIN: CPT | Performed by: NURSE PRACTITIONER

## 2022-05-10 PROCEDURE — G0009 ADMIN PNEUMOCOCCAL VACCINE: HCPCS | Performed by: NURSE PRACTITIONER

## 2022-05-10 PROCEDURE — 90471 IMMUNIZATION ADMIN: CPT | Performed by: NURSE PRACTITIONER

## 2022-05-10 PROCEDURE — G8417 CALC BMI ABV UP PARAM F/U: HCPCS | Performed by: NURSE PRACTITIONER

## 2022-05-10 PROCEDURE — 90715 TDAP VACCINE 7 YRS/> IM: CPT | Performed by: NURSE PRACTITIONER

## 2022-05-10 PROCEDURE — G8753 SYS BP > OR = 140: HCPCS | Performed by: NURSE PRACTITIONER

## 2022-05-10 RX ORDER — LOSARTAN POTASSIUM 100 MG/1
100 TABLET ORAL DAILY
Qty: 90 TABLET | Refills: 0 | Status: SHIPPED | OUTPATIENT
Start: 2022-05-10 | End: 2022-08-10 | Stop reason: SDUPTHER

## 2022-05-10 RX ORDER — HYDROCHLOROTHIAZIDE 25 MG/1
25 TABLET ORAL DAILY
Qty: 90 TABLET | Refills: 0 | Status: CANCELLED | OUTPATIENT
Start: 2022-05-10

## 2022-05-10 RX ORDER — DILTIAZEM HYDROCHLORIDE 360 MG/1
360 CAPSULE, EXTENDED RELEASE ORAL DAILY
Qty: 90 CAPSULE | Refills: 0 | Status: CANCELLED | OUTPATIENT
Start: 2022-05-10

## 2022-05-10 RX ORDER — HYDROCHLOROTHIAZIDE 25 MG/1
25 TABLET ORAL DAILY
Qty: 90 TABLET | Refills: 0 | Status: SHIPPED | OUTPATIENT
Start: 2022-05-10 | End: 2022-08-10 | Stop reason: SDUPTHER

## 2022-05-10 RX ORDER — METFORMIN HYDROCHLORIDE 1000 MG/1
TABLET ORAL
Qty: 180 TABLET | Refills: 1 | Status: SHIPPED | OUTPATIENT
Start: 2022-05-10 | End: 2022-08-10 | Stop reason: SDUPTHER

## 2022-05-10 RX ORDER — DILTIAZEM HYDROCHLORIDE 360 MG/1
360 CAPSULE, EXTENDED RELEASE ORAL DAILY
Qty: 90 CAPSULE | Refills: 0 | Status: SHIPPED | OUTPATIENT
Start: 2022-05-10 | End: 2022-08-10 | Stop reason: SDUPTHER

## 2022-05-10 RX ORDER — METOPROLOL SUCCINATE 25 MG/1
25 TABLET, EXTENDED RELEASE ORAL DAILY
Qty: 90 TABLET | Refills: 3 | Status: CANCELLED | OUTPATIENT
Start: 2022-05-10

## 2022-05-10 RX ORDER — GLIPIZIDE 5 MG/1
TABLET, FILM COATED, EXTENDED RELEASE ORAL
Qty: 90 TABLET | Refills: 1 | Status: SHIPPED | OUTPATIENT
Start: 2022-05-10 | End: 2022-08-10 | Stop reason: SDUPTHER

## 2022-05-10 RX ORDER — ERGOCALCIFEROL 1.25 MG/1
CAPSULE ORAL
Qty: 12 CAPSULE | Refills: 1 | Status: CANCELLED | OUTPATIENT
Start: 2022-05-10

## 2022-05-10 RX ORDER — METOPROLOL SUCCINATE 25 MG/1
25 TABLET, EXTENDED RELEASE ORAL DAILY
Qty: 90 TABLET | Refills: 0 | Status: SHIPPED | OUTPATIENT
Start: 2022-05-10 | End: 2022-08-10 | Stop reason: SDUPTHER

## 2022-05-10 NOTE — PROGRESS NOTES
Oliverio De Leon               [phone]      Easton Zheng is a 61 y.o. female and presents with Follow Up Chronic Condition and Diabetes       Assessment/Plan:    Diagnoses and all orders for this visit:    1. Controlled type 2 diabetes mellitus without complication, without long-term current use of insulin (HCC)  -     metFORMIN (GLUCOPHAGE) 1,000 mg tablet; TAKE 1 TABLET BY MOUTH TWICE DAILY WITH MEALS  -     glipiZIDE SR (GLUCOTROL XL) 5 mg CR tablet; 1 tablet bu mouth daily.  -     LIPID PANEL; Future  -     HEMOGLOBIN A1C WITH EAG; Future  -     MICROALBUMIN, UR, RAND W/ MICROALB/CREAT RATIO; Future  Endorses medication compliance, Refill provided, Have asked him/her to come in for follow-up labs and Denies signs and symptoms of hyper or hypoglycemia   2. Primary hypertension  -     metoprolol succinate (TOPROL-XL) 25 mg XL tablet; Take 1 Tablet by mouth daily. -     losartan (COZAAR) 100 mg tablet; Take 1 Tablet by mouth daily. -     hydroCHLOROthiazide (HYDRODIURIL) 25 mg tablet; Take 1 Tablet by mouth daily. -     dilTIAZem ER (CARDIZEM CD) 360 mg capsule; Take 1 Capsule by mouth daily.  -     METABOLIC PANEL, COMPREHENSIVE; Future  Endorses medication compliance, Refill provided, Have asked him/her to come in for follow-up labs and Blood pressure uncontrolled return in one week for recheck   3. Encounter for immunization  -     TETANUS, DIPHTHERIA TOXOIDS AND ACELLULAR PERTUSSIS VACCINE (TDAP), IN INDIVIDS. >=7, IM  -     PNEUMOCOCCAL POLYSACCHARIDE VACCINE, 23-VALENT, ADULT OR IMMUNOSUPPRESSED PT DOSE,  -     THER/PROPH/DIAG INJECTION, SUBCUT/IM  -     THER/PROPH/DIAG INJECTION, SUBCUT/IM  Health maintenance need addressed  Other orders  -     HEMOGLOBIN A1C W/O EAG        Follow-up and Dispositions    · Return in about 1 week (around 5/17/2022) for Lab, NV, b/pcheck, AND, 3month, MAWV, DM, HLD, HTN, 30 min, office only.            Health Maintenance: Health Maintenance   Topic Date Due    Cervical cancer screen  Never done    Foot Exam Q1  03/25/2022    Medicare Yearly Exam  06/17/2022    Bone Densitometry (Dexa) Screening  08/08/2022    Breast Cancer Screen Mammogram  07/07/2022    Flu Vaccine (Season Ended) 09/01/2022    Colorectal Cancer Screening Combo  01/30/2023    Pneumococcal 0-64 years (2 - PCV) 05/10/2023    Depression Screen  05/17/2023    A1C test (Diabetic or Prediabetic)  05/17/2023    MICROALBUMIN Q1  05/17/2023    Lipid Screen  05/17/2023    Eye Exam Retinal or Dilated  09/22/2023    DTaP/Tdap/Td series (3 - Td or Tdap) 05/10/2032    Hepatitis C Screening  Completed    Shingrix Vaccine Age 50>  Completed    COVID-19 Vaccine  Completed        Subjective:    Labs obtained prior to visit? NO  Reviewed with patient? Not applicable    DMII-   Patient reports medication compliance most of the time  Diabetic diet compliance most of the time  Patient monitors blood sugars regularly Daily   Reports am fasting sugars range    Denies hypoglycemic episodes yes  Denies polyuria, polydipsia, paraesthesia, vision changes? yes  Engaging in daily exercise?  Yes: Comment: walking for about an hour TIW     Diabetic Foot and Eye Exam HM Status   Topic Date Due    Diabetic Foot Care  03/25/2022    Eye Exam  09/22/2023     Hemoglobin A1c   Date Value Ref Range Status   05/17/2022 7.2 (H) 4.8 - 5.6 % Final   ]  Creatinine, urine   Date Value Ref Range Status   05/17/2022 127 mg/dL Final     Microalbumin/Creat ratio (mg/g creat)   Date Value Ref Range Status   06/30/2020 6 0 - 30 mg/g Final   11/03/2017 4 0 - 30 mg/g Final   09/13/2016 7 0 - 30 mg/g Final     Microalb/Creat ratio (ug/mg creat.)   Date Value Ref Range Status   05/17/2022   Final     Comment:     ** Unable to calculate Microablumin/Creatinine Ratio due to low Microalbumin       09/10/2021   Final     Comment:     ** Unable to calculate Microablumin/Creatinine Ratio due to low Microalbumin         Key Antihyperglycemic Medications             metFORMIN (GLUCOPHAGE) 1,000 mg tablet (Taking) TAKE 1 TABLET BY MOUTH TWICE DAILY WITH MEALS    glipiZIDE SR (GLUCOTROL XL) 5 mg CR tablet (Taking) 1 tablet bu mouth daily. Hypertension:  Visit Vitals  BP (!) 150/80   Pulse 85   Temp 98 °F (36.7 °C) (Temporal)   Resp 18   Ht 5' 4\" (1.626 m)   Wt 230 lb (104.3 kg)   SpO2 100%   BMI 39.48 kg/m²      Patient reports taking medications as instructed. yes   Medication side effects noted. no  Headache upon wakening. no   Home BP monitoring: Does not check  Do you experience chest pain/pressure or SOB with exertion? no  Maintain a low Sodium diet? yes  Key CAD CHF Meds             metoprolol succinate (TOPROL-XL) 25 mg XL tablet (Taking) Take 1 Tablet by mouth daily. losartan (COZAAR) 100 mg tablet (Taking) Take 1 Tablet by mouth daily. hydroCHLOROthiazide (HYDRODIURIL) 25 mg tablet (Taking) Take 1 Tablet by mouth daily. dilTIAZem ER (CARDIZEM CD) 360 mg capsule (Taking) Take 1 Capsule by mouth daily. BUN   Date Value Ref Range Status   05/17/2022 16 6 - 22 mg/dL Final     Creatinine   Date Value Ref Range Status   05/17/2022 0.7 (L) 0.8 - 1.4 mg/dL Final     GFR est AA   Date Value Ref Range Status   01/04/2021 >60 >60 ml/min/1.73m2 Final     Potassium   Date Value Ref Range Status   05/17/2022 4.7 3.5 - 5.5 mmol/L Final           ROS:     ROS  As stated in HPI, otherwise all others negative. The problem list was updated as a part of today's visit. Patient Active Problem List   Diagnosis Code    Anemia D64.9    Hypertension I10    Vitamin D deficiency E55.9    Breast calcifications on mammogram R92.1    Morbid obesity due to excess calories (HCC) E66.01    Ductal carcinoma in situ (DCIS) of right breast D05.11    Controlled type 2 diabetes mellitus without complication, without long-term current use of insulin (HCC) E11.9       The PSH, FH were reviewed.       SH:  Social History     Tobacco Use    Smoking status: Never Smoker    Smokeless tobacco: Never Used   Vaping Use    Vaping Use: Never used   Substance Use Topics    Alcohol use: No    Drug use: No       Medications/Allergies:  Current Outpatient Medications on File Prior to Visit   Medication Sig Dispense Refill    Iron, Ferrous Sulfate, 325 mg (65 mg iron) tablet TAKE 1 TABLET BY MOUTH ONCE DAILY BEFORE BREAKFAST 90 Tablet 0     No current facility-administered medications on file prior to visit. No Known Allergies    Objective:  Visit Vitals  BP (!) 150/80   Pulse 85   Temp 98 °F (36.7 °C) (Temporal)   Resp 18   Ht 5' 4\" (1.626 m)   Wt 230 lb (104.3 kg)   SpO2 100%   BMI 39.48 kg/m²    Body mass index is 39.48 kg/m². Physical assessment  Physical Exam  Vitals and nursing note reviewed. Eyes:      Conjunctiva/sclera: Conjunctivae normal.      Pupils: Pupils are equal, round, and reactive to light. Cardiovascular:      Rate and Rhythm: Normal rate and regular rhythm. Heart sounds: Normal heart sounds. No murmur heard. No friction rub. No gallop. Pulmonary:      Effort: Pulmonary effort is normal.      Breath sounds: Normal breath sounds. Musculoskeletal:         General: Normal range of motion. Cervical back: Normal range of motion. Skin:     General: Skin is warm and dry. Neurological:      Mental Status: She is alert.            Labwork and Ancillary Studies:    CBC w/Diff  Lab Results   Component Value Date/Time    WBC 13.2 (H) 09/10/2021 09:49 AM    HGB 13.3 09/10/2021 09:49 AM    PLATELET 769 (H) 12/76/5391 09:49 AM         Basic Metabolic Profile  Lab Results   Component Value Date/Time    Sodium 142 05/17/2022 09:11 AM    Potassium 4.7 05/17/2022 09:11 AM    Chloride 104 05/17/2022 09:11 AM    CO2 24 05/17/2022 09:11 AM    Anion gap 14.0 05/17/2022 09:11 AM    Glucose 124 (H) 05/17/2022 09:11 AM    BUN 16 05/17/2022 09:11 AM    Creatinine 0.7 (L) 05/17/2022 09:11 AM BUN/Creatinine ratio 22 (H) 01/04/2021 11:24 AM    GFR est AA >60 01/04/2021 11:24 AM    GFR est non-AA >60 01/04/2021 11:24 AM    Calcium 10.0 05/17/2022 09:11 AM        Cholesterol  Lab Results   Component Value Date/Time    Cholesterol, total 159 05/17/2022 09:11 AM    HDL Cholesterol 72 05/17/2022 09:11 AM    LDL, calculated 64 05/17/2022 09:11 AM    Triglyceride 118 05/17/2022 09:11 AM    CHOL/HDL Ratio 1.9 06/30/2020 10:38 AM           I have discussed the diagnosis with the patient and the intended plan as seen in the above orders. The patient has received an After-Visit Summary and questions were answered concerning future plans. An After Visit Summary was printed and given to the patient. All diagnosis have been discussed with the patient and all of the patient's questions have been answered. Follow-up and Dispositions    · Return in about 1 week (around 5/17/2022) for Lab, NV, b/pcheck, AND, 3month, MAWV, DM, HLD, HTN, 30 min, office only. Ashish Parsons, [facility]  [address]  [id number]

## 2022-05-10 NOTE — PROGRESS NOTES
Room 7    Patient was given VIS for review, consent was obtained and per orders of NP. Ezequiel Franco , injection of Tetanus Toxoid and Pneumococcal given by Jay Lo. Patient observed. No signs nor symptoms of any adverse reactions. Patient tolerated injection well. Did patient bring someone? No    Did the patient have DME equipment? No   Did you take your medication today? Yes     1. \"Have you been to the ER, urgent care clinic since your last visit? Hospitalized since your last visit? \" No    2. \"Have you seen or consulted any other health care providers outside of the [address] [place] [address] since your last visit? \" No     3. For patients aged 39-70: Has the patient had a colonoscopy / FIT/ Cologuard? Yes - no Care Gap present      If the patient is female:    4. For patients aged 41-77: Has the patient had a mammogram within the past 2 years? Yes - no Care Gap present      5. For patients aged 21-65: Has the patient had a pap smear?  No Patient states I have an appointment with my OBGYN .         3 most recent [facility] 36 Screens 5/10/2022   Little interest or pleasure in doing things Not at all   Feeling down, depressed, irritable, or hopeless Not at all   Total Score PHQ 2 0         Health Maintenance Due   Topic Date Due    Cervical cancer screen  Never done    Pneumococcal 0-64 years (2 - PCV) 03/02/2018    DTaP/Tdap/Td series (2 - Td or Tdap) 10/07/2021    Foot Exam Q1  03/25/2022    Bone Densitometry (Dexa) Screening  08/08/2022       Learning Assessment 7/13/2017   PRIMARY LEARNER Patient   HIGHEST LEVEL OF EDUCATION - PRIMARY LEARNER  DID NOT GRADUATE HIGH SCHOOL   BARRIERS PRIMARY LEARNER NONE   CO-LEARNER CAREGIVER No   PRIMARY LANGUAGE ENGLISH    NEED No   LEARNER PREFERENCE PRIMARY LISTENING     DEMONSTRATION   LEARNING SPECIAL TOPICS no   ANSWERED BY self   RELATIONSHIP SELF

## 2022-05-17 ENCOUNTER — LAB ONLY (OUTPATIENT)
Dept: FAMILY MEDICINE CLINIC | Age: 67
End: 2022-05-17

## 2022-05-17 ENCOUNTER — CLINICAL SUPPORT (OUTPATIENT)
Dept: FAMILY MEDICINE CLINIC | Age: 67
End: 2022-05-17

## 2022-05-17 VITALS
DIASTOLIC BLOOD PRESSURE: 65 MMHG | HEART RATE: 96 BPM | SYSTOLIC BLOOD PRESSURE: 132 MMHG | RESPIRATION RATE: 18 BRPM | WEIGHT: 226 LBS | TEMPERATURE: 98.2 F | HEIGHT: 64 IN | BODY MASS INDEX: 38.58 KG/M2 | OXYGEN SATURATION: 99 %

## 2022-05-17 DIAGNOSIS — I10 PRIMARY HYPERTENSION: ICD-10-CM

## 2022-05-17 DIAGNOSIS — I10 PRIMARY HYPERTENSION: Primary | ICD-10-CM

## 2022-05-17 DIAGNOSIS — E11.9 CONTROLLED TYPE 2 DIABETES MELLITUS WITHOUT COMPLICATION, WITHOUT LONG-TERM CURRENT USE OF INSULIN (HCC): ICD-10-CM

## 2022-05-18 LAB
A-G RATIO,AGRAT: 2.4 RATIO (ref 1.1–2.6)
ALBUMIN SERPL-MCNC: 4.7 G/DL (ref 3.5–5)
ALP SERPL-CCNC: 107 U/L (ref 40–120)
ALT SERPL-CCNC: 20 U/L (ref 5–40)
ANION GAP SERPL CALC-SCNC: 14 MMOL/L (ref 3–15)
AST SERPL W P-5'-P-CCNC: 16 U/L (ref 10–37)
AVG GLU, 10930: 159 MG/DL (ref 91–123)
BILIRUB SERPL-MCNC: 0.1 MG/DL (ref 0.2–1.2)
BUN SERPL-MCNC: 16 MG/DL (ref 6–22)
CALCIUM SERPL-MCNC: 10 MG/DL (ref 8.4–10.5)
CHLORIDE SERPL-SCNC: 104 MMOL/L (ref 98–110)
CHOLEST SERPL-MCNC: 159 MG/DL (ref 110–200)
CO2 SERPL-SCNC: 24 MMOL/L (ref 20–32)
CREAT SERPL-MCNC: 0.7 MG/DL (ref 0.8–1.4)
CREATININE, URINE: 127 MG/DL
GFRAA, 66117: >60
GFRNA, 66118: >60
GLOBULIN,GLOB: 2 G/DL (ref 2–4)
GLUCOSE SERPL-MCNC: 124 MG/DL (ref 70–99)
HBA1C MFR BLD HPLC: 7.2 % (ref 4.8–5.6)
HDLC SERPL-MCNC: 2.2 MG/DL (ref 0–5)
HDLC SERPL-MCNC: 72 MG/DL
LDL/HDL RATIO,LDHD: 0.9
LDLC SERPL CALC-MCNC: 64 MG/DL (ref 50–99)
MICROALB/CREAT RATIO, 140286: NORMAL
MICROALBUMIN,URINE RANDOM 140054: <12 MG/L (ref 0.1–17)
NON-HDL CHOLESTEROL, 011976: 87 MG/DL
POTASSIUM SERPL-SCNC: 4.7 MMOL/L (ref 3.5–5.5)
PROT SERPL-MCNC: 6.7 G/DL (ref 6.2–8.1)
SODIUM SERPL-SCNC: 142 MMOL/L (ref 133–145)
TRIGL SERPL-MCNC: 118 MG/DL (ref 40–149)
VLDLC SERPL CALC-MCNC: 24 MG/DL (ref 8–30)

## 2022-07-08 ENCOUNTER — HOSPITAL ENCOUNTER (OUTPATIENT)
Dept: WOMENS IMAGING | Age: 67
Discharge: HOME | End: 2022-07-08
Attending: NURSE PRACTITIONER
Payer: MEDICARE

## 2022-07-08 DIAGNOSIS — Z12.31 ENCOUNTER FOR SCREENING MAMMOGRAM FOR BREAST CANCER: ICD-10-CM

## 2022-07-08 PROCEDURE — 77063 BREAST TOMOSYNTHESIS BI: CPT

## 2022-08-10 ENCOUNTER — OFFICE VISIT (OUTPATIENT)
Dept: FAMILY MEDICINE CLINIC | Age: 67
End: 2022-08-10
Payer: COMMERCIAL

## 2022-08-10 VITALS
DIASTOLIC BLOOD PRESSURE: 78 MMHG | SYSTOLIC BLOOD PRESSURE: 138 MMHG | OXYGEN SATURATION: 100 % | TEMPERATURE: 98.4 F | HEART RATE: 90 BPM

## 2022-08-10 DIAGNOSIS — E11.9 CONTROLLED TYPE 2 DIABETES MELLITUS WITHOUT COMPLICATION, WITHOUT LONG-TERM CURRENT USE OF INSULIN (HCC): ICD-10-CM

## 2022-08-10 DIAGNOSIS — E61.1 IRON DEFICIENCY: ICD-10-CM

## 2022-08-10 DIAGNOSIS — Z71.89 ADVANCED CARE PLANNING/COUNSELING DISCUSSION: ICD-10-CM

## 2022-08-10 DIAGNOSIS — Z00.00 MEDICARE ANNUAL WELLNESS VISIT, SUBSEQUENT: Primary | ICD-10-CM

## 2022-08-10 DIAGNOSIS — I10 PRIMARY HYPERTENSION: ICD-10-CM

## 2022-08-10 PROCEDURE — 1101F PT FALLS ASSESS-DOCD LE1/YR: CPT | Performed by: NURSE PRACTITIONER

## 2022-08-10 PROCEDURE — G9899 SCRN MAM PERF RSLTS DOC: HCPCS | Performed by: NURSE PRACTITIONER

## 2022-08-10 PROCEDURE — G8754 DIAS BP LESS 90: HCPCS | Performed by: NURSE PRACTITIONER

## 2022-08-10 PROCEDURE — G8752 SYS BP LESS 140: HCPCS | Performed by: NURSE PRACTITIONER

## 2022-08-10 PROCEDURE — 2022F DILAT RTA XM EVC RTNOPTHY: CPT | Performed by: NURSE PRACTITIONER

## 2022-08-10 PROCEDURE — 3051F HG A1C>EQUAL 7.0%<8.0%: CPT | Performed by: NURSE PRACTITIONER

## 2022-08-10 PROCEDURE — G8432 DEP SCR NOT DOC, RNG: HCPCS | Performed by: NURSE PRACTITIONER

## 2022-08-10 PROCEDURE — G8536 NO DOC ELDER MAL SCRN: HCPCS | Performed by: NURSE PRACTITIONER

## 2022-08-10 PROCEDURE — G8427 DOCREV CUR MEDS BY ELIG CLIN: HCPCS | Performed by: NURSE PRACTITIONER

## 2022-08-10 PROCEDURE — 1090F PRES/ABSN URINE INCON ASSESS: CPT | Performed by: NURSE PRACTITIONER

## 2022-08-10 PROCEDURE — 1123F ACP DISCUSS/DSCN MKR DOCD: CPT | Performed by: NURSE PRACTITIONER

## 2022-08-10 PROCEDURE — G0439 PPPS, SUBSEQ VISIT: HCPCS | Performed by: NURSE PRACTITIONER

## 2022-08-10 PROCEDURE — G8400 PT W/DXA NO RESULTS DOC: HCPCS | Performed by: NURSE PRACTITIONER

## 2022-08-10 PROCEDURE — G8417 CALC BMI ABV UP PARAM F/U: HCPCS | Performed by: NURSE PRACTITIONER

## 2022-08-10 PROCEDURE — 99214 OFFICE O/P EST MOD 30 MIN: CPT | Performed by: NURSE PRACTITIONER

## 2022-08-10 PROCEDURE — 3017F COLORECTAL CA SCREEN DOC REV: CPT | Performed by: NURSE PRACTITIONER

## 2022-08-10 RX ORDER — LANOLIN ALCOHOL/MO/W.PET/CERES
325 CREAM (GRAM) TOPICAL
Qty: 90 TABLET | Refills: 3 | Status: SHIPPED | OUTPATIENT
Start: 2022-08-10

## 2022-08-10 RX ORDER — METOPROLOL SUCCINATE 25 MG/1
25 TABLET, EXTENDED RELEASE ORAL DAILY
Qty: 90 TABLET | Refills: 3 | Status: SHIPPED | OUTPATIENT
Start: 2022-08-10

## 2022-08-10 RX ORDER — GLIPIZIDE 5 MG/1
TABLET, FILM COATED, EXTENDED RELEASE ORAL
Qty: 90 TABLET | Refills: 3 | Status: SHIPPED | OUTPATIENT
Start: 2022-08-10

## 2022-08-10 RX ORDER — METFORMIN HYDROCHLORIDE 1000 MG/1
TABLET ORAL
Qty: 180 TABLET | Refills: 3 | Status: SHIPPED | OUTPATIENT
Start: 2022-08-10

## 2022-08-10 RX ORDER — DILTIAZEM HYDROCHLORIDE 360 MG/1
360 CAPSULE, EXTENDED RELEASE ORAL DAILY
Qty: 90 CAPSULE | Refills: 3 | Status: SHIPPED | OUTPATIENT
Start: 2022-08-10

## 2022-08-10 RX ORDER — HYDROCHLOROTHIAZIDE 25 MG/1
25 TABLET ORAL DAILY
Qty: 90 TABLET | Refills: 3 | Status: SHIPPED | OUTPATIENT
Start: 2022-08-10

## 2022-08-10 RX ORDER — LOSARTAN POTASSIUM 100 MG/1
100 TABLET ORAL DAILY
Qty: 90 TABLET | Refills: 3 | Status: SHIPPED | OUTPATIENT
Start: 2022-08-10 | End: 2022-09-15

## 2022-08-10 NOTE — PATIENT INSTRUCTIONS
Medicare Wellness Visit, Female     The best way to live healthy is to have a lifestyle where you eat a well-balanced diet, exercise regularly, limit alcohol use, and quit all forms of tobacco/nicotine, if applicable. Regular preventive services are another way to keep healthy. Preventive services (vaccines, screening tests, monitoring & exams) can help personalize your care plan, which helps you manage your own care. Screening tests can find health problems at the earliest stages, when they are easiest to treat. [facility] follows the current, evidence-based guidelines published by the [facility] [place] ([facility]) when recommending preventive services for our patients. Because we follow these guidelines, sometimes recommendations change over time as research supports it. (For example, mammograms used to be recommended annually. Even though Medicare will still pay for an annual mammogram, the newer guidelines recommend a mammogram every two years for women of average risk). Of course, you and your doctor may decide to screen more often for some diseases, based on your risk and your co-morbidities (chronic disease you are already diagnosed with). Preventive services for you include:  - Medicare offers their members a free annual wellness visit, which is time for you and your primary care provider to discuss and plan for your preventive service needs. Take advantage of this benefit every year!  -All adults over the age of 72 should receive the recommended pneumonia vaccines. Current USPSTF guidelines recommend a series of two vaccines for the best pneumonia protection.   -All adults should have a flu vaccine yearly and a tetanus vaccine every 10 years.   -All adults age 48 and older should receive the shingles vaccines (series of two vaccines).       -All adults age 38-68 who are overweight should have a diabetes screening test once every three years.   -All adults born between [birth year] and 1963 should be screened once for Hepatitis C.  -Other screening tests and preventive services for persons with diabetes include: an eye exam to screen for diabetic retinopathy, a kidney function test, a foot exam, and stricter control over your cholesterol.   -Cardiovascular screening for adults with routine risk involves an electrocardiogram (ECG) at intervals determined by your doctor.   -Colorectal cancer screenings should be done for adults age 54-65 with no increased risk factors for colorectal cancer. There are a number of acceptable methods of screening for this type of cancer. Each test has its own benefits and drawbacks. Discuss with your doctor what is most appropriate for you during your annual wellness visit. The different tests include: colonoscopy (considered the best screening method), a fecal occult blood test, a fecal DNA test, and sigmoidoscopy.    -A bone mass density test is recommended when a woman turns 65 to screen for osteoporosis. This test is only recommended one time, as a screening. Some providers will use this same test as a disease monitoring tool if you already have osteoporosis. -Breast cancer screenings are recommended every other year for women of normal risk, age 54-69.  -Cervical cancer screenings for women over age 72 are only recommended with certain risk factors.      Here is a list of your current Health Maintenance items (your personalized list of preventive services) with a due date:  Health Maintenance Due   Topic Date Due    Cervical cancer screen  Never done    Diabetic Foot Care  03/25/2022    Bone Mineral Density   Never done

## 2022-08-10 NOTE — PROGRESS NOTES
Room 7    When asked if patient has any concerns he would like to address with RONALDO Jimenez patient states Yes I would like to discuss the of taking Asprin . Did patient bring someone? No    Did the patient have DME equipment? No     Did you take your medication today? No       1. \"Have you been to the ER, urgent care clinic since your last visit? Hospitalized since your last visit? \" No    2. \"Have you seen or consulted any other health care providers outside of the [address] [place] [address] since your last visit? \" No     3. For patients aged 39-70: Has the patient had a colonoscopy / FIT/ Cologuard? Yes - no Care Gap present      If the patient is female:    4. For patients aged 41-77: Has the patient had a mammogram within the past 2 years? Yes - no Care Gap present      5. For patients aged 21-65: Has the patient had a pap smear?  No        3 most recent PHQ Screens 8/10/2022   Little interest or pleasure in doing things Not at all   Feeling down, depressed, irritable, or hopeless Not at all   Total Score PHQ 2 0         Health Maintenance Due   Topic Date Due    Cervical cancer screen  Never done    Foot Exam Q1  03/25/2022    Medicare Yearly Exam  06/17/2022    Bone Densitometry (Dexa) Screening  Never done       Learning Assessment 7/13/2017   PRIMARY LEARNER Patient   HIGHEST LEVEL OF EDUCATION - PRIMARY LEARNER  DID NOT GRADUATE HIGH SCHOOL   BARRIERS PRIMARY LEARNER NONE   CO-LEARNER CAREGIVER No   PRIMARY LANGUAGE ENGLISH    NEED No   LEARNER PREFERENCE PRIMARY LISTENING     DEMONSTRATION   LEARNING SPECIAL TOPICS no   ANSWERED BY self   RELATIONSHIP SELF

## 2022-08-10 NOTE — PROGRESS NOTES
[place]               Oliverio Granger               [phone]      James Garcia is a 74 y.o. female and presents with Annual Wellness Visit, Follow Up Chronic Condition, Cholesterol Problem, Hypertension, and Diabetes       Assessment/Plan:    Diagnoses and all orders for this visit:    1. Medicare annual wellness visit, subsequent  Completed today to include ETOH and depression screening     2. Advanced care planning/counseling discussion  Healthcare decision maker verified and ACP Discussion performed and documented in note     3. Controlled type 2 diabetes mellitus without complication, without long-term current use of insulin (HCC)  -     metFORMIN (GLUCOPHAGE) 1,000 mg tablet; TAKE 1 TABLET BY MOUTH TWICE DAILY WITH MEALS  -     glipiZIDE SR (GLUCOTROL XL) 5 mg CR tablet; 1 tablet bu mouth daily.  -     LIPID PANEL; Future  -     HEMOGLOBIN A1C WITH EAG; Future  Endorses medication compliance, Refill provided, Follow up labs prior to next visit, and Denies signs and symptoms of hyper or hypoglycemia     4. Primary hypertension  -     losartan (COZAAR) 100 mg tablet; Take 1 Tablet by mouth in the morning.  -     metoprolol succinate (TOPROL-XL) 25 mg XL tablet; Take 1 Tablet by mouth in the morning.  -     hydroCHLOROthiazide (HYDRODIURIL) 25 mg tablet; Take 1 Tablet by mouth in the morning.  -     dilTIAZem ER (CARDIZEM CD) 360 mg capsule; Take 1 Capsule by mouth in the morning.  -     METABOLIC PANEL, COMPREHENSIVE; Future  Endorses medication compliance, Refill provided, and Follow up labs prior to next visit blood pressure stable     5. Iron deficiency  -     ferrous sulfate (Iron, Ferrous Sulfate,) 325 mg (65 mg iron) tablet; Take 1 Tablet by mouth Daily (before breakfast). Refill provided    Follow-up and Dispositions    Return in about 4 months (around 12/10/2022) for DM, HLD, HTN, 15 min, office only, w/labs prior.            Health Maintenance:   Health Maintenance   Topic Date Due    Cervical cancer screen  Never done    Foot Exam Q1  03/25/2022    Bone Densitometry (Dexa) Screening  Never done    Flu Vaccine (1) 09/01/2022    Colorectal Cancer Screening Combo  01/30/2023    Pneumococcal 65+ years (2 - PCV) 05/10/2023    A1C test (Diabetic or Prediabetic)  05/17/2023    MICROALBUMIN Q1  05/17/2023    Lipid Screen  05/17/2023    Breast Cancer Screen Mammogram  07/08/2023    Depression Screen  08/10/2023    Medicare Yearly Exam  08/11/2023    Eye Exam Retinal or Dilated  09/22/2023    DTaP/Tdap/Td series (3 - Td or Tdap) 05/10/2032    Hepatitis C Screening  Completed    Shingrix Vaccine Age 50>  Completed    COVID-19 Vaccine  Completed        Subjective:    Labs obtained prior to visit? NO  Reviewed with patient? Not applicable    DMII-   Patient reports medication compliance Daily  Diabetic diet compliance most of the time  Patient monitors blood sugars regularly Daily   Reports am fasting sugars range  130 in AM   Denies hypoglycemic episodes yes  Denies polyuria, polydipsia, paraesthesia, vision changes? yes  Engaging in daily exercise?  Yes: Comment: jamaal on her phone, 5 days a week     Diabetic Foot and Eye Exam HM Status   Topic Date Due    Diabetic Foot Care  03/25/2022    Eye Exam  09/22/2023     Hemoglobin A1c   Date Value Ref Range Status   05/17/2022 7.2 (H) 4.8 - 5.6 % Final   ]  Creatinine, urine   Date Value Ref Range Status   05/17/2022 127 mg/dL Final     Microalbumin/Creat ratio (mg/g creat)   Date Value Ref Range Status   06/30/2020 6 0 - 30 mg/g Final   11/03/2017 4 0 - 30 mg/g Final   09/13/2016 7 0 - 30 mg/g Final     Microalb/Creat ratio (ug/mg creat.)   Date Value Ref Range Status   05/17/2022   Final     Comment:     ** Unable to calculate Microablumin/Creatinine Ratio due to low Microalbumin       09/10/2021   Final     Comment:     ** Unable to calculate Microablumin/Creatinine Ratio due to low Microalbumin         Key Antihyperglycemic Medications metFORMIN (GLUCOPHAGE) 1,000 mg tablet (Taking) TAKE 1 TABLET BY MOUTH TWICE DAILY WITH MEALS    glipiZIDE SR (GLUCOTROL XL) 5 mg CR tablet (Taking) 1 tablet bu mouth daily. Hypertension:  Visit Vitals  /78   Pulse 90   Temp 98.4 °F (36.9 °C) (Temporal)   SpO2 100%      Patient reports taking medications as instructed. yes   Medication side effects noted. no  Headache upon wakening. no   Home BP monitoring: Does not check  Do you experience chest pain/pressure or SOB with exertion? no  Maintain a low Sodium diet? yes  Key CAD CHF Meds               losartan (COZAAR) 100 mg tablet (Taking) Take 1 Tablet by mouth in the morning. metoprolol succinate (TOPROL-XL) 25 mg XL tablet (Taking) Take 1 Tablet by mouth in the morning. hydroCHLOROthiazide (HYDRODIURIL) 25 mg tablet (Taking) Take 1 Tablet by mouth in the morning. dilTIAZem ER (CARDIZEM CD) 360 mg capsule (Taking) Take 1 Capsule by mouth in the morning. BUN   Date Value Ref Range Status   05/17/2022 16 6 - 22 mg/dL Final     Creatinine   Date Value Ref Range Status   05/17/2022 0.7 (L) 0.8 - 1.4 mg/dL Final     GFR est AA   Date Value Ref Range Status   01/04/2021 >60 >60 ml/min/1.73m2 Final     Potassium   Date Value Ref Range Status   05/17/2022 4.7 3.5 - 5.5 mmol/L Final         HLD:  Has been compliant with meds  No: managed with diet  Compliant with low-fat diet. most of the time    Denies myalgias or other side effects. N/A  The 10-year ASCVD risk score (Polly Dukes, et al., 2013) is: 18.8%    Cholesterol, total   Date Value Ref Range Status   05/17/2022 159 110 - 200 mg/dL Final     Triglyceride   Date Value Ref Range Status   05/17/2022 118 40 - 149 mg/dL Final     HDL Cholesterol   Date Value Ref Range Status   05/17/2022 72 >=40 mg/dL Final     LDL, calculated   Date Value Ref Range Status   05/17/2022 64 50 - 99 mg/dL Final   ]  Key Antihyperlipidemia Meds       The patient is on no antihyperlipidemia meds. ROS:     ROS  As stated in HPI, otherwise all others negative. The problem list was updated as a part of today's visit. Patient Active Problem List   Diagnosis Code    Anemia D64.9    Hypertension I10    Vitamin D deficiency E55.9    Breast calcifications on mammogram R92.1    Morbid obesity due to excess calories (HCC) E66.01    Ductal carcinoma in situ (DCIS) of right breast D05.11    Controlled type 2 diabetes mellitus without complication, without long-term current use of insulin (HCC) E11.9       The PSH, FH were reviewed. SH:  Social History     Tobacco Use    Smoking status: Never    Smokeless tobacco: Never   Vaping Use    Vaping Use: Never used   Substance Use Topics    Alcohol use: No    Drug use: No       Medications/Allergies:  No current outpatient medications on file prior to visit. No current facility-administered medications on file prior to visit. No Known Allergies    Objective:  Visit Vitals  /78   Pulse 90   Temp 98.4 °F (36.9 °C) (Temporal)   SpO2 100%    There is no height or weight on file to calculate BMI.     Physical assessment  Physical Exam      Labwork and Ancillary Studies:    CBC w/Diff  Lab Results   Component Value Date/Time    WBC 13.2 (H) 09/10/2021 09:49 AM    HGB 13.3 09/10/2021 09:49 AM    PLATELET 843 (H) 06/13/1549 09:49 AM         Basic Metabolic Profile  Lab Results   Component Value Date/Time    Sodium 142 05/17/2022 09:11 AM    Potassium 4.7 05/17/2022 09:11 AM    Chloride 104 05/17/2022 09:11 AM    CO2 24 05/17/2022 09:11 AM    Anion gap 14.0 05/17/2022 09:11 AM    Glucose 124 (H) 05/17/2022 09:11 AM    BUN 16 05/17/2022 09:11 AM    Creatinine 0.7 (L) 05/17/2022 09:11 AM    BUN/Creatinine ratio 22 (H) 01/04/2021 11:24 AM    GFR est AA >60 01/04/2021 11:24 AM    GFR est non-AA >60 01/04/2021 11:24 AM    Calcium 10.0 05/17/2022 09:11 AM        Cholesterol  Lab Results   Component Value Date/Time    Cholesterol, total 159 05/17/2022 09:11 AM HDL Cholesterol 72 05/17/2022 09:11 AM    LDL, calculated 64 05/17/2022 09:11 AM    Triglyceride 118 05/17/2022 09:11 AM    CHOL/HDL Ratio 1.9 06/30/2020 10:38 AM           I have discussed the diagnosis with the patient and the intended plan as seen in the above orders. The patient has received an After-Visit Summary and questions were answered concerning future plans. An After Visit Summary was printed and given to the patient. All diagnosis have been discussed with the patient and all of the patient's questions have been answered. Follow-up and Dispositions    Return in about 4 months (around 12/10/2022) for DM, HLD, HTN, 15 min, office only, w/labs prior. Nicolette Obregon, [facility]  [address] [id number] [facility] [address] [address]      This is the Subsequent Medicare Annual Wellness Exam, performed 12 months or more after the Initial AWV or the last Subsequent AWV    I have reviewed the patient's medical history in detail and updated the computerized patient record. Assessment/Plan   Education and counseling provided:  Are appropriate based on today's review and evaluation  End-of-Life planning (with patient's consent)    1. Medicare annual wellness visit, subsequent  2. Advanced care planning/counseling discussion  3. Controlled type 2 diabetes mellitus without complication, without long-term current use of insulin (HCC)  -     metFORMIN (GLUCOPHAGE) 1,000 mg tablet; TAKE 1 TABLET BY MOUTH TWICE DAILY WITH MEALS, Normal, Disp-180 Tablet, R-3  -     glipiZIDE SR (GLUCOTROL XL) 5 mg CR tablet; 1 tablet bu mouth daily. , Normal, Disp-90 Tablet, R-3  -     LIPID PANEL; Future  -     HEMOGLOBIN A1C WITH EAG; Future  4. Primary hypertension  -     losartan (COZAAR) 100 mg tablet; Take 1 Tablet by mouth in the morning., Normal, Disp-90 Tablet, R-3  -     metoprolol succinate (TOPROL-XL) 25 mg XL tablet;  Take 1 Tablet by mouth in the morning., Normal, Disp-90 Tablet, R-3  -     hydroCHLOROthiazide (HYDRODIURIL) 25 mg tablet; Take 1 Tablet by mouth in the morning., Normal, Disp-90 Tablet, R-3  -     dilTIAZem ER (CARDIZEM CD) 360 mg capsule; Take 1 Capsule by mouth in the morning., Normal, Disp-90 Capsule, R-3  -     METABOLIC PANEL, COMPREHENSIVE; Future  5. Iron deficiency  -     ferrous sulfate (Iron, Ferrous Sulfate,) 325 mg (65 mg iron) tablet; Take 1 Tablet by mouth Daily (before breakfast). , Normal, Disp-90 Tablet, R-3       Depression Risk Factor Screening     3 most recent PHQ Screens 8/10/2022   Little interest or pleasure in doing things Not at all   Feeling down, depressed, irritable, or hopeless Not at all   Total Score PHQ 2 0       Alcohol & Drug Abuse Risk Screen    Do you average more than 1 drink per night or more than 7 drinks a week:  No    On any one occasion in the past three months have you have had more than 3 drinks containing alcohol:  No          Functional Ability and Level of Safety    Hearing: Hearing is good. Activities of Daily Living: The home contains: no safety equipment. Patient needs help with:  transportation      Ambulation: with no difficulty     Fall Risk:  Fall Risk Assessment, last 12 mths 8/10/2022   Able to walk? Yes   Fall in past 12 months? 0   Do you feel unsteady?  0   Are you worried about falling 0      Abuse Screen:  Patient is not abused       Cognitive Screening    Has your family/caregiver stated any concerns about your memory: no     Cognitive Screening: Abnormal - Clock Drawing Test, minute hand on the wrong number    Health Maintenance Due     Health Maintenance Due   Topic Date Due    Cervical cancer screen  Never done    Foot Exam Q1  03/25/2022    Bone Densitometry (Dexa) Screening  Never done       Patient Care Team   Patient Care Team:  Rachel Ramirez NP as PCP - General (Nurse Practitioner)  Rachel Ramirez NP as PCP - [facility] Empaneled Provider  Naga Maurizio Loaiza MD (Gastroenterology)  Priscila Damon MD (Ophthalmology)  Nidhi Burton MD (Orthopedic Surgery)  Luis Wong MD (General Surgery)  Darin Srivastava RN as Nurse Navigator  Luis Wong MD (General Surgery)    History     Patient Active Problem List   Diagnosis Code    Anemia D64.9    Hypertension I10    Vitamin D deficiency E55.9    Breast calcifications on mammogram R92.1    Morbid obesity due to excess calories (HCC) E66.01    Ductal carcinoma in situ (DCIS) of right breast D05.11    Controlled type 2 diabetes mellitus without complication, without long-term current use of insulin ([address] E11.9     Past Medical History:   Diagnosis Date    Anemia     resolved    Breast cancer ([address]     Diverticulosis 11/13    Hypertension     Internal hemorrhoids 11/13    Menopause     Radiation therapy complication     Vitamin D deficiency       Past Surgical History:   Procedure Laterality Date    HX BREAST LUMPECTOMY Right 5/30/2017    right BREAST LUMPECTOMY WITH localization performed by Shadi Infante MD at [facility]    HX BREAST LUMPECTOMY Right 6/20/2017    revision right breast to achieve neg margin performed by Shadi Infante MD at [facility]    HX COLONOSCOPY  1/13    repeat 1/23 - [facility]    HX ORTHOPAEDIC      fluid drained from lt shoulder    HX TUBAL LIGATION       Current Outpatient Medications   Medication Sig Dispense Refill    metFORMIN (GLUCOPHAGE) 1,000 mg tablet TAKE 1 TABLET BY MOUTH TWICE DAILY WITH MEALS 180 Tablet 3    losartan (COZAAR) 100 mg tablet Take 1 Tablet by mouth in the morning. 90 Tablet 3    metoprolol succinate (TOPROL-XL) 25 mg XL tablet Take 1 Tablet by mouth in the morning. 90 Tablet 3    hydroCHLOROthiazide (HYDRODIURIL) 25 mg tablet Take 1 Tablet by mouth in the morning. 90 Tablet 3    glipiZIDE SR (GLUCOTROL XL) 5 mg CR tablet 1 tablet bu mouth daily.  90 Tablet 3    ferrous sulfate (Iron, Ferrous Sulfate,) 325 mg (65 mg iron) tablet Take 1 Tablet by mouth Daily (before breakfast). 90 Tablet 3    dilTIAZem ER (CARDIZEM CD) 360 mg capsule Take 1 Capsule by mouth in the morning.  90 Capsule 3     No Known Allergies    Family History   Problem Relation Age of Onset    Stroke Mother     Hypertension Mother     Diabetes Daughter      Social History     Tobacco Use    Smoking status: Never    Smokeless tobacco: Never   Substance Use Topics    Alcohol use: No         Albert Trejo NP

## 2022-08-10 NOTE — ACP (ADVANCE CARE PLANNING)
Advance Care Planning     General Advance Care Planning (ACP) Conversation      Date of Conversation: 8/10/2022  Conducted with: Patient with Decision Making Capacity    Healthcare Decision Maker:     Primary Decision Maker: dominique martins - Daughter - [phone]  Click here to complete [address] including selection of the Healthcare Decision Maker Relationship (ie \"Primary\")    Today we documented Decision Maker(s) consistent with Legal Next of Kin hierarchy.     Content/Action Overview:   Has NO ACP documents/care preferences - information provided, considering goals and options  Reviewed DNR/DNI and patient elects Full Code (Attempt Resuscitation)  Topics discussed: resuscitation preferences       Length of Voluntary ACP Conversation in minutes:  <16 minutes (Non-Billable)    Anna Gonzalez NP

## 2022-11-18 DIAGNOSIS — E11.9 CONTROLLED TYPE 2 DIABETES MELLITUS WITHOUT COMPLICATION, WITHOUT LONG-TERM CURRENT USE OF INSULIN (HCC): ICD-10-CM

## 2022-11-20 RX ORDER — METFORMIN HYDROCHLORIDE 1000 MG/1
TABLET ORAL
Qty: 180 TABLET | Refills: 3 | Status: SHIPPED | OUTPATIENT
Start: 2022-11-20

## 2022-11-22 DIAGNOSIS — I10 PRIMARY HYPERTENSION: ICD-10-CM

## 2022-11-23 RX ORDER — METOPROLOL SUCCINATE 25 MG/1
25 TABLET, EXTENDED RELEASE ORAL DAILY
Qty: 90 TABLET | Refills: 3 | Status: SHIPPED | OUTPATIENT
Start: 2022-11-23

## 2023-01-04 DIAGNOSIS — I10 PRIMARY HYPERTENSION: ICD-10-CM

## 2023-01-04 RX ORDER — DILTIAZEM HYDROCHLORIDE 360 MG/1
360 CAPSULE, EXTENDED RELEASE ORAL DAILY
Qty: 90 CAPSULE | Refills: 3 | Status: SHIPPED | OUTPATIENT
Start: 2023-01-04

## 2023-01-10 NOTE — BRIEF OP NOTE
-Reviewed hospitalization 12/23/2022, reviewed labs, imaging  -Recommend evaluation of stone  -Referral to urology to evaluate further  -Continue tamsulosin, given UTI symptoms, check UA, can continue Macrobid as prescribed per urogynecology in the past.  To take twice daily x5 days.  -Has Rx for ketorolac to take as needed.  -Avoid constipation w/ fiber rich foods and MiraLAX   BRIEF OPERATIVE NOTE    Date of Procedure: 5/30/2017   Preoperative Diagnosis: dcis right breast  d05.11  Postoperative Diagnosis: dcis right breast  d05.11    Procedure(s):  right BREAST LUMPECTOMY WITH localization  Surgeon(s) and Role:     * Ludivina Fu MD - Primary         Assistant Staff:       Surgical Staff:  Circ-1: Alaina Magana RN  Scrub Tech-1: María Lynas  Surg Asst-1: Padmaja Colin  Event Time In   Incision Start 8723   Incision Close      Anesthesia: General   Estimated Blood Loss: 10ml  Specimens:   ID Type Source Tests Collected by Time Destination   1 : Calcification Right Breast mass in the 10 oclock position right breast short stitch superior long stitch right lateral Fresh Breast  Ludivina Fu MD 5/30/2017 8700 Pathology   2 :       Pathology      Findings: calcifications and clip in specimen   Complications: none  Implants: * No implants in log *

## 2023-01-19 ENCOUNTER — OFFICE VISIT (OUTPATIENT)
Dept: FAMILY MEDICINE CLINIC | Age: 68
End: 2023-01-19
Payer: MEDICARE

## 2023-01-19 VITALS
TEMPERATURE: 98.1 F | WEIGHT: 230 LBS | HEIGHT: 64 IN | HEART RATE: 90 BPM | DIASTOLIC BLOOD PRESSURE: 78 MMHG | RESPIRATION RATE: 20 BRPM | SYSTOLIC BLOOD PRESSURE: 150 MMHG | OXYGEN SATURATION: 100 % | BODY MASS INDEX: 39.27 KG/M2

## 2023-01-19 DIAGNOSIS — I10 PRIMARY HYPERTENSION: ICD-10-CM

## 2023-01-19 DIAGNOSIS — Z12.11 SCREEN FOR COLON CANCER: ICD-10-CM

## 2023-01-19 DIAGNOSIS — E11.9 CONTROLLED TYPE 2 DIABETES MELLITUS WITHOUT COMPLICATION, WITHOUT LONG-TERM CURRENT USE OF INSULIN (HCC): Primary | ICD-10-CM

## 2023-01-19 PROCEDURE — G8427 DOCREV CUR MEDS BY ELIG CLIN: HCPCS | Performed by: NURSE PRACTITIONER

## 2023-01-19 PROCEDURE — 3078F DIAST BP <80 MM HG: CPT | Performed by: NURSE PRACTITIONER

## 2023-01-19 PROCEDURE — 2022F DILAT RTA XM EVC RTNOPTHY: CPT | Performed by: NURSE PRACTITIONER

## 2023-01-19 PROCEDURE — 3044F HG A1C LEVEL LT 7.0%: CPT | Performed by: NURSE PRACTITIONER

## 2023-01-19 PROCEDURE — G8400 PT W/DXA NO RESULTS DOC: HCPCS | Performed by: NURSE PRACTITIONER

## 2023-01-19 PROCEDURE — 1090F PRES/ABSN URINE INCON ASSESS: CPT | Performed by: NURSE PRACTITIONER

## 2023-01-19 PROCEDURE — G9899 SCRN MAM PERF RSLTS DOC: HCPCS | Performed by: NURSE PRACTITIONER

## 2023-01-19 PROCEDURE — G8536 NO DOC ELDER MAL SCRN: HCPCS | Performed by: NURSE PRACTITIONER

## 2023-01-19 PROCEDURE — 1101F PT FALLS ASSESS-DOCD LE1/YR: CPT | Performed by: NURSE PRACTITIONER

## 2023-01-19 PROCEDURE — 3077F SYST BP >= 140 MM HG: CPT | Performed by: NURSE PRACTITIONER

## 2023-01-19 PROCEDURE — G8432 DEP SCR NOT DOC, RNG: HCPCS | Performed by: NURSE PRACTITIONER

## 2023-01-19 PROCEDURE — 1123F ACP DISCUSS/DSCN MKR DOCD: CPT | Performed by: NURSE PRACTITIONER

## 2023-01-19 PROCEDURE — 3017F COLORECTAL CA SCREEN DOC REV: CPT | Performed by: NURSE PRACTITIONER

## 2023-01-19 PROCEDURE — G8417 CALC BMI ABV UP PARAM F/U: HCPCS | Performed by: NURSE PRACTITIONER

## 2023-01-19 PROCEDURE — 99214 OFFICE O/P EST MOD 30 MIN: CPT | Performed by: NURSE PRACTITIONER

## 2023-01-19 RX ORDER — METOPROLOL SUCCINATE 50 MG/1
50 TABLET, EXTENDED RELEASE ORAL DAILY
Qty: 90 TABLET | Refills: 3 | Status: SHIPPED | OUTPATIENT
Start: 2023-01-19

## 2023-01-19 RX ORDER — SIMVASTATIN 10 MG/1
10 TABLET, FILM COATED ORAL
Qty: 90 TABLET | Refills: 3 | Status: SHIPPED | OUTPATIENT
Start: 2023-01-19

## 2023-01-19 NOTE — PROGRESS NOTES
[address]               [phone]      Koki James is a 74 y.o. female and presents with Follow Up Chronic Condition, Diabetes, Cholesterol Problem, and Hypertension       Assessment/Plan:    Diagnoses and all orders for this visit:    1. Controlled type 2 diabetes mellitus without complication, without long-term current use of insulin (HCC)  -     simvastatin (ZOCOR) 10 mg tablet; Take 1 Tablet by mouth nightly. -     LIPID PANEL; Future  -     HEMOGLOBIN A1C WITH EAG; Future  -     MICROALBUMIN, UR, RAND W/ MICROALB/CREAT RATIO; Future  Endorses medication compliance, Follow up labs prior to next visit, Denies signs and symptoms of hyper or hypoglycemia, Diabetes controlled, A1C <7, and add statin per guidlines    2. Screen for colon cancer  -     REFERRAL FOR COLONOSCOPY    3. Primary hypertension  -     metoprolol succinate (TOPROL-XL) 50 mg XL tablet; Take 1 Tablet by mouth daily.  -     METABOLIC PANEL, COMPREHENSIVE; Future  Endorses medication compliance, Follow up labs prior to next visit, and Blood pressure uncontrolled increase metoprolol to 50mg a day, continue diltiazem and losartan and hctz at same dose      Follow-up and Dispositions    Return in about 3 months (around 4/19/2023) for DM foot, HLD, HTN, 30 min, office only, w/labs prior.            Health Maintenance:   Health Maintenance   Topic Date Due    Cervical cancer screen  Never done    Foot Exam Q1  03/25/2022    Flu Vaccine (1) 08/01/2022    Bone Densitometry (Dexa) Screening  Never done    COVID-19 Vaccine (5 - Booster for Pfizer series) 08/10/2022    Colorectal Cancer Screening Combo  01/30/2023    Pneumococcal 65+ years (2 - PCV) 05/10/2023    Diabetic Alb to Cr ratio (uACR) test  05/17/2023    Breast Cancer Screen Mammogram  07/08/2023    Depression Screen  08/10/2023    Medicare Yearly Exam  08/11/2023    Eye Exam Retinal or Dilated  09/22/2023    GFR test (Diabetes, CKD 3-4, OR last GFR 15-59)  01/12/2024    A1C test (Diabetic or Prediabetic)  01/12/2024    Lipid Screen  01/12/2024    DTaP/Tdap/Td series (3 - Td or Tdap) 05/10/2032    Hepatitis C Screening  Completed    Shingles Vaccine  Completed        Subjective:    Labs obtained prior to visit? YES  Reviewed with patient? Yes    DMII-   Patient reports medication compliance Yes  Diabetic diet compliance most of the time  Patient monitors blood sugars regularly Daily   Home glucose readings:  130   Denies hypoglycemic episodes yes  Denies polyuria, polydipsia, paraesthesia, vision changes? yes  Engaging in daily exercise? Yes: Comment: walking about 30min 4 x a week     Diabetic Foot and Eye Exam HM Status   Topic Date Due    Diabetic Foot Care  03/25/2022    Eye Exam  09/22/2023     Hemoglobin A1c   Date Value Ref Range Status   01/12/2023 6.9 (H) 4.8 - 5.6 % Final     Comment:              Prediabetes: 5.7 - 6.4           Diabetes: >6.4           Glycemic control for adults with diabetes: <7.0     ]  Creatinine, urine random   Date Value Ref Range Status   05/17/2022 127 mg/dL Final     Microalbumin/Creat ratio (mg/g creat)   Date Value Ref Range Status   06/30/2020 6 0 - 30 mg/g Final   11/03/2017 4 0 - 30 mg/g Final   09/13/2016 7 0 - 30 mg/g Final     Microalb/Creat ratio (ug/mg creat.)   Date Value Ref Range Status   05/17/2022   Final     Comment:     ** Unable to calculate Microablumin/Creatinine Ratio due to low Microalbumin       09/10/2021   Final     Comment:     ** Unable to calculate Microablumin/Creatinine Ratio due to low Microalbumin         Key Antihyperglycemic Medications               metFORMIN (GLUCOPHAGE) 1,000 mg tablet (Taking) TAKE 1 TABLET BY MOUTH TWICE DAILY WITH MEALS    glipiZIDE SR (GLUCOTROL XL) 5 mg CR tablet (Taking) 1 tablet bu mouth daily.         Hypertension:  Visit Vitals  BP (!) 150/78   Pulse 90   Temp 98.1 °F (36.7 °C) (Temporal)   Resp 20   Ht 5' 4\" (1.626 m)   Wt 230 lb (104.3 kg)   SpO2 100% BMI 39.48 kg/m²      Patient reports taking medications as instructed. yes   Medication side effects noted. no  Headache upon wakening. no   Home BP monitoring: Does not check  Do you experience chest pain/pressure or SOB with exertion? no  Maintain a low Sodium diet? yes  Key CAD CHF Meds               simvastatin (ZOCOR) 10 mg tablet (Taking) Take 1 Tablet by mouth nightly. metoprolol succinate (TOPROL-XL) 50 mg XL tablet (Taking) Take 1 Tablet by mouth daily. dilTIAZem ER (CARDIZEM CD) 360 mg capsule (Taking) Take 1 Capsule by mouth daily. losartan (COZAAR) 100 mg tablet (Taking) Take 1 tablet by mouth once daily    hydroCHLOROthiazide (HYDRODIURIL) 25 mg tablet (Taking) Take 1 Tablet by mouth in the morning. BUN   Date Value Ref Range Status   01/12/2023 13 8 - 27 mg/dL Final     Creatinine   Date Value Ref Range Status   01/12/2023 0.68 0.57 - 1.00 mg/dL Final     GFR est AA   Date Value Ref Range Status   01/04/2021 >60 >60 ml/min/1.73m2 Final     Potassium   Date Value Ref Range Status   01/12/2023 4.6 3.5 - 5.2 mmol/L Final       HLD:  Has been compliant with meds  No: managed with diet  Compliant with low-fat diet. most of the time    Denies myalgias or other side effects. yes  The 10-year ASCVD risk score (Xochitl MARROQUIN, et al., 2019) is: 24.4%    Cholesterol, total   Date Value Ref Range Status   01/12/2023 175 100 - 199 mg/dL Final     Triglyceride   Date Value Ref Range Status   01/12/2023 107 0 - 149 mg/dL Final     HDL Cholesterol   Date Value Ref Range Status   01/12/2023 73 >39 mg/dL Final     LDL, calculated   Date Value Ref Range Status   01/12/2023 83 0 - 99 mg/dL Final   ]  Key Antihyperlipidemia Meds               simvastatin (ZOCOR) 10 mg tablet (Taking) Take 1 Tablet by mouth nightly. ROS:     ROS  As stated in HPI, otherwise all others negative. The problem list was updated as a part of today's visit.   Patient Active Problem List   Diagnosis Code    Anemia D64.9    Hypertension I10    Vitamin D deficiency E55.9    Breast calcifications on mammogram R92.1    Morbid obesity due to excess calories (HCC) E66.01    Ductal carcinoma in situ (DCIS) of right breast D05.11    Controlled type 2 diabetes mellitus without complication, without long-term current use of insulin (HCC) E11.9       The PSH, FH were reviewed. SH:  Social History     Tobacco Use    Smoking status: Never    Smokeless tobacco: Never   Vaping Use    Vaping Use: Never used   Substance Use Topics    Alcohol use: No    Drug use: No       Medications/Allergies:  Current Outpatient Medications on File Prior to Visit   Medication Sig Dispense Refill    dilTIAZem ER (CARDIZEM CD) 360 mg capsule Take 1 Capsule by mouth daily. 90 Capsule 3    metFORMIN (GLUCOPHAGE) 1,000 mg tablet TAKE 1 TABLET BY MOUTH TWICE DAILY WITH MEALS 180 Tablet 3    losartan (COZAAR) 100 mg tablet Take 1 tablet by mouth once daily 90 Tablet 3    hydroCHLOROthiazide (HYDRODIURIL) 25 mg tablet Take 1 Tablet by mouth in the morning. 90 Tablet 3    glipiZIDE SR (GLUCOTROL XL) 5 mg CR tablet 1 tablet bu mouth daily. 90 Tablet 3    ferrous sulfate (Iron, Ferrous Sulfate,) 325 mg (65 mg iron) tablet Take 1 Tablet by mouth Daily (before breakfast). 90 Tablet 3    [DISCONTINUED] metoprolol succinate (TOPROL-XL) 25 mg XL tablet Take 1 Tablet by mouth daily. 90 Tablet 3     No current facility-administered medications on file prior to visit. No Known Allergies    Objective:  Visit Vitals  BP (!) 150/78   Pulse 90   Temp 98.1 °F (36.7 °C) (Temporal)   Resp 20   Ht 5' 4\" (1.626 m)   Wt 230 lb (104.3 kg)   SpO2 100%   BMI 39.48 kg/m²    Body mass index is 39.48 kg/m². Physical assessment  Physical Exam  Vitals and nursing note reviewed. Eyes:      Conjunctiva/sclera: Conjunctivae normal.      Pupils: Pupils are equal, round, and reactive to light. Cardiovascular:      Rate and Rhythm: Normal rate and regular rhythm.       Heart sounds: Normal heart sounds. No murmur heard. No friction rub. No gallop. Pulmonary:      Effort: Pulmonary effort is normal.      Breath sounds: Normal breath sounds. Musculoskeletal:         General: Normal range of motion. Cervical back: Normal range of motion. Skin:     General: Skin is warm and dry. Neurological:      Mental Status: She is alert. Labwork and Ancillary Studies:    CBC w/Diff  Lab Results   Component Value Date/Time    WBC 13.2 (H) 09/10/2021 09:49 AM    HGB 13.3 09/10/2021 09:49 AM    PLATELET 370 (H) 57/98/6557 09:49 AM         Basic Metabolic Profile  Lab Results   Component Value Date/Time    Sodium 141 01/12/2023 09:07 AM    Potassium 4.6 01/12/2023 09:07 AM    Chloride 101 01/12/2023 09:07 AM    CO2 22 01/12/2023 09:07 AM    Anion gap 14.0 05/17/2022 09:11 AM    Glucose 130 (H) 01/12/2023 09:07 AM    BUN 13 01/12/2023 09:07 AM    Creatinine 0.68 01/12/2023 09:07 AM    BUN/Creatinine ratio 19 01/12/2023 09:07 AM    GFR est AA >60 01/04/2021 11:24 AM    GFR est non-AA >60 01/04/2021 11:24 AM    Calcium 10.0 01/12/2023 09:07 AM        Cholesterol  Lab Results   Component Value Date/Time    Cholesterol, total 175 01/12/2023 09:07 AM    HDL Cholesterol 73 01/12/2023 09:07 AM    LDL, calculated 83 01/12/2023 09:07 AM    LDL, calculated 64 05/17/2022 09:11 AM    Triglyceride 107 01/12/2023 09:07 AM    CHOL/HDL Ratio 1.9 06/30/2020 10:38 AM           I have discussed the diagnosis with the patient and the intended plan as seen in the above orders. The patient has received an After-Visit Summary and questions were answered concerning future plans. An After Visit Summary was printed and given to the patient. All diagnosis have been discussed with the patient and all of the patient's questions have been answered. Follow-up and Dispositions    Return in about 3 months (around 4/19/2023) for DM foot, HLD, HTN, 30 min, office only, w/labs prior.            Delonte Colorado, AGNP-BC  [facility]   [address] [facility] [address]  [address]

## 2023-01-19 NOTE — PROGRESS NOTES
Room 7     When asked if patient has any concerns she would like to address with RONALDO Jimenez patient states no. Did patient bring someone? No    Did the patient have DME equipment? No     Did you take your medication today? Yes       1. \"Have you been to the ER, urgent care clinic since your last visit? Hospitalized since your last visit? \" No    2. \"Have you seen or consulted any other health care providers outside of the [address] [place] [address] since your last visit? \" No     3. For patients aged 39-70: Has the patient had a colonoscopy / FIT/ Cologuard? No Order has been placed     If the patient is female:    4. For patients aged 41-77: Has the patient had a mammogram within the past 2 years? Yes - no Care Gap present      5. For patients aged 21-65: Has the patient had a pap smear?  Yes - no Care Gap present        3 most recent PHQ Screens 1/19/2023   Little interest or pleasure in doing things Not at all   Feeling down, depressed, irritable, or hopeless Not at all   Total Score PHQ 2 0         Health Maintenance Due   Topic Date Due    Cervical cancer screen  Never done    Foot Exam Q1  03/25/2022    Flu Vaccine (1) 08/01/2022    Bone Densitometry (Dexa) Screening  Never done    COVID-19 Vaccine (5 - Booster for Pfizer series) 08/10/2022    Colorectal Cancer Screening Combo  01/30/2023       Learning Assessment 7/13/2017   PRIMARY LEARNER Patient   HIGHEST LEVEL OF EDUCATION - PRIMARY LEARNER  DID NOT GRADUATE HIGH SCHOOL   BARRIERS PRIMARY LEARNER NONE   CO-LEARNER CAREGIVER No   PRIMARY LANGUAGE ENGLISH    NEED No   LEARNER PREFERENCE PRIMARY LISTENING     DEMONSTRATION   LEARNING SPECIAL TOPICS no   ANSWERED BY self   RELATIONSHIP SELF

## 2023-02-14 ENCOUNTER — CLINICAL DOCUMENTATION (OUTPATIENT)
Facility: CLINIC | Age: 68
End: 2023-02-14

## 2023-02-14 NOTE — PROGRESS NOTES
I do not have access to even view referrals yet so I will document this here-   Received a fax from  [facility] concerning the colonoscopy referral stating:\"called pt 3 x and spoke with Nadira Mckeon and NEERAJ to call and schedule but pt has not called back\".

## 2023-06-22 RX ORDER — GLIPIZIDE 5 MG/1
TABLET, FILM COATED, EXTENDED RELEASE ORAL
Qty: 30 TABLET | Refills: 10 | Status: SHIPPED | OUTPATIENT
Start: 2023-06-22

## 2023-06-26 ENCOUNTER — TELEPHONE (OUTPATIENT)
Facility: CLINIC | Age: 68
End: 2023-06-26

## 2023-06-26 DIAGNOSIS — E11.9 CONTROLLED TYPE 2 DIABETES MELLITUS WITHOUT COMPLICATION, WITHOUT LONG-TERM CURRENT USE OF INSULIN (HCC): Primary | ICD-10-CM

## 2023-06-26 RX ORDER — SIMVASTATIN 10 MG
10 TABLET ORAL NIGHTLY
COMMUNITY
Start: 2023-06-21 | End: 2023-06-26

## 2023-06-26 RX ORDER — ROSUVASTATIN CALCIUM 5 MG/1
5 TABLET, COATED ORAL NIGHTLY
Qty: 30 TABLET | Refills: 5 | Status: SHIPPED | OUTPATIENT
Start: 2023-06-26

## 2023-06-26 RX ORDER — METOPROLOL SUCCINATE 50 MG/1
50 TABLET, EXTENDED RELEASE ORAL DAILY
COMMUNITY
Start: 2023-06-21

## 2023-06-29 RX ORDER — DILTIAZEM HYDROCHLORIDE 360 MG/1
CAPSULE, EXTENDED RELEASE ORAL
Qty: 90 CAPSULE | Refills: 1 | Status: SHIPPED | OUTPATIENT
Start: 2023-06-29

## 2023-07-06 RX ORDER — LOSARTAN POTASSIUM 100 MG/1
100 TABLET ORAL DAILY
Qty: 90 TABLET | Refills: 1 | Status: SHIPPED | OUTPATIENT
Start: 2023-07-06

## 2023-07-06 RX ORDER — FERROUS SULFATE 325(65) MG
325 TABLET ORAL
Qty: 90 TABLET | Refills: 1 | Status: SHIPPED | OUTPATIENT
Start: 2023-07-06

## 2023-07-06 RX ORDER — HYDROCHLOROTHIAZIDE 25 MG/1
25 TABLET ORAL DAILY
Qty: 90 TABLET | Refills: 1 | Status: SHIPPED | OUTPATIENT
Start: 2023-07-06

## 2023-07-14 ENCOUNTER — HOSPITAL ENCOUNTER (OUTPATIENT)
Dept: WOMENS IMAGING | Facility: HOSPITAL | Age: 68
End: 2023-07-14
Payer: MEDICARE

## 2023-07-14 DIAGNOSIS — Z12.31 VISIT FOR SCREENING MAMMOGRAM: ICD-10-CM

## 2023-07-14 PROCEDURE — 77063 BREAST TOMOSYNTHESIS BI: CPT

## 2023-08-23 ENCOUNTER — OFFICE VISIT (OUTPATIENT)
Facility: CLINIC | Age: 68
End: 2023-08-23
Payer: MEDICARE

## 2023-08-23 VITALS
HEART RATE: 87 BPM | BODY MASS INDEX: 38.93 KG/M2 | TEMPERATURE: 97.9 F | DIASTOLIC BLOOD PRESSURE: 78 MMHG | RESPIRATION RATE: 20 BRPM | HEIGHT: 64 IN | SYSTOLIC BLOOD PRESSURE: 164 MMHG | WEIGHT: 228 LBS | OXYGEN SATURATION: 98 %

## 2023-08-23 DIAGNOSIS — E11.9 CONTROLLED TYPE 2 DIABETES MELLITUS WITHOUT COMPLICATION, WITHOUT LONG-TERM CURRENT USE OF INSULIN (HCC): ICD-10-CM

## 2023-08-23 DIAGNOSIS — Z00.00 MEDICARE ANNUAL WELLNESS VISIT, SUBSEQUENT: Primary | ICD-10-CM

## 2023-08-23 DIAGNOSIS — I10 PRIMARY HYPERTENSION: ICD-10-CM

## 2023-08-23 DIAGNOSIS — Z78.0 ASYMPTOMATIC MENOPAUSAL STATE: ICD-10-CM

## 2023-08-23 DIAGNOSIS — Z71.89 ADVANCED CARE PLANNING/COUNSELING DISCUSSION: ICD-10-CM

## 2023-08-23 DIAGNOSIS — Z12.11 COLON CANCER SCREENING: ICD-10-CM

## 2023-08-23 PROBLEM — E66.01 MORBID OBESITY DUE TO EXCESS CALORIES (HCC): Status: ACTIVE | Noted: 2017-05-13

## 2023-08-23 PROCEDURE — 3074F SYST BP LT 130 MM HG: CPT | Performed by: NURSE PRACTITIONER

## 2023-08-23 PROCEDURE — 3044F HG A1C LEVEL LT 7.0%: CPT | Performed by: NURSE PRACTITIONER

## 2023-08-23 PROCEDURE — G0439 PPPS, SUBSEQ VISIT: HCPCS | Performed by: NURSE PRACTITIONER

## 2023-08-23 PROCEDURE — 3078F DIAST BP <80 MM HG: CPT | Performed by: NURSE PRACTITIONER

## 2023-08-23 PROCEDURE — 1123F ACP DISCUSS/DSCN MKR DOCD: CPT | Performed by: NURSE PRACTITIONER

## 2023-08-23 PROCEDURE — 3017F COLORECTAL CA SCREEN DOC REV: CPT | Performed by: NURSE PRACTITIONER

## 2023-08-23 SDOH — ECONOMIC STABILITY: HOUSING INSECURITY
IN THE LAST 12 MONTHS, WAS THERE A TIME WHEN YOU DID NOT HAVE A STEADY PLACE TO SLEEP OR SLEPT IN A SHELTER (INCLUDING NOW)?: NO

## 2023-08-23 SDOH — ECONOMIC STABILITY: FOOD INSECURITY: WITHIN THE PAST 12 MONTHS, YOU WORRIED THAT YOUR FOOD WOULD RUN OUT BEFORE YOU GOT MONEY TO BUY MORE.: NEVER TRUE

## 2023-08-23 SDOH — ECONOMIC STABILITY: FOOD INSECURITY: WITHIN THE PAST 12 MONTHS, THE FOOD YOU BOUGHT JUST DIDN'T LAST AND YOU DIDN'T HAVE MONEY TO GET MORE.: NEVER TRUE

## 2023-08-23 SDOH — ECONOMIC STABILITY: INCOME INSECURITY: HOW HARD IS IT FOR YOU TO PAY FOR THE VERY BASICS LIKE FOOD, HOUSING, MEDICAL CARE, AND HEATING?: NOT HARD AT ALL

## 2023-08-23 ASSESSMENT — PATIENT HEALTH QUESTIONNAIRE - PHQ9
SUM OF ALL RESPONSES TO PHQ QUESTIONS 1-9: 0
1. LITTLE INTEREST OR PLEASURE IN DOING THINGS: 0
SUM OF ALL RESPONSES TO PHQ9 QUESTIONS 1 & 2: 0
SUM OF ALL RESPONSES TO PHQ QUESTIONS 1-9: 0
2. FEELING DOWN, DEPRESSED OR HOPELESS: 0

## 2023-08-23 ASSESSMENT — LIFESTYLE VARIABLES
HOW MANY STANDARD DRINKS CONTAINING ALCOHOL DO YOU HAVE ON A TYPICAL DAY: PATIENT DOES NOT DRINK
HOW OFTEN DO YOU HAVE A DRINK CONTAINING ALCOHOL: NEVER

## 2023-08-23 NOTE — PATIENT INSTRUCTIONS

## 2023-08-23 NOTE — ACP (ADVANCE CARE PLANNING)
Advance Care Planning     General Advance Care Planning (ACP) Conversation    Date of Conversation: 8/23/2023  Conducted with: Patient with Decision Making Capacity    Healthcare Decision Maker:    Primary Decision Maker: Jodie Esquivel - Child - [phone]  Click here to complete Healthcare Decision Makers including selection of the Healthcare Decision Maker Relationship (ie \"Primary\"). Today we documented Decision Maker(s) consistent with Legal Next of Kin hierarchy.     Content/Action Overview:  Has NO ACP documents/care preferences - information provided, considering goals and options  Reviewed DNR/DNI and patient elects Full Code (Attempt Resuscitation)  resuscitation preferences      Length of Voluntary ACP Conversation in minutes:  <16 minutes (Non-Billable)    OPAL Barrientos - CNP

## 2023-09-05 RX ORDER — HYDROCHLOROTHIAZIDE 25 MG/1
TABLET ORAL
Qty: 90 TABLET | Refills: 1 | Status: SHIPPED | OUTPATIENT
Start: 2023-09-05

## 2023-09-05 RX ORDER — LOSARTAN POTASSIUM 100 MG/1
TABLET ORAL
Qty: 90 TABLET | Refills: 1 | Status: SHIPPED | OUTPATIENT
Start: 2023-09-05

## 2023-10-03 RX ORDER — DILTIAZEM HYDROCHLORIDE 360 MG/1
360 CAPSULE, EXTENDED RELEASE ORAL DAILY
Qty: 90 CAPSULE | Refills: 1 | Status: SHIPPED | OUTPATIENT
Start: 2023-10-03

## 2023-10-20 NOTE — PROGRESS NOTES
[facility] Progress Note    Date: 2019    Name: Ayanna Carlson    MRN: [id number]         : 1955    Peripheral Lab Draw      Ms. Horowitz to [facility], ambulatory at 1400 accompanied by self. Pt was assessed and education was provided. Ms. Sagrario Resendez vitals were reviewed and patient was observed for 5 minutes prior to treatment. Visit Vitals  /73   Pulse 78   Temp 98.5 °F (36.9 °C) (Oral)   Ht 5' 4\" (1.626 m)   Wt 95.3 kg (210 lb)   SpO2 99%   BMI 36.05 kg/m²     Recent Results (from the past 12 hour(s))   CBC WITH 3 PART DIFF    Collection Time: 19  2:05 PM   Result Value Ref Range    WBC 10.5 4.5 - 13.0 K/uL    RBC 4.47 4.10 - 5.10 M/uL    HGB 12.1 12.0 - 16.0 g/dL    HCT 38.5 36 - 48 %    MCV 86.1 78 - 102 FL    MCH 27.1 25.0 - 35.0 PG    MCHC 31.4 31 - 37 g/dL    RDW 13.6 11.5 - 14.5 %    PLATELET 331 742 - 646 K/uL    NEUTROPHILS 74 (H) 40 - 70 %    MIXED CELLS 7 0.1 - 17 %    LYMPHOCYTES 19 14 - 44 %    ABS. NEUTROPHILS 7.7 1.8 - 9.5 K/UL    ABS. MIXED CELLS 0.8 0.0 - 2.3 K/uL    ABS. LYMPHOCYTES 2.0 1.1 - 5.9 K/UL    DF AUTOMATED         Blood obtained peripherally from left arm x 1 attempt with butterfly needle and sent to lab for Cbc w/diff and Cmp, Iron Profile and Ferritin per written orders. No bleeding or hematoma noted at site. Gauze and coban applied. Ms. Toi Flanagan tolerated the phlebotomy, and had no complaints. Patient armband removed and shredded. Ms. Toi Flanagan was discharged from [place] [address] in stable condition at 1410.      Jaclyn Favre Phlebotomist PCT  2019  2:11 PM no edema,  no murmurs,  regular rate and rhythm

## 2023-10-31 ENCOUNTER — TELEPHONE (OUTPATIENT)
Facility: CLINIC | Age: 68
End: 2023-10-31

## 2023-11-01 RX ORDER — GLIPIZIDE 5 MG/1
TABLET, FILM COATED, EXTENDED RELEASE ORAL
Qty: 90 TABLET | Refills: 1 | Status: SHIPPED | OUTPATIENT
Start: 2023-11-01

## 2023-11-03 RX ORDER — FERROUS SULFATE 325(65) MG
325 TABLET ORAL
Qty: 90 TABLET | Refills: 1 | Status: SHIPPED | OUTPATIENT
Start: 2023-11-03

## 2023-11-03 RX ORDER — METOPROLOL SUCCINATE 25 MG/1
25 TABLET, EXTENDED RELEASE ORAL EVERY MORNING
Qty: 90 TABLET | Refills: 1 | OUTPATIENT
Start: 2023-11-03

## 2023-11-21 LAB
ALBUMIN SERPL-MCNC: 4.7 G/DL (ref 3.9–4.9)
ALBUMIN/CREAT UR: <4 MG/G CREAT (ref 0–29)
ALBUMIN/GLOB SERPL: 1.8 {RATIO} (ref 1.2–2.2)
ALP SERPL-CCNC: 115 IU/L (ref 44–121)
ALT SERPL-CCNC: 19 IU/L (ref 0–32)
AST SERPL-CCNC: 17 IU/L (ref 0–40)
BILIRUB SERPL-MCNC: <0.2 MG/DL (ref 0–1.2)
BUN SERPL-MCNC: 15 MG/DL (ref 8–27)
BUN/CREAT SERPL: 19 (ref 12–28)
CALCIUM SERPL-MCNC: 10 MG/DL (ref 8.7–10.3)
CHLORIDE SERPL-SCNC: 98 MMOL/L (ref 96–106)
CHOLEST SERPL-MCNC: 155 MG/DL (ref 100–199)
CO2 SERPL-SCNC: 24 MMOL/L (ref 20–29)
CREAT SERPL-MCNC: 0.79 MG/DL (ref 0.57–1)
CREAT UR-MCNC: 81.4 MG/DL
EGFRCR SERPLBLD CKD-EPI 2021: 82 ML/MIN/1.73
GLOBULIN SER CALC-MCNC: 2.6 G/DL (ref 1.5–4.5)
GLUCOSE SERPL-MCNC: 160 MG/DL (ref 70–99)
HBA1C MFR BLD: 9 % (ref 4.8–5.6)
HDLC SERPL-MCNC: 74 MG/DL
LDLC SERPL CALC-MCNC: 59 MG/DL (ref 0–99)
MICROALBUMIN UR-MCNC: <3 UG/ML
POTASSIUM SERPL-SCNC: 4.4 MMOL/L (ref 3.5–5.2)
PROT SERPL-MCNC: 7.3 G/DL (ref 6–8.5)
SODIUM SERPL-SCNC: 141 MMOL/L (ref 134–144)
SPECIMEN STATUS REPORT: NORMAL
TRIGL SERPL-MCNC: 131 MG/DL (ref 0–149)
TSH SERPL DL<=0.005 MIU/L-ACNC: 1.92 UIU/ML (ref 0.45–4.5)
VLDLC SERPL CALC-MCNC: 22 MG/DL (ref 5–40)

## 2023-11-27 RX ORDER — METOPROLOL SUCCINATE 25 MG/1
25 TABLET, EXTENDED RELEASE ORAL EVERY MORNING
Qty: 90 TABLET | Refills: 1 | OUTPATIENT
Start: 2023-11-27

## 2023-11-29 DIAGNOSIS — I10 PRIMARY HYPERTENSION: Primary | ICD-10-CM

## 2023-11-29 RX ORDER — METOPROLOL SUCCINATE 50 MG/1
50 TABLET, EXTENDED RELEASE ORAL DAILY
Qty: 90 TABLET | Refills: 1 | Status: SHIPPED | OUTPATIENT
Start: 2023-11-29

## 2024-01-02 ENCOUNTER — OFFICE VISIT (OUTPATIENT)
Facility: CLINIC | Age: 69
End: 2024-01-02
Payer: MEDICARE

## 2024-01-02 VITALS
HEART RATE: 81 BPM | RESPIRATION RATE: 16 BRPM | DIASTOLIC BLOOD PRESSURE: 75 MMHG | TEMPERATURE: 98.4 F | OXYGEN SATURATION: 96 % | HEIGHT: 64 IN | SYSTOLIC BLOOD PRESSURE: 134 MMHG | BODY MASS INDEX: 38.58 KG/M2 | WEIGHT: 226 LBS

## 2024-01-02 DIAGNOSIS — I10 PRIMARY HYPERTENSION: ICD-10-CM

## 2024-01-02 DIAGNOSIS — E55.9 VITAMIN D DEFICIENCY: ICD-10-CM

## 2024-01-02 DIAGNOSIS — E11.65 CONTROLLED TYPE 2 DIABETES MELLITUS WITH HYPERGLYCEMIA, WITHOUT LONG-TERM CURRENT USE OF INSULIN (HCC): Primary | ICD-10-CM

## 2024-01-02 PROCEDURE — 99214 OFFICE O/P EST MOD 30 MIN: CPT | Performed by: NURSE PRACTITIONER

## 2024-01-02 PROCEDURE — 3078F DIAST BP <80 MM HG: CPT | Performed by: NURSE PRACTITIONER

## 2024-01-02 PROCEDURE — 3075F SYST BP GE 130 - 139MM HG: CPT | Performed by: NURSE PRACTITIONER

## 2024-01-02 PROCEDURE — 1123F ACP DISCUSS/DSCN MKR DOCD: CPT | Performed by: NURSE PRACTITIONER

## 2024-01-02 RX ORDER — DULAGLUTIDE 0.75 MG/.5ML
0.75 INJECTION, SOLUTION SUBCUTANEOUS WEEKLY
Qty: 6 ML | Refills: 0 | Status: SHIPPED | OUTPATIENT
Start: 2024-01-02

## 2024-01-02 ASSESSMENT — PATIENT HEALTH QUESTIONNAIRE - PHQ9
SUM OF ALL RESPONSES TO PHQ QUESTIONS 1-9: 0
1. LITTLE INTEREST OR PLEASURE IN DOING THINGS: 0
2. FEELING DOWN, DEPRESSED OR HOPELESS: 0
SUM OF ALL RESPONSES TO PHQ QUESTIONS 1-9: 0
SUM OF ALL RESPONSES TO PHQ9 QUESTIONS 1 & 2: 0
SUM OF ALL RESPONSES TO PHQ QUESTIONS 1-9: 0
SUM OF ALL RESPONSES TO PHQ QUESTIONS 1-9: 0

## 2024-01-02 NOTE — PROGRESS NOTES
Mae Nicholas is a 68 y.o. female (: 1955) presenting to address:    Chief Complaint   Patient presents with    Follow-up     DM       Vitals:    24 1533   BP: 134/75   Pulse: 81   Resp: 16   Temp: 98.4 °F (36.9 °C)   SpO2: 96%       Coordination of Care Questionaire:   1. \"Have you been to the ER, urgent care clinic since your last visit?  Hospitalized since your last visit?\" no    2. \"Have you seen or consulted any other health care providers outside of the [facility] since your last visit?\" No     3. For patients aged 45-75: Has the patient had a colonoscopy / FIT/ Cologuard? No      If the patient is female:    4. For patients aged 40-74: Has the patient had a mammogram within the past 2 years? yes      5. For patients aged 21-65: Has the patient had a pap smear? N/a    Advanced Directive:   1. Do you have an Advanced Directive? No    2. Would you like information on Advanced Directives? No

## 2024-01-02 NOTE — PROGRESS NOTES
[address] [place] [address]               [phone]      Mae Nicholas is a 68 y.o. female and presents with Follow-up (DM)       Assessment/Plan:    1. Controlled type 2 diabetes mellitus with hyperglycemia, without long-term current use of insulin (HCC)  -     Dulaglutide (TRULICITY) 0.75 MG/0.5ML SOPN; Inject 0.75 mg into the skin once a week, Disp-6 mL, R-0Normal  -     Lipid Panel; Future  -     Hemoglobin A1C; Future  -     Microalbumin / Creatinine Urine Ratio; Future  2. Primary hypertension  -     Comprehensive Metabolic Panel; Future  -     CBC; Future  3. Vitamin D deficiency  -     Vitamin D 25 Hydroxy; Future  Endorses medication compliance  DM uncontrolled, add trulicity, instructed on usage and side effects to monitor for  Blood pressure controlled,  continue diltiazem, metoprolol, losartan and hctz at same dose  Labs prior to next visit  Patient verbalized understanding and is in agreement with this plan of care        Follow up and disposition:   Return in about 3 months (around 4/2/2024) for MAWV, DM, HTN, 30min, office, w/labsprior.      Subjective:    Labs obtained prior to visit? Yes  Reviewed with patient? yes    DMII-   Patient reports medication compliance Yes  Diabetic diet compliance frequently  Patient monitors blood sugars regularly daily   Home glucose readings: 130-140   Denies hypoglycemic episodes Yes  Denies polyuria, polydipsia, paraesthesia, vision changes? Yes  Engaging in daily exercise?  Walking 30 min, 3-4 x a week     Diabetes Management   Topic Date Due    Diabetic foot exam  03/25/2022    Diabetic retinal exam  09/22/2022     Lab Results   Component Value Date/Time    LABA1C 9.0 11/20/2023 12:00 AM   ]  Lab Results   Component Value Date/Time    MALBCR <4 11/20/2023 12:00 AM   ]  Diabetic medications:   Key Antihyperglycemic Medications            metFORMIN (GLUCOPHAGE) 1000 MG tablet (Taking)    Sig - Route: TAKE 1 TABLET BY MOUTH

## 2024-03-05 NOTE — PROGRESS NOTES
Hematology Oncology Progress Note    Elidia Perdomo is a 63 y.o. female with history of DCIS+LCIS of right breast s/p Lumpectomy by Dr. Heidy Salazar in 06/2017 followed by adjuvant radiation completed 08/2017, started Tamoxifen since 09/2017 is now here for a 3 month follow up visit. Her last bilateral screening mammogram done 05/15/18 was normal, Birads 2. Chart notes reviewed since last visit. SUBJECTIVE: She denies any worsening fatigue, bone pains, breast lumps, worsening hotflashes, bleeding or spotting per vagina, loss of weight or apetite or any other major complaints at this time. Visit Vitals    /69    Pulse 91    Temp 97.1 °F (36.2 °C) (Oral)    Resp 17    Ht 5' 4\" (1.626 m)    Wt 98.9 kg (218 lb)    SpO2 97%    BMI 37.42 kg/m2       Review of Systems:  Constitutional No fevers, chills, night sweats, excessive fatigue or weight loss. Allergic/Immunologic No recent allergic reactions   Eyes No significant visual difficulties. No diplopia. ENMT No problems with hearing, no sore throat, no sinus drainage. Endocrine No hot flashes or night sweats. No cold intolerance, polyuria, or polydipsia   Hematologic/Lymphatic No easy bruising or bleeding. The patient denies any tender or palpable lymph nodes   Breasts No abnormal masses of breast, nipple discharge or pain. Respiratory No dyspnea on exertion, orthopnea, chest pain, cough or hemoptysis. Cardiovascular No anginal chest pain, irregular heart beat, tachycardia, palpitations or orthopnea. Gastrointestinal No nausea, vomiting, diarrhea, constipation, cramping, dysphagia, reflux, heartburn, GI bleeding, or early satiety. No change in bowel habits. Genitourinary (M) No hematuria, dysuria, increased frequency, urgency, hesitancy or incontinence. Musculoskeletal No joint pain, swelling or redness. No decreased range of motion.    Integumentary No chronic rashes, inflammation, ulcerations, pruritus, petechiae, purpura, ecchymoses, or Scheduled to see PCP for 3/7/24.    skin changes. Neurologic No headache, blurred vision, and no areas of focal weakness or numbness. Normal gait. No sensory problems. Psychiatric No insomnia, depression, nessa or mood swings. No psychotropic drugs. Physical Examination: ECOG 0.    BREAST EXAM: no masses axillary lymphadenopathy appreciated. Constitutional Alert, cooperative, oriented. Mood and affect appropriate. Appears close to chronological age. Well nourished. Well developed. Head Normocephalic; no scars   Eyes Conjunctivae and sclerae are clear and without icterus. Pupils are reactive and equal.   ENMT Sinuses are nontender. No oral exudates, ulcers, masses, thrush or mucositis. Oropharynx clear. Tongue normal.   Neck Supple without masses or thyromegaly. No jugular venous distension. Hematologic/Lymphatic No petechiae or purpura. No tender or palpable lymph nodes in the cervical, supraclavicular, axillary or inguinal area. Respiratory Lungs are clear to auscultation without rhonchi or wheezing. Cardiovascular Regular rate and rhythm of heart without murmurs, gallops or rubs. Chest / Line Site Chest is symmetric with no chest wall deformities. Abdomen Non-tender, non-distended, no masses, ascites or hepatosplenomegaly. Good bowel sounds. No guarding or rebound tenderness. No pulsatile masses. Musculoskeletal No tenderness or swelling, normal range of motion without obvious weakness. Extremities No visible deformities, no cyanosis, clubbing or edema. Skin No rashes, scars, or lesions suggestive of malignancy. No petechiae, purpura, or ecchymoses. No excoriations. Neurologic No sensory or motor deficits, normal cerebellar function, normal gait, cranial nerves intact. Psychiatric Alert and oriented times three. Coherent speech. Verbalizes understanding of our discussions today. Labs: Pending    PATHOLOGY:   RIGHT BREAST MASS, LUMPECTOMY (5/31/18):   EXTENSIVE IN-SITU MAMMARY CARCINOMA (SEE COMMENT). HISTOLOGIC TYPE: MIXED DUCTAL AND LOBULAR. NUCLEAR stGstRstAstDstEst:st st1st. NECROSIS: NOT IDENTIFIED. ESTIMATED SIZE: APPROXIMATELY 5 CM (INVOLVING APPROXIMATELY ½ OF   SPECIMEN). MARGINS OF RESECTION: POSITIVE AT LATERAL MARGIN, MULTIFOCALLY CLOSE AT   ANTERIOR MARGIN.   LYMPH NODES: NOT SUBMITTED. ESTROGEN RECEPTOR: POSITIVE   PROGESTERONE RECEPTOR: POSITIVE. OTHER PATHOLOGIC FINDINGS: EXTENSIVE SCLEROSING ADENOSIS, FIBROCYSTIC   CHANGE, INTRADUCTAL PAPILLOMAS, BIOPSY SITE CHANGES. AJCC STAGE: pTis. IMAGING:  Bilateral screening mammogram + ultrasound:6/15/18  IMPRESSION:   Stable post lumpectomy changes right breast. Left breast remains stable as well. Patient informed of the results prior to her departure. A result letter will be sent to the patient. The patient will be notified by the [facility] reminder system for scheduling  of her annual screening mammogram.     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   - She started Tamoxifen since 8/17/17 and tolerating it very well. - I will request CBC, CMP today, but patient wants do it at her PCP's office on the 25th. We will send them a request.  - She will need Gyn consultation for a complete pelvic exam and evaluation for endometrial thickness.  - Her last mammogram was in 06/2018, she will be due for the next mammogram in 06/2019. - She will return to the clinic in 4 months with repeat CBC, CMP prior to the visit.

## 2024-03-20 RX ORDER — SIMVASTATIN 10 MG
10 TABLET ORAL NIGHTLY
Qty: 90 TABLET | Refills: 1 | Status: SHIPPED | OUTPATIENT
Start: 2024-03-20

## 2024-03-25 ENCOUNTER — TELEPHONE (OUTPATIENT)
Facility: CLINIC | Age: 69
End: 2024-03-25

## 2024-03-25 DIAGNOSIS — E11.65 CONTROLLED TYPE 2 DIABETES MELLITUS WITH HYPERGLYCEMIA, WITHOUT LONG-TERM CURRENT USE OF INSULIN (HCC): ICD-10-CM

## 2024-03-25 DIAGNOSIS — I10 PRIMARY HYPERTENSION: Primary | ICD-10-CM

## 2024-03-25 DIAGNOSIS — E11.9 CONTROLLED TYPE 2 DIABETES MELLITUS WITHOUT COMPLICATION, WITHOUT LONG-TERM CURRENT USE OF INSULIN (HCC): ICD-10-CM

## 2024-03-25 RX ORDER — DILTIAZEM HYDROCHLORIDE 360 MG/1
360 CAPSULE, EXTENDED RELEASE ORAL DAILY
Qty: 90 CAPSULE | Refills: 0 | Status: SHIPPED | OUTPATIENT
Start: 2024-03-25

## 2024-03-25 RX ORDER — ROSUVASTATIN CALCIUM 5 MG/1
5 TABLET, COATED ORAL NIGHTLY
Qty: 90 TABLET | Refills: 5 | Status: SHIPPED | OUTPATIENT
Start: 2024-03-25

## 2024-03-25 RX ORDER — DULAGLUTIDE 0.75 MG/.5ML
INJECTION, SOLUTION SUBCUTANEOUS
Qty: 6 ML | Refills: 1 | Status: SHIPPED | OUTPATIENT
Start: 2024-03-25

## 2024-03-25 NOTE — TELEPHONE ENCOUNTER
Pt's insurance [employer] called to place a refill and 90 day supply of rosuvastatin (CRESTOR) 5 MG tablet.  The caller also wanted to verify if patient is supposed to be taking 2 cholesterol medications (simvastatin (ZOCOR) 10 MG tablet ) with the rosuvastatin (CRESTOR) 5 MG currently.  The caller requested that we call the patient back when refill is completed and whether she is supposed to take both medications together.

## 2024-03-25 NOTE — TELEPHONE ENCOUNTER
Called patient and informed her she is supposed to be taking crestor and not simvastatin  Patient verbalized understanding and is in agreement with this plan of care

## 2024-04-03 LAB
25(OH)D3+25(OH)D2 SERPL-MCNC: 22.7 NG/ML (ref 30–100)
ALBUMIN SERPL-MCNC: 4.4 G/DL (ref 3.9–4.9)
ALBUMIN/CREAT UR: 3 MG/G CREAT (ref 0–29)
ALBUMIN/GLOB SERPL: 1.6 {RATIO} (ref 1.2–2.2)
ALP SERPL-CCNC: 96 IU/L (ref 44–121)
ALT SERPL-CCNC: 17 IU/L (ref 0–32)
AST SERPL-CCNC: 16 IU/L (ref 0–40)
BASOPHILS # BLD AUTO: 0 X10E3/UL (ref 0–0.2)
BASOPHILS NFR BLD AUTO: 0 %
BILIRUB SERPL-MCNC: <0.2 MG/DL (ref 0–1.2)
BUN SERPL-MCNC: 15 MG/DL (ref 8–27)
BUN/CREAT SERPL: 21 (ref 12–28)
CALCIUM SERPL-MCNC: 10.1 MG/DL (ref 8.7–10.3)
CHLORIDE SERPL-SCNC: 103 MMOL/L (ref 96–106)
CHOLEST SERPL-MCNC: 145 MG/DL (ref 100–199)
CO2 SERPL-SCNC: 23 MMOL/L (ref 20–29)
CREAT SERPL-MCNC: 0.7 MG/DL (ref 0.57–1)
CREAT UR-MCNC: 102.6 MG/DL
EGFRCR SERPLBLD CKD-EPI 2021: 95 ML/MIN/1.73
EOSINOPHIL # BLD AUTO: 0.3 X10E3/UL (ref 0–0.4)
EOSINOPHIL NFR BLD AUTO: 2 %
ERYTHROCYTE [DISTWIDTH] IN BLOOD BY AUTOMATED COUNT: 13.7 % (ref 11.7–15.4)
GLOBULIN SER CALC-MCNC: 2.8 G/DL (ref 1.5–4.5)
GLUCOSE SERPL-MCNC: 127 MG/DL (ref 70–99)
HBA1C MFR BLD: 7 % (ref 4.8–5.6)
HCT VFR BLD AUTO: 39 % (ref 34–46.6)
HDLC SERPL-MCNC: 70 MG/DL
HGB BLD-MCNC: 12.8 G/DL (ref 11.1–15.9)
IMM GRANULOCYTES # BLD AUTO: 0 X10E3/UL (ref 0–0.1)
IMM GRANULOCYTES NFR BLD AUTO: 0 %
IMP & REVIEW OF LAB RESULTS: NORMAL
LDLC SERPL CALC-MCNC: 60 MG/DL (ref 0–99)
LYMPHOCYTES # BLD AUTO: 2.7 X10E3/UL (ref 0.7–3.1)
LYMPHOCYTES NFR BLD AUTO: 27 %
Lab: NORMAL
MCH RBC QN AUTO: 28.3 PG (ref 26.6–33)
MCHC RBC AUTO-ENTMCNC: 32.8 G/DL (ref 31.5–35.7)
MCV RBC AUTO: 86 FL (ref 79–97)
MICROALBUMIN UR-MCNC: 3.2 UG/ML
MONOCYTES # BLD AUTO: 0.5 X10E3/UL (ref 0.1–0.9)
MONOCYTES NFR BLD AUTO: 5 %
NEUTROPHILS # BLD AUTO: 6.7 X10E3/UL (ref 1.4–7)
NEUTROPHILS NFR BLD AUTO: 66 %
PLATELET # BLD AUTO: 491 X10E3/UL (ref 150–450)
POTASSIUM SERPL-SCNC: 4.5 MMOL/L (ref 3.5–5.2)
PROT SERPL-MCNC: 7.2 G/DL (ref 6–8.5)
RBC # BLD AUTO: 4.53 X10E6/UL (ref 3.77–5.28)
SODIUM SERPL-SCNC: 143 MMOL/L (ref 134–144)
SPECIMEN STATUS REPORT: NORMAL
TRIGL SERPL-MCNC: 81 MG/DL (ref 0–149)
VLDLC SERPL CALC-MCNC: 15 MG/DL (ref 5–40)
WBC # BLD AUTO: 10.3 X10E3/UL (ref 3.4–10.8)

## 2024-04-09 ENCOUNTER — OFFICE VISIT (OUTPATIENT)
Facility: CLINIC | Age: 69
End: 2024-04-09
Payer: COMMERCIAL

## 2024-04-09 VITALS
BODY MASS INDEX: 37.25 KG/M2 | SYSTOLIC BLOOD PRESSURE: 160 MMHG | HEIGHT: 64 IN | TEMPERATURE: 98.1 F | RESPIRATION RATE: 16 BRPM | WEIGHT: 218.2 LBS | DIASTOLIC BLOOD PRESSURE: 80 MMHG | HEART RATE: 79 BPM | OXYGEN SATURATION: 98 %

## 2024-04-09 DIAGNOSIS — I10 PRIMARY HYPERTENSION: ICD-10-CM

## 2024-04-09 DIAGNOSIS — E55.9 VITAMIN D DEFICIENCY: ICD-10-CM

## 2024-04-09 DIAGNOSIS — E11.9 CONTROLLED TYPE 2 DIABETES MELLITUS WITHOUT COMPLICATION, WITHOUT LONG-TERM CURRENT USE OF INSULIN (HCC): ICD-10-CM

## 2024-04-09 DIAGNOSIS — Z00.00 MEDICARE ANNUAL WELLNESS VISIT, SUBSEQUENT: Primary | ICD-10-CM

## 2024-04-09 PROCEDURE — 3077F SYST BP >= 140 MM HG: CPT | Performed by: NURSE PRACTITIONER

## 2024-04-09 PROCEDURE — 1123F ACP DISCUSS/DSCN MKR DOCD: CPT | Performed by: NURSE PRACTITIONER

## 2024-04-09 PROCEDURE — G0439 PPPS, SUBSEQ VISIT: HCPCS | Performed by: NURSE PRACTITIONER

## 2024-04-09 PROCEDURE — 3051F HG A1C>EQUAL 7.0%<8.0%: CPT | Performed by: NURSE PRACTITIONER

## 2024-04-09 PROCEDURE — 3079F DIAST BP 80-89 MM HG: CPT | Performed by: NURSE PRACTITIONER

## 2024-04-09 ASSESSMENT — PATIENT HEALTH QUESTIONNAIRE - PHQ9
SUM OF ALL RESPONSES TO PHQ QUESTIONS 1-9: 0
SUM OF ALL RESPONSES TO PHQ QUESTIONS 1-9: 0
1. LITTLE INTEREST OR PLEASURE IN DOING THINGS: NOT AT ALL
SUM OF ALL RESPONSES TO PHQ QUESTIONS 1-9: 0
SUM OF ALL RESPONSES TO PHQ QUESTIONS 1-9: 0
SUM OF ALL RESPONSES TO PHQ9 QUESTIONS 1 & 2: 0
2. FEELING DOWN, DEPRESSED OR HOPELESS: NOT AT ALL

## 2024-04-09 ASSESSMENT — LIFESTYLE VARIABLES
HOW OFTEN DO YOU HAVE A DRINK CONTAINING ALCOHOL: NEVER
HOW MANY STANDARD DRINKS CONTAINING ALCOHOL DO YOU HAVE ON A TYPICAL DAY: PATIENT DOES NOT DRINK

## 2024-04-09 NOTE — PROGRESS NOTES
Patient states she has taken medication today.     Mae Nicholas presents today for   Chief Complaint   Patient presents with    Medicare AWV    Diabetes    Hypertension    Discuss Labs       Is someone accompanying this pt? no    Is the patient using any DME equipment during OV? no    Depression Screening:       No data to display                Learning Assessment:  Failed to redirect to the Timeline version of the [employer] SmartLink.    Fall Risk  Failed to redirect to the Timeline version of the [employer] SmartLink.    ADL       No data to display                Health Maintenance reviewed and discussed and ordered per Provider.    Health Maintenance Due   Topic Date Due    DEXA (modify frequency per FRAX score)  Never done    Respiratory Syncytial Virus (RSV) Pregnant or age 60 yrs+ (1 - 1-dose 60+ series) Never done    Diabetic foot exam  03/25/2022    Diabetic retinal exam  09/22/2022    Colorectal Cancer Screen  01/30/2023    Pneumococcal 65+ years Vaccine (2 of 2 - PCV) 05/10/2023    COVID-19 Vaccine (5 - 2023-24 season) 09/01/2023   .    \"Have you been to the ER, urgent care clinic since your last visit?  Hospitalized since your last visit?\"    3/5 eye pain SLE     “Have you seen or consulted any other health care providers outside of [facility] since your last visit?”    Eye doctor     “Have you had a colorectal cancer screening such as a colonoscopy/FIT/Cologuard?    no    Date of last Colonoscopy: 1/30/2013  No cologuard on file  No FIT/FOBT on file   No flexible sigmoidoscopy on file         Click Here for Release of Records Request  
Dorcas Oakley APRN - CNP   glipiZIDE (GLUCOTROL XL) 5 MG extended release tablet Take 1 tablet by mouth once daily Yes Dorcas Oakley APRN - CNP   hydroCHLOROthiazide (HYDRODIURIL) 25 MG tablet TAKE 1 TABLET BY MOUTH IN THE MORNING Yes Dorcas Oakley APRN - CNP   losartan (COZAAR) 100 MG tablet Take 1 tablet by mouth once daily Yes Dorcas Oakley APRN - CNP       CareTeam (Including outside providers/suppliers regularly involved in providing care):   Patient Care Team:  Dorcas Oakley APRN - CNP as PCP - General  Dorcas Oakley APRN - CNP as PCP - Empaneled Provider     Reviewed and updated this visit:  Allergies  Meds  Problems  Med Hx  Surg Hx  Soc Hx  Fam Hx         LAURIE Francois-BC  [facility]  [facility]  [address] [place] [address].  [place] [address]

## 2024-04-09 NOTE — PATIENT INSTRUCTIONS
assessments performed today your provider may have ordered immunizations, labs, imaging, and/or referrals for you.  A list of these orders (if applicable) as well as your Preventive Care list are included within your After Visit Summary for your review.    Other Preventive Recommendations:    A preventive eye exam performed by an eye specialist is recommended every 1-2 years to screen for glaucoma; cataracts, macular degeneration, and other eye disorders.  A preventive dental visit is recommended every 6 months.  Try to get at least 150 minutes of exercise per week or 10,000 steps per day on a pedometer .  Order or download the FREE \"Exercise & Physical Activity: Your Everyday Guide\" from [employer]. Call 1-259.612.1100 or search [employer] online.  You need 9286-0816 mg of calcium and 2214-6086 IU of vitamin D per day. It is possible to meet your calcium requirement with diet alone, but a vitamin D supplement is usually necessary to meet this goal.  When exposed to the sun, use a sunscreen that protects against both UVA and UVB radiation with an SPF of 30 or greater. Reapply every 2 to 3 hours or after sweating, drying off with a towel, or swimming.  Always wear a seat belt when traveling in a car. Always wear a helmet when riding a bicycle or motorcycle.

## 2024-04-16 RX ORDER — GLIPIZIDE 5 MG/1
TABLET, FILM COATED, EXTENDED RELEASE ORAL
Qty: 90 TABLET | Refills: 1 | Status: SHIPPED | OUTPATIENT
Start: 2024-04-16

## 2024-04-16 RX ORDER — LOSARTAN POTASSIUM 100 MG/1
TABLET ORAL
Qty: 90 TABLET | Refills: 1 | Status: SHIPPED | OUTPATIENT
Start: 2024-04-16

## 2024-04-16 RX ORDER — HYDROCHLOROTHIAZIDE 25 MG/1
TABLET ORAL
Qty: 90 TABLET | Refills: 1 | Status: SHIPPED | OUTPATIENT
Start: 2024-04-16

## 2024-04-16 RX ORDER — FERROUS SULFATE 325(65) MG
TABLET ORAL
Qty: 90 TABLET | Refills: 0 | Status: SHIPPED | OUTPATIENT
Start: 2024-04-16

## 2024-05-13 RX ORDER — HYDROCHLOROTHIAZIDE 25 MG/1
25 TABLET ORAL EVERY MORNING
Qty: 90 TABLET | Refills: 1 | OUTPATIENT
Start: 2024-05-13

## 2024-05-20 DIAGNOSIS — I10 PRIMARY HYPERTENSION: ICD-10-CM

## 2024-05-21 RX ORDER — METOPROLOL SUCCINATE 50 MG/1
50 TABLET, EXTENDED RELEASE ORAL DAILY
Qty: 90 TABLET | Refills: 1 | Status: SHIPPED | OUTPATIENT
Start: 2024-05-21

## 2024-06-21 ENCOUNTER — TRANSCRIBE ORDERS (OUTPATIENT)
Facility: HOSPITAL | Age: 69
End: 2024-06-21

## 2024-06-21 DIAGNOSIS — Z12.31 ENCOUNTER FOR MAMMOGRAM TO ESTABLISH BASELINE MAMMOGRAM: Primary | ICD-10-CM

## 2024-06-25 DIAGNOSIS — I10 PRIMARY HYPERTENSION: ICD-10-CM

## 2024-06-26 RX ORDER — DILTIAZEM HYDROCHLORIDE 360 MG/1
360 CAPSULE, EXTENDED RELEASE ORAL DAILY
Qty: 90 CAPSULE | Refills: 1 | Status: SHIPPED | OUTPATIENT
Start: 2024-06-26

## 2024-07-01 RX ORDER — PNV NO.95/FERROUS FUM/FOLIC AC 28MG-0.8MG
TABLET ORAL
Qty: 90 TABLET | Refills: 0 | Status: SHIPPED | OUTPATIENT
Start: 2024-07-01

## 2024-07-15 ENCOUNTER — HOSPITAL ENCOUNTER (OUTPATIENT)
Dept: WOMENS IMAGING | Facility: HOSPITAL | Age: 69
Discharge: HOME | End: 2024-07-18
Payer: MEDICARE

## 2024-07-15 DIAGNOSIS — Z12.31 ENCOUNTER FOR MAMMOGRAM TO ESTABLISH BASELINE MAMMOGRAM: ICD-10-CM

## 2024-07-15 PROCEDURE — 77063 BREAST TOMOSYNTHESIS BI: CPT

## 2024-07-30 LAB
25(OH)D3+25(OH)D2 SERPL-MCNC: 31.8 NG/ML (ref 30–100)
ALBUMIN SERPL-MCNC: 4.4 G/DL (ref 3.9–4.9)
ALBUMIN/CREAT UR: 6 MG/G CREAT (ref 0–29)
ALP SERPL-CCNC: 96 IU/L (ref 44–121)
ALT SERPL-CCNC: 17 IU/L (ref 0–32)
AST SERPL-CCNC: 18 IU/L (ref 0–40)
BASOPHILS # BLD AUTO: 0 X10E3/UL (ref 0–0.2)
BASOPHILS NFR BLD AUTO: 0 %
BILIRUB SERPL-MCNC: 0.2 MG/DL (ref 0–1.2)
BUN SERPL-MCNC: 15 MG/DL (ref 8–27)
BUN/CREAT SERPL: 21 (ref 12–28)
CALCIUM SERPL-MCNC: 10.4 MG/DL (ref 8.7–10.3)
CHLORIDE SERPL-SCNC: 100 MMOL/L (ref 96–106)
CHOLEST SERPL-MCNC: 132 MG/DL (ref 100–199)
CO2 SERPL-SCNC: 26 MMOL/L (ref 20–29)
CREAT SERPL-MCNC: 0.72 MG/DL (ref 0.57–1)
CREAT UR-MCNC: 77.3 MG/DL
EGFRCR SERPLBLD CKD-EPI 2021: 92 ML/MIN/1.73
EOSINOPHIL # BLD AUTO: 0.4 X10E3/UL (ref 0–0.4)
EOSINOPHIL NFR BLD AUTO: 3 %
ERYTHROCYTE [DISTWIDTH] IN BLOOD BY AUTOMATED COUNT: 13.2 % (ref 11.7–15.4)
GLOBULIN SER CALC-MCNC: 2.9 G/DL (ref 1.5–4.5)
GLUCOSE SERPL-MCNC: 129 MG/DL (ref 70–99)
HBA1C MFR BLD: 6.8 % (ref 4.8–5.6)
HCT VFR BLD AUTO: 38.4 % (ref 34–46.6)
HDLC SERPL-MCNC: 75 MG/DL
HGB BLD-MCNC: 12.1 G/DL (ref 11.1–15.9)
IMM GRANULOCYTES # BLD AUTO: 0 X10E3/UL (ref 0–0.1)
IMM GRANULOCYTES NFR BLD AUTO: 0 %
IMP & REVIEW OF LAB RESULTS: NORMAL
LDLC SERPL CALC-MCNC: 40 MG/DL (ref 0–99)
LYMPHOCYTES # BLD AUTO: 2.9 X10E3/UL (ref 0.7–3.1)
LYMPHOCYTES NFR BLD AUTO: 20 %
Lab: NORMAL
MCH RBC QN AUTO: 27.2 PG (ref 26.6–33)
MCHC RBC AUTO-ENTMCNC: 31.5 G/DL (ref 31.5–35.7)
MCV RBC AUTO: 86 FL (ref 79–97)
MICROALBUMIN UR-MCNC: 4.7 UG/ML
MONOCYTES # BLD AUTO: 0.7 X10E3/UL (ref 0.1–0.9)
MONOCYTES NFR BLD AUTO: 5 %
NEUTROPHILS # BLD AUTO: 10 X10E3/UL (ref 1.4–7)
NEUTROPHILS NFR BLD AUTO: 72 %
PLATELET # BLD AUTO: 496 X10E3/UL (ref 150–450)
POTASSIUM SERPL-SCNC: 4.1 MMOL/L (ref 3.5–5.2)
PROT SERPL-MCNC: 7.3 G/DL (ref 6–8.5)
RBC # BLD AUTO: 4.45 X10E6/UL (ref 3.77–5.28)
SODIUM SERPL-SCNC: 142 MMOL/L (ref 134–144)
SPECIMEN STATUS REPORT: NORMAL
TRIGL SERPL-MCNC: 92 MG/DL (ref 0–149)
VLDLC SERPL CALC-MCNC: 17 MG/DL (ref 5–40)
WBC # BLD AUTO: 14.1 X10E3/UL (ref 3.4–10.8)

## 2024-09-09 DIAGNOSIS — E11.65 CONTROLLED TYPE 2 DIABETES MELLITUS WITH HYPERGLYCEMIA, WITHOUT LONG-TERM CURRENT USE OF INSULIN (HCC): ICD-10-CM

## 2024-09-09 RX ORDER — DULAGLUTIDE 0.75 MG/.5ML
INJECTION, SOLUTION SUBCUTANEOUS
Qty: 12 ML | Refills: 0 | Status: SHIPPED | OUTPATIENT
Start: 2024-09-09

## 2024-09-30 RX ORDER — LOSARTAN POTASSIUM 100 MG/1
TABLET ORAL
Qty: 90 TABLET | Refills: 1 | Status: SHIPPED | OUTPATIENT
Start: 2024-09-30

## 2024-09-30 RX ORDER — HYDROCHLOROTHIAZIDE 25 MG/1
TABLET ORAL
Qty: 90 TABLET | Refills: 1 | Status: SHIPPED | OUTPATIENT
Start: 2024-09-30

## 2024-10-23 RX ORDER — PNV NO.95/FERROUS FUM/FOLIC AC 28MG-0.8MG
TABLET ORAL
Qty: 90 TABLET | Refills: 1 | Status: SHIPPED | OUTPATIENT
Start: 2024-10-23

## 2024-10-23 RX ORDER — GLIPIZIDE 5 MG/1
TABLET, FILM COATED, EXTENDED RELEASE ORAL
Qty: 90 TABLET | Refills: 1 | Status: SHIPPED | OUTPATIENT
Start: 2024-10-23

## 2024-10-28 ENCOUNTER — OFFICE VISIT (OUTPATIENT)
Facility: CLINIC | Age: 69
End: 2024-10-28
Payer: MEDICARE

## 2024-10-28 VITALS
SYSTOLIC BLOOD PRESSURE: 144 MMHG | BODY MASS INDEX: 38.14 KG/M2 | HEART RATE: 83 BPM | TEMPERATURE: 98.1 F | DIASTOLIC BLOOD PRESSURE: 74 MMHG | RESPIRATION RATE: 18 BRPM | OXYGEN SATURATION: 96 % | WEIGHT: 223.4 LBS | HEIGHT: 64 IN

## 2024-10-28 DIAGNOSIS — Z12.11 COLON CANCER SCREENING: ICD-10-CM

## 2024-10-28 DIAGNOSIS — E11.9 CONTROLLED TYPE 2 DIABETES MELLITUS WITHOUT COMPLICATION, WITHOUT LONG-TERM CURRENT USE OF INSULIN (HCC): Primary | ICD-10-CM

## 2024-10-28 DIAGNOSIS — I10 PRIMARY HYPERTENSION: ICD-10-CM

## 2024-10-28 PROCEDURE — 1159F MED LIST DOCD IN RCRD: CPT | Performed by: NURSE PRACTITIONER

## 2024-10-28 PROCEDURE — 1160F RVW MEDS BY RX/DR IN RCRD: CPT | Performed by: NURSE PRACTITIONER

## 2024-10-28 PROCEDURE — 99214 OFFICE O/P EST MOD 30 MIN: CPT | Performed by: NURSE PRACTITIONER

## 2024-10-28 PROCEDURE — 3044F HG A1C LEVEL LT 7.0%: CPT | Performed by: NURSE PRACTITIONER

## 2024-10-28 PROCEDURE — 3078F DIAST BP <80 MM HG: CPT | Performed by: NURSE PRACTITIONER

## 2024-10-28 PROCEDURE — 1123F ACP DISCUSS/DSCN MKR DOCD: CPT | Performed by: NURSE PRACTITIONER

## 2024-10-28 PROCEDURE — 3077F SYST BP >= 140 MM HG: CPT | Performed by: NURSE PRACTITIONER

## 2024-10-28 SDOH — ECONOMIC STABILITY: FOOD INSECURITY: WITHIN THE PAST 12 MONTHS, YOU WORRIED THAT YOUR FOOD WOULD RUN OUT BEFORE YOU GOT MONEY TO BUY MORE.: NEVER TRUE

## 2024-10-28 SDOH — ECONOMIC STABILITY: INCOME INSECURITY: HOW HARD IS IT FOR YOU TO PAY FOR THE VERY BASICS LIKE FOOD, HOUSING, MEDICAL CARE, AND HEATING?: NOT HARD AT ALL

## 2024-10-28 SDOH — ECONOMIC STABILITY: FOOD INSECURITY: WITHIN THE PAST 12 MONTHS, THE FOOD YOU BOUGHT JUST DIDN'T LAST AND YOU DIDN'T HAVE MONEY TO GET MORE.: NEVER TRUE

## 2024-10-28 ASSESSMENT — PATIENT HEALTH QUESTIONNAIRE - PHQ9
SUM OF ALL RESPONSES TO PHQ QUESTIONS 1-9: 0
SUM OF ALL RESPONSES TO PHQ QUESTIONS 1-9: 0
SUM OF ALL RESPONSES TO PHQ9 QUESTIONS 1 & 2: 0
2. FEELING DOWN, DEPRESSED OR HOPELESS: NOT AT ALL
SUM OF ALL RESPONSES TO PHQ QUESTIONS 1-9: 0
SUM OF ALL RESPONSES TO PHQ QUESTIONS 1-9: 0
1. LITTLE INTEREST OR PLEASURE IN DOING THINGS: NOT AT ALL

## 2024-10-28 NOTE — PROGRESS NOTES
Room 8       Mae Nicholas had concerns including Follow-up Chronic Condition, Hypertension, and Diabetes. for today's visit .     1. \"Have you been to the ER, urgent care clinic since your last visit?  Hospitalized since your last visit?\" .NO    2. \"Have you seen or consulted any other health care providers outside of the [facility] since your last visit?\" Patient states I went to the foot doctor on 10/03/24.    3. For patients aged 45-75: Has the patient had a colonoscopy / FIT/ Cologuard?  Order has been placed       If the patient is female:    4. For patients aged 40-74: Has the patient had a mammogram within the past 2 years? Yes       5. For patients aged 21-65: Has the patient had a pap smear? {Cancer Care Gap present? N/A          4/9/2024     3:06 PM   Amb Fall Risk Assessment and TUG Test   Do you feel unsteady or are you worried about falling?  yes   2 or more falls in past year? no   Fall with injury in past year? no              10/28/2024     3:49 PM   PHQ-9    Little interest or pleasure in doing things 0   Feeling down, depressed, or hopeless 0   PHQ-2 Score 0   PHQ-9 Total Score 0          Health Maintenance Due   Topic Date Due    DEXA (modify frequency per FRAX score)  Never done    Respiratory Syncytial Virus (RSV) Pregnant or age 60 yrs+ (1 - 1-dose 60+ series) Never done    Diabetic foot exam  03/25/2022    Diabetic retinal exam  09/22/2022    Colorectal Cancer Screen  01/30/2023    Pneumococcal 65+ years Vaccine (2 of 2 - PCV) 05/10/2023    Flu vaccine (1) 08/01/2024               
Outpatient Medications on File Prior to Visit   Medication Sig Dispense Refill    glipiZIDE (GLUCOTROL XL) 5 MG extended release tablet Take 1 tablet by mouth once daily 90 tablet 1    losartan (COZAAR) 100 MG tablet Take 1 tablet by mouth once daily 90 tablet 1    TRULICITY 0.75 MG/0.5ML SOPN SC injection INJECT 1 SYRINGE SUBCUTANEOUSLY ONCE A WEEK AS  DIRECTED 12 mL 0    metFORMIN (GLUCOPHAGE) 1000 MG tablet TAKE 1 TABLET BY MOUTH TWICE DAILY WITH MEALS 180 tablet 1    metoprolol succinate (TOPROL XL) 50 MG extended release tablet Take 1 tablet by mouth once daily 90 tablet 1    rosuvastatin (CRESTOR) 5 MG tablet Take 1 tablet by mouth nightly 90 tablet 5    Ferrous Sulfate (IRON) 325 (65 Fe) MG TABS TAKE 1 TABLET BY MOUTH ONCE DAILY IN THE MORNING BEFORE BREAKFAST 90 tablet 1    hydroCHLOROthiazide (HYDRODIURIL) 25 MG tablet TAKE 1 TABLET BY MOUTH IN THE MORNING 90 tablet 1    dilTIAZem (TIAZAC) 360 MG extended release capsule Take 1 capsule by mouth once daily 90 capsule 1     No current facility-administered medications on file prior to visit.        No Known Allergies    Objective:  BP (!) 144/78   Pulse 83   Temp 98.1 °F (36.7 °C) (Temporal)   Resp 18   Ht 1.626 m (5' 4\")   Wt 101.3 kg (223 lb 6.4 oz)   SpO2 96%   BMI 38.35 kg/m²  Body mass index is 38.35 kg/m².    Physical assessment  Physical Exam            I have discussed the diagnosis with the patient and the intended plan as seen in the above orders.  The patient has received an After-Visit Summary and questions were answered concerning future plans.     An After Visit Summary was printed and given to the patient.    All diagnosis have been discussed with the patient and all of the patient's questions have been answered.           Dorcas Oakley, [facility]  [address] [place] [address]. [phone]

## 2024-11-16 DIAGNOSIS — I10 PRIMARY HYPERTENSION: ICD-10-CM

## 2024-11-19 RX ORDER — METOPROLOL SUCCINATE 50 MG/1
50 TABLET, EXTENDED RELEASE ORAL DAILY
Qty: 90 TABLET | Refills: 1 | Status: SHIPPED | OUTPATIENT
Start: 2024-11-19

## 2024-12-23 DIAGNOSIS — I10 PRIMARY HYPERTENSION: ICD-10-CM

## 2024-12-26 DIAGNOSIS — I10 PRIMARY HYPERTENSION: ICD-10-CM

## 2024-12-26 RX ORDER — DILTIAZEM HYDROCHLORIDE 360 MG/1
360 CAPSULE, EXTENDED RELEASE ORAL DAILY
Qty: 90 CAPSULE | Refills: 0 | Status: SHIPPED | OUTPATIENT
Start: 2024-12-26

## 2024-12-27 RX ORDER — DILTIAZEM HYDROCHLORIDE 360 MG/1
360 CAPSULE, EXTENDED RELEASE ORAL DAILY
Qty: 90 CAPSULE | Refills: 0 | OUTPATIENT
Start: 2024-12-27

## 2025-01-10 ENCOUNTER — COMMUNITY OUTREACH (OUTPATIENT)
Facility: CLINIC | Age: 70
End: 2025-01-10

## 2025-02-28 ENCOUNTER — HOSPITAL ENCOUNTER (OUTPATIENT)
Facility: HOSPITAL | Age: 70
Setting detail: SPECIMEN
Discharge: HOME | End: 2025-02-28
Payer: MEDICARE

## 2025-02-28 DIAGNOSIS — I10 PRIMARY HYPERTENSION: ICD-10-CM

## 2025-02-28 DIAGNOSIS — E11.9 CONTROLLED TYPE 2 DIABETES MELLITUS WITHOUT COMPLICATION, WITHOUT LONG-TERM CURRENT USE OF INSULIN (HCC): ICD-10-CM

## 2025-02-28 LAB
ALBUMIN SERPL-MCNC: 3.9 G/DL (ref 3.4–5)
ALBUMIN/GLOB SERPL: 1.1 (ref 0.8–1.7)
ALP SERPL-CCNC: 91 U/L (ref 45–117)
ALT SERPL-CCNC: 24 U/L (ref 13–56)
ANION GAP SERPL CALC-SCNC: 6 MMOL/L (ref 3–18)
AST SERPL-CCNC: 16 U/L (ref 10–38)
BILIRUB SERPL-MCNC: 0.3 MG/DL (ref 0.2–1)
BUN SERPL-MCNC: 21 MG/DL (ref 7–18)
BUN/CREAT SERPL: 27 (ref 12–20)
CALCIUM SERPL-MCNC: 9.6 MG/DL (ref 8.5–10.1)
CHLORIDE SERPL-SCNC: 108 MMOL/L (ref 100–111)
CHOLEST SERPL-MCNC: 141 MG/DL
CO2 SERPL-SCNC: 27 MMOL/L (ref 21–32)
CREAT SERPL-MCNC: 0.78 MG/DL (ref 0.6–1.3)
CREAT UR-MCNC: 37 MG/DL (ref 30–125)
EST. AVERAGE GLUCOSE BLD GHB EST-MCNC: 146 MG/DL
GLOBULIN SER CALC-MCNC: 3.6 G/DL (ref 2–4)
GLUCOSE SERPL-MCNC: 87 MG/DL (ref 74–99)
HBA1C MFR BLD: 6.7 % (ref 4.2–5.6)
HDLC SERPL-MCNC: 75 MG/DL (ref 40–60)
HDLC SERPL: 1.9 (ref 0–5)
LDLC SERPL CALC-MCNC: 44.2 MG/DL (ref 0–100)
LIPID PANEL: ABNORMAL
MICROALBUMIN UR-MCNC: <0.5 MG/DL (ref 0–3)
MICROALBUMIN/CREAT UR-RTO: NORMAL MG/G (ref 0–30)
POTASSIUM SERPL-SCNC: 4.2 MMOL/L (ref 3.5–5.5)
PROT SERPL-MCNC: 7.5 G/DL (ref 6.4–8.2)
SODIUM SERPL-SCNC: 141 MMOL/L (ref 136–145)
TRIGL SERPL-MCNC: 109 MG/DL
VLDLC SERPL CALC-MCNC: 21.8 MG/DL

## 2025-02-28 PROCEDURE — 36415 COLL VENOUS BLD VENIPUNCTURE: CPT

## 2025-02-28 PROCEDURE — 82570 ASSAY OF URINE CREATININE: CPT

## 2025-02-28 PROCEDURE — 80061 LIPID PANEL: CPT

## 2025-02-28 PROCEDURE — 80053 COMPREHEN METABOLIC PANEL: CPT

## 2025-02-28 PROCEDURE — 83036 HEMOGLOBIN GLYCOSYLATED A1C: CPT

## 2025-02-28 PROCEDURE — 82043 UR ALBUMIN QUANTITATIVE: CPT

## 2025-03-06 ENCOUNTER — OFFICE VISIT (OUTPATIENT)
Facility: CLINIC | Age: 70
End: 2025-03-06
Payer: MEDICARE

## 2025-03-06 VITALS
SYSTOLIC BLOOD PRESSURE: 134 MMHG | BODY MASS INDEX: 38.41 KG/M2 | HEART RATE: 92 BPM | HEIGHT: 64 IN | WEIGHT: 225 LBS | DIASTOLIC BLOOD PRESSURE: 84 MMHG | TEMPERATURE: 97.8 F | OXYGEN SATURATION: 97 % | RESPIRATION RATE: 16 BRPM

## 2025-03-06 DIAGNOSIS — I10 PRIMARY HYPERTENSION: ICD-10-CM

## 2025-03-06 DIAGNOSIS — E11.9 CONTROLLED TYPE 2 DIABETES MELLITUS WITHOUT COMPLICATION, WITHOUT LONG-TERM CURRENT USE OF INSULIN: Primary | ICD-10-CM

## 2025-03-06 DIAGNOSIS — E55.9 VITAMIN D DEFICIENCY: ICD-10-CM

## 2025-03-06 PROCEDURE — 1123F ACP DISCUSS/DSCN MKR DOCD: CPT | Performed by: NURSE PRACTITIONER

## 2025-03-06 PROCEDURE — 1126F AMNT PAIN NOTED NONE PRSNT: CPT | Performed by: NURSE PRACTITIONER

## 2025-03-06 PROCEDURE — 3044F HG A1C LEVEL LT 7.0%: CPT | Performed by: NURSE PRACTITIONER

## 2025-03-06 PROCEDURE — 99214 OFFICE O/P EST MOD 30 MIN: CPT | Performed by: NURSE PRACTITIONER

## 2025-03-06 PROCEDURE — 3075F SYST BP GE 130 - 139MM HG: CPT | Performed by: NURSE PRACTITIONER

## 2025-03-06 PROCEDURE — 3079F DIAST BP 80-89 MM HG: CPT | Performed by: NURSE PRACTITIONER

## 2025-03-06 SDOH — ECONOMIC STABILITY: FOOD INSECURITY: WITHIN THE PAST 12 MONTHS, THE FOOD YOU BOUGHT JUST DIDN'T LAST AND YOU DIDN'T HAVE MONEY TO GET MORE.: NEVER TRUE

## 2025-03-06 SDOH — ECONOMIC STABILITY: FOOD INSECURITY: WITHIN THE PAST 12 MONTHS, YOU WORRIED THAT YOUR FOOD WOULD RUN OUT BEFORE YOU GOT MONEY TO BUY MORE.: NEVER TRUE

## 2025-03-06 ASSESSMENT — PATIENT HEALTH QUESTIONNAIRE - PHQ9
1. LITTLE INTEREST OR PLEASURE IN DOING THINGS: NOT AT ALL
SUM OF ALL RESPONSES TO PHQ QUESTIONS 1-9: 0
DEPRESSION UNABLE TO ASSESS: FUNCTIONAL CAPACITY MOTIVATION LIMITS ACCURACY
2. FEELING DOWN, DEPRESSED OR HOPELESS: NOT AT ALL

## 2025-03-06 NOTE — PROGRESS NOTES
[address] [place] [address]               [phone]      Mae Nicholas is a 69 y.o. female and presents with Diabetes       Assessment/Plan:    1. Controlled type 2 diabetes mellitus without complication, without long-term current use of insulin (HCC)  -     Lipid Panel; Future  -     Hemoglobin A1C; Future  -     Albumin/Creatinine Ratio, Urine; Future  -      DIABETES FOOT EXAM  2. Primary hypertension  -     Comprehensive Metabolic Panel; Future  3. Vitamin D deficiency  -     Vitamin D 25 Hydroxy; Future  DM controlled, A1C 6.7%, educated the goal is to keep the A1C less than at least 7  DM foot exam performed, no anbormalities  Blood pressure controlled continue diltiazem, metoprolol, losartan and hctz at same dose  Labs prior to next visit  Patient verbalized understanding and is in agreement with this plan of care        Follow up and disposition:   Return in about 4 months (around 7/6/2025) for MAWV, DM, HTN, 30min, office, w/labsprior.      Subjective:    Labs obtained prior to visit? Yes  Reviewed with patient? yes    DMII-   Patient reports medication compliance Yes  Diabetic diet compliance daily  Patient monitors blood sugars regularly daily   Home glucose readings: Patient states this morning her sugar was 140 an hour after eating.    Denies hypoglycemic episodes Yes  Denies polyuria, polydipsia, paraesthesia, vision changes? Yes  Engaging in daily exercise?  Patient states she just started with a  this Monday and is going to try to do 3-4 days a week.     Diabetes Management   Topic Date Due    Diabetic foot exam  03/25/2022    Diabetic retinal exam  09/22/2022     Lab Results   Component Value Date/Time    LABA1C 6.7 02/28/2025 09:40 AM   ]  No results found for: \"MALBCR\"]  Diabetic medications:   Diabetic Medications       Sulfonylureas       glipiZIDE (GLUCOTROL XL) 5 MG extended release tablet Take 1 tablet by mouth once daily       Biguanides

## 2025-03-06 NOTE — PROGRESS NOTES
Mae Nicholas is a 69 y.o. female (: 1955) presenting to address:    Chief Complaint   Patient presents with    Diabetes       Vitals:    25 0940   BP: 134/84   Pulse: 92   Resp: 16   Temp: 97.8 °F (36.6 °C)   SpO2: 97%       \"Have you been to the ER, urgent care clinic since your last visit?  Hospitalized since your last visit?\"    Yes, Urgent Care for a fall    “Have you seen or consulted any other health care providers outside of [facility] since your last visit?”    Yes, Podiatry and Eye Doctor    “Have you had a colorectal cancer screening such as a colonoscopy/FIT/Cologuard?    NO    Date of last Colonoscopy: 2013  No cologuard on file  No FIT/FOBT on file   No flexible sigmoidoscopy on file

## 2025-03-23 DIAGNOSIS — I10 PRIMARY HYPERTENSION: ICD-10-CM

## 2025-03-24 RX ORDER — DILTIAZEM HYDROCHLORIDE 360 MG/1
360 CAPSULE, EXTENDED RELEASE ORAL DAILY
Qty: 90 CAPSULE | Refills: 1 | Status: SHIPPED | OUTPATIENT
Start: 2025-03-24

## 2025-04-03 RX ORDER — LOSARTAN POTASSIUM 100 MG/1
100 TABLET ORAL DAILY
Qty: 90 TABLET | Refills: 1 | Status: SHIPPED | OUTPATIENT
Start: 2025-04-03

## 2025-04-03 RX ORDER — HYDROCHLOROTHIAZIDE 25 MG/1
25 TABLET ORAL EVERY MORNING
Qty: 90 TABLET | Refills: 1 | Status: SHIPPED | OUTPATIENT
Start: 2025-04-03

## 2025-04-03 NOTE — TELEPHONE ENCOUNTER
Requested Prescriptions     Pending Prescriptions Disp Refills    hydroCHLOROthiazide (HYDRODIURIL) 25 MG tablet [Pharmacy Med Name: hydroCHLOROthiazide 25 MG Oral Tablet] 90 tablet 0     Sig: TAKE 1 TABLET BY MOUTH IN THE MORNING    losartan (COZAAR) 100 MG tablet [Pharmacy Med Name: Losartan Potassium 100 MG Oral Tablet] 90 tablet 0     Sig: Take 1 tablet by mouth once daily     Last seen: 3/6/25  Next seen: 7/7/25    Last filled: 9/30/24 Qty 90 w1/ refill

## 2025-04-21 RX ORDER — PNV NO.95/FERROUS FUM/FOLIC AC 28MG-0.8MG
TABLET ORAL
Qty: 90 TABLET | Refills: 1 | Status: SHIPPED | OUTPATIENT
Start: 2025-04-21

## 2025-04-21 RX ORDER — GLIPIZIDE 5 MG/1
5 TABLET, FILM COATED, EXTENDED RELEASE ORAL DAILY
Qty: 90 TABLET | Refills: 1 | Status: SHIPPED | OUTPATIENT
Start: 2025-04-21

## 2025-05-12 DIAGNOSIS — I10 PRIMARY HYPERTENSION: ICD-10-CM

## 2025-05-14 DIAGNOSIS — I10 PRIMARY HYPERTENSION: ICD-10-CM

## 2025-05-14 RX ORDER — METOPROLOL SUCCINATE 50 MG/1
50 TABLET, EXTENDED RELEASE ORAL DAILY
Qty: 90 TABLET | Refills: 1 | Status: SHIPPED | OUTPATIENT
Start: 2025-05-14

## 2025-05-14 RX ORDER — METOPROLOL SUCCINATE 50 MG/1
50 TABLET, EXTENDED RELEASE ORAL DAILY
Qty: 90 TABLET | Refills: 0 | OUTPATIENT
Start: 2025-05-14

## 2025-05-14 NOTE — TELEPHONE ENCOUNTER
Requested Prescriptions     Pending Prescriptions Disp Refills    metoprolol succinate (TOPROL XL) 50 MG extended release tablet 90 tablet 1     Sig: Take 1 tablet by mouth daily     Last seen: 3/6/25  Next seen: 7/7/25    Last filled: 11/19/24 Qty 90 w1/ refill

## 2025-06-03 DIAGNOSIS — E11.9 CONTROLLED TYPE 2 DIABETES MELLITUS WITHOUT COMPLICATION, WITHOUT LONG-TERM CURRENT USE OF INSULIN (HCC): ICD-10-CM

## 2025-06-03 RX ORDER — ROSUVASTATIN CALCIUM 5 MG/1
TABLET, COATED ORAL
Qty: 90 TABLET | Refills: 0 | Status: SHIPPED | OUTPATIENT
Start: 2025-06-03

## 2025-07-02 DIAGNOSIS — E11.9 CONTROLLED TYPE 2 DIABETES MELLITUS WITHOUT COMPLICATION, WITHOUT LONG-TERM CURRENT USE OF INSULIN (HCC): ICD-10-CM

## 2025-07-03 RX ORDER — ROSUVASTATIN CALCIUM 5 MG/1
TABLET, COATED ORAL
Qty: 90 TABLET | Refills: 0 | Status: SHIPPED | OUTPATIENT
Start: 2025-07-03

## 2025-08-05 ENCOUNTER — HOSPITAL ENCOUNTER (OUTPATIENT)
Dept: WOMENS IMAGING | Facility: HOSPITAL | Age: 70
Discharge: HOME | End: 2025-08-08
Payer: MEDICARE

## 2025-08-05 VITALS — WEIGHT: 225 LBS | BODY MASS INDEX: 38.41 KG/M2 | HEIGHT: 64 IN

## 2025-08-05 DIAGNOSIS — Z12.31 VISIT FOR SCREENING MAMMOGRAM: ICD-10-CM

## 2025-08-05 PROCEDURE — 77063 BREAST TOMOSYNTHESIS BI: CPT

## (undated) DEVICE — STERILE POLYISOPRENE POWDER-FREE SURGICAL GLOVES WITH EMOLLIENT COATING: Brand: PROTEXIS

## (undated) DEVICE — GOWN,SIRUS,NONRNF,SETINSLV,XL,20/CS: Brand: MEDLINE

## (undated) DEVICE — SPONGE GZ W4XL4IN COT 12 PLY TYP VII WVN C FLD DSGN

## (undated) DEVICE — BANDAGE COMPR W4INXL5YD BGE COHESIVE SELF ADH ADBAN CBN1104] AVCOR HEALTHCARE PRODUCTS INC]

## (undated) DEVICE — PAD,NON-ADHERENT,3X8,STERILE,LF,1/PK: Brand: MEDLINE

## (undated) DEVICE — TABLE COVER: Brand: CONVERTORS

## (undated) DEVICE — SUT ETHLN 3-0 18IN PS1 BLK --

## (undated) DEVICE — INSULATED BLADE ELECTRODE: Brand: EDGE

## (undated) DEVICE — CONVERTORS STOCKINETTE: Brand: CONVERTORS

## (undated) DEVICE — KENDALL SCD EXPRESS SLEEVES, KNEE LENGTH, MEDIUM: Brand: KENDALL SCD

## (undated) DEVICE — SOLUTION IV 1000ML 0.9% SOD CHL

## (undated) DEVICE — STERILE LATEX POWDER-FREE SURGICAL GLOVESWITH NITRILE COATING: Brand: PROTEXIS

## (undated) DEVICE — SUTURE VCRL SZ 2-0 L12X18IN ABSRB UD POLYGLACTIN 910 BRAID J911T

## (undated) DEVICE — NEEDLE HYPO 25GA L1.5IN BLU POLYPR HUB S STL REG BVL STR

## (undated) DEVICE — (D)STRIP SKN CLSR 0.5X4IN WHT --

## (undated) DEVICE — SOL IRR NACL 0.9% 500ML POUR --

## (undated) DEVICE — Z DISCONTINUED USE 2219801 STAPLER SKIN REG CRWN L5.7MM LEG L3.9MM WIRE DIA0.53MM PROX

## (undated) DEVICE — STERILE POLYISOPRENE POWDER-FREE SURGICAL GLOVES: Brand: PROTEXIS

## (undated) DEVICE — OCCLUSIVE GAUZE STRIP,3% BISMUTH TRIBROMOPHENATE IN PETROLATUM BLEND: Brand: XEROFORM

## (undated) DEVICE — (D)PREP SKN CHLRAPRP APPL 26ML -- CONVERT TO ITEM 371833

## (undated) DEVICE — ZINACTIVE USE 2641837 CLIP LIG M BLU TI HRT SHP WIRE HORZ 600 PER BX

## (undated) DEVICE — CLIP INT SM WIDE RED TI TRNSVRS GRV CHEVRON SHP W PRECIS

## (undated) DEVICE — SYR 10ML CTRL LR LCK NSAF LF --

## (undated) DEVICE — NEEDLE HYPO 25GA L1.5IN BVL ORIENTED ECLIPSE

## (undated) DEVICE — SUTURE PROL SZ 4-0 L18IN NONABSORBABLE BLU L19MM PC-5 3/8 8631G

## (undated) DEVICE — INTENDED FOR TISSUE SEPARATION, AND OTHER PROCEDURES THAT REQUIRE A SHARP SURGICAL BLADE TO PUNCTURE OR CUT.: Brand: BARD-PARKER ® STAINLESS STEEL BLADES

## (undated) DEVICE — DEPAUL MAJOR PROCEDURE PACK: Brand: MEDLINE INDUSTRIES, INC.

## (undated) DEVICE — PAD,ABDOMINAL,5"X9",STERILE,LF,1/PK: Brand: MEDLINE INDUSTRIES, INC.

## (undated) DEVICE — 3M™ TEGADERM™ TRANSPARENT FILM DRESSING FRAME STYLE, 1626W, 4 IN X 4-3/4 IN (10 CM X 12 CM), 50/CT 4CT/CASE: Brand: 3M™ TEGADERM™

## (undated) DEVICE — SUTURE VCRL SZ 3-0 L27IN ABSRB UD L26MM SH 1/2 CIR J416H

## (undated) DEVICE — REM POLYHESIVE ADULT PATIENT RETURN ELECTRODE: Brand: VALLEYLAB

## (undated) DEVICE — SUTURE VCRL SZ 3-0 L18IN ABSRB UD POLYGLACTIN 910 BRAID TIE J910T

## (undated) DEVICE — GOWN,SIRUS,POLYRNF,BRTHSLV,XL,30/CS: Brand: MEDLINE

## (undated) DEVICE — SPONGE: SPECIALTY CHERRY DISS XR 100/CS: Brand: MEDICAL ACTION INDUSTRIES

## (undated) DEVICE — DRAPE,REIN 53X77,STERILE: Brand: MEDLINE